# Patient Record
Sex: FEMALE | Race: WHITE | Employment: OTHER | URBAN - METROPOLITAN AREA
[De-identification: names, ages, dates, MRNs, and addresses within clinical notes are randomized per-mention and may not be internally consistent; named-entity substitution may affect disease eponyms.]

---

## 2018-01-01 ENCOUNTER — APPOINTMENT (OUTPATIENT)
Dept: ULTRASOUND IMAGING | Facility: CLINIC | Age: 63
DRG: 477 | End: 2018-01-01
Attending: STUDENT IN AN ORGANIZED HEALTH CARE EDUCATION/TRAINING PROGRAM
Payer: COMMERCIAL

## 2018-01-01 ENCOUNTER — APPOINTMENT (OUTPATIENT)
Dept: PHYSICAL THERAPY | Facility: CLINIC | Age: 63
DRG: 477 | End: 2018-01-01
Attending: INTERNAL MEDICINE
Payer: COMMERCIAL

## 2018-01-01 ENCOUNTER — APPOINTMENT (OUTPATIENT)
Dept: MRI IMAGING | Facility: CLINIC | Age: 63
DRG: 477 | End: 2018-01-01
Attending: INTERNAL MEDICINE
Payer: COMMERCIAL

## 2018-01-01 ENCOUNTER — APPOINTMENT (OUTPATIENT)
Dept: GENERAL RADIOLOGY | Facility: CLINIC | Age: 63
DRG: 477 | End: 2018-01-01
Attending: ORTHOPAEDIC SURGERY
Payer: COMMERCIAL

## 2018-01-01 ENCOUNTER — APPOINTMENT (OUTPATIENT)
Dept: GENERAL RADIOLOGY | Facility: CLINIC | Age: 63
DRG: 477 | End: 2018-01-01
Attending: INTERNAL MEDICINE
Payer: COMMERCIAL

## 2018-01-01 ENCOUNTER — PRE VISIT (OUTPATIENT)
Dept: ONCOLOGY | Facility: CLINIC | Age: 63
End: 2018-01-01

## 2018-01-01 ENCOUNTER — ONCOLOGY VISIT (OUTPATIENT)
Dept: ONCOLOGY | Facility: CLINIC | Age: 63
End: 2018-01-01
Payer: COMMERCIAL

## 2018-01-01 ENCOUNTER — APPOINTMENT (OUTPATIENT)
Dept: OCCUPATIONAL THERAPY | Facility: CLINIC | Age: 63
DRG: 871 | End: 2018-01-01
Attending: INTERNAL MEDICINE
Payer: COMMERCIAL

## 2018-01-01 ENCOUNTER — TRANSFERRED RECORDS (OUTPATIENT)
Dept: HEALTH INFORMATION MANAGEMENT | Facility: CLINIC | Age: 63
End: 2018-01-01

## 2018-01-01 ENCOUNTER — APPOINTMENT (OUTPATIENT)
Dept: GENERAL RADIOLOGY | Facility: CLINIC | Age: 63
DRG: 871 | End: 2018-01-01
Attending: PEDIATRICS
Payer: COMMERCIAL

## 2018-01-01 ENCOUNTER — APPOINTMENT (OUTPATIENT)
Dept: PHYSICAL THERAPY | Facility: CLINIC | Age: 63
DRG: 871 | End: 2018-01-01
Attending: INTERNAL MEDICINE
Payer: COMMERCIAL

## 2018-01-01 ENCOUNTER — ANESTHESIA (OUTPATIENT)
Dept: SURGERY | Facility: CLINIC | Age: 63
DRG: 477 | End: 2018-01-01
Payer: COMMERCIAL

## 2018-01-01 ENCOUNTER — APPOINTMENT (OUTPATIENT)
Dept: INTERVENTIONAL RADIOLOGY/VASCULAR | Facility: CLINIC | Age: 63
DRG: 477 | End: 2018-01-01
Attending: RADIOLOGY PRACTITIONER ASSISTANT
Payer: COMMERCIAL

## 2018-01-01 ENCOUNTER — APPOINTMENT (OUTPATIENT)
Dept: GENERAL RADIOLOGY | Facility: CLINIC | Age: 63
DRG: 871 | End: 2018-01-01
Attending: INTERNAL MEDICINE
Payer: COMMERCIAL

## 2018-01-01 ENCOUNTER — TEAM CONFERENCE (OUTPATIENT)
Dept: OTHER | Facility: CLINIC | Age: 63
End: 2018-01-01

## 2018-01-01 ENCOUNTER — APPOINTMENT (OUTPATIENT)
Dept: GENERAL RADIOLOGY | Facility: CLINIC | Age: 63
DRG: 477 | End: 2018-01-01
Attending: PHYSICIAN ASSISTANT
Payer: COMMERCIAL

## 2018-01-01 ENCOUNTER — APPOINTMENT (OUTPATIENT)
Dept: CT IMAGING | Facility: CLINIC | Age: 63
DRG: 477 | End: 2018-01-01
Attending: STUDENT IN AN ORGANIZED HEALTH CARE EDUCATION/TRAINING PROGRAM
Payer: COMMERCIAL

## 2018-01-01 ENCOUNTER — APPOINTMENT (OUTPATIENT)
Dept: PHYSICAL THERAPY | Facility: CLINIC | Age: 63
DRG: 871 | End: 2018-01-01
Attending: PEDIATRICS
Payer: COMMERCIAL

## 2018-01-01 ENCOUNTER — APPOINTMENT (OUTPATIENT)
Dept: CARDIOLOGY | Facility: CLINIC | Age: 63
DRG: 871 | End: 2018-01-01
Attending: PEDIATRICS
Payer: COMMERCIAL

## 2018-01-01 ENCOUNTER — HOSPITAL ENCOUNTER (INPATIENT)
Facility: CLINIC | Age: 63
LOS: 14 days | Discharge: NURSING, REHAB OR LONG-TERM CARE | DRG: 477 | End: 2018-11-08
Attending: INTERNAL MEDICINE | Admitting: INTERNAL MEDICINE
Payer: COMMERCIAL

## 2018-01-01 ENCOUNTER — APPOINTMENT (OUTPATIENT)
Dept: CT IMAGING | Facility: CLINIC | Age: 63
DRG: 871 | End: 2018-01-01
Attending: INTERNAL MEDICINE
Payer: COMMERCIAL

## 2018-01-01 ENCOUNTER — APPOINTMENT (OUTPATIENT)
Dept: CARDIOLOGY | Facility: CLINIC | Age: 63
DRG: 477 | End: 2018-01-01
Attending: INTERNAL MEDICINE
Payer: COMMERCIAL

## 2018-01-01 ENCOUNTER — HOSPITAL ENCOUNTER (INPATIENT)
Facility: CLINIC | Age: 63
LOS: 20 days | DRG: 871 | End: 2018-12-31
Attending: INTERNAL MEDICINE | Admitting: INTERNAL MEDICINE
Payer: COMMERCIAL

## 2018-01-01 ENCOUNTER — ANESTHESIA EVENT (OUTPATIENT)
Dept: SURGERY | Facility: CLINIC | Age: 63
DRG: 477 | End: 2018-01-01
Payer: COMMERCIAL

## 2018-01-01 VITALS
WEIGHT: 293 LBS | TEMPERATURE: 98.3 F | DIASTOLIC BLOOD PRESSURE: 76 MMHG | BODY MASS INDEX: 48.82 KG/M2 | HEART RATE: 86 BPM | OXYGEN SATURATION: 94 % | SYSTOLIC BLOOD PRESSURE: 146 MMHG | RESPIRATION RATE: 16 BRPM | HEIGHT: 65 IN

## 2018-01-01 VITALS
OXYGEN SATURATION: 93 % | SYSTOLIC BLOOD PRESSURE: 134 MMHG | DIASTOLIC BLOOD PRESSURE: 73 MMHG | BODY MASS INDEX: 47.09 KG/M2 | WEIGHT: 293 LBS | TEMPERATURE: 100.7 F | RESPIRATION RATE: 26 BRPM | HEIGHT: 66 IN | HEART RATE: 95 BPM

## 2018-01-01 DIAGNOSIS — S72.401D CLOSED FRACTURE OF DISTAL END OF RIGHT FEMUR WITH ROUTINE HEALING, UNSPECIFIED FRACTURE MORPHOLOGY, SUBSEQUENT ENCOUNTER: Primary | ICD-10-CM

## 2018-01-01 DIAGNOSIS — G47.33 OSA (OBSTRUCTIVE SLEEP APNEA): ICD-10-CM

## 2018-01-01 DIAGNOSIS — E83.39 HYPOPHOSPHATEMIA: ICD-10-CM

## 2018-01-01 LAB
1,25(OH)2D SERPL-MCNC: 27.2 PG/ML (ref 19.9–79.3)
ABO + RH BLD: NORMAL
ABO + RH BLD: NORMAL
ALBUMIN SERPL-MCNC: 1.9 G/DL (ref 3.4–5)
ALBUMIN SERPL-MCNC: 1.9 G/DL (ref 3.4–5)
ALBUMIN SERPL-MCNC: 2 G/DL (ref 3.4–5)
ALBUMIN SERPL-MCNC: 2.1 G/DL (ref 3.4–5)
ALBUMIN SERPL-MCNC: 2.2 G/DL (ref 3.4–5)
ALBUMIN SERPL-MCNC: 2.3 G/DL (ref 3.4–5)
ALBUMIN SERPL-MCNC: 2.4 G/DL (ref 3.4–5)
ALBUMIN SERPL-MCNC: 2.5 G/DL (ref 3.4–5)
ALBUMIN SERPL-MCNC: 2.6 G/DL (ref 3.4–5)
ALBUMIN UR QL: DETECTED
ALBUMIN UR-MCNC: 10 MG/DL
ALP SERPL-CCNC: 149 U/L (ref 40–150)
ALP SERPL-CCNC: 154 U/L (ref 40–150)
ALP SERPL-CCNC: 156 U/L (ref 40–150)
ALP SERPL-CCNC: 158 U/L (ref 40–150)
ALP SERPL-CCNC: 176 U/L (ref 40–150)
ALP SERPL-CCNC: 178 U/L (ref 40–150)
ALP SERPL-CCNC: 191 U/L (ref 40–150)
ALP SERPL-CCNC: 207 U/L (ref 40–150)
ALP SERPL-CCNC: 231 U/L (ref 40–150)
ALPHA1 GLOB 24H UR QL ELPH: DETECTED
ALPHA2 GLOB UR QL ELPH: DETECTED
ALT SERPL W P-5'-P-CCNC: 15 U/L (ref 0–50)
ALT SERPL W P-5'-P-CCNC: 16 U/L (ref 0–50)
ALT SERPL W P-5'-P-CCNC: 25 U/L (ref 0–50)
ALT SERPL W P-5'-P-CCNC: 26 U/L (ref 0–50)
ALT SERPL W P-5'-P-CCNC: 27 U/L (ref 0–50)
ALT SERPL W P-5'-P-CCNC: 34 U/L (ref 0–50)
AMMONIA PLAS-SCNC: 36 UMOL/L (ref 10–50)
ANION GAP SERPL CALCULATED.3IONS-SCNC: 10 MMOL/L (ref 3–14)
ANION GAP SERPL CALCULATED.3IONS-SCNC: 5 MMOL/L (ref 3–14)
ANION GAP SERPL CALCULATED.3IONS-SCNC: 6 MMOL/L (ref 3–14)
ANION GAP SERPL CALCULATED.3IONS-SCNC: 7 MMOL/L (ref 3–14)
ANION GAP SERPL CALCULATED.3IONS-SCNC: 8 MMOL/L (ref 3–14)
ANION GAP SERPL CALCULATED.3IONS-SCNC: 9 MMOL/L (ref 3–14)
ANISOCYTOSIS BLD QL SMEAR: ABNORMAL
ANISOCYTOSIS BLD QL SMEAR: SLIGHT
ANNOTATION COMMENT IMP: NORMAL
APPEARANCE UR: CLEAR
APTT PPP: 32 SEC (ref 22–37)
AST SERPL W P-5'-P-CCNC: 27 U/L (ref 0–45)
AST SERPL W P-5'-P-CCNC: 29 U/L (ref 0–45)
AST SERPL W P-5'-P-CCNC: 30 U/L (ref 0–45)
AST SERPL W P-5'-P-CCNC: 31 U/L (ref 0–45)
AST SERPL W P-5'-P-CCNC: 36 U/L (ref 0–45)
AST SERPL W P-5'-P-CCNC: 36 U/L (ref 0–45)
AST SERPL W P-5'-P-CCNC: 37 U/L (ref 0–45)
AST SERPL W P-5'-P-CCNC: 37 U/L (ref 0–45)
AST SERPL W P-5'-P-CCNC: 57 U/L (ref 0–45)
B-GLOBULIN UR QL ELPH: DETECTED
BACTERIA SPEC CULT: ABNORMAL
BACTERIA SPEC CULT: NO GROWTH
BACTERIA SPEC CULT: NORMAL
BASE EXCESS BLDA CALC-SCNC: 0.1 MMOL/L
BASE EXCESS BLDA CALC-SCNC: 0.8 MMOL/L
BASE EXCESS BLDV CALC-SCNC: 0.7 MMOL/L
BASE EXCESS BLDV CALC-SCNC: 1.6 MMOL/L
BASE EXCESS BLDV CALC-SCNC: 1.7 MMOL/L
BASE EXCESS BLDV CALC-SCNC: 2.8 MMOL/L
BASE EXCESS BLDV CALC-SCNC: 2.9 MMOL/L
BASE EXCESS BLDV CALC-SCNC: 2.9 MMOL/L
BASE EXCESS BLDV CALC-SCNC: 3.1 MMOL/L
BASOPHILS # BLD AUTO: 0 10E9/L (ref 0–0.2)
BASOPHILS NFR BLD AUTO: 0 %
BILIRUB DIRECT SERPL-MCNC: 0.1 MG/DL (ref 0–0.2)
BILIRUB DIRECT SERPL-MCNC: <0.1 MG/DL (ref 0–0.2)
BILIRUB SERPL-MCNC: 0.2 MG/DL (ref 0.2–1.3)
BILIRUB SERPL-MCNC: 0.3 MG/DL (ref 0.2–1.3)
BILIRUB SERPL-MCNC: 0.4 MG/DL (ref 0.2–1.3)
BILIRUB SERPL-MCNC: 0.4 MG/DL (ref 0.2–1.3)
BILIRUB SERPL-MCNC: 0.5 MG/DL (ref 0.2–1.3)
BILIRUB SERPL-MCNC: 0.5 MG/DL (ref 0.2–1.3)
BILIRUB UR QL STRIP: NEGATIVE
BLD GP AB SCN SERPL QL: NORMAL
BLOOD BANK CMNT PATIENT-IMP: NORMAL
BUN SERPL-MCNC: 10 MG/DL (ref 7–30)
BUN SERPL-MCNC: 10 MG/DL (ref 7–30)
BUN SERPL-MCNC: 11 MG/DL (ref 7–30)
BUN SERPL-MCNC: 12 MG/DL (ref 7–30)
BUN SERPL-MCNC: 12 MG/DL (ref 7–30)
BUN SERPL-MCNC: 14 MG/DL (ref 7–30)
BUN SERPL-MCNC: 15 MG/DL (ref 7–30)
BUN SERPL-MCNC: 17 MG/DL (ref 7–30)
BUN SERPL-MCNC: 18 MG/DL (ref 7–30)
BUN SERPL-MCNC: 19 MG/DL (ref 7–30)
BUN SERPL-MCNC: 19 MG/DL (ref 7–30)
BUN SERPL-MCNC: 20 MG/DL (ref 7–30)
BUN SERPL-MCNC: 21 MG/DL (ref 7–30)
BUN SERPL-MCNC: 26 MG/DL (ref 7–30)
BUN SERPL-MCNC: 4 MG/DL (ref 7–30)
BUN SERPL-MCNC: 5 MG/DL (ref 7–30)
BUN SERPL-MCNC: 6 MG/DL (ref 7–30)
BUN SERPL-MCNC: 7 MG/DL (ref 7–30)
BUN SERPL-MCNC: 7 MG/DL (ref 7–30)
BUN SERPL-MCNC: 8 MG/DL (ref 7–30)
BUN SERPL-MCNC: 9 MG/DL (ref 7–30)
C DIFF TOX B STL QL: NEGATIVE
C DIFF TOX B STL QL: NEGATIVE
CA-I BLD-MCNC: 7 MG/DL (ref 4.4–5.2)
CA-I BLD-MCNC: 7.1 MG/DL (ref 4.4–5.2)
CA-I BLD-MCNC: 7.3 MG/DL (ref 4.4–5.2)
CA-I BLD-MCNC: 7.6 MG/DL (ref 4.4–5.2)
CA-I BLD-MCNC: 7.6 MG/DL (ref 4.4–5.2)
CA-I BLD-MCNC: NORMAL MG/DL (ref 4.4–5.2)
CA-I SERPL ISE-MCNC: 4.9 MG/DL (ref 4.4–5.2)
CA-I SERPL ISE-MCNC: 5.1 MG/DL (ref 4.4–5.2)
CA-I SERPL ISE-MCNC: 5.2 MG/DL (ref 4.4–5.2)
CA-I SERPL ISE-MCNC: 5.3 MG/DL (ref 4.4–5.2)
CA-I SERPL ISE-MCNC: 5.4 MG/DL (ref 4.4–5.2)
CA-I SERPL ISE-MCNC: 5.4 MG/DL (ref 4.4–5.2)
CA-I SERPL ISE-MCNC: 5.8 MG/DL (ref 4.4–5.2)
CA-I SERPL ISE-MCNC: 5.8 MG/DL (ref 4.4–5.2)
CA-I SERPL ISE-MCNC: 6 MG/DL (ref 4.4–5.2)
CA-I SERPL ISE-MCNC: 6.2 MG/DL (ref 4.4–5.2)
CA-I SERPL ISE-MCNC: 6.3 MG/DL (ref 4.4–5.2)
CA-I SERPL ISE-MCNC: 6.5 MG/DL (ref 4.4–5.2)
CA-I SERPL ISE-MCNC: 6.6 MG/DL (ref 4.4–5.2)
CA-I SERPL ISE-MCNC: 6.7 MG/DL (ref 4.4–5.2)
CA-I SERPL ISE-MCNC: 6.8 MG/DL (ref 4.4–5.2)
CA-I SERPL ISE-MCNC: 7 MG/DL (ref 4.4–5.2)
CA-I SERPL ISE-MCNC: 7.1 MG/DL (ref 4.4–5.2)
CA-I SERPL ISE-MCNC: 7.1 MG/DL (ref 4.4–5.2)
CA-I SERPL ISE-MCNC: 7.3 MG/DL (ref 4.4–5.2)
CA-I SERPL ISE-MCNC: 7.3 MG/DL (ref 4.4–5.2)
CA-I SERPL ISE-MCNC: 7.6 MG/DL (ref 4.4–5.2)
CALCIUM SERPL-MCNC: 10.2 MG/DL (ref 8.5–10.1)
CALCIUM SERPL-MCNC: 10.2 MG/DL (ref 8.5–10.1)
CALCIUM SERPL-MCNC: 10.6 MG/DL (ref 8.5–10.1)
CALCIUM SERPL-MCNC: 10.8 MG/DL (ref 8.5–10.1)
CALCIUM SERPL-MCNC: 10.9 MG/DL (ref 8.5–10.1)
CALCIUM SERPL-MCNC: 11.6 MG/DL (ref 8.5–10.1)
CALCIUM SERPL-MCNC: 11.7 MG/DL (ref 8.5–10.1)
CALCIUM SERPL-MCNC: 11.7 MG/DL (ref 8.5–10.1)
CALCIUM SERPL-MCNC: 11.8 MG/DL (ref 8.5–10.1)
CALCIUM SERPL-MCNC: 11.8 MG/DL (ref 8.5–10.1)
CALCIUM SERPL-MCNC: 12.1 MG/DL (ref 8.5–10.1)
CALCIUM SERPL-MCNC: 12.2 MG/DL (ref 8.5–10.1)
CALCIUM SERPL-MCNC: 12.2 MG/DL (ref 8.5–10.1)
CALCIUM SERPL-MCNC: 12.3 MG/DL (ref 8.5–10.1)
CALCIUM SERPL-MCNC: 12.4 MG/DL (ref 8.5–10.1)
CALCIUM SERPL-MCNC: 12.5 MG/DL (ref 8.5–10.1)
CALCIUM SERPL-MCNC: 12.6 MG/DL (ref 8.5–10.1)
CALCIUM SERPL-MCNC: 12.7 MG/DL (ref 8.5–10.1)
CALCIUM SERPL-MCNC: 12.8 MG/DL (ref 8.5–10.1)
CALCIUM SERPL-MCNC: 12.8 MG/DL (ref 8.5–10.1)
CALCIUM SERPL-MCNC: 13 MG/DL (ref 8.5–10.1)
CALCIUM SERPL-MCNC: 13.1 MG/DL (ref 8.5–10.1)
CALCIUM SERPL-MCNC: 13.2 MG/DL (ref 8.5–10.1)
CALCIUM SERPL-MCNC: 13.2 MG/DL (ref 8.5–10.1)
CALCIUM SERPL-MCNC: 13.3 MG/DL (ref 8.5–10.1)
CALCIUM SERPL-MCNC: 8.3 MG/DL (ref 8.5–10.1)
CALCIUM SERPL-MCNC: 8.9 MG/DL (ref 8.5–10.1)
CALCIUM SERPL-MCNC: 9.3 MG/DL (ref 8.5–10.1)
CALCIUM SERPL-MCNC: 9.4 MG/DL (ref 8.5–10.1)
CALCIUM SERPL-MCNC: 9.5 MG/DL (ref 8.5–10.1)
CALCIUM SERPL-MCNC: 9.6 MG/DL (ref 8.5–10.1)
CALCIUM SERPL-MCNC: 9.7 MG/DL (ref 8.5–10.1)
CALCIUM SERPL-MCNC: 9.8 MG/DL (ref 8.5–10.1)
CALCIUM SERPL-MCNC: 9.9 MG/DL (ref 8.5–10.1)
CHLORIDE SERPL-SCNC: 100 MMOL/L (ref 94–109)
CHLORIDE SERPL-SCNC: 101 MMOL/L (ref 94–109)
CHLORIDE SERPL-SCNC: 102 MMOL/L (ref 94–109)
CHLORIDE SERPL-SCNC: 103 MMOL/L (ref 94–109)
CHLORIDE SERPL-SCNC: 104 MMOL/L (ref 94–109)
CHLORIDE SERPL-SCNC: 105 MMOL/L (ref 94–109)
CHLORIDE SERPL-SCNC: 105 MMOL/L (ref 94–109)
CHLORIDE SERPL-SCNC: 106 MMOL/L (ref 94–109)
CHLORIDE SERPL-SCNC: 95 MMOL/L (ref 94–109)
CHLORIDE SERPL-SCNC: 96 MMOL/L (ref 94–109)
CHLORIDE SERPL-SCNC: 96 MMOL/L (ref 94–109)
CHLORIDE SERPL-SCNC: 97 MMOL/L (ref 94–109)
CHLORIDE SERPL-SCNC: 97 MMOL/L (ref 94–109)
CHLORIDE SERPL-SCNC: 98 MMOL/L (ref 94–109)
CO2 SERPL-SCNC: 23 MMOL/L (ref 20–32)
CO2 SERPL-SCNC: 23 MMOL/L (ref 20–32)
CO2 SERPL-SCNC: 24 MMOL/L (ref 20–32)
CO2 SERPL-SCNC: 25 MMOL/L (ref 20–32)
CO2 SERPL-SCNC: 26 MMOL/L (ref 20–32)
CO2 SERPL-SCNC: 27 MMOL/L (ref 20–32)
CO2 SERPL-SCNC: 28 MMOL/L (ref 20–32)
CO2 SERPL-SCNC: 29 MMOL/L (ref 20–32)
CO2 SERPL-SCNC: 30 MMOL/L (ref 20–32)
CO2 SERPL-SCNC: 31 MMOL/L (ref 20–32)
CO2 SERPL-SCNC: 31 MMOL/L (ref 20–32)
CO2 SERPL-SCNC: 33 MMOL/L (ref 20–32)
COLLECT DURATION TIME SPEC: ABNORMAL H
COLOR UR AUTO: YELLOW
COPATH REPORT: NORMAL
CREAT SERPL-MCNC: 0.3 MG/DL (ref 0.52–1.04)
CREAT SERPL-MCNC: 0.32 MG/DL (ref 0.52–1.04)
CREAT SERPL-MCNC: 0.36 MG/DL (ref 0.52–1.04)
CREAT SERPL-MCNC: 0.38 MG/DL (ref 0.52–1.04)
CREAT SERPL-MCNC: 0.39 MG/DL (ref 0.52–1.04)
CREAT SERPL-MCNC: 0.4 MG/DL (ref 0.52–1.04)
CREAT SERPL-MCNC: 0.42 MG/DL (ref 0.52–1.04)
CREAT SERPL-MCNC: 0.42 MG/DL (ref 0.52–1.04)
CREAT SERPL-MCNC: 0.44 MG/DL (ref 0.52–1.04)
CREAT SERPL-MCNC: 0.46 MG/DL (ref 0.52–1.04)
CREAT SERPL-MCNC: 0.49 MG/DL (ref 0.52–1.04)
CREAT SERPL-MCNC: 0.52 MG/DL (ref 0.52–1.04)
CREAT SERPL-MCNC: 0.52 MG/DL (ref 0.52–1.04)
CREAT SERPL-MCNC: 0.53 MG/DL (ref 0.52–1.04)
CREAT SERPL-MCNC: 0.53 MG/DL (ref 0.52–1.04)
CREAT SERPL-MCNC: 0.56 MG/DL (ref 0.52–1.04)
CREAT SERPL-MCNC: 0.56 MG/DL (ref 0.52–1.04)
CREAT SERPL-MCNC: 0.57 MG/DL (ref 0.52–1.04)
CREAT SERPL-MCNC: 0.62 MG/DL (ref 0.52–1.04)
CREAT SERPL-MCNC: 0.7 MG/DL (ref 0.52–1.04)
CREAT SERPL-MCNC: 0.71 MG/DL (ref 0.52–1.04)
CREAT SERPL-MCNC: 0.76 MG/DL (ref 0.52–1.04)
CREAT SERPL-MCNC: 0.79 MG/DL (ref 0.52–1.04)
CREAT SERPL-MCNC: 0.83 MG/DL (ref 0.52–1.04)
CREAT SERPL-MCNC: 0.87 MG/DL (ref 0.52–1.04)
CREAT SERPL-MCNC: 1.09 MG/DL (ref 0.57–1.11)
CREAT UR-MCNC: 18 MG/DL
CRP SERPL-MCNC: 140 MG/L (ref 0–8)
CRP SERPL-MCNC: 82 MG/L (ref 0–8)
DEPRECATED CALCIDIOL+CALCIFEROL SERPL-MC: <11 UG/L (ref 20–75)
DIFFERENTIAL METHOD BLD: ABNORMAL
EOSINOPHIL # BLD AUTO: 0 10E9/L (ref 0–0.7)
EOSINOPHIL # BLD AUTO: 0 10E9/L (ref 0–0.7)
EOSINOPHIL # BLD AUTO: 0.3 10E9/L (ref 0–0.7)
EOSINOPHIL NFR BLD AUTO: 0 %
EOSINOPHIL NFR BLD AUTO: 0 %
EOSINOPHIL NFR BLD AUTO: 2 %
ERYTHROCYTE [DISTWIDTH] IN BLOOD BY AUTOMATED COUNT: 12.6 % (ref 10–15)
ERYTHROCYTE [DISTWIDTH] IN BLOOD BY AUTOMATED COUNT: 12.7 % (ref 10–15)
ERYTHROCYTE [DISTWIDTH] IN BLOOD BY AUTOMATED COUNT: 12.8 % (ref 10–15)
ERYTHROCYTE [DISTWIDTH] IN BLOOD BY AUTOMATED COUNT: 13.1 % (ref 10–15)
ERYTHROCYTE [DISTWIDTH] IN BLOOD BY AUTOMATED COUNT: 13.1 % (ref 10–15)
ERYTHROCYTE [DISTWIDTH] IN BLOOD BY AUTOMATED COUNT: 13.2 % (ref 10–15)
ERYTHROCYTE [DISTWIDTH] IN BLOOD BY AUTOMATED COUNT: 13.3 % (ref 10–15)
ERYTHROCYTE [DISTWIDTH] IN BLOOD BY AUTOMATED COUNT: 13.3 % (ref 10–15)
ERYTHROCYTE [DISTWIDTH] IN BLOOD BY AUTOMATED COUNT: 13.5 % (ref 10–15)
ERYTHROCYTE [DISTWIDTH] IN BLOOD BY AUTOMATED COUNT: 17.8 % (ref 10–15)
ERYTHROCYTE [DISTWIDTH] IN BLOOD BY AUTOMATED COUNT: 17.8 % (ref 10–15)
ERYTHROCYTE [DISTWIDTH] IN BLOOD BY AUTOMATED COUNT: 17.9 % (ref 10–15)
ERYTHROCYTE [DISTWIDTH] IN BLOOD BY AUTOMATED COUNT: 18 % (ref 10–15)
ERYTHROCYTE [DISTWIDTH] IN BLOOD BY AUTOMATED COUNT: 18.2 % (ref 10–15)
ERYTHROCYTE [DISTWIDTH] IN BLOOD BY AUTOMATED COUNT: 18.2 % (ref 10–15)
ERYTHROCYTE [DISTWIDTH] IN BLOOD BY AUTOMATED COUNT: 18.3 % (ref 10–15)
ERYTHROCYTE [DISTWIDTH] IN BLOOD BY AUTOMATED COUNT: 18.4 % (ref 10–15)
ERYTHROCYTE [DISTWIDTH] IN BLOOD BY AUTOMATED COUNT: 18.7 % (ref 10–15)
ERYTHROCYTE [DISTWIDTH] IN BLOOD BY AUTOMATED COUNT: 19 % (ref 10–15)
FOLATE SERPL-MCNC: 3.9 NG/ML
GAMMA GLOB UR QL ELPH: DETECTED
GFR SERPL CREATININE-BSD FRML MDRD: 51 ML/MIN/1.73M2
GFR SERPL CREATININE-BSD FRML MDRD: 66 ML/MIN/1.7M2
GFR SERPL CREATININE-BSD FRML MDRD: 70 ML/MIN/1.7M2
GFR SERPL CREATININE-BSD FRML MDRD: 74 ML/MIN/1.7M2
GFR SERPL CREATININE-BSD FRML MDRD: 76 ML/MIN/1.7M2
GFR SERPL CREATININE-BSD FRML MDRD: 83 ML/MIN/1.7M2
GFR SERPL CREATININE-BSD FRML MDRD: 84 ML/MIN/1.7M2
GFR SERPL CREATININE-BSD FRML MDRD: >90 ML/MIN/1.7M2
GFR SERPL CREATININE-BSD FRML MDRD: >90 ML/MIN/{1.73_M2}
GLUCOSE BLDC GLUCOMTR-MCNC: 100 MG/DL (ref 70–99)
GLUCOSE BLDC GLUCOMTR-MCNC: 100 MG/DL (ref 70–99)
GLUCOSE BLDC GLUCOMTR-MCNC: 101 MG/DL (ref 70–99)
GLUCOSE BLDC GLUCOMTR-MCNC: 102 MG/DL (ref 70–99)
GLUCOSE BLDC GLUCOMTR-MCNC: 103 MG/DL (ref 70–99)
GLUCOSE BLDC GLUCOMTR-MCNC: 103 MG/DL (ref 70–99)
GLUCOSE BLDC GLUCOMTR-MCNC: 105 MG/DL (ref 70–99)
GLUCOSE BLDC GLUCOMTR-MCNC: 105 MG/DL (ref 70–99)
GLUCOSE BLDC GLUCOMTR-MCNC: 107 MG/DL (ref 70–99)
GLUCOSE BLDC GLUCOMTR-MCNC: 108 MG/DL (ref 70–99)
GLUCOSE BLDC GLUCOMTR-MCNC: 109 MG/DL (ref 70–99)
GLUCOSE BLDC GLUCOMTR-MCNC: 109 MG/DL (ref 70–99)
GLUCOSE BLDC GLUCOMTR-MCNC: 110 MG/DL (ref 70–99)
GLUCOSE BLDC GLUCOMTR-MCNC: 111 MG/DL (ref 70–99)
GLUCOSE BLDC GLUCOMTR-MCNC: 118 MG/DL (ref 70–99)
GLUCOSE BLDC GLUCOMTR-MCNC: 118 MG/DL (ref 70–99)
GLUCOSE BLDC GLUCOMTR-MCNC: 119 MG/DL (ref 70–99)
GLUCOSE BLDC GLUCOMTR-MCNC: 122 MG/DL (ref 70–99)
GLUCOSE BLDC GLUCOMTR-MCNC: 125 MG/DL (ref 70–99)
GLUCOSE BLDC GLUCOMTR-MCNC: 126 MG/DL (ref 70–99)
GLUCOSE BLDC GLUCOMTR-MCNC: 127 MG/DL (ref 70–99)
GLUCOSE BLDC GLUCOMTR-MCNC: 128 MG/DL (ref 70–99)
GLUCOSE BLDC GLUCOMTR-MCNC: 131 MG/DL (ref 70–99)
GLUCOSE BLDC GLUCOMTR-MCNC: 131 MG/DL (ref 70–99)
GLUCOSE BLDC GLUCOMTR-MCNC: 143 MG/DL (ref 70–99)
GLUCOSE BLDC GLUCOMTR-MCNC: 89 MG/DL (ref 70–99)
GLUCOSE BLDC GLUCOMTR-MCNC: 92 MG/DL (ref 70–99)
GLUCOSE BLDC GLUCOMTR-MCNC: 94 MG/DL (ref 70–99)
GLUCOSE BLDC GLUCOMTR-MCNC: 97 MG/DL (ref 70–99)
GLUCOSE BLDC GLUCOMTR-MCNC: 98 MG/DL (ref 70–99)
GLUCOSE BLDC GLUCOMTR-MCNC: 99 MG/DL (ref 70–99)
GLUCOSE SERPL-MCNC: 100 MG/DL (ref 70–99)
GLUCOSE SERPL-MCNC: 100 MG/DL (ref 70–99)
GLUCOSE SERPL-MCNC: 102 MG/DL (ref 70–99)
GLUCOSE SERPL-MCNC: 104 MG/DL (ref 70–99)
GLUCOSE SERPL-MCNC: 104 MG/DL (ref 70–99)
GLUCOSE SERPL-MCNC: 105 MG/DL (ref 70–99)
GLUCOSE SERPL-MCNC: 109 MG/DL (ref 70–99)
GLUCOSE SERPL-MCNC: 113 MG/DL (ref 70–99)
GLUCOSE SERPL-MCNC: 114 MG/DL (ref 70–99)
GLUCOSE SERPL-MCNC: 114 MG/DL (ref 70–99)
GLUCOSE SERPL-MCNC: 115 MG/DL (ref 70–99)
GLUCOSE SERPL-MCNC: 116 MG/DL (ref 70–99)
GLUCOSE SERPL-MCNC: 116 MG/DL (ref 70–99)
GLUCOSE SERPL-MCNC: 118 MG/DL (ref 70–100)
GLUCOSE SERPL-MCNC: 120 MG/DL (ref 70–99)
GLUCOSE SERPL-MCNC: 121 MG/DL (ref 70–99)
GLUCOSE SERPL-MCNC: 123 MG/DL (ref 70–99)
GLUCOSE SERPL-MCNC: 123 MG/DL (ref 70–99)
GLUCOSE SERPL-MCNC: 126 MG/DL (ref 70–99)
GLUCOSE SERPL-MCNC: 128 MG/DL (ref 70–99)
GLUCOSE SERPL-MCNC: 128 MG/DL (ref 70–99)
GLUCOSE SERPL-MCNC: 129 MG/DL (ref 70–99)
GLUCOSE SERPL-MCNC: 129 MG/DL (ref 70–99)
GLUCOSE SERPL-MCNC: 90 MG/DL (ref 70–99)
GLUCOSE SERPL-MCNC: 91 MG/DL (ref 70–99)
GLUCOSE SERPL-MCNC: 91 MG/DL (ref 70–99)
GLUCOSE SERPL-MCNC: 99 MG/DL (ref 70–99)
GLUCOSE UR STRIP-MCNC: NEGATIVE MG/DL
HBA1C MFR BLD: 5.6 % (ref 0–5.6)
HCO3 BLD-SCNC: 27 MMOL/L (ref 21–28)
HCO3 BLD-SCNC: 27 MMOL/L (ref 21–28)
HCO3 BLDV-SCNC: 27 MMOL/L (ref 21–28)
HCO3 BLDV-SCNC: 28 MMOL/L (ref 21–28)
HCO3 BLDV-SCNC: 28 MMOL/L (ref 21–28)
HCO3 BLDV-SCNC: 29 MMOL/L (ref 21–28)
HCO3 BLDV-SCNC: 30 MMOL/L (ref 21–28)
HCO3 BLDV-SCNC: 30 MMOL/L (ref 21–28)
HCO3 BLDV-SCNC: 31 MMOL/L (ref 21–28)
HCT VFR BLD AUTO: 27.8 % (ref 35–47)
HCT VFR BLD AUTO: 29.4 % (ref 35–47)
HCT VFR BLD AUTO: 30.2 % (ref 35–47)
HCT VFR BLD AUTO: 31 % (ref 35–47)
HCT VFR BLD AUTO: 31.3 % (ref 35–47)
HCT VFR BLD AUTO: 32.1 % (ref 35–47)
HCT VFR BLD AUTO: 32.4 % (ref 35–47)
HCT VFR BLD AUTO: 32.5 % (ref 35–47)
HCT VFR BLD AUTO: 32.5 % (ref 35–47)
HCT VFR BLD AUTO: 32.7 % (ref 35–47)
HCT VFR BLD AUTO: 32.8 % (ref 35–47)
HCT VFR BLD AUTO: 33 % (ref 35–47)
HCT VFR BLD AUTO: 33.2 % (ref 35–47)
HCT VFR BLD AUTO: 33.5 % (ref 35–47)
HCT VFR BLD AUTO: 33.5 % (ref 35–47)
HCT VFR BLD AUTO: 33.6 % (ref 35–47)
HCT VFR BLD AUTO: 34.1 % (ref 35–47)
HGB BLD-MCNC: 10.4 G/DL (ref 11.7–15.7)
HGB BLD-MCNC: 10.6 G/DL (ref 11.7–15.7)
HGB BLD-MCNC: 8.2 G/DL (ref 11.7–15.7)
HGB BLD-MCNC: 8.3 G/DL (ref 11.7–15.7)
HGB BLD-MCNC: 8.5 G/DL (ref 11.7–15.7)
HGB BLD-MCNC: 8.8 G/DL (ref 11.7–15.7)
HGB BLD-MCNC: 8.9 G/DL (ref 11.7–15.7)
HGB BLD-MCNC: 8.9 G/DL (ref 11.7–15.7)
HGB BLD-MCNC: 9 G/DL (ref 11.7–15.7)
HGB BLD-MCNC: 9.1 G/DL (ref 11.7–15.7)
HGB BLD-MCNC: 9.3 G/DL (ref 11.7–15.7)
HGB BLD-MCNC: 9.4 G/DL (ref 11.7–15.7)
HGB BLD-MCNC: 9.5 G/DL (ref 11.7–15.7)
HGB BLD-MCNC: 9.6 G/DL (ref 11.7–15.7)
HGB BLD-MCNC: 9.6 G/DL (ref 11.7–15.7)
HGB UR QL STRIP: ABNORMAL
HYALINE CASTS #/AREA URNS LPF: 15 /LPF (ref 0–2)
HYPOCHROMIA BLD QL: PRESENT
INR PPP: 1.32 (ref 0.86–1.14)
INTERPRETATION ECG - MUSE: NORMAL
INTERPRETATION UR IFE-IMP: ABNORMAL
KAPPA LC FREE 24H UR-MRATE: ABNORMAL MG/D
KAPPA LC FREE UR-MCNC: 13.9 MG/DL (ref 0.14–2.42)
KAPPA LC FREE/LAMBDA FREE UR: 10.61 RATIO (ref 2.04–10.37)
KETONES UR STRIP-MCNC: NEGATIVE MG/DL
LACTATE BLD-SCNC: 0.6 MMOL/L (ref 0.7–2)
LACTATE BLD-SCNC: 0.7 MMOL/L (ref 0.7–2)
LACTATE BLD-SCNC: 0.7 MMOL/L (ref 0.7–2)
LACTATE BLD-SCNC: 0.8 MMOL/L (ref 0.7–2)
LACTATE BLD-SCNC: 0.8 MMOL/L (ref 0.7–2)
LACTATE BLD-SCNC: 0.9 MMOL/L (ref 0.7–2)
LACTATE BLD-SCNC: 1 MMOL/L (ref 0.7–2)
LACTATE BLD-SCNC: 1.3 MMOL/L (ref 0.7–2)
LACTATE BLD-SCNC: 1.3 MMOL/L (ref 0.7–2)
LACTATE BLD-SCNC: 1.4 MMOL/L (ref 0.7–2)
LACTATE SERPL-SCNC: 0.7 MMOL/L (ref 0.4–2)
LAMBDA LC FREE 24H UR-MRATE: ABNORMAL MG/D
LAMBDA LC FREE UR-MCNC: 1.31 MG/DL (ref 0.02–0.67)
LEUKOCYTE ESTERASE UR QL STRIP: NEGATIVE
LIPASE SERPL-CCNC: 291 U/L (ref 73–393)
LIPASE SERPL-CCNC: 994 U/L (ref 73–393)
LYMPHOCYTES # BLD AUTO: 0.7 10E9/L (ref 0.8–5.3)
LYMPHOCYTES # BLD AUTO: 0.8 10E9/L (ref 0.8–5.3)
LYMPHOCYTES # BLD AUTO: 0.9 10E9/L (ref 0.8–5.3)
LYMPHOCYTES NFR BLD AUTO: 4.4 %
LYMPHOCYTES NFR BLD AUTO: 5 %
LYMPHOCYTES NFR BLD AUTO: 5.2 %
Lab: ABNORMAL
Lab: NORMAL
MACROCYTES BLD QL SMEAR: PRESENT
MACROCYTES BLD QL SMEAR: PRESENT
MAGNESIUM SERPL-MCNC: 1.4 MG/DL (ref 1.6–2.3)
MAGNESIUM SERPL-MCNC: 1.5 MG/DL (ref 1.6–2.3)
MAGNESIUM SERPL-MCNC: 1.5 MG/DL (ref 1.6–2.3)
MAGNESIUM SERPL-MCNC: 1.6 MG/DL (ref 1.6–2.3)
MAGNESIUM SERPL-MCNC: 1.7 MG/DL (ref 1.6–2.3)
MAGNESIUM SERPL-MCNC: 1.7 MG/DL (ref 1.6–2.3)
MAGNESIUM SERPL-MCNC: 1.8 MG/DL (ref 1.6–2.3)
MAGNESIUM SERPL-MCNC: 1.9 MG/DL (ref 1.6–2.3)
MAGNESIUM SERPL-MCNC: 2 MG/DL (ref 1.6–2.3)
MAGNESIUM SERPL-MCNC: 2.1 MG/DL (ref 1.6–2.3)
MAGNESIUM SERPL-MCNC: 2.2 MG/DL (ref 1.6–2.3)
MAGNESIUM SERPL-MCNC: 2.2 MG/DL (ref 1.6–2.3)
MAGNESIUM SERPL-MCNC: 2.3 MG/DL (ref 1.6–2.3)
MCH RBC QN AUTO: 26.4 PG (ref 26.5–33)
MCH RBC QN AUTO: 26.4 PG (ref 26.5–33)
MCH RBC QN AUTO: 26.5 PG (ref 26.5–33)
MCH RBC QN AUTO: 26.6 PG (ref 26.5–33)
MCH RBC QN AUTO: 26.7 PG (ref 26.5–33)
MCH RBC QN AUTO: 26.7 PG (ref 26.5–33)
MCH RBC QN AUTO: 26.8 PG (ref 26.5–33)
MCH RBC QN AUTO: 27.3 PG (ref 26.5–33)
MCH RBC QN AUTO: 31.3 PG (ref 26.5–33)
MCH RBC QN AUTO: 31.8 PG (ref 26.5–33)
MCH RBC QN AUTO: 31.9 PG (ref 26.5–33)
MCH RBC QN AUTO: 32 PG (ref 26.5–33)
MCH RBC QN AUTO: 32.1 PG (ref 26.5–33)
MCH RBC QN AUTO: 32.1 PG (ref 26.5–33)
MCH RBC QN AUTO: 32.2 PG (ref 26.5–33)
MCH RBC QN AUTO: 32.2 PG (ref 26.5–33)
MCH RBC QN AUTO: 33.1 PG (ref 26.5–33)
MCHC RBC AUTO-ENTMCNC: 27.1 G/DL (ref 31.5–36.5)
MCHC RBC AUTO-ENTMCNC: 27.2 G/DL (ref 31.5–36.5)
MCHC RBC AUTO-ENTMCNC: 27.2 G/DL (ref 31.5–36.5)
MCHC RBC AUTO-ENTMCNC: 27.4 G/DL (ref 31.5–36.5)
MCHC RBC AUTO-ENTMCNC: 27.6 G/DL (ref 31.5–36.5)
MCHC RBC AUTO-ENTMCNC: 27.7 G/DL (ref 31.5–36.5)
MCHC RBC AUTO-ENTMCNC: 27.8 G/DL (ref 31.5–36.5)
MCHC RBC AUTO-ENTMCNC: 28.1 G/DL (ref 31.5–36.5)
MCHC RBC AUTO-ENTMCNC: 28.2 G/DL (ref 31.5–36.5)
MCHC RBC AUTO-ENTMCNC: 28.4 G/DL (ref 31.5–36.5)
MCHC RBC AUTO-ENTMCNC: 28.6 G/DL (ref 31.5–36.5)
MCHC RBC AUTO-ENTMCNC: 29.1 G/DL (ref 31.5–36.5)
MCHC RBC AUTO-ENTMCNC: 29.3 G/DL (ref 31.5–36.5)
MCHC RBC AUTO-ENTMCNC: 29.5 G/DL (ref 31.5–36.5)
MCHC RBC AUTO-ENTMCNC: 29.9 G/DL (ref 31.5–36.5)
MCHC RBC AUTO-ENTMCNC: 30.5 G/DL (ref 31.5–36.5)
MCHC RBC AUTO-ENTMCNC: 31.5 G/DL (ref 31.5–36.5)
MCV RBC AUTO: 105 FL (ref 78–100)
MCV RBC AUTO: 105 FL (ref 78–100)
MCV RBC AUTO: 107 FL (ref 78–100)
MCV RBC AUTO: 109 FL (ref 78–100)
MCV RBC AUTO: 110 FL (ref 78–100)
MCV RBC AUTO: 111 FL (ref 78–100)
MCV RBC AUTO: 111 FL (ref 78–100)
MCV RBC AUTO: 112 FL (ref 78–100)
MCV RBC AUTO: 112 FL (ref 78–100)
MCV RBC AUTO: 95 FL (ref 78–100)
MCV RBC AUTO: 95 FL (ref 78–100)
MCV RBC AUTO: 96 FL (ref 78–100)
MCV RBC AUTO: 96 FL (ref 78–100)
MCV RBC AUTO: 97 FL (ref 78–100)
MCV RBC AUTO: 98 FL (ref 78–100)
MCV RBC AUTO: 98 FL (ref 78–100)
MONOCYTES # BLD AUTO: 0.6 10E9/L (ref 0–1.3)
MONOCYTES # BLD AUTO: 0.8 10E9/L (ref 0–1.3)
MONOCYTES # BLD AUTO: 1.1 10E9/L (ref 0–1.3)
MONOCYTES NFR BLD AUTO: 3.5 %
MONOCYTES NFR BLD AUTO: 5 %
MONOCYTES NFR BLD AUTO: 7 %
MRSA DNA SPEC QL NAA+PROBE: NEGATIVE
MUCOUS THREADS #/AREA URNS LPF: PRESENT /LPF
MYELOCYTES # BLD: 0.3 10E9/L
MYELOCYTES # BLD: 0.3 10E9/L
MYELOCYTES NFR BLD MANUAL: 1.8 %
MYELOCYTES NFR BLD MANUAL: 2 %
NEUTROPHILS # BLD AUTO: 13.1 10E9/L (ref 1.6–8.3)
NEUTROPHILS # BLD AUTO: 14 10E9/L (ref 1.6–8.3)
NEUTROPHILS # BLD AUTO: 15.5 10E9/L (ref 1.6–8.3)
NEUTROPHILS NFR BLD AUTO: 86 %
NEUTROPHILS NFR BLD AUTO: 86.8 %
NEUTROPHILS NFR BLD AUTO: 91.3 %
NITRATE UR QL: NEGATIVE
NRBC # BLD AUTO: 0.2 10*3/UL
NRBC BLD AUTO-RTO: 1 /100
NT-PROBNP SERPL-MCNC: 1937 PG/ML (ref 0–900)
O2/TOTAL GAS SETTING VFR VENT: 21 %
O2/TOTAL GAS SETTING VFR VENT: 21 %
O2/TOTAL GAS SETTING VFR VENT: 30 %
O2/TOTAL GAS SETTING VFR VENT: 4 %
O2/TOTAL GAS SETTING VFR VENT: 50 %
O2/TOTAL GAS SETTING VFR VENT: 50 %
O2/TOTAL GAS SETTING VFR VENT: 9 %
O2/TOTAL GAS SETTING VFR VENT: ABNORMAL %
OXYHGB MFR BLDV: 92 %
PCO2 BLD: 53 MM HG (ref 35–45)
PCO2 BLD: 55 MM HG (ref 35–45)
PCO2 BLDV: 51 MM HG (ref 40–50)
PCO2 BLDV: 54 MM HG (ref 40–50)
PCO2 BLDV: 55 MM HG (ref 40–50)
PCO2 BLDV: 56 MM HG (ref 40–50)
PCO2 BLDV: 59 MM HG (ref 40–50)
PCO2 BLDV: 61 MM HG (ref 40–50)
PCO2 BLDV: 62 MM HG (ref 40–50)
PCO2 BLDV: 63 MM HG (ref 40–50)
PCO2 BLDV: 66 MM HG (ref 40–50)
PH BLD: 7.3 PH (ref 7.35–7.45)
PH BLD: 7.32 PH (ref 7.35–7.45)
PH BLDV: 7.28 PH (ref 7.32–7.43)
PH BLDV: 7.29 PH (ref 7.32–7.43)
PH BLDV: 7.3 PH (ref 7.32–7.43)
PH BLDV: 7.3 PH (ref 7.32–7.43)
PH BLDV: 7.32 PH (ref 7.32–7.43)
PH BLDV: 7.33 PH (ref 7.32–7.43)
PH BLDV: 7.35 PH (ref 7.32–7.43)
PH UR STRIP: 5 PH (ref 5–7)
PHOSPHATE SERPL-MCNC: 1.5 MG/DL (ref 2.5–4.5)
PHOSPHATE SERPL-MCNC: 1.5 MG/DL (ref 2.5–4.5)
PHOSPHATE SERPL-MCNC: 1.6 MG/DL (ref 2.5–4.5)
PHOSPHATE SERPL-MCNC: 1.6 MG/DL (ref 2.5–4.5)
PHOSPHATE SERPL-MCNC: 1.7 MG/DL (ref 2.5–4.5)
PHOSPHATE SERPL-MCNC: 1.7 MG/DL (ref 2.5–4.5)
PHOSPHATE SERPL-MCNC: 1.8 MG/DL (ref 2.5–4.5)
PHOSPHATE SERPL-MCNC: 1.8 MG/DL (ref 2.5–4.5)
PHOSPHATE SERPL-MCNC: 1.9 MG/DL (ref 2.5–4.5)
PHOSPHATE SERPL-MCNC: 2 MG/DL (ref 2.5–4.5)
PHOSPHATE SERPL-MCNC: 2 MG/DL (ref 2.5–4.5)
PHOSPHATE SERPL-MCNC: 2.1 MG/DL (ref 2.5–4.5)
PHOSPHATE SERPL-MCNC: 2.2 MG/DL (ref 2.5–4.5)
PHOSPHATE SERPL-MCNC: 2.2 MG/DL (ref 2.5–4.5)
PHOSPHATE SERPL-MCNC: 2.3 MG/DL (ref 2.5–4.5)
PHOSPHATE SERPL-MCNC: 2.4 MG/DL (ref 2.5–4.5)
PHOSPHATE SERPL-MCNC: 2.5 MG/DL (ref 2.5–4.5)
PHOSPHATE SERPL-MCNC: 2.6 MG/DL (ref 2.5–4.5)
PHOSPHATE SERPL-MCNC: 2.6 MG/DL (ref 2.5–4.5)
PLATELET # BLD AUTO: 197 10E9/L (ref 150–450)
PLATELET # BLD AUTO: 219 10E9/L (ref 150–450)
PLATELET # BLD AUTO: 226 10E9/L (ref 150–450)
PLATELET # BLD AUTO: 227 10E9/L (ref 150–450)
PLATELET # BLD AUTO: 227 10E9/L (ref 150–450)
PLATELET # BLD AUTO: 228 10E9/L (ref 150–450)
PLATELET # BLD AUTO: 231 10E9/L (ref 150–450)
PLATELET # BLD AUTO: 233 10E9/L (ref 150–450)
PLATELET # BLD AUTO: 240 10E9/L (ref 150–450)
PLATELET # BLD AUTO: 246 10E9/L (ref 150–450)
PLATELET # BLD AUTO: 248 10E9/L (ref 150–450)
PLATELET # BLD AUTO: 250 10E9/L (ref 150–450)
PLATELET # BLD AUTO: 260 10E9/L (ref 150–450)
PLATELET # BLD AUTO: 266 10E9/L (ref 150–450)
PLATELET # BLD AUTO: 267 10E9/L (ref 150–450)
PLATELET # BLD AUTO: 298 10E9/L (ref 150–450)
PLATELET # BLD AUTO: 303 10E9/L (ref 150–450)
PLATELET # BLD AUTO: 308 10E9/L (ref 150–450)
PLATELET # BLD AUTO: 312 10E9/L (ref 150–450)
PLATELET # BLD AUTO: 316 10E9/L (ref 150–450)
PLATELET # BLD AUTO: 319 10E9/L (ref 150–450)
PLATELET # BLD AUTO: NORMAL 10E9/L (ref 150–450)
PLATELET # BLD EST: ABNORMAL 10*3/UL
PLATELET # BLD EST: NORMAL 10*3/UL
PO2 BLD: 168 MM HG (ref 80–105)
PO2 BLD: 170 MM HG (ref 80–105)
PO2 BLDV: 104 MM HG (ref 25–47)
PO2 BLDV: 67 MM HG (ref 25–47)
PO2 BLDV: 68 MM HG (ref 25–47)
PO2 BLDV: 69 MM HG (ref 25–47)
PO2 BLDV: 76 MM HG (ref 25–47)
PO2 BLDV: 76 MM HG (ref 25–47)
PO2 BLDV: 90 MM HG (ref 25–47)
PO2 BLDV: 94 MM HG (ref 25–47)
PO2 BLDV: 97 MM HG (ref 25–47)
POIKILOCYTOSIS BLD QL SMEAR: SLIGHT
POTASSIUM SERPL-SCNC: 3 MMOL/L (ref 3.4–5.3)
POTASSIUM SERPL-SCNC: 3.2 MMOL/L (ref 3.4–5.3)
POTASSIUM SERPL-SCNC: 3.3 MMOL/L (ref 3.4–5.3)
POTASSIUM SERPL-SCNC: 3.4 MMOL/L (ref 3.4–5.3)
POTASSIUM SERPL-SCNC: 3.4 MMOL/L (ref 3.4–5.3)
POTASSIUM SERPL-SCNC: 3.5 MMOL/L (ref 3.4–5.3)
POTASSIUM SERPL-SCNC: 3.6 MMOL/L (ref 3.4–5.3)
POTASSIUM SERPL-SCNC: 3.7 MMOL/L (ref 3.4–5.3)
POTASSIUM SERPL-SCNC: 3.7 MMOL/L (ref 3.4–5.3)
POTASSIUM SERPL-SCNC: 3.8 MMOL/L (ref 3.4–5.3)
POTASSIUM SERPL-SCNC: 4 MMOL/L (ref 3.4–5.3)
POTASSIUM SERPL-SCNC: 4.1 MMOL/L (ref 3.4–5.3)
POTASSIUM SERPL-SCNC: 4.1 MMOL/L (ref 3.4–5.3)
POTASSIUM SERPL-SCNC: 4.4 MMOL/L (ref 3.4–5.3)
POTASSIUM SERPL-SCNC: 4.4 MMOL/L (ref 3.5–5.1)
POTASSIUM SERPL-SCNC: 4.5 MMOL/L (ref 3.4–5.3)
POTASSIUM SERPL-SCNC: 4.5 MMOL/L (ref 3.4–5.3)
POTASSIUM SERPL-SCNC: 4.6 MMOL/L (ref 3.4–5.3)
POTASSIUM SERPL-SCNC: 4.7 MMOL/L (ref 3.4–5.3)
POTASSIUM SERPL-SCNC: 4.9 MMOL/L (ref 3.4–5.3)
POTASSIUM SERPL-SCNC: 5 MMOL/L (ref 3.4–5.3)
POTASSIUM SERPL-SCNC: 5.1 MMOL/L (ref 3.4–5.3)
PROCALCITONIN SERPL-MCNC: 0.13 NG/ML
PROCALCITONIN SERPL-MCNC: 0.68 NG/ML
PROT 24H UR-MRATE: ABNORMAL MG/D (ref 10–140)
PROT SERPL-MCNC: 6.6 G/DL (ref 6.8–8.8)
PROT SERPL-MCNC: 6.7 G/DL (ref 6.8–8.8)
PROT SERPL-MCNC: 6.8 G/DL (ref 6.8–8.8)
PROT SERPL-MCNC: 6.8 G/DL (ref 6.8–8.8)
PROT SERPL-MCNC: 6.9 G/DL (ref 6.8–8.8)
PROT SERPL-MCNC: 7.5 G/DL (ref 6.8–8.8)
PROT SERPL-MCNC: 7.5 G/DL (ref 6.8–8.8)
PROT UR-MCNC: 0.41 G/L
PROT/CREAT 24H UR: 2.3 G/G CR (ref 0–0.2)
PTH RELATED PROT SERPL-SCNC: 81.2 PMOL/L (ref 0–3.4)
PTH-INTACT SERPL-MCNC: <7 PG/ML (ref 18–80)
RBC # BLD AUTO: 2.56 10E12/L (ref 3.8–5.2)
RBC # BLD AUTO: 2.65 10E12/L (ref 3.8–5.2)
RBC # BLD AUTO: 2.73 10E12/L (ref 3.8–5.2)
RBC # BLD AUTO: 2.8 10E12/L (ref 3.8–5.2)
RBC # BLD AUTO: 2.96 10E12/L (ref 3.8–5.2)
RBC # BLD AUTO: 2.98 10E12/L (ref 3.8–5.2)
RBC # BLD AUTO: 3.01 10E12/L (ref 3.8–5.2)
RBC # BLD AUTO: 3.2 10E12/L (ref 3.8–5.2)
RBC # BLD AUTO: 3.21 10E12/L (ref 3.8–5.2)
RBC # BLD AUTO: 3.24 10E12/L (ref 3.8–5.2)
RBC # BLD AUTO: 3.3 10E12/L (ref 3.8–5.2)
RBC # BLD AUTO: 3.31 10E12/L (ref 3.8–5.2)
RBC # BLD AUTO: 3.37 10E12/L (ref 3.8–5.2)
RBC # BLD AUTO: 3.37 10E12/L (ref 3.8–5.2)
RBC # BLD AUTO: 3.39 10E12/L (ref 3.8–5.2)
RBC # BLD AUTO: 3.44 10E12/L (ref 3.8–5.2)
RBC # BLD AUTO: 3.44 10E12/L (ref 3.8–5.2)
RBC # BLD AUTO: 3.45 10E12/L (ref 3.8–5.2)
RBC # BLD AUTO: 3.51 10E12/L (ref 3.8–5.2)
RBC #/AREA URNS AUTO: 2 /HPF (ref 0–2)
RETICS # AUTO: 47.1 10E9/L (ref 25–95)
RETICS/RBC NFR AUTO: 1.4 % (ref 0.5–2)
RETINYL PALMITATE SERPL-MCNC: <0.02 MG/L (ref 0–0.1)
SODIUM SERPL-SCNC: 130 MMOL/L (ref 133–144)
SODIUM SERPL-SCNC: 132 MMOL/L (ref 133–144)
SODIUM SERPL-SCNC: 134 MMOL/L (ref 133–144)
SODIUM SERPL-SCNC: 135 MMOL/L (ref 133–144)
SODIUM SERPL-SCNC: 135 MMOL/L (ref 133–144)
SODIUM SERPL-SCNC: 136 MMOL/L (ref 133–144)
SODIUM SERPL-SCNC: 137 MMOL/L (ref 133–144)
SODIUM SERPL-SCNC: 138 MMOL/L (ref 133–144)
SODIUM SERPL-SCNC: 139 MMOL/L (ref 133–144)
SODIUM SERPL-SCNC: 141 MMOL/L (ref 133–144)
SOURCE: ABNORMAL
SP GR UR STRIP: 1.01 (ref 1–1.03)
SPECIMEN EXP DATE BLD: NORMAL
SPECIMEN SOURCE: ABNORMAL
SPECIMEN SOURCE: NORMAL
SPECIMEN VOL ?TM UR: 70 ML
STOMATOCYTES BLD QL SMEAR: SLIGHT
TRANS CELLS #/AREA URNS HPF: <1 /HPF (ref 0–1)
UROBILINOGEN UR STRIP-MCNC: NORMAL MG/DL (ref 0–2)
VANCOMYCIN SERPL-MCNC: 21.1 MG/L
VIT A SERPL-MCNC: 0.33 MG/L (ref 0.3–1.2)
VIT B12 SERPL-MCNC: 839 PG/ML (ref 193–986)
VITAMIN D2 SERPL-MCNC: <5 UG/L
VITAMIN D3 SERPL-MCNC: 6 UG/L
WBC # BLD AUTO: 10.8 10E9/L (ref 4–11)
WBC # BLD AUTO: 11.5 10E9/L (ref 4–11)
WBC # BLD AUTO: 12.4 10E9/L (ref 4–11)
WBC # BLD AUTO: 12.5 10E9/L (ref 4–11)
WBC # BLD AUTO: 12.8 10E9/L (ref 4–11)
WBC # BLD AUTO: 13.3 10E9/L (ref 4–11)
WBC # BLD AUTO: 13.4 10E9/L (ref 4–11)
WBC # BLD AUTO: 14 10E9/L (ref 4–11)
WBC # BLD AUTO: 14.1 10E9/L (ref 4–11)
WBC # BLD AUTO: 14.3 10E9/L (ref 4–11)
WBC # BLD AUTO: 14.3 10E9/L (ref 4–11)
WBC # BLD AUTO: 14.7 10E9/L (ref 4–11)
WBC # BLD AUTO: 15.1 10E9/L (ref 4–11)
WBC # BLD AUTO: 15.1 10E9/L (ref 4–11)
WBC # BLD AUTO: 15.3 10E9/L (ref 4–11)
WBC # BLD AUTO: 15.8 10E9/L (ref 4–11)
WBC # BLD AUTO: 16 10E9/L (ref 4–11)
WBC # BLD AUTO: 16.3 10E9/L (ref 4–11)
WBC # BLD AUTO: 16.3 10E9/L (ref 4–11)
WBC # BLD AUTO: 17 10E9/L (ref 4–11)
WBC #/AREA URNS AUTO: 3 /HPF (ref 0–5)

## 2018-01-01 PROCEDURE — 40000141 ZZH STATISTIC PERIPHERAL IV START W/O US GUIDANCE

## 2018-01-01 PROCEDURE — 82330 ASSAY OF CALCIUM: CPT | Performed by: PHYSICIAN ASSISTANT

## 2018-01-01 PROCEDURE — 94660 CPAP INITIATION&MGMT: CPT

## 2018-01-01 PROCEDURE — 12000008 ZZH R&B INTERMEDIATE UMMC

## 2018-01-01 PROCEDURE — 25000132 ZZH RX MED GY IP 250 OP 250 PS 637: Performed by: PHYSICIAN ASSISTANT

## 2018-01-01 PROCEDURE — 84100 ASSAY OF PHOSPHORUS: CPT | Performed by: INTERNAL MEDICINE

## 2018-01-01 PROCEDURE — 84132 ASSAY OF SERUM POTASSIUM: CPT | Performed by: INTERNAL MEDICINE

## 2018-01-01 PROCEDURE — 84145 PROCALCITONIN (PCT): CPT | Performed by: STUDENT IN AN ORGANIZED HEALTH CARE EDUCATION/TRAINING PROGRAM

## 2018-01-01 PROCEDURE — 25000132 ZZH RX MED GY IP 250 OP 250 PS 637: Performed by: INTERNAL MEDICINE

## 2018-01-01 PROCEDURE — 99233 SBSQ HOSP IP/OBS HIGH 50: CPT | Performed by: INTERNAL MEDICINE

## 2018-01-01 PROCEDURE — 25000132 ZZH RX MED GY IP 250 OP 250 PS 637: Performed by: STUDENT IN AN ORGANIZED HEALTH CARE EDUCATION/TRAINING PROGRAM

## 2018-01-01 PROCEDURE — 25500064 ZZH RX 255 OP 636: Performed by: INTERNAL MEDICINE

## 2018-01-01 PROCEDURE — 87040 BLOOD CULTURE FOR BACTERIA: CPT | Performed by: PHYSICIAN ASSISTANT

## 2018-01-01 PROCEDURE — 25000128 H RX IP 250 OP 636: Performed by: PEDIATRICS

## 2018-01-01 PROCEDURE — 82310 ASSAY OF CALCIUM: CPT | Performed by: STUDENT IN AN ORGANIZED HEALTH CARE EDUCATION/TRAINING PROGRAM

## 2018-01-01 PROCEDURE — 00000146 ZZHCL STATISTIC GLUCOSE BY METER IP

## 2018-01-01 PROCEDURE — 80069 RENAL FUNCTION PANEL: CPT | Performed by: INTERNAL MEDICINE

## 2018-01-01 PROCEDURE — 83735 ASSAY OF MAGNESIUM: CPT | Performed by: STUDENT IN AN ORGANIZED HEALTH CARE EDUCATION/TRAINING PROGRAM

## 2018-01-01 PROCEDURE — 25000128 H RX IP 250 OP 636: Performed by: PHYSICIAN ASSISTANT

## 2018-01-01 PROCEDURE — 84156 ASSAY OF PROTEIN URINE: CPT | Performed by: STUDENT IN AN ORGANIZED HEALTH CARE EDUCATION/TRAINING PROGRAM

## 2018-01-01 PROCEDURE — 40000275 ZZH STATISTIC RCP TIME EA 10 MIN

## 2018-01-01 PROCEDURE — 80048 BASIC METABOLIC PNL TOTAL CA: CPT | Performed by: PEDIATRICS

## 2018-01-01 PROCEDURE — 99222 1ST HOSP IP/OBS MODERATE 55: CPT | Mod: GC | Performed by: OBSTETRICS & GYNECOLOGY

## 2018-01-01 PROCEDURE — 25000128 H RX IP 250 OP 636: Performed by: INTERNAL MEDICINE

## 2018-01-01 PROCEDURE — 71045 X-RAY EXAM CHEST 1 VIEW: CPT

## 2018-01-01 PROCEDURE — 82330 ASSAY OF CALCIUM: CPT | Performed by: INTERNAL MEDICINE

## 2018-01-01 PROCEDURE — 99223 1ST HOSP IP/OBS HIGH 75: CPT | Mod: GC | Performed by: INTERNAL MEDICINE

## 2018-01-01 PROCEDURE — 25000125 ZZHC RX 250: Performed by: STUDENT IN AN ORGANIZED HEALTH CARE EDUCATION/TRAINING PROGRAM

## 2018-01-01 PROCEDURE — 93010 ELECTROCARDIOGRAM REPORT: CPT | Performed by: INTERNAL MEDICINE

## 2018-01-01 PROCEDURE — 36415 COLL VENOUS BLD VENIPUNCTURE: CPT | Performed by: STUDENT IN AN ORGANIZED HEALTH CARE EDUCATION/TRAINING PROGRAM

## 2018-01-01 PROCEDURE — 85610 PROTHROMBIN TIME: CPT | Performed by: STUDENT IN AN ORGANIZED HEALTH CARE EDUCATION/TRAINING PROGRAM

## 2018-01-01 PROCEDURE — 25000132 ZZH RX MED GY IP 250 OP 250 PS 637: Performed by: CLINICAL NURSE SPECIALIST

## 2018-01-01 PROCEDURE — 97530 THERAPEUTIC ACTIVITIES: CPT | Mod: GP | Performed by: REHABILITATION PRACTITIONER

## 2018-01-01 PROCEDURE — 25000128 H RX IP 250 OP 636: Performed by: NURSE ANESTHETIST, CERTIFIED REGISTERED

## 2018-01-01 PROCEDURE — 82803 BLOOD GASES ANY COMBINATION: CPT | Performed by: PHYSICIAN ASSISTANT

## 2018-01-01 PROCEDURE — 99233 SBSQ HOSP IP/OBS HIGH 50: CPT | Mod: GC | Performed by: INTERNAL MEDICINE

## 2018-01-01 PROCEDURE — 25000128 H RX IP 250 OP 636: Performed by: STUDENT IN AN ORGANIZED HEALTH CARE EDUCATION/TRAINING PROGRAM

## 2018-01-01 PROCEDURE — 82330 ASSAY OF CALCIUM: CPT | Performed by: PEDIATRICS

## 2018-01-01 PROCEDURE — 87070 CULTURE OTHR SPECIMN AEROBIC: CPT | Performed by: ORTHOPAEDIC SURGERY

## 2018-01-01 PROCEDURE — 99233 SBSQ HOSP IP/OBS HIGH 50: CPT | Mod: GC | Performed by: PEDIATRICS

## 2018-01-01 PROCEDURE — 83735 ASSAY OF MAGNESIUM: CPT | Performed by: PEDIATRICS

## 2018-01-01 PROCEDURE — 25000131 ZZH RX MED GY IP 250 OP 636 PS 637: Performed by: INTERNAL MEDICINE

## 2018-01-01 PROCEDURE — 83735 ASSAY OF MAGNESIUM: CPT | Performed by: INTERNAL MEDICINE

## 2018-01-01 PROCEDURE — 40000193 ZZH STATISTIC PT WARD VISIT

## 2018-01-01 PROCEDURE — 25000125 ZZHC RX 250: Performed by: PHYSICIAN ASSISTANT

## 2018-01-01 PROCEDURE — 97530 THERAPEUTIC ACTIVITIES: CPT | Mod: GP | Performed by: PHYSICAL THERAPIST

## 2018-01-01 PROCEDURE — 73720 MRI LWR EXTREMITY W/O&W/DYE: CPT | Mod: RT

## 2018-01-01 PROCEDURE — 12000006 ZZH R&B IMCU INTERMEDIATE UMMC

## 2018-01-01 PROCEDURE — 40000193 ZZH STATISTIC PT WARD VISIT: Performed by: PHYSICAL THERAPIST

## 2018-01-01 PROCEDURE — 99233 SBSQ HOSP IP/OBS HIGH 50: CPT | Mod: GC | Performed by: STUDENT IN AN ORGANIZED HEALTH CARE EDUCATION/TRAINING PROGRAM

## 2018-01-01 PROCEDURE — 25000132 ZZH RX MED GY IP 250 OP 250 PS 637: Performed by: PEDIATRICS

## 2018-01-01 PROCEDURE — 97163 PT EVAL HIGH COMPLEX 45 MIN: CPT | Mod: GP

## 2018-01-01 PROCEDURE — 00000159 ZZHCL STATISTIC H-SEND OUTS PREP: Performed by: ORTHOPAEDIC SURGERY

## 2018-01-01 PROCEDURE — 36415 COLL VENOUS BLD VENIPUNCTURE: CPT | Performed by: PEDIATRICS

## 2018-01-01 PROCEDURE — 82040 ASSAY OF SERUM ALBUMIN: CPT | Performed by: STUDENT IN AN ORGANIZED HEALTH CARE EDUCATION/TRAINING PROGRAM

## 2018-01-01 PROCEDURE — 40000611 ZZHCL STATISTIC MORPHOLOGY W/INTERP HEMEPATH TC 85060: Performed by: INTERNAL MEDICINE

## 2018-01-01 PROCEDURE — 83605 ASSAY OF LACTIC ACID: CPT | Performed by: PEDIATRICS

## 2018-01-01 PROCEDURE — 97110 THERAPEUTIC EXERCISES: CPT | Mod: GP

## 2018-01-01 PROCEDURE — 25000131 ZZH RX MED GY IP 250 OP 636 PS 637: Performed by: STUDENT IN AN ORGANIZED HEALTH CARE EDUCATION/TRAINING PROGRAM

## 2018-01-01 PROCEDURE — 36415 COLL VENOUS BLD VENIPUNCTURE: CPT | Performed by: INTERNAL MEDICINE

## 2018-01-01 PROCEDURE — 99232 SBSQ HOSP IP/OBS MODERATE 35: CPT | Performed by: INTERNAL MEDICINE

## 2018-01-01 PROCEDURE — 88341 IMHCHEM/IMCYTCHM EA ADD ANTB: CPT | Mod: 26,59 | Performed by: ORTHOPAEDIC SURGERY

## 2018-01-01 PROCEDURE — 83605 ASSAY OF LACTIC ACID: CPT | Performed by: STUDENT IN AN ORGANIZED HEALTH CARE EDUCATION/TRAINING PROGRAM

## 2018-01-01 PROCEDURE — 84590 ASSAY OF VITAMIN A: CPT | Performed by: STUDENT IN AN ORGANIZED HEALTH CARE EDUCATION/TRAINING PROGRAM

## 2018-01-01 PROCEDURE — 73552 X-RAY EXAM OF FEMUR 2/>: CPT | Mod: RT

## 2018-01-01 PROCEDURE — 88342 IMHCHEM/IMCYTCHM 1ST ANTB: CPT | Mod: 26,59 | Performed by: ORTHOPAEDIC SURGERY

## 2018-01-01 PROCEDURE — 12000001 ZZH R&B MED SURG/OB UMMC

## 2018-01-01 PROCEDURE — 84100 ASSAY OF PHOSPHORUS: CPT | Performed by: PEDIATRICS

## 2018-01-01 PROCEDURE — 99232 SBSQ HOSP IP/OBS MODERATE 35: CPT | Performed by: CLINICAL NURSE SPECIALIST

## 2018-01-01 PROCEDURE — 36415 COLL VENOUS BLD VENIPUNCTURE: CPT | Performed by: PHYSICIAN ASSISTANT

## 2018-01-01 PROCEDURE — 25000125 ZZHC RX 250: Performed by: INTERNAL MEDICINE

## 2018-01-01 PROCEDURE — 85027 COMPLETE CBC AUTOMATED: CPT | Performed by: INTERNAL MEDICINE

## 2018-01-01 PROCEDURE — 84100 ASSAY OF PHOSPHORUS: CPT | Performed by: PHYSICIAN ASSISTANT

## 2018-01-01 PROCEDURE — 80053 COMPREHEN METABOLIC PANEL: CPT | Performed by: INTERNAL MEDICINE

## 2018-01-01 PROCEDURE — 83735 ASSAY OF MAGNESIUM: CPT | Performed by: PHYSICIAN ASSISTANT

## 2018-01-01 PROCEDURE — 85027 COMPLETE CBC AUTOMATED: CPT | Performed by: PEDIATRICS

## 2018-01-01 PROCEDURE — 88360 TUMOR IMMUNOHISTOCHEM/MANUAL: CPT | Performed by: ORTHOPAEDIC SURGERY

## 2018-01-01 PROCEDURE — 80069 RENAL FUNCTION PANEL: CPT | Performed by: PEDIATRICS

## 2018-01-01 PROCEDURE — 80048 BASIC METABOLIC PNL TOTAL CA: CPT | Performed by: INTERNAL MEDICINE

## 2018-01-01 PROCEDURE — 99223 1ST HOSP IP/OBS HIGH 75: CPT | Performed by: INTERNAL MEDICINE

## 2018-01-01 PROCEDURE — 82330 ASSAY OF CALCIUM: CPT | Performed by: STUDENT IN AN ORGANIZED HEALTH CARE EDUCATION/TRAINING PROGRAM

## 2018-01-01 PROCEDURE — 85027 COMPLETE CBC AUTOMATED: CPT | Performed by: STUDENT IN AN ORGANIZED HEALTH CARE EDUCATION/TRAINING PROGRAM

## 2018-01-01 PROCEDURE — 88305 TISSUE EXAM BY PATHOLOGIST: CPT | Performed by: ORTHOPAEDIC SURGERY

## 2018-01-01 PROCEDURE — 80053 COMPREHEN METABOLIC PANEL: CPT | Performed by: STUDENT IN AN ORGANIZED HEALTH CARE EDUCATION/TRAINING PROGRAM

## 2018-01-01 PROCEDURE — 82607 VITAMIN B-12: CPT | Performed by: STUDENT IN AN ORGANIZED HEALTH CARE EDUCATION/TRAINING PROGRAM

## 2018-01-01 PROCEDURE — 87075 CULTR BACTERIA EXCEPT BLOOD: CPT | Performed by: ORTHOPAEDIC SURGERY

## 2018-01-01 PROCEDURE — 40000809 ZZH STATISTIC NO DOCUMENTATION TO SUPPORT CHARGE

## 2018-01-01 PROCEDURE — 40000193 ZZH STATISTIC PT WARD VISIT: Performed by: REHABILITATION PRACTITIONER

## 2018-01-01 PROCEDURE — 40000275 ZZH STATISTIC RCP TIME EA 10 MIN: Performed by: OPTOMETRIST

## 2018-01-01 PROCEDURE — 84100 ASSAY OF PHOSPHORUS: CPT | Performed by: STUDENT IN AN ORGANIZED HEALTH CARE EDUCATION/TRAINING PROGRAM

## 2018-01-01 PROCEDURE — 82746 ASSAY OF FOLIC ACID SERUM: CPT | Performed by: PHYSICIAN ASSISTANT

## 2018-01-01 PROCEDURE — 97165 OT EVAL LOW COMPLEX 30 MIN: CPT | Mod: GO

## 2018-01-01 PROCEDURE — 97530 THERAPEUTIC ACTIVITIES: CPT | Mod: GP

## 2018-01-01 PROCEDURE — 83605 ASSAY OF LACTIC ACID: CPT | Performed by: INTERNAL MEDICINE

## 2018-01-01 PROCEDURE — 82803 BLOOD GASES ANY COMBINATION: CPT | Performed by: STUDENT IN AN ORGANIZED HEALTH CARE EDUCATION/TRAINING PROGRAM

## 2018-01-01 PROCEDURE — 82810 BLOOD GASES O2 SAT ONLY: CPT | Performed by: STUDENT IN AN ORGANIZED HEALTH CARE EDUCATION/TRAINING PROGRAM

## 2018-01-01 PROCEDURE — 37000008 ZZH ANESTHESIA TECHNICAL FEE, 1ST 30 MIN: Performed by: ORTHOPAEDIC SURGERY

## 2018-01-01 PROCEDURE — 99233 SBSQ HOSP IP/OBS HIGH 50: CPT | Performed by: CLINICAL NURSE SPECIALIST

## 2018-01-01 PROCEDURE — 85049 AUTOMATED PLATELET COUNT: CPT | Performed by: INTERNAL MEDICINE

## 2018-01-01 PROCEDURE — 93308 TTE F-UP OR LMTD: CPT

## 2018-01-01 PROCEDURE — 40000802 ZZH SITE CHECK

## 2018-01-01 PROCEDURE — 80069 RENAL FUNCTION PANEL: CPT | Performed by: STUDENT IN AN ORGANIZED HEALTH CARE EDUCATION/TRAINING PROGRAM

## 2018-01-01 PROCEDURE — 25000125 ZZHC RX 250: Performed by: ORTHOPAEDIC SURGERY

## 2018-01-01 PROCEDURE — 40000133 ZZH STATISTIC OT WARD VISIT

## 2018-01-01 PROCEDURE — 25000128 H RX IP 250 OP 636

## 2018-01-01 PROCEDURE — 82310 ASSAY OF CALCIUM: CPT | Performed by: INTERNAL MEDICINE

## 2018-01-01 PROCEDURE — 81001 URINALYSIS AUTO W/SCOPE: CPT | Performed by: PHYSICIAN ASSISTANT

## 2018-01-01 PROCEDURE — 99207 ZZC CDG-MDM COMPONENT: MEETS LOW - DOWN CODED: CPT | Performed by: INTERNAL MEDICINE

## 2018-01-01 PROCEDURE — 80076 HEPATIC FUNCTION PANEL: CPT | Performed by: INTERNAL MEDICINE

## 2018-01-01 PROCEDURE — 25000128 H RX IP 250 OP 636: Performed by: ANESTHESIOLOGY

## 2018-01-01 PROCEDURE — 93005 ELECTROCARDIOGRAM TRACING: CPT

## 2018-01-01 PROCEDURE — 87040 BLOOD CULTURE FOR BACTERIA: CPT | Performed by: INTERNAL MEDICINE

## 2018-01-01 PROCEDURE — 88331 PATH CONSLTJ SURG 1 BLK 1SPC: CPT | Performed by: ORTHOPAEDIC SURGERY

## 2018-01-01 PROCEDURE — 36600 WITHDRAWAL OF ARTERIAL BLOOD: CPT

## 2018-01-01 PROCEDURE — 25000125 ZZHC RX 250: Performed by: NURSE ANESTHETIST, CERTIFIED REGISTERED

## 2018-01-01 PROCEDURE — 99231 SBSQ HOSP IP/OBS SF/LOW 25: CPT | Performed by: INTERNAL MEDICINE

## 2018-01-01 PROCEDURE — 88307 TISSUE EXAM BY PATHOLOGIST: CPT | Mod: 26 | Performed by: ORTHOPAEDIC SURGERY

## 2018-01-01 PROCEDURE — 83605 ASSAY OF LACTIC ACID: CPT | Performed by: PHYSICIAN ASSISTANT

## 2018-01-01 PROCEDURE — 87493 C DIFF AMPLIFIED PROBE: CPT | Performed by: INTERNAL MEDICINE

## 2018-01-01 PROCEDURE — 0QBB0ZZ EXCISION OF RIGHT LOWER FEMUR, OPEN APPROACH: ICD-10-PCS | Performed by: ORTHOPAEDIC SURGERY

## 2018-01-01 PROCEDURE — 84156 ASSAY OF PROTEIN URINE: CPT | Performed by: INTERNAL MEDICINE

## 2018-01-01 PROCEDURE — 99233 SBSQ HOSP IP/OBS HIGH 50: CPT | Performed by: PEDIATRICS

## 2018-01-01 PROCEDURE — 40000170 ZZH STATISTIC PRE-PROCEDURE ASSESSMENT II: Performed by: ORTHOPAEDIC SURGERY

## 2018-01-01 PROCEDURE — 86140 C-REACTIVE PROTEIN: CPT | Performed by: PHYSICIAN ASSISTANT

## 2018-01-01 PROCEDURE — 88305 TISSUE EXAM BY PATHOLOGIST: CPT | Performed by: STUDENT IN AN ORGANIZED HEALTH CARE EDUCATION/TRAINING PROGRAM

## 2018-01-01 PROCEDURE — 99232 SBSQ HOSP IP/OBS MODERATE 35: CPT | Mod: GC | Performed by: INTERNAL MEDICINE

## 2018-01-01 PROCEDURE — 85027 COMPLETE CBC AUTOMATED: CPT | Performed by: PHYSICIAN ASSISTANT

## 2018-01-01 PROCEDURE — 82803 BLOOD GASES ANY COMBINATION: CPT | Performed by: INTERNAL MEDICINE

## 2018-01-01 PROCEDURE — 82310 ASSAY OF CALCIUM: CPT | Performed by: PHYSICIAN ASSISTANT

## 2018-01-01 PROCEDURE — 82306 VITAMIN D 25 HYDROXY: CPT | Performed by: STUDENT IN AN ORGANIZED HEALTH CARE EDUCATION/TRAINING PROGRAM

## 2018-01-01 PROCEDURE — 93306 TTE W/DOPPLER COMPLETE: CPT | Mod: 26 | Performed by: INTERNAL MEDICINE

## 2018-01-01 PROCEDURE — 99233 SBSQ HOSP IP/OBS HIGH 50: CPT | Performed by: STUDENT IN AN ORGANIZED HEALTH CARE EDUCATION/TRAINING PROGRAM

## 2018-01-01 PROCEDURE — 99207 ZZC NO BILLABLE SERVICE THIS VISIT: CPT | Performed by: INTERNAL MEDICINE

## 2018-01-01 PROCEDURE — 83690 ASSAY OF LIPASE: CPT | Performed by: INTERNAL MEDICINE

## 2018-01-01 PROCEDURE — 0QBB0ZX EXCISION OF RIGHT LOWER FEMUR, OPEN APPROACH, DIAGNOSTIC: ICD-10-PCS | Performed by: ORTHOPAEDIC SURGERY

## 2018-01-01 PROCEDURE — 25000128 H RX IP 250 OP 636: Performed by: NURSE PRACTITIONER

## 2018-01-01 PROCEDURE — 86140 C-REACTIVE PROTEIN: CPT | Performed by: STUDENT IN AN ORGANIZED HEALTH CARE EDUCATION/TRAINING PROGRAM

## 2018-01-01 PROCEDURE — 10022 IR FINE NEEDLE ASPIRATION W IMAGING: CPT

## 2018-01-01 PROCEDURE — 87040 BLOOD CULTURE FOR BACTERIA: CPT | Performed by: STUDENT IN AN ORGANIZED HEALTH CARE EDUCATION/TRAINING PROGRAM

## 2018-01-01 PROCEDURE — 84132 ASSAY OF SERUM POTASSIUM: CPT | Performed by: PEDIATRICS

## 2018-01-01 PROCEDURE — 73502 X-RAY EXAM HIP UNI 2-3 VIEWS: CPT

## 2018-01-01 PROCEDURE — 87641 MR-STAPH DNA AMP PROBE: CPT | Performed by: PEDIATRICS

## 2018-01-01 PROCEDURE — 99222 1ST HOSP IP/OBS MODERATE 55: CPT | Mod: GC | Performed by: INTERNAL MEDICINE

## 2018-01-01 PROCEDURE — 36000064 ZZH SURGERY LEVEL 4 EA 15 ADDTL MIN - UMMC: Performed by: ORTHOPAEDIC SURGERY

## 2018-01-01 PROCEDURE — 83605 ASSAY OF LACTIC ACID: CPT

## 2018-01-01 PROCEDURE — 87493 C DIFF AMPLIFIED PROBE: CPT

## 2018-01-01 PROCEDURE — 82803 BLOOD GASES ANY COMBINATION: CPT | Performed by: PEDIATRICS

## 2018-01-01 PROCEDURE — 85045 AUTOMATED RETICULOCYTE COUNT: CPT | Performed by: STUDENT IN AN ORGANIZED HEALTH CARE EDUCATION/TRAINING PROGRAM

## 2018-01-01 PROCEDURE — C9113 INJ PANTOPRAZOLE SODIUM, VIA: HCPCS | Performed by: INTERNAL MEDICINE

## 2018-01-01 PROCEDURE — 97110 THERAPEUTIC EXERCISES: CPT | Mod: GP | Performed by: PHYSICAL THERAPIST

## 2018-01-01 PROCEDURE — 25000125 ZZHC RX 250: Performed by: RADIOLOGY

## 2018-01-01 PROCEDURE — 88341 IMHCHEM/IMCYTCHM EA ADD ANTB: CPT | Performed by: ORTHOPAEDIC SURGERY

## 2018-01-01 PROCEDURE — 99222 1ST HOSP IP/OBS MODERATE 55: CPT | Performed by: INTERNAL MEDICINE

## 2018-01-01 PROCEDURE — 99207 ZZC APP CREDIT; MD BILLING SHARED VISIT: CPT | Mod: GC | Performed by: PEDIATRICS

## 2018-01-01 PROCEDURE — 74018 RADEX ABDOMEN 1 VIEW: CPT

## 2018-01-01 PROCEDURE — 86850 RBC ANTIBODY SCREEN: CPT | Performed by: STUDENT IN AN ORGANIZED HEALTH CARE EDUCATION/TRAINING PROGRAM

## 2018-01-01 PROCEDURE — 82542 COL CHROMOTOGRAPHY QUAL/QUAN: CPT | Performed by: STUDENT IN AN ORGANIZED HEALTH CARE EDUCATION/TRAINING PROGRAM

## 2018-01-01 PROCEDURE — 25000132 ZZH RX MED GY IP 250 OP 250 PS 637: Performed by: HOSPITALIST

## 2018-01-01 PROCEDURE — 99238 HOSP IP/OBS DSCHRG MGMT 30/<: CPT | Mod: GC | Performed by: INTERNAL MEDICINE

## 2018-01-01 PROCEDURE — 80048 BASIC METABOLIC PNL TOTAL CA: CPT | Performed by: PHYSICIAN ASSISTANT

## 2018-01-01 PROCEDURE — 82140 ASSAY OF AMMONIA: CPT | Performed by: PEDIATRICS

## 2018-01-01 PROCEDURE — 99239 HOSP IP/OBS DSCHRG MGMT >30: CPT | Mod: GC | Performed by: INTERNAL MEDICINE

## 2018-01-01 PROCEDURE — 88360 TUMOR IMMUNOHISTOCHEM/MANUAL: CPT | Mod: 26 | Performed by: ORTHOPAEDIC SURGERY

## 2018-01-01 PROCEDURE — 82040 ASSAY OF SERUM ALBUMIN: CPT | Performed by: PEDIATRICS

## 2018-01-01 PROCEDURE — 25000566 ZZH SEVOFLURANE, EA 15 MIN: Performed by: ORTHOPAEDIC SURGERY

## 2018-01-01 PROCEDURE — A9585 GADOBUTROL INJECTION: HCPCS | Performed by: INTERNAL MEDICINE

## 2018-01-01 PROCEDURE — 83970 ASSAY OF PARATHORMONE: CPT | Performed by: INTERNAL MEDICINE

## 2018-01-01 PROCEDURE — 71000015 ZZH RECOVERY PHASE 1 LEVEL 2 EA ADDTL HR: Performed by: ORTHOPAEDIC SURGERY

## 2018-01-01 PROCEDURE — 99207 ZZC APP CREDIT; MD BILLING SHARED VISIT: CPT | Performed by: PHYSICIAN ASSISTANT

## 2018-01-01 PROCEDURE — 88172 CYTP DX EVAL FNA 1ST EA SITE: CPT | Performed by: STUDENT IN AN ORGANIZED HEALTH CARE EDUCATION/TRAINING PROGRAM

## 2018-01-01 PROCEDURE — 37000009 ZZH ANESTHESIA TECHNICAL FEE, EACH ADDTL 15 MIN: Performed by: ORTHOPAEDIC SURGERY

## 2018-01-01 PROCEDURE — 99223 1ST HOSP IP/OBS HIGH 75: CPT | Mod: AI | Performed by: HOSPITALIST

## 2018-01-01 PROCEDURE — 87176 TISSUE HOMOGENIZATION CULTR: CPT | Performed by: ORTHOPAEDIC SURGERY

## 2018-01-01 PROCEDURE — 86901 BLOOD TYPING SEROLOGIC RH(D): CPT | Performed by: STUDENT IN AN ORGANIZED HEALTH CARE EDUCATION/TRAINING PROGRAM

## 2018-01-01 PROCEDURE — 97162 PT EVAL MOD COMPLEX 30 MIN: CPT | Mod: GP

## 2018-01-01 PROCEDURE — 36000066 ZZH SURGERY LEVEL 4 W FLUORO 1ST 30 MIN - UMMC: Performed by: ORTHOPAEDIC SURGERY

## 2018-01-01 PROCEDURE — 97110 THERAPEUTIC EXERCISES: CPT | Mod: GP | Performed by: REHABILITATION PRACTITIONER

## 2018-01-01 PROCEDURE — 97535 SELF CARE MNGMENT TRAINING: CPT | Mod: GO

## 2018-01-01 PROCEDURE — 83880 ASSAY OF NATRIURETIC PEPTIDE: CPT | Performed by: STUDENT IN AN ORGANIZED HEALTH CARE EDUCATION/TRAINING PROGRAM

## 2018-01-01 PROCEDURE — 87088 URINE BACTERIA CULTURE: CPT | Performed by: PEDIATRICS

## 2018-01-01 PROCEDURE — 97162 PT EVAL MOD COMPLEX 30 MIN: CPT | Mod: GP | Performed by: PHYSICAL THERAPIST

## 2018-01-01 PROCEDURE — 99221 1ST HOSP IP/OBS SF/LOW 40: CPT | Mod: GC | Performed by: INTERNAL MEDICINE

## 2018-01-01 PROCEDURE — 74177 CT ABD & PELVIS W/CONTRAST: CPT

## 2018-01-01 PROCEDURE — 86900 BLOOD TYPING SEROLOGIC ABO: CPT | Performed by: STUDENT IN AN ORGANIZED HEALTH CARE EDUCATION/TRAINING PROGRAM

## 2018-01-01 PROCEDURE — 99356 ZZC PROLONGED SERV,INPATIENT,1ST HR: CPT | Performed by: CLINICAL NURSE SPECIALIST

## 2018-01-01 PROCEDURE — 83883 ASSAY NEPHELOMETRY NOT SPEC: CPT | Performed by: INTERNAL MEDICINE

## 2018-01-01 PROCEDURE — 80048 BASIC METABOLIC PNL TOTAL CA: CPT | Performed by: STUDENT IN AN ORGANIZED HEALTH CARE EDUCATION/TRAINING PROGRAM

## 2018-01-01 PROCEDURE — C1713 ANCHOR/SCREW BN/BN,TIS/BN: HCPCS | Performed by: ORTHOPAEDIC SURGERY

## 2018-01-01 PROCEDURE — 0QSB04Z REPOSITION RIGHT LOWER FEMUR WITH INTERNAL FIXATION DEVICE, OPEN APPROACH: ICD-10-PCS | Performed by: ORTHOPAEDIC SURGERY

## 2018-01-01 PROCEDURE — 80202 ASSAY OF VANCOMYCIN: CPT | Performed by: PEDIATRICS

## 2018-01-01 PROCEDURE — C1769 GUIDE WIRE: HCPCS | Performed by: ORTHOPAEDIC SURGERY

## 2018-01-01 PROCEDURE — 87086 URINE CULTURE/COLONY COUNT: CPT | Performed by: PEDIATRICS

## 2018-01-01 PROCEDURE — 80053 COMPREHEN METABOLIC PANEL: CPT | Performed by: PEDIATRICS

## 2018-01-01 PROCEDURE — 85730 THROMBOPLASTIN TIME PARTIAL: CPT | Performed by: STUDENT IN AN ORGANIZED HEALTH CARE EDUCATION/TRAINING PROGRAM

## 2018-01-01 PROCEDURE — 40000278 XR SURGERY CARM FLUORO LESS THAN 5 MIN: Mod: TC

## 2018-01-01 PROCEDURE — 85048 AUTOMATED LEUKOCYTE COUNT: CPT | Performed by: INTERNAL MEDICINE

## 2018-01-01 PROCEDURE — 71000014 ZZH RECOVERY PHASE 1 LEVEL 2 FIRST HR: Performed by: ORTHOPAEDIC SURGERY

## 2018-01-01 PROCEDURE — 82330 ASSAY OF CALCIUM: CPT | Performed by: HOSPITALIST

## 2018-01-01 PROCEDURE — 99231 SBSQ HOSP IP/OBS SF/LOW 25: CPT | Mod: GC | Performed by: INTERNAL MEDICINE

## 2018-01-01 PROCEDURE — 86335 IMMUNFIX E-PHORSIS/URINE/CSF: CPT | Performed by: INTERNAL MEDICINE

## 2018-01-01 PROCEDURE — 90682 RIV4 VACC RECOMBINANT DNA IM: CPT | Performed by: STUDENT IN AN ORGANIZED HEALTH CARE EDUCATION/TRAINING PROGRAM

## 2018-01-01 PROCEDURE — 80053 COMPREHEN METABOLIC PANEL: CPT | Performed by: PHYSICIAN ASSISTANT

## 2018-01-01 PROCEDURE — 40000556 ZZH STATISTIC PERIPHERAL IV START W US GUIDANCE

## 2018-01-01 PROCEDURE — 99207 ZZC NO SHOW FOR SCHEDULED APPT: CPT | Performed by: OBSTETRICS & GYNECOLOGY

## 2018-01-01 PROCEDURE — 00000155 ZZHCL STATISTIC H-CELL BLOCK W/STAIN: Performed by: STUDENT IN AN ORGANIZED HEALTH CARE EDUCATION/TRAINING PROGRAM

## 2018-01-01 PROCEDURE — 27210794 ZZH OR GENERAL SUPPLY STERILE: Performed by: ORTHOPAEDIC SURGERY

## 2018-01-01 PROCEDURE — 40000894 ZZH STATISTIC OT IP EVAL DEFER

## 2018-01-01 PROCEDURE — 88342 IMHCHEM/IMCYTCHM 1ST ANTB: CPT | Performed by: ORTHOPAEDIC SURGERY

## 2018-01-01 PROCEDURE — 85025 COMPLETE CBC W/AUTO DIFF WBC: CPT | Performed by: STUDENT IN AN ORGANIZED HEALTH CARE EDUCATION/TRAINING PROGRAM

## 2018-01-01 PROCEDURE — 0CBC3ZZ EXCISION OF LEFT PAROTID DUCT, PERCUTANEOUS APPROACH: ICD-10-PCS | Performed by: RADIOLOGY

## 2018-01-01 PROCEDURE — 76856 US EXAM PELVIC COMPLETE: CPT

## 2018-01-01 PROCEDURE — 88331 PATH CONSLTJ SURG 1 BLK 1SPC: CPT | Mod: 26 | Performed by: ORTHOPAEDIC SURGERY

## 2018-01-01 PROCEDURE — 85025 COMPLETE CBC W/AUTO DIFF WBC: CPT | Performed by: INTERNAL MEDICINE

## 2018-01-01 PROCEDURE — 83036 HEMOGLOBIN GLYCOSYLATED A1C: CPT | Performed by: INTERNAL MEDICINE

## 2018-01-01 PROCEDURE — 84132 ASSAY OF SERUM POTASSIUM: CPT | Performed by: STUDENT IN AN ORGANIZED HEALTH CARE EDUCATION/TRAINING PROGRAM

## 2018-01-01 PROCEDURE — 83605 ASSAY OF LACTIC ACID: CPT | Performed by: ANESTHESIOLOGY

## 2018-01-01 PROCEDURE — 25000128 H RX IP 250 OP 636: Performed by: ORTHOPAEDIC SURGERY

## 2018-01-01 PROCEDURE — 97530 THERAPEUTIC ACTIVITIES: CPT | Mod: GO

## 2018-01-01 PROCEDURE — 93308 TTE F-UP OR LMTD: CPT | Mod: 26 | Performed by: INTERNAL MEDICINE

## 2018-01-01 PROCEDURE — 94640 AIRWAY INHALATION TREATMENT: CPT

## 2018-01-01 PROCEDURE — 87640 STAPH A DNA AMP PROBE: CPT | Performed by: PEDIATRICS

## 2018-01-01 PROCEDURE — 27211024 ZZHC OR SUPPLY OTHER OPNP: Performed by: ORTHOPAEDIC SURGERY

## 2018-01-01 PROCEDURE — 82652 VIT D 1 25-DIHYDROXY: CPT | Performed by: STUDENT IN AN ORGANIZED HEALTH CARE EDUCATION/TRAINING PROGRAM

## 2018-01-01 PROCEDURE — 88307 TISSUE EXAM BY PATHOLOGIST: CPT | Performed by: ORTHOPAEDIC SURGERY

## 2018-01-01 PROCEDURE — 88173 CYTOPATH EVAL FNA REPORT: CPT | Performed by: STUDENT IN AN ORGANIZED HEALTH CARE EDUCATION/TRAINING PROGRAM

## 2018-01-01 PROCEDURE — 87186 SC STD MICRODIL/AGAR DIL: CPT | Performed by: PEDIATRICS

## 2018-01-01 PROCEDURE — 93321 DOPPLER ECHO F-UP/LMTD STD: CPT | Mod: 26 | Performed by: INTERNAL MEDICINE

## 2018-01-01 PROCEDURE — 36415 COLL VENOUS BLD VENIPUNCTURE: CPT | Performed by: ANESTHESIOLOGY

## 2018-01-01 PROCEDURE — 88305 TISSUE EXAM BY PATHOLOGIST: CPT | Mod: 26 | Performed by: ORTHOPAEDIC SURGERY

## 2018-01-01 PROCEDURE — 93325 DOPPLER ECHO COLOR FLOW MAPG: CPT | Mod: 26 | Performed by: INTERNAL MEDICINE

## 2018-01-01 PROCEDURE — 84145 PROCALCITONIN (PCT): CPT | Performed by: PHYSICIAN ASSISTANT

## 2018-01-01 DEVICE — IMP SCR SYN CORTEX 4.5X46MM SELF TAP SS 214.846: Type: IMPLANTABLE DEVICE | Site: FEMUR | Status: FUNCTIONAL

## 2018-01-01 DEVICE — IMP SCR SYN CORTEX 4.5X38MM SELF TAP SS 214.838: Type: IMPLANTABLE DEVICE | Site: FEMUR | Status: FUNCTIONAL

## 2018-01-01 DEVICE — IMPLANTABLE DEVICE: Type: IMPLANTABLE DEVICE | Site: FEMUR | Status: FUNCTIONAL

## 2018-01-01 DEVICE — IMP SCR SYN 5.0X65MM VA LOCK ST T25 STARDRIVE 02.231.265: Type: IMPLANTABLE DEVICE | Site: FEMUR | Status: FUNCTIONAL

## 2018-01-01 DEVICE — IMP SCR SYN 5.0X42MM VA LOCK ST T25 STARDRIVE 02.231.242: Type: IMPLANTABLE DEVICE | Site: FEMUR | Status: FUNCTIONAL

## 2018-01-01 DEVICE — IMP SCR SYN 5.0X40MM VA LOCK ST T25 STARDRIVE 02.231.240: Type: IMPLANTABLE DEVICE | Site: FEMUR | Status: FUNCTIONAL

## 2018-01-01 DEVICE — IMP SCR SYN 5.0X38MM VA LOCK ST T25 STARDRIVE 02.231.238: Type: IMPLANTABLE DEVICE | Site: FEMUR | Status: FUNCTIONAL

## 2018-01-01 DEVICE — IMP SCR SYN 5.0X70MM VA LOCK ST T25 STARDRIVE 02.231.270: Type: IMPLANTABLE DEVICE | Site: FEMUR | Status: FUNCTIONAL

## 2018-01-01 DEVICE — IMP SCR SYN 5.0X80MM VA LOCK ST T25 STARDRIVE 02.231.280: Type: IMPLANTABLE DEVICE | Site: FEMUR | Status: FUNCTIONAL

## 2018-01-01 RX ORDER — OXYCODONE HCL 20 MG/ML
2.6-5.2 CONCENTRATE, ORAL ORAL EVERY 4 HOURS PRN
Status: DISCONTINUED | OUTPATIENT
Start: 2018-01-01 | End: 2018-01-01

## 2018-01-01 RX ORDER — CALCITONIN SALMON 200 [USP'U]/ML
600 INJECTION, SOLUTION INTRAMUSCULAR; SUBCUTANEOUS ONCE
Status: COMPLETED | OUTPATIENT
Start: 2018-01-01 | End: 2018-01-01

## 2018-01-01 RX ORDER — LOPERAMIDE HCL 2 MG
2 CAPSULE ORAL 4 TIMES DAILY PRN
Start: 2018-01-01 | End: 2024-08-08

## 2018-01-01 RX ORDER — LORAZEPAM 0.5 MG/1
.5-1 TABLET ORAL
Start: 2018-01-01 | End: 2024-08-08

## 2018-01-01 RX ORDER — SODIUM CHLORIDE 9 MG/ML
INJECTION, SOLUTION INTRAVENOUS CONTINUOUS
Status: DISCONTINUED | OUTPATIENT
Start: 2018-01-01 | End: 2018-01-01

## 2018-01-01 RX ORDER — SOD PHOS DI, MONO/K PHOS MONO 250 MG
2 TABLET ORAL 4 TIMES DAILY
COMMUNITY

## 2018-01-01 RX ORDER — ATROPINE SULFATE 10 MG/ML
1-2 SOLUTION/ DROPS OPHTHALMIC
Status: DISCONTINUED | OUTPATIENT
Start: 2018-01-01 | End: 2018-01-01 | Stop reason: HOSPADM

## 2018-01-01 RX ORDER — LOPERAMIDE HCL 2 MG
2 CAPSULE ORAL 4 TIMES DAILY PRN
Status: DISCONTINUED | OUTPATIENT
Start: 2018-01-01 | End: 2018-01-01 | Stop reason: HOSPADM

## 2018-01-01 RX ORDER — GADOBUTROL 604.72 MG/ML
10 INJECTION INTRAVENOUS ONCE
Status: COMPLETED | OUTPATIENT
Start: 2018-01-01 | End: 2018-01-01

## 2018-01-01 RX ORDER — HYDROMORPHONE HYDROCHLORIDE 1 MG/ML
.3-.5 INJECTION, SOLUTION INTRAMUSCULAR; INTRAVENOUS; SUBCUTANEOUS EVERY 5 MIN PRN
Status: DISCONTINUED | OUTPATIENT
Start: 2018-01-01 | End: 2018-01-01 | Stop reason: HOSPADM

## 2018-01-01 RX ORDER — POTASSIUM CHLORIDE 20MEQ/15ML
40 LIQUID (ML) ORAL ONCE
Status: COMPLETED | OUTPATIENT
Start: 2018-01-01 | End: 2018-01-01

## 2018-01-01 RX ORDER — POLYETHYLENE GLYCOL 3350 17 G/17G
17 POWDER, FOR SOLUTION ORAL DAILY PRN
Status: DISCONTINUED | OUTPATIENT
Start: 2018-01-01 | End: 2018-01-01 | Stop reason: HOSPADM

## 2018-01-01 RX ORDER — LORAZEPAM 0.5 MG/1
.5-1 TABLET ORAL
Status: DISCONTINUED | OUTPATIENT
Start: 2018-01-01 | End: 2018-01-01 | Stop reason: HOSPADM

## 2018-01-01 RX ORDER — ENALAPRIL MALEATE 5 MG/1
5 TABLET ORAL DAILY
Status: DISCONTINUED | OUTPATIENT
Start: 2018-01-01 | End: 2018-01-01 | Stop reason: HOSPADM

## 2018-01-01 RX ORDER — AMOXICILLIN 250 MG
2 CAPSULE ORAL 2 TIMES DAILY
Status: DISCONTINUED | OUTPATIENT
Start: 2018-01-01 | End: 2018-01-01

## 2018-01-01 RX ORDER — LEVALBUTEROL TARTRATE 45 UG/1
2 AEROSOL, METERED ORAL EVERY 4 HOURS PRN
Status: DISCONTINUED | OUTPATIENT
Start: 2018-01-01 | End: 2018-01-01

## 2018-01-01 RX ORDER — ASPIRIN 81 MG/1
81 TABLET ORAL DAILY
COMMUNITY

## 2018-01-01 RX ORDER — OXYCODONE HYDROCHLORIDE 5 MG/1
5-10 TABLET ORAL
Status: DISCONTINUED | OUTPATIENT
Start: 2018-01-01 | End: 2018-01-01

## 2018-01-01 RX ORDER — SODIUM CHLORIDE, SODIUM LACTATE, POTASSIUM CHLORIDE, CALCIUM CHLORIDE 600; 310; 30; 20 MG/100ML; MG/100ML; MG/100ML; MG/100ML
INJECTION, SOLUTION INTRAVENOUS CONTINUOUS PRN
Status: DISCONTINUED | OUTPATIENT
Start: 2018-01-01 | End: 2018-01-01

## 2018-01-01 RX ORDER — MAGNESIUM SULFATE HEPTAHYDRATE 40 MG/ML
4 INJECTION, SOLUTION INTRAVENOUS EVERY 4 HOURS PRN
Status: DISCONTINUED | OUTPATIENT
Start: 2018-01-01 | End: 2018-01-01

## 2018-01-01 RX ORDER — ASPIRIN 81 MG/1
81 TABLET ORAL DAILY
Status: DISCONTINUED | OUTPATIENT
Start: 2018-01-01 | End: 2018-01-01

## 2018-01-01 RX ORDER — LIDOCAINE 40 MG/G
CREAM TOPICAL
Status: DISCONTINUED | OUTPATIENT
Start: 2018-01-01 | End: 2018-01-01 | Stop reason: HOSPADM

## 2018-01-01 RX ORDER — AMOXICILLIN 250 MG
1 CAPSULE ORAL 2 TIMES DAILY
Status: DISCONTINUED | OUTPATIENT
Start: 2018-01-01 | End: 2018-01-01

## 2018-01-01 RX ORDER — METOPROLOL TARTRATE 100 MG
100 TABLET ORAL
Status: DISCONTINUED | OUTPATIENT
Start: 2018-01-01 | End: 2018-01-01 | Stop reason: CLARIF

## 2018-01-01 RX ORDER — TRAMADOL HYDROCHLORIDE 50 MG/1
100 TABLET ORAL EVERY 6 HOURS
Status: DISCONTINUED | OUTPATIENT
Start: 2018-01-01 | End: 2018-01-01

## 2018-01-01 RX ORDER — ONDANSETRON 2 MG/ML
4 INJECTION INTRAMUSCULAR; INTRAVENOUS EVERY 30 MIN PRN
Status: DISCONTINUED | OUTPATIENT
Start: 2018-01-01 | End: 2018-01-01 | Stop reason: HOSPADM

## 2018-01-01 RX ORDER — POTASSIUM CHLORIDE 29.8 MG/ML
20 INJECTION INTRAVENOUS
Status: DISCONTINUED | OUTPATIENT
Start: 2018-01-01 | End: 2018-01-01 | Stop reason: HOSPADM

## 2018-01-01 RX ORDER — OXYCODONE HYDROCHLORIDE 100 MG/5ML
2.5-5 SOLUTION ORAL EVERY 4 HOURS PRN
Status: DISCONTINUED | OUTPATIENT
Start: 2018-01-01 | End: 2018-01-01

## 2018-01-01 RX ORDER — FUROSEMIDE 10 MG/ML
20 INJECTION INTRAMUSCULAR; INTRAVENOUS EVERY 8 HOURS
Status: DISCONTINUED | OUTPATIENT
Start: 2018-01-01 | End: 2018-01-01

## 2018-01-01 RX ORDER — OXYCODONE HYDROCHLORIDE 5 MG/1
5-10 TABLET ORAL EVERY 4 HOURS PRN
Status: DISCONTINUED | OUTPATIENT
Start: 2018-01-01 | End: 2018-01-01

## 2018-01-01 RX ORDER — METOPROLOL TARTRATE 100 MG
100 TABLET ORAL 2 TIMES DAILY
COMMUNITY

## 2018-01-01 RX ORDER — POTASSIUM CHLORIDE 7.45 MG/ML
10 INJECTION INTRAVENOUS
Status: DISCONTINUED | OUTPATIENT
Start: 2018-01-01 | End: 2018-01-01 | Stop reason: HOSPADM

## 2018-01-01 RX ORDER — DEXTROSE MONOHYDRATE 25 G/50ML
25-50 INJECTION, SOLUTION INTRAVENOUS
Status: DISCONTINUED | OUTPATIENT
Start: 2018-01-01 | End: 2018-01-01

## 2018-01-01 RX ORDER — ACETAMINOPHEN 325 MG/1
650 TABLET ORAL EVERY 6 HOURS PRN
Status: DISCONTINUED | OUTPATIENT
Start: 2018-01-01 | End: 2018-01-01

## 2018-01-01 RX ORDER — FUROSEMIDE 40 MG
40 TABLET ORAL 2 TIMES DAILY
COMMUNITY

## 2018-01-01 RX ORDER — POTASSIUM CL/LIDO/0.9 % NACL 10MEQ/0.1L
10 INTRAVENOUS SOLUTION, PIGGYBACK (ML) INTRAVENOUS
Status: DISCONTINUED | OUTPATIENT
Start: 2018-01-01 | End: 2018-01-01 | Stop reason: RX

## 2018-01-01 RX ORDER — MEPERIDINE HYDROCHLORIDE 25 MG/ML
12.5 INJECTION INTRAMUSCULAR; INTRAVENOUS; SUBCUTANEOUS EVERY 5 MIN PRN
Status: DISCONTINUED | OUTPATIENT
Start: 2018-01-01 | End: 2018-01-01 | Stop reason: HOSPADM

## 2018-01-01 RX ORDER — POLYETHYLENE GLYCOL 3350 17 G/17G
17 POWDER, FOR SOLUTION ORAL DAILY
Status: DISCONTINUED | OUTPATIENT
Start: 2018-01-01 | End: 2018-01-01

## 2018-01-01 RX ORDER — KETAMINE HYDROCHLORIDE 10 MG/ML
INJECTION, SOLUTION INTRAMUSCULAR; INTRAVENOUS PRN
Status: DISCONTINUED | OUTPATIENT
Start: 2018-01-01 | End: 2018-01-01

## 2018-01-01 RX ORDER — HYDROMORPHONE HYDROCHLORIDE 1 MG/ML
.5-1 SOLUTION ORAL EVERY 4 HOURS PRN
Status: DISCONTINUED | OUTPATIENT
Start: 2018-01-01 | End: 2018-01-01

## 2018-01-01 RX ORDER — CALCITONIN SALMON 200 [IU]/.09ML
1 SPRAY, METERED NASAL DAILY
Status: DISCONTINUED | OUTPATIENT
Start: 2018-01-01 | End: 2018-01-01

## 2018-01-01 RX ORDER — CALCITONIN SALMON 200 [USP'U]/ML
400 INJECTION, SOLUTION INTRAMUSCULAR; SUBCUTANEOUS EVERY 12 HOURS
Status: COMPLETED | OUTPATIENT
Start: 2018-01-01 | End: 2018-01-01

## 2018-01-01 RX ORDER — METOPROLOL TARTRATE 50 MG
100 TABLET ORAL 2 TIMES DAILY
Status: DISCONTINUED | OUTPATIENT
Start: 2018-01-01 | End: 2018-01-01 | Stop reason: HOSPADM

## 2018-01-01 RX ORDER — NICOTINE POLACRILEX 4 MG
15-30 LOZENGE BUCCAL
Status: DISCONTINUED | OUTPATIENT
Start: 2018-01-01 | End: 2018-01-01

## 2018-01-01 RX ORDER — ONDANSETRON 4 MG/1
4 TABLET, ORALLY DISINTEGRATING ORAL EVERY 6 HOURS PRN
Status: DISCONTINUED | OUTPATIENT
Start: 2018-01-01 | End: 2018-01-01 | Stop reason: HOSPADM

## 2018-01-01 RX ORDER — BISACODYL 10 MG
10 SUPPOSITORY, RECTAL RECTAL
Status: DISCONTINUED | OUTPATIENT
Start: 2018-01-01 | End: 2018-01-01 | Stop reason: HOSPADM

## 2018-01-01 RX ORDER — METOPROLOL TARTRATE 50 MG
100 TABLET ORAL 2 TIMES DAILY
Status: DISCONTINUED | OUTPATIENT
Start: 2018-01-01 | End: 2018-01-01

## 2018-01-01 RX ORDER — POTASSIUM CHLORIDE 1.5 G/1.58G
20-40 POWDER, FOR SOLUTION ORAL
Status: DISCONTINUED | OUTPATIENT
Start: 2018-01-01 | End: 2018-01-01

## 2018-01-01 RX ORDER — POTASSIUM CHLORIDE 750 MG/1
20-40 TABLET, EXTENDED RELEASE ORAL
Status: DISCONTINUED | OUTPATIENT
Start: 2018-01-01 | End: 2018-01-01

## 2018-01-01 RX ORDER — TRAMADOL HYDROCHLORIDE 50 MG/1
50-100 TABLET ORAL EVERY 4 HOURS PRN
COMMUNITY

## 2018-01-01 RX ORDER — OXYCODONE HCL 20 MG/ML
5-10 CONCENTRATE, ORAL ORAL
Status: DISCONTINUED | OUTPATIENT
Start: 2018-01-01 | End: 2018-01-01

## 2018-01-01 RX ORDER — ONDANSETRON 2 MG/ML
INJECTION INTRAMUSCULAR; INTRAVENOUS PRN
Status: DISCONTINUED | OUTPATIENT
Start: 2018-01-01 | End: 2018-01-01

## 2018-01-01 RX ORDER — SENNOSIDES A AND B 8.6 MG/1
1 TABLET, FILM COATED ORAL DAILY
COMMUNITY

## 2018-01-01 RX ORDER — ASPIRIN 325 MG
325 TABLET ORAL 2 TIMES DAILY
Status: DISCONTINUED | OUTPATIENT
Start: 2018-01-01 | End: 2018-01-01

## 2018-01-01 RX ORDER — FUROSEMIDE 10 MG/ML
20 INJECTION INTRAMUSCULAR; INTRAVENOUS ONCE
Status: COMPLETED | OUTPATIENT
Start: 2018-01-01 | End: 2018-01-01

## 2018-01-01 RX ORDER — POTASSIUM CHLORIDE 750 MG/1
20-40 TABLET, EXTENDED RELEASE ORAL
Status: DISCONTINUED | OUTPATIENT
Start: 2018-01-01 | End: 2018-01-01 | Stop reason: HOSPADM

## 2018-01-01 RX ORDER — POTASSIUM CHLORIDE 20MEQ/15ML
60 LIQUID (ML) ORAL ONCE
Status: COMPLETED | OUTPATIENT
Start: 2018-01-01 | End: 2018-01-01

## 2018-01-01 RX ORDER — HYDROMORPHONE HYDROCHLORIDE 1 MG/ML
.3-.5 INJECTION, SOLUTION INTRAMUSCULAR; INTRAVENOUS; SUBCUTANEOUS ONCE
Status: COMPLETED | OUTPATIENT
Start: 2018-01-01 | End: 2018-01-01

## 2018-01-01 RX ORDER — CALCITONIN SALMON 200 [USP'U]/ML
600 INJECTION, SOLUTION INTRAMUSCULAR; SUBCUTANEOUS EVERY 12 HOURS
Status: COMPLETED | OUTPATIENT
Start: 2018-01-01 | End: 2018-01-01

## 2018-01-01 RX ORDER — ASPIRIN 81 MG/1
81 TABLET ORAL 2 TIMES DAILY
Status: DISCONTINUED | OUTPATIENT
Start: 2018-01-01 | End: 2018-01-01

## 2018-01-01 RX ORDER — NALOXONE HYDROCHLORIDE 0.4 MG/ML
.1-.4 INJECTION, SOLUTION INTRAMUSCULAR; INTRAVENOUS; SUBCUTANEOUS
Status: ACTIVE | OUTPATIENT
Start: 2018-01-01 | End: 2018-01-01

## 2018-01-01 RX ORDER — CALCITONIN SALMON 200 [USP'U]/ML
400 INJECTION, SOLUTION INTRAMUSCULAR; SUBCUTANEOUS ONCE
Status: COMPLETED | OUTPATIENT
Start: 2018-01-01 | End: 2018-01-01

## 2018-01-01 RX ORDER — HYDROMORPHONE HYDROCHLORIDE 1 MG/ML
0.5 SOLUTION ORAL EVERY 6 HOURS PRN
Status: DISCONTINUED | OUTPATIENT
Start: 2018-01-01 | End: 2018-01-01

## 2018-01-01 RX ORDER — MAGNESIUM SULFATE HEPTAHYDRATE 40 MG/ML
4 INJECTION, SOLUTION INTRAVENOUS EVERY 4 HOURS PRN
Status: DISCONTINUED | OUTPATIENT
Start: 2018-01-01 | End: 2018-01-01 | Stop reason: HOSPADM

## 2018-01-01 RX ORDER — FUROSEMIDE 10 MG/ML
10 INJECTION INTRAMUSCULAR; INTRAVENOUS ONCE
Status: COMPLETED | OUTPATIENT
Start: 2018-01-01 | End: 2018-01-01

## 2018-01-01 RX ORDER — POTASSIUM CHLORIDE 1.5 G/1.58G
20-40 POWDER, FOR SOLUTION ORAL
Status: DISCONTINUED | OUTPATIENT
Start: 2018-01-01 | End: 2018-01-01 | Stop reason: HOSPADM

## 2018-01-01 RX ORDER — LEVALBUTEROL INHALATION SOLUTION 0.63 MG/3ML
0.63 SOLUTION RESPIRATORY (INHALATION) EVERY 6 HOURS PRN
Status: DISCONTINUED | OUTPATIENT
Start: 2018-01-01 | End: 2018-01-01

## 2018-01-01 RX ORDER — POTASSIUM CHLORIDE 7.45 MG/ML
10 INJECTION INTRAVENOUS
Status: DISCONTINUED | OUTPATIENT
Start: 2018-01-01 | End: 2018-01-01

## 2018-01-01 RX ORDER — LOPERAMIDE HCL 2 MG
2 CAPSULE ORAL 4 TIMES DAILY PRN
Status: DISCONTINUED | OUTPATIENT
Start: 2018-01-01 | End: 2018-01-01

## 2018-01-01 RX ORDER — LEVALBUTEROL INHALATION SOLUTION 0.63 MG/3ML
0.63 SOLUTION RESPIRATORY (INHALATION) 4 TIMES DAILY
Status: DISCONTINUED | OUTPATIENT
Start: 2018-01-01 | End: 2018-01-01

## 2018-01-01 RX ORDER — ONDANSETRON 2 MG/ML
4 INJECTION INTRAMUSCULAR; INTRAVENOUS EVERY 6 HOURS PRN
Status: DISCONTINUED | OUTPATIENT
Start: 2018-01-01 | End: 2018-01-01 | Stop reason: HOSPADM

## 2018-01-01 RX ORDER — PROPOFOL 10 MG/ML
INJECTION, EMULSION INTRAVENOUS PRN
Status: DISCONTINUED | OUTPATIENT
Start: 2018-01-01 | End: 2018-01-01

## 2018-01-01 RX ORDER — IBUPROFEN 200 MG
400 TABLET ORAL EVERY 6 HOURS PRN
Status: DISCONTINUED | OUTPATIENT
Start: 2018-01-01 | End: 2018-01-01 | Stop reason: HOSPADM

## 2018-01-01 RX ORDER — ONDANSETRON 4 MG/1
4 TABLET, ORALLY DISINTEGRATING ORAL EVERY 30 MIN PRN
Status: DISCONTINUED | OUTPATIENT
Start: 2018-01-01 | End: 2018-01-01 | Stop reason: HOSPADM

## 2018-01-01 RX ORDER — POTASSIUM CHLORIDE 29.8 MG/ML
20 INJECTION INTRAVENOUS
Status: DISCONTINUED | OUTPATIENT
Start: 2018-01-01 | End: 2018-01-01

## 2018-01-01 RX ORDER — CEFAZOLIN SODIUM 1 G/3ML
1 INJECTION, POWDER, FOR SOLUTION INTRAMUSCULAR; INTRAVENOUS EVERY 8 HOURS
Status: DISCONTINUED | OUTPATIENT
Start: 2018-01-01 | End: 2018-01-01

## 2018-01-01 RX ORDER — ASPIRIN 325 MG
81 TABLET ORAL DAILY
Status: DISCONTINUED | OUTPATIENT
Start: 2018-01-01 | End: 2018-01-01

## 2018-01-01 RX ORDER — TRAMADOL HYDROCHLORIDE 50 MG/1
50-100 TABLET ORAL EVERY 6 HOURS PRN
Status: DISCONTINUED | OUTPATIENT
Start: 2018-01-01 | End: 2018-01-01

## 2018-01-01 RX ORDER — MAGNESIUM SULFATE HEPTAHYDRATE 40 MG/ML
2 INJECTION, SOLUTION INTRAVENOUS DAILY PRN
Status: DISCONTINUED | OUTPATIENT
Start: 2018-01-01 | End: 2018-01-01

## 2018-01-01 RX ORDER — ONDANSETRON 2 MG/ML
4 INJECTION INTRAMUSCULAR; INTRAVENOUS EVERY 6 HOURS PRN
Status: DISCONTINUED | OUTPATIENT
Start: 2018-01-01 | End: 2018-01-01

## 2018-01-01 RX ORDER — HYDROMORPHONE HYDROCHLORIDE 1 MG/ML
0.5 INJECTION, SOLUTION INTRAMUSCULAR; INTRAVENOUS; SUBCUTANEOUS
Status: DISCONTINUED | OUTPATIENT
Start: 2018-01-01 | End: 2018-01-01

## 2018-01-01 RX ORDER — FENTANYL CITRATE 50 UG/ML
25-50 INJECTION, SOLUTION INTRAMUSCULAR; INTRAVENOUS
Status: DISCONTINUED | OUTPATIENT
Start: 2018-01-01 | End: 2018-01-01 | Stop reason: HOSPADM

## 2018-01-01 RX ORDER — ACETAMINOPHEN 325 MG/1
650 TABLET ORAL EVERY 4 HOURS PRN
COMMUNITY

## 2018-01-01 RX ORDER — FENTANYL CITRATE 50 UG/ML
INJECTION, SOLUTION INTRAMUSCULAR; INTRAVENOUS PRN
Status: DISCONTINUED | OUTPATIENT
Start: 2018-01-01 | End: 2018-01-01

## 2018-01-01 RX ORDER — LEVALBUTEROL INHALATION SOLUTION 0.63 MG/3ML
0.63 SOLUTION RESPIRATORY (INHALATION) EVERY 4 HOURS PRN
Start: 2018-01-01 | End: 2024-08-08

## 2018-01-01 RX ORDER — ONDANSETRON 2 MG/ML
4 INJECTION INTRAMUSCULAR; INTRAVENOUS EVERY 6 HOURS PRN
Qty: 15 ML
Start: 2018-01-01 | End: 2024-08-08

## 2018-01-01 RX ORDER — MAGNESIUM HYDROXIDE/ALUMINUM HYDROXICE/SIMETHICONE 120; 1200; 1200 MG/30ML; MG/30ML; MG/30ML
15 SUSPENSION ORAL
COMMUNITY

## 2018-01-01 RX ORDER — AMPICILLIN AND SULBACTAM 2; 1 G/1; G/1
3 INJECTION, POWDER, FOR SOLUTION INTRAMUSCULAR; INTRAVENOUS EVERY 6 HOURS
Status: DISCONTINUED | OUTPATIENT
Start: 2018-01-01 | End: 2018-01-01

## 2018-01-01 RX ORDER — ATROPINE SULFATE 10 MG/ML
1-2 SOLUTION/ DROPS OPHTHALMIC
Start: 2018-01-01 | End: 2024-08-08

## 2018-01-01 RX ORDER — ACETAMINOPHEN 325 MG/1
650 TABLET ORAL EVERY 4 HOURS PRN
Status: COMPLETED | OUTPATIENT
Start: 2018-01-01 | End: 2018-01-01

## 2018-01-01 RX ORDER — NALOXONE HYDROCHLORIDE 0.4 MG/ML
.1-.4 INJECTION, SOLUTION INTRAMUSCULAR; INTRAVENOUS; SUBCUTANEOUS
Status: DISCONTINUED | OUTPATIENT
Start: 2018-01-01 | End: 2018-01-01 | Stop reason: HOSPADM

## 2018-01-01 RX ORDER — HYDROMORPHONE HYDROCHLORIDE 1 MG/ML
.3-.5 INJECTION, SOLUTION INTRAMUSCULAR; INTRAVENOUS; SUBCUTANEOUS
Status: DISCONTINUED | OUTPATIENT
Start: 2018-01-01 | End: 2018-01-01

## 2018-01-01 RX ORDER — LIDOCAINE 40 MG/G
CREAM TOPICAL
Status: DISCONTINUED | OUTPATIENT
Start: 2018-01-01 | End: 2018-01-01

## 2018-01-01 RX ORDER — SODIUM CHLORIDE 9 MG/ML
INJECTION, SOLUTION INTRAVENOUS CONTINUOUS
Status: ACTIVE | OUTPATIENT
Start: 2018-01-01 | End: 2018-01-01

## 2018-01-01 RX ORDER — FUROSEMIDE 10 MG/ML
20 INJECTION INTRAMUSCULAR; INTRAVENOUS 2 TIMES DAILY
Status: COMPLETED | OUTPATIENT
Start: 2018-01-01 | End: 2018-01-01

## 2018-01-01 RX ORDER — POTASSIUM CL/LIDO/0.9 % NACL 10MEQ/0.1L
10 INTRAVENOUS SOLUTION, PIGGYBACK (ML) INTRAVENOUS
Status: DISCONTINUED | OUTPATIENT
Start: 2018-01-01 | End: 2018-01-01

## 2018-01-01 RX ORDER — DEXAMETHASONE SODIUM PHOSPHATE 4 MG/ML
8 INJECTION, SOLUTION INTRA-ARTICULAR; INTRALESIONAL; INTRAMUSCULAR; INTRAVENOUS; SOFT TISSUE DAILY
Status: DISCONTINUED | OUTPATIENT
Start: 2018-01-01 | End: 2018-01-01 | Stop reason: HOSPADM

## 2018-01-01 RX ORDER — OXYCODONE HCL 20 MG/ML
5-10 CONCENTRATE, ORAL ORAL
Status: DISCONTINUED | OUTPATIENT
Start: 2018-01-01 | End: 2018-01-01 | Stop reason: HOSPADM

## 2018-01-01 RX ORDER — OXYCODONE HYDROCHLORIDE 5 MG/1
5 TABLET ORAL EVERY 4 HOURS PRN
Status: DISCONTINUED | OUTPATIENT
Start: 2018-01-01 | End: 2018-01-01

## 2018-01-01 RX ORDER — ACETAMINOPHEN 325 MG/1
650 TABLET ORAL EVERY 6 HOURS PRN
Start: 2018-01-01 | End: 2024-08-08

## 2018-01-01 RX ORDER — OXYCODONE HYDROCHLORIDE 5 MG/1
10-15 TABLET ORAL
Status: DISCONTINUED | OUTPATIENT
Start: 2018-01-01 | End: 2018-01-01

## 2018-01-01 RX ORDER — HALOPERIDOL 2 MG/ML
.5-1 SOLUTION ORAL EVERY 4 HOURS PRN
Status: DISCONTINUED | OUTPATIENT
Start: 2018-01-01 | End: 2018-01-01 | Stop reason: HOSPADM

## 2018-01-01 RX ORDER — LIDOCAINE 4 G/G
2 PATCH TOPICAL
Status: DISCONTINUED | OUTPATIENT
Start: 2018-01-01 | End: 2018-01-01 | Stop reason: HOSPADM

## 2018-01-01 RX ORDER — CEFTRIAXONE 1 G/1
1 INJECTION, POWDER, FOR SOLUTION INTRAMUSCULAR; INTRAVENOUS EVERY 24 HOURS
Status: COMPLETED | OUTPATIENT
Start: 2018-01-01 | End: 2018-01-01

## 2018-01-01 RX ORDER — SODIUM CHLORIDE 9 MG/ML
INJECTION, SOLUTION INTRAVENOUS CONTINUOUS
Status: DISPENSED | OUTPATIENT
Start: 2018-01-01 | End: 2018-01-01

## 2018-01-01 RX ORDER — FUROSEMIDE 10 MG/ML
40 INJECTION INTRAMUSCULAR; INTRAVENOUS EVERY 8 HOURS
Status: DISCONTINUED | OUTPATIENT
Start: 2018-01-01 | End: 2018-01-01

## 2018-01-01 RX ORDER — LIDOCAINE HYDROCHLORIDE 20 MG/ML
INJECTION, SOLUTION INFILTRATION; PERINEURAL PRN
Status: DISCONTINUED | OUTPATIENT
Start: 2018-01-01 | End: 2018-01-01

## 2018-01-01 RX ORDER — BISACODYL 10 MG
10 SUPPOSITORY, RECTAL RECTAL DAILY PRN
COMMUNITY

## 2018-01-01 RX ORDER — FUROSEMIDE 10 MG/ML
20 INJECTION INTRAMUSCULAR; INTRAVENOUS EVERY 6 HOURS
Status: DISCONTINUED | OUTPATIENT
Start: 2018-01-01 | End: 2018-01-01

## 2018-01-01 RX ORDER — ENALAPRIL MALEATE 5 MG/1
5 TABLET ORAL DAILY
COMMUNITY

## 2018-01-01 RX ORDER — KETOROLAC TROMETHAMINE 15 MG/ML
30 INJECTION, SOLUTION INTRAMUSCULAR; INTRAVENOUS EVERY 6 HOURS PRN
Status: DISCONTINUED | OUTPATIENT
Start: 2018-01-01 | End: 2018-01-01

## 2018-01-01 RX ORDER — OXYCODONE HYDROCHLORIDE 100 MG/5ML
2.5-5 SOLUTION ORAL EVERY 6 HOURS PRN
Start: 2018-01-01 | End: 2024-08-08

## 2018-01-01 RX ORDER — OXYCODONE HYDROCHLORIDE 100 MG/5ML
2.5-5 SOLUTION ORAL EVERY 6 HOURS PRN
Status: DISCONTINUED | OUTPATIENT
Start: 2018-01-01 | End: 2018-01-01

## 2018-01-01 RX ORDER — LEVALBUTEROL INHALATION SOLUTION 0.63 MG/3ML
0.63 SOLUTION RESPIRATORY (INHALATION) EVERY 4 HOURS PRN
Status: DISCONTINUED | OUTPATIENT
Start: 2018-01-01 | End: 2018-01-01 | Stop reason: HOSPADM

## 2018-01-01 RX ORDER — GLYCOPYRROLATE 1 MG/1
2-4 TABLET ORAL EVERY 4 HOURS PRN
Status: DISCONTINUED | OUTPATIENT
Start: 2018-01-01 | End: 2018-01-01 | Stop reason: HOSPADM

## 2018-01-01 RX ORDER — HYDROMORPHONE HYDROCHLORIDE 1 MG/ML
0.5 INJECTION, SOLUTION INTRAMUSCULAR; INTRAVENOUS; SUBCUTANEOUS ONCE
Status: COMPLETED | OUTPATIENT
Start: 2018-01-01 | End: 2018-01-01

## 2018-01-01 RX ORDER — SODIUM CHLORIDE, SODIUM LACTATE, POTASSIUM CHLORIDE, CALCIUM CHLORIDE 600; 310; 30; 20 MG/100ML; MG/100ML; MG/100ML; MG/100ML
INJECTION, SOLUTION INTRAVENOUS CONTINUOUS
Status: DISCONTINUED | OUTPATIENT
Start: 2018-01-01 | End: 2018-01-01

## 2018-01-01 RX ORDER — DEXAMETHASONE SODIUM PHOSPHATE 4 MG/ML
8 INJECTION, SOLUTION INTRA-ARTICULAR; INTRALESIONAL; INTRAMUSCULAR; INTRAVENOUS; SOFT TISSUE DAILY
Status: DISCONTINUED | OUTPATIENT
Start: 2018-01-01 | End: 2018-01-01

## 2018-01-01 RX ORDER — TRAMADOL HYDROCHLORIDE 50 MG/1
25 TABLET ORAL EVERY 6 HOURS PRN
Qty: 10 TABLET | Refills: 0 | Status: ON HOLD | OUTPATIENT
Start: 2018-01-01 | End: 2018-01-01

## 2018-01-01 RX ORDER — LORAZEPAM 2 MG/ML
.5-1 INJECTION INTRAMUSCULAR
Status: DISCONTINUED | OUTPATIENT
Start: 2018-01-01 | End: 2018-01-01 | Stop reason: HOSPADM

## 2018-01-01 RX ORDER — IOPAMIDOL 755 MG/ML
135 INJECTION, SOLUTION INTRAVASCULAR ONCE
Status: COMPLETED | OUTPATIENT
Start: 2018-01-01 | End: 2018-01-01

## 2018-01-01 RX ORDER — SODIUM CHLORIDE, SODIUM LACTATE, POTASSIUM CHLORIDE, CALCIUM CHLORIDE 600; 310; 30; 20 MG/100ML; MG/100ML; MG/100ML; MG/100ML
INJECTION, SOLUTION INTRAVENOUS CONTINUOUS
Status: DISCONTINUED | OUTPATIENT
Start: 2018-01-01 | End: 2018-01-01 | Stop reason: HOSPADM

## 2018-01-01 RX ORDER — OXYCODONE HYDROCHLORIDE 10 MG/1
10 TABLET ORAL
Status: DISCONTINUED | OUTPATIENT
Start: 2018-01-01 | End: 2018-01-01

## 2018-01-01 RX ORDER — OXYCODONE HCL 20 MG/ML
5-10 CONCENTRATE, ORAL ORAL EVERY 4 HOURS PRN
Status: DISCONTINUED | OUTPATIENT
Start: 2018-01-01 | End: 2018-01-01

## 2018-01-01 RX ORDER — SENNOSIDES 8.6 MG
8.6 TABLET ORAL 2 TIMES DAILY PRN
Status: DISCONTINUED | OUTPATIENT
Start: 2018-01-01 | End: 2018-01-01 | Stop reason: HOSPADM

## 2018-01-01 RX ORDER — MORPHINE SULFATE 2 MG/ML
2 INJECTION, SOLUTION INTRAMUSCULAR; INTRAVENOUS ONCE
Status: COMPLETED | OUTPATIENT
Start: 2018-01-01 | End: 2018-01-01

## 2018-01-01 RX ORDER — FUROSEMIDE 10 MG/ML
40 INJECTION INTRAMUSCULAR; INTRAVENOUS EVERY 6 HOURS
Status: DISCONTINUED | OUTPATIENT
Start: 2018-01-01 | End: 2018-01-01

## 2018-01-01 RX ORDER — FUROSEMIDE 10 MG/ML
30 INJECTION INTRAMUSCULAR; INTRAVENOUS EVERY 6 HOURS SCHEDULED
Status: DISCONTINUED | OUTPATIENT
Start: 2018-01-01 | End: 2018-01-01

## 2018-01-01 RX ORDER — FUROSEMIDE 20 MG
40 TABLET ORAL
Status: DISCONTINUED | OUTPATIENT
Start: 2018-01-01 | End: 2018-01-01

## 2018-01-01 RX ORDER — ONDANSETRON 4 MG/1
4 TABLET, ORALLY DISINTEGRATING ORAL EVERY 6 HOURS PRN
Start: 2018-01-01 | End: 2024-08-08

## 2018-01-01 RX ORDER — ACETAMINOPHEN 325 MG/1
975 TABLET ORAL EVERY 8 HOURS
Status: DISCONTINUED | OUTPATIENT
Start: 2018-01-01 | End: 2018-01-01 | Stop reason: HOSPADM

## 2018-01-01 RX ORDER — LORAZEPAM 2 MG/ML
.5-1 INJECTION INTRAMUSCULAR
Qty: 6 ML
Start: 2018-01-01 | End: 2024-08-08

## 2018-01-01 RX ORDER — MAGNESIUM HYDROXIDE 1200 MG/15ML
LIQUID ORAL PRN
Status: DISCONTINUED | OUTPATIENT
Start: 2018-01-01 | End: 2018-01-01 | Stop reason: HOSPADM

## 2018-01-01 RX ORDER — LEVOFLOXACIN 5 MG/ML
750 INJECTION, SOLUTION INTRAVENOUS EVERY 24 HOURS
Status: DISCONTINUED | OUTPATIENT
Start: 2018-01-01 | End: 2018-01-01

## 2018-01-01 RX ORDER — MAGNESIUM SULFATE HEPTAHYDRATE 40 MG/ML
2 INJECTION, SOLUTION INTRAVENOUS ONCE
Status: COMPLETED | OUTPATIENT
Start: 2018-01-01 | End: 2018-01-01

## 2018-01-01 RX ORDER — TRAMADOL HYDROCHLORIDE 50 MG/1
100 TABLET ORAL EVERY 6 HOURS
Qty: 24 TABLET | Refills: 0
Start: 2018-01-01 | End: 2024-08-08

## 2018-01-01 RX ORDER — FUROSEMIDE 10 MG/ML
30 INJECTION INTRAMUSCULAR; INTRAVENOUS EVERY 8 HOURS
Status: DISCONTINUED | OUTPATIENT
Start: 2018-01-01 | End: 2018-01-01

## 2018-01-01 RX ORDER — CALCITONIN SALMON 200 [IU]/.09ML
1 SPRAY, METERED NASAL DAILY
COMMUNITY

## 2018-01-01 RX ORDER — GLYCOPYRROLATE 2 MG/1
2-4 TABLET ORAL EVERY 4 HOURS PRN
Start: 2018-01-01 | End: 2024-08-08

## 2018-01-01 RX ORDER — POLYETHYLENE GLYCOL 3350 17 G/17G
17 POWDER, FOR SOLUTION ORAL 3 TIMES DAILY
Status: DISCONTINUED | OUTPATIENT
Start: 2018-01-01 | End: 2018-01-01

## 2018-01-01 RX ADMIN — ACETAMINOPHEN 975 MG: 325 TABLET, FILM COATED ORAL at 04:46

## 2018-01-01 RX ADMIN — METOPROLOL TARTRATE 100 MG: 50 TABLET ORAL at 10:13

## 2018-01-01 RX ADMIN — ATROPINE SULFATE 2 DROP: 10 SOLUTION OPHTHALMIC at 05:27

## 2018-01-01 RX ADMIN — OXYCODONE HYDROCHLORIDE 5 MG: 5 TABLET ORAL at 07:08

## 2018-01-01 RX ADMIN — TRAMADOL HYDROCHLORIDE 100 MG: 50 TABLET, COATED ORAL at 18:01

## 2018-01-01 RX ADMIN — TRAMADOL HYDROCHLORIDE 100 MG: 50 TABLET, COATED ORAL at 23:29

## 2018-01-01 RX ADMIN — TRAMADOL HYDROCHLORIDE 100 MG: 50 TABLET, COATED ORAL at 01:53

## 2018-01-01 RX ADMIN — METOPROLOL TARTRATE 100 MG: 50 TABLET ORAL at 20:32

## 2018-01-01 RX ADMIN — SODIUM CHLORIDE: 9 INJECTION, SOLUTION INTRAVENOUS at 05:14

## 2018-01-01 RX ADMIN — ACETAMINOPHEN 650 MG: 325 TABLET, FILM COATED ORAL at 05:09

## 2018-01-01 RX ADMIN — OXYCODONE HYDROCHLORIDE 10 MG: 100 SOLUTION ORAL at 05:13

## 2018-01-01 RX ADMIN — ACETAMINOPHEN 650 MG: 325 TABLET, FILM COATED ORAL at 05:23

## 2018-01-01 RX ADMIN — SODIUM CHLORIDE: 9 INJECTION, SOLUTION INTRAVENOUS at 03:58

## 2018-01-01 RX ADMIN — SENNOSIDES AND DOCUSATE SODIUM 2 TABLET: 8.6; 5 TABLET ORAL at 19:50

## 2018-01-01 RX ADMIN — POTASSIUM & SODIUM PHOSPHATES POWDER PACK 280-160-250 MG 1 PACKET: 280-160-250 PACK at 13:34

## 2018-01-01 RX ADMIN — Medication 2.5 MG: at 23:44

## 2018-01-01 RX ADMIN — LIDOCAINE 2 PATCH: 560 PATCH PERCUTANEOUS; TOPICAL; TRANSDERMAL at 10:55

## 2018-01-01 RX ADMIN — OXYCODONE HYDROCHLORIDE 10 MG: 100 SOLUTION ORAL at 12:11

## 2018-01-01 RX ADMIN — TRAMADOL HYDROCHLORIDE 100 MG: 50 TABLET, COATED ORAL at 16:26

## 2018-01-01 RX ADMIN — FUROSEMIDE 20 MG: 10 INJECTION, SOLUTION INTRAVENOUS at 14:04

## 2018-01-01 RX ADMIN — LOPERAMIDE HYDROCHLORIDE 2 MG: 2 CAPSULE ORAL at 14:34

## 2018-01-01 RX ADMIN — METOPROLOL TARTRATE 100 MG: 50 TABLET ORAL at 19:45

## 2018-01-01 RX ADMIN — TRAMADOL HYDROCHLORIDE 50 MG: 50 TABLET, COATED ORAL at 21:48

## 2018-01-01 RX ADMIN — Medication 10 MG: at 13:36

## 2018-01-01 RX ADMIN — FUROSEMIDE 20 MG: 10 INJECTION, SOLUTION INTRAVENOUS at 13:22

## 2018-01-01 RX ADMIN — SODIUM CHLORIDE: 9 INJECTION, SOLUTION INTRAVENOUS at 21:06

## 2018-01-01 RX ADMIN — AMPICILLIN AND SULBACTAM 3 G: 1; 2 INJECTION, POWDER, FOR SOLUTION INTRAMUSCULAR; INTRAVENOUS at 06:10

## 2018-01-01 RX ADMIN — SODIUM CHLORIDE: 9 INJECTION, SOLUTION INTRAVENOUS at 06:02

## 2018-01-01 RX ADMIN — METOPROLOL TARTRATE 100 MG: 50 TABLET ORAL at 08:50

## 2018-01-01 RX ADMIN — TRAMADOL HYDROCHLORIDE 100 MG: 50 TABLET, COATED ORAL at 10:27

## 2018-01-01 RX ADMIN — AMPICILLIN AND SULBACTAM 3 G: 1; 2 INJECTION, POWDER, FOR SOLUTION INTRAMUSCULAR; INTRAVENOUS at 06:33

## 2018-01-01 RX ADMIN — Medication 4 MG: at 10:10

## 2018-01-01 RX ADMIN — Medication 2.5 MG: at 08:40

## 2018-01-01 RX ADMIN — OMEPRAZOLE 20 MG: 20 CAPSULE, DELAYED RELEASE ORAL at 08:35

## 2018-01-01 RX ADMIN — SODIUM CHLORIDE: 9 INJECTION, SOLUTION INTRAVENOUS at 18:07

## 2018-01-01 RX ADMIN — OXYCODONE HYDROCHLORIDE 2.6 MG: 100 SOLUTION ORAL at 01:51

## 2018-01-01 RX ADMIN — ACETAMINOPHEN 975 MG: 325 TABLET, FILM COATED ORAL at 16:27

## 2018-01-01 RX ADMIN — AMPICILLIN AND SULBACTAM 3 G: 1; 2 INJECTION, POWDER, FOR SOLUTION INTRAMUSCULAR; INTRAVENOUS at 05:53

## 2018-01-01 RX ADMIN — FUROSEMIDE 30 MG: 10 INJECTION, SOLUTION INTRAVENOUS at 00:02

## 2018-01-01 RX ADMIN — VITAMIN D, TAB 1000IU (100/BT) 2000 UNITS: 25 TAB at 09:18

## 2018-01-01 RX ADMIN — OXYCODONE HYDROCHLORIDE 10 MG: 100 SOLUTION ORAL at 22:56

## 2018-01-01 RX ADMIN — ENOXAPARIN SODIUM 40 MG: 100 INJECTION SUBCUTANEOUS at 13:22

## 2018-01-01 RX ADMIN — POTASSIUM PHOSPHATE, MONOBASIC AND POTASSIUM PHOSPHATE, DIBASIC 20 MMOL: 224; 236 INJECTION, SOLUTION INTRAVENOUS at 12:07

## 2018-01-01 RX ADMIN — ACETAMINOPHEN 650 MG: 325 TABLET, FILM COATED ORAL at 15:33

## 2018-01-01 RX ADMIN — OXYCODONE HYDROCHLORIDE 5 MG: 5 TABLET ORAL at 09:46

## 2018-01-01 RX ADMIN — DIBASIC SODIUM PHOSPHATE, MONOBASIC POTASSIUM PHOSPHATE AND MONOBASIC SODIUM PHOSPHATE 500 MG: 852; 155; 130 TABLET ORAL at 10:10

## 2018-01-01 RX ADMIN — Medication 25 MG: at 17:47

## 2018-01-01 RX ADMIN — Medication 8 MG: at 08:52

## 2018-01-01 RX ADMIN — MAGNESIUM SULFATE HEPTAHYDRATE 2 G: 40 INJECTION, SOLUTION INTRAVENOUS at 14:46

## 2018-01-01 RX ADMIN — TRAMADOL HYDROCHLORIDE 100 MG: 50 TABLET, COATED ORAL at 05:44

## 2018-01-01 RX ADMIN — METOPROLOL TARTRATE 100 MG: 50 TABLET, FILM COATED ORAL at 08:58

## 2018-01-01 RX ADMIN — SODIUM CHLORIDE: 9 INJECTION, SOLUTION INTRAVENOUS at 17:00

## 2018-01-01 RX ADMIN — TRAMADOL HYDROCHLORIDE 100 MG: 50 TABLET, COATED ORAL at 11:30

## 2018-01-01 RX ADMIN — MICONAZOLE NITRATE: 2 POWDER TOPICAL at 19:54

## 2018-01-01 RX ADMIN — ENALAPRIL MALEATE 5 MG: 5 TABLET ORAL at 08:13

## 2018-01-01 RX ADMIN — FUROSEMIDE 20 MG: 10 INJECTION, SOLUTION INTRAVENOUS at 09:29

## 2018-01-01 RX ADMIN — CALCITONIN SALMON 400 UNITS: 200 INJECTION, SOLUTION INTRAMUSCULAR; SUBCUTANEOUS at 00:35

## 2018-01-01 RX ADMIN — ASPIRIN 325 MG: 325 TABLET, DELAYED RELEASE ORAL at 09:46

## 2018-01-01 RX ADMIN — FUROSEMIDE 40 MG: 20 TABLET ORAL at 16:48

## 2018-01-01 RX ADMIN — AMPICILLIN AND SULBACTAM 3 G: 1; 2 INJECTION, POWDER, FOR SOLUTION INTRAMUSCULAR; INTRAVENOUS at 17:40

## 2018-01-01 RX ADMIN — SODIUM CHLORIDE, PRESERVATIVE FREE 84 ML: 5 INJECTION INTRAVENOUS at 12:22

## 2018-01-01 RX ADMIN — CALCITONIN SALMON 600 UNITS: 200 INJECTION, SOLUTION INTRAMUSCULAR; SUBCUTANEOUS at 18:30

## 2018-01-01 RX ADMIN — FUROSEMIDE 40 MG: 20 TABLET ORAL at 09:02

## 2018-01-01 RX ADMIN — HYDROMORPHONE HYDROCHLORIDE 0.5 MG: 1 SOLUTION ORAL at 05:01

## 2018-01-01 RX ADMIN — SODIUM CHLORIDE: 9 INJECTION, SOLUTION INTRAVENOUS at 19:35

## 2018-01-01 RX ADMIN — POTASSIUM & SODIUM PHOSPHATES POWDER PACK 280-160-250 MG 1 PACKET: 280-160-250 PACK at 11:03

## 2018-01-01 RX ADMIN — OMEPRAZOLE 20 MG: 20 CAPSULE, DELAYED RELEASE ORAL at 09:19

## 2018-01-01 RX ADMIN — SODIUM CHLORIDE: 9 INJECTION, SOLUTION INTRAVENOUS at 05:34

## 2018-01-01 RX ADMIN — ENALAPRIL MALEATE 5 MG: 5 TABLET ORAL at 08:14

## 2018-01-01 RX ADMIN — AMPICILLIN AND SULBACTAM 3 G: 1; 2 INJECTION, POWDER, FOR SOLUTION INTRAMUSCULAR; INTRAVENOUS at 23:23

## 2018-01-01 RX ADMIN — SODIUM CHLORIDE: 9 INJECTION, SOLUTION INTRAVENOUS at 06:42

## 2018-01-01 RX ADMIN — PROPOFOL 200 MG: 10 INJECTION, EMULSION INTRAVENOUS at 13:44

## 2018-01-01 RX ADMIN — METOPROLOL TARTRATE 100 MG: 50 TABLET ORAL at 10:10

## 2018-01-01 RX ADMIN — METOPROLOL TARTRATE 100 MG: 50 TABLET, FILM COATED ORAL at 20:04

## 2018-01-01 RX ADMIN — OXYCODONE HYDROCHLORIDE 15 MG: 5 TABLET ORAL at 22:12

## 2018-01-01 RX ADMIN — MICONAZOLE NITRATE: 2 POWDER TOPICAL at 19:26

## 2018-01-01 RX ADMIN — ROCURONIUM BROMIDE 10 MG: 10 INJECTION INTRAVENOUS at 15:10

## 2018-01-01 RX ADMIN — Medication 25 MG: at 14:34

## 2018-01-01 RX ADMIN — TRAMADOL HYDROCHLORIDE 100 MG: 50 TABLET, COATED ORAL at 16:11

## 2018-01-01 RX ADMIN — MICONAZOLE NITRATE: 2 POWDER TOPICAL at 20:32

## 2018-01-01 RX ADMIN — Medication 25 MG: at 08:15

## 2018-01-01 RX ADMIN — METOPROLOL TARTRATE 100 MG: 50 TABLET ORAL at 08:40

## 2018-01-01 RX ADMIN — ACETAMINOPHEN 650 MG: 325 TABLET, FILM COATED ORAL at 13:42

## 2018-01-01 RX ADMIN — ENOXAPARIN SODIUM 40 MG: 40 INJECTION SUBCUTANEOUS at 20:17

## 2018-01-01 RX ADMIN — Medication 2.5 MG: at 02:27

## 2018-01-01 RX ADMIN — METOPROLOL TARTRATE 100 MG: 50 TABLET ORAL at 08:24

## 2018-01-01 RX ADMIN — ACETAMINOPHEN 975 MG: 325 TABLET, FILM COATED ORAL at 08:15

## 2018-01-01 RX ADMIN — DIBASIC SODIUM PHOSPHATE, MONOBASIC POTASSIUM PHOSPHATE AND MONOBASIC SODIUM PHOSPHATE 500 MG: 852; 155; 130 TABLET ORAL at 08:50

## 2018-01-01 RX ADMIN — METOPROLOL TARTRATE 100 MG: 50 TABLET ORAL at 09:02

## 2018-01-01 RX ADMIN — Medication 15 MG: at 13:41

## 2018-01-01 RX ADMIN — ACETAMINOPHEN 650 MG: 325 TABLET, FILM COATED ORAL at 22:01

## 2018-01-01 RX ADMIN — DIBASIC SODIUM PHOSPHATE, MONOBASIC POTASSIUM PHOSPHATE AND MONOBASIC SODIUM PHOSPHATE 500 MG: 852; 155; 130 TABLET ORAL at 08:59

## 2018-01-01 RX ADMIN — SENNOSIDES AND DOCUSATE SODIUM 2 TABLET: 8.6; 5 TABLET ORAL at 08:36

## 2018-01-01 RX ADMIN — ACETAMINOPHEN 975 MG: 325 TABLET, FILM COATED ORAL at 17:33

## 2018-01-01 RX ADMIN — SENNOSIDES AND DOCUSATE SODIUM 2 TABLET: 8.6; 5 TABLET ORAL at 21:04

## 2018-01-01 RX ADMIN — FUROSEMIDE 20 MG: 10 INJECTION, SOLUTION INTRAVENOUS at 08:45

## 2018-01-01 RX ADMIN — LEVALBUTEROL HYDROCHLORIDE 0.63 MG: 0.63 SOLUTION RESPIRATORY (INHALATION) at 00:22

## 2018-01-01 RX ADMIN — POTASSIUM PHOSPHATE, MONOBASIC AND POTASSIUM PHOSPHATE, DIBASIC 20 MMOL: 224; 236 INJECTION, SOLUTION INTRAVENOUS at 23:59

## 2018-01-01 RX ADMIN — OXYCODONE HYDROCHLORIDE 2.6 MG: 100 SOLUTION ORAL at 04:42

## 2018-01-01 RX ADMIN — ACETAMINOPHEN 650 MG: 325 TABLET, FILM COATED ORAL at 09:02

## 2018-01-01 RX ADMIN — ACETAMINOPHEN 975 MG: 325 TABLET, FILM COATED ORAL at 16:52

## 2018-01-01 RX ADMIN — Medication 25 MG: at 19:25

## 2018-01-01 RX ADMIN — Medication 2 G: at 10:51

## 2018-01-01 RX ADMIN — FUROSEMIDE 20 MG: 10 INJECTION, SOLUTION INTRAVENOUS at 12:02

## 2018-01-01 RX ADMIN — ACETAMINOPHEN 975 MG: 325 TABLET, FILM COATED ORAL at 14:08

## 2018-01-01 RX ADMIN — HYDROMORPHONE HYDROCHLORIDE 0.5 MG: 1 INJECTION, SOLUTION INTRAMUSCULAR; INTRAVENOUS; SUBCUTANEOUS at 15:20

## 2018-01-01 RX ADMIN — HYDROMORPHONE HYDROCHLORIDE 0.5 MG: 1 INJECTION, SOLUTION INTRAMUSCULAR; INTRAVENOUS; SUBCUTANEOUS at 15:43

## 2018-01-01 RX ADMIN — FUROSEMIDE 20 MG: 10 INJECTION, SOLUTION INTRAVENOUS at 20:30

## 2018-01-01 RX ADMIN — SENNOSIDES AND DOCUSATE SODIUM 2 TABLET: 8.6; 5 TABLET ORAL at 09:03

## 2018-01-01 RX ADMIN — TRAMADOL HYDROCHLORIDE 100 MG: 50 TABLET, COATED ORAL at 00:02

## 2018-01-01 RX ADMIN — FUROSEMIDE 30 MG: 10 INJECTION, SOLUTION INTRAVENOUS at 23:59

## 2018-01-01 RX ADMIN — Medication 4 MG: at 08:38

## 2018-01-01 RX ADMIN — DIBASIC SODIUM PHOSPHATE, MONOBASIC POTASSIUM PHOSPHATE AND MONOBASIC SODIUM PHOSPHATE 500 MG: 852; 155; 130 TABLET ORAL at 08:25

## 2018-01-01 RX ADMIN — DIBASIC SODIUM PHOSPHATE, MONOBASIC POTASSIUM PHOSPHATE AND MONOBASIC SODIUM PHOSPHATE 500 MG: 852; 155; 130 TABLET ORAL at 20:34

## 2018-01-01 RX ADMIN — Medication 0.5 MG: at 22:00

## 2018-01-01 RX ADMIN — OMEPRAZOLE 20 MG: 20 CAPSULE, DELAYED RELEASE ORAL at 08:14

## 2018-01-01 RX ADMIN — FUROSEMIDE 40 MG: 20 TABLET ORAL at 08:25

## 2018-01-01 RX ADMIN — METOPROLOL TARTRATE 100 MG: 50 TABLET, FILM COATED ORAL at 07:48

## 2018-01-01 RX ADMIN — AMPICILLIN AND SULBACTAM 3 G: 1; 2 INJECTION, POWDER, FOR SOLUTION INTRAMUSCULAR; INTRAVENOUS at 17:46

## 2018-01-01 RX ADMIN — ATROPINE SULFATE 2 DROP: 10 SOLUTION OPHTHALMIC at 18:20

## 2018-01-01 RX ADMIN — OXYCODONE HYDROCHLORIDE 5 MG: 5 TABLET ORAL at 21:04

## 2018-01-01 RX ADMIN — OXYCODONE HYDROCHLORIDE 10 MG: 100 SOLUTION ORAL at 12:46

## 2018-01-01 RX ADMIN — ENOXAPARIN SODIUM 40 MG: 100 INJECTION SUBCUTANEOUS at 13:10

## 2018-01-01 RX ADMIN — METOPROLOL TARTRATE 100 MG: 50 TABLET, FILM COATED ORAL at 08:57

## 2018-01-01 RX ADMIN — ENOXAPARIN SODIUM 40 MG: 100 INJECTION SUBCUTANEOUS at 11:33

## 2018-01-01 RX ADMIN — TRAMADOL HYDROCHLORIDE 100 MG: 50 TABLET, COATED ORAL at 05:02

## 2018-01-01 RX ADMIN — FUROSEMIDE 20 MG: 10 INJECTION, SOLUTION INTRAMUSCULAR; INTRAVENOUS at 02:41

## 2018-01-01 RX ADMIN — OXYCODONE HYDROCHLORIDE 10 MG: 100 SOLUTION ORAL at 19:32

## 2018-01-01 RX ADMIN — FUROSEMIDE 40 MG: 20 TABLET ORAL at 16:11

## 2018-01-01 RX ADMIN — FUROSEMIDE 40 MG: 20 TABLET ORAL at 16:26

## 2018-01-01 RX ADMIN — OXYCODONE HYDROCHLORIDE 5 MG: 5 TABLET ORAL at 04:57

## 2018-01-01 RX ADMIN — DIBASIC SODIUM PHOSPHATE, MONOBASIC POTASSIUM PHOSPHATE AND MONOBASIC SODIUM PHOSPHATE 500 MG: 852; 155; 130 TABLET ORAL at 08:17

## 2018-01-01 RX ADMIN — SENNOSIDES AND DOCUSATE SODIUM 2 TABLET: 8.6; 5 TABLET ORAL at 09:45

## 2018-01-01 RX ADMIN — POTASSIUM PHOSPHATE, MONOBASIC AND POTASSIUM PHOSPHATE, DIBASIC 15 MMOL: 224; 236 INJECTION, SOLUTION INTRAVENOUS at 08:39

## 2018-01-01 RX ADMIN — METOPROLOL TARTRATE 100 MG: 50 TABLET, FILM COATED ORAL at 08:40

## 2018-01-01 RX ADMIN — LIDOCAINE 2 PATCH: 560 PATCH PERCUTANEOUS; TOPICAL; TRANSDERMAL at 12:01

## 2018-01-01 RX ADMIN — OXYCODONE HYDROCHLORIDE 5 MG: 5 TABLET ORAL at 11:58

## 2018-01-01 RX ADMIN — POTASSIUM CHLORIDE 20 MEQ: 750 TABLET, EXTENDED RELEASE ORAL at 16:01

## 2018-01-01 RX ADMIN — TRAMADOL HYDROCHLORIDE 100 MG: 50 TABLET, COATED ORAL at 13:42

## 2018-01-01 RX ADMIN — SUGAMMADEX 300 MG: 100 INJECTION, SOLUTION INTRAVENOUS at 17:33

## 2018-01-01 RX ADMIN — METOPROLOL TARTRATE 100 MG: 50 TABLET ORAL at 20:26

## 2018-01-01 RX ADMIN — VANCOMYCIN HYDROCHLORIDE 2000 MG: 10 INJECTION, POWDER, LYOPHILIZED, FOR SOLUTION INTRAVENOUS at 23:53

## 2018-01-01 RX ADMIN — CALCITONIN SALMON 600 UNITS: 200 INJECTION, SOLUTION INTRAMUSCULAR; SUBCUTANEOUS at 18:27

## 2018-01-01 RX ADMIN — ACETAMINOPHEN 975 MG: 325 TABLET, FILM COATED ORAL at 23:09

## 2018-01-01 RX ADMIN — CALCITONIN SALMON 600 UNITS: 200 INJECTION, SOLUTION INTRAMUSCULAR; SUBCUTANEOUS at 04:58

## 2018-01-01 RX ADMIN — SODIUM CHLORIDE: 9 INJECTION, SOLUTION INTRAVENOUS at 18:32

## 2018-01-01 RX ADMIN — SODIUM CHLORIDE, POTASSIUM CHLORIDE, SODIUM LACTATE AND CALCIUM CHLORIDE: 600; 310; 30; 20 INJECTION, SOLUTION INTRAVENOUS at 08:03

## 2018-01-01 RX ADMIN — OXYCODONE HYDROCHLORIDE 10 MG: 100 SOLUTION ORAL at 07:54

## 2018-01-01 RX ADMIN — METOPROLOL TARTRATE 100 MG: 50 TABLET ORAL at 08:38

## 2018-01-01 RX ADMIN — CALCITONIN SALMON 600 UNITS: 200 INJECTION, SOLUTION INTRAMUSCULAR; SUBCUTANEOUS at 12:08

## 2018-01-01 RX ADMIN — OXYCODONE HYDROCHLORIDE 10 MG: 100 SOLUTION ORAL at 08:52

## 2018-01-01 RX ADMIN — FUROSEMIDE 20 MG: 10 INJECTION, SOLUTION INTRAVENOUS at 17:47

## 2018-01-01 RX ADMIN — OXYCODONE HYDROCHLORIDE 2.6 MG: 100 SOLUTION ORAL at 16:32

## 2018-01-01 RX ADMIN — ENOXAPARIN SODIUM 40 MG: 40 INJECTION SUBCUTANEOUS at 19:37

## 2018-01-01 RX ADMIN — Medication 25 MG: at 20:28

## 2018-01-01 RX ADMIN — Medication 25 MG: at 01:34

## 2018-01-01 RX ADMIN — ACETAMINOPHEN 975 MG: 325 TABLET, FILM COATED ORAL at 06:41

## 2018-01-01 RX ADMIN — KETOROLAC TROMETHAMINE 30 MG: 15 INJECTION, SOLUTION INTRAMUSCULAR; INTRAVENOUS at 09:55

## 2018-01-01 RX ADMIN — CEFTRIAXONE 1 G: 1 INJECTION, POWDER, FOR SOLUTION INTRAMUSCULAR; INTRAVENOUS at 11:49

## 2018-01-01 RX ADMIN — ACETAMINOPHEN 975 MG: 325 TABLET, FILM COATED ORAL at 17:47

## 2018-01-01 RX ADMIN — AMPICILLIN AND SULBACTAM 3 G: 1; 2 INJECTION, POWDER, FOR SOLUTION INTRAMUSCULAR; INTRAVENOUS at 23:56

## 2018-01-01 RX ADMIN — ENALAPRIL MALEATE 5 MG: 5 TABLET ORAL at 09:18

## 2018-01-01 RX ADMIN — OXYCODONE HYDROCHLORIDE 2.6 MG: 100 SOLUTION ORAL at 09:42

## 2018-01-01 RX ADMIN — ENOXAPARIN SODIUM 40 MG: 100 INJECTION SUBCUTANEOUS at 11:50

## 2018-01-01 RX ADMIN — Medication 0.2 MG: at 18:54

## 2018-01-01 RX ADMIN — DEXAMETHASONE SODIUM PHOSPHATE 8 MG: 4 INJECTION, SOLUTION INTRA-ARTICULAR; INTRALESIONAL; INTRAMUSCULAR; INTRAVENOUS; SOFT TISSUE at 09:14

## 2018-01-01 RX ADMIN — OXYCODONE HYDROCHLORIDE 4.8 MG: 100 SOLUTION ORAL at 03:37

## 2018-01-01 RX ADMIN — POTASSIUM PHOSPHATE, MONOBASIC AND POTASSIUM PHOSPHATE, DIBASIC 20 MMOL: 224; 236 INJECTION, SOLUTION INTRAVENOUS at 20:23

## 2018-01-01 RX ADMIN — Medication 2.5 MG: at 16:26

## 2018-01-01 RX ADMIN — OXYCODONE HYDROCHLORIDE 10 MG: 5 TABLET ORAL at 03:47

## 2018-01-01 RX ADMIN — Medication 25 MG: at 20:17

## 2018-01-01 RX ADMIN — CALCITONIN SALMON 400 UNITS: 200 INJECTION, SOLUTION INTRAMUSCULAR; SUBCUTANEOUS at 03:35

## 2018-01-01 RX ADMIN — SODIUM CHLORIDE: 9 INJECTION, SOLUTION INTRAVENOUS at 08:40

## 2018-01-01 RX ADMIN — METOPROLOL TARTRATE 100 MG: 50 TABLET ORAL at 19:40

## 2018-01-01 RX ADMIN — METOPROLOL TARTRATE 100 MG: 50 TABLET, FILM COATED ORAL at 08:15

## 2018-01-01 RX ADMIN — FENTANYL CITRATE 25 MCG: 50 INJECTION, SOLUTION INTRAMUSCULAR; INTRAVENOUS at 17:15

## 2018-01-01 RX ADMIN — AMPICILLIN AND SULBACTAM 3 G: 1; 2 INJECTION, POWDER, FOR SOLUTION INTRAMUSCULAR; INTRAVENOUS at 00:13

## 2018-01-01 RX ADMIN — ENALAPRIL MALEATE 5 MG: 5 TABLET ORAL at 08:57

## 2018-01-01 RX ADMIN — AMPICILLIN AND SULBACTAM 3 G: 1; 2 INJECTION, POWDER, FOR SOLUTION INTRAMUSCULAR; INTRAVENOUS at 12:32

## 2018-01-01 RX ADMIN — Medication 10 MEQ: at 09:00

## 2018-01-01 RX ADMIN — ACETAMINOPHEN 975 MG: 325 TABLET, FILM COATED ORAL at 18:03

## 2018-01-01 RX ADMIN — ACETAMINOPHEN 650 MG: 325 TABLET, FILM COATED ORAL at 06:20

## 2018-01-01 RX ADMIN — Medication 25 MG: at 11:05

## 2018-01-01 RX ADMIN — TRAMADOL HYDROCHLORIDE 100 MG: 50 TABLET, COATED ORAL at 04:21

## 2018-01-01 RX ADMIN — POTASSIUM CHLORIDE 20 MEQ: 750 TABLET, EXTENDED RELEASE ORAL at 11:50

## 2018-01-01 RX ADMIN — VITAMIN D, TAB 1000IU (100/BT) 2000 UNITS: 25 TAB at 08:14

## 2018-01-01 RX ADMIN — SODIUM CHLORIDE: 9 INJECTION, SOLUTION INTRAVENOUS at 09:01

## 2018-01-01 RX ADMIN — POTASSIUM PHOSPHATE, MONOBASIC AND POTASSIUM PHOSPHATE, DIBASIC 20 MMOL: 224; 236 INJECTION, SOLUTION INTRAVENOUS at 10:36

## 2018-01-01 RX ADMIN — DIBASIC SODIUM PHOSPHATE, MONOBASIC POTASSIUM PHOSPHATE AND MONOBASIC SODIUM PHOSPHATE 500 MG: 852; 155; 130 TABLET ORAL at 08:56

## 2018-01-01 RX ADMIN — DEXAMETHASONE SODIUM PHOSPHATE 8 MG: 4 INJECTION, SOLUTION INTRA-ARTICULAR; INTRALESIONAL; INTRAMUSCULAR; INTRAVENOUS; SOFT TISSUE at 14:29

## 2018-01-01 RX ADMIN — POTASSIUM & SODIUM PHOSPHATES POWDER PACK 280-160-250 MG 1 PACKET: 280-160-250 PACK at 07:49

## 2018-01-01 RX ADMIN — ACETAMINOPHEN 975 MG: 325 TABLET, FILM COATED ORAL at 02:06

## 2018-01-01 RX ADMIN — SODIUM CHLORIDE 1000 ML: 9 INJECTION, SOLUTION INTRAVENOUS at 10:07

## 2018-01-01 RX ADMIN — Medication 25 MG: at 05:06

## 2018-01-01 RX ADMIN — DIBASIC SODIUM PHOSPHATE, MONOBASIC POTASSIUM PHOSPHATE AND MONOBASIC SODIUM PHOSPHATE 500 MG: 852; 155; 130 TABLET ORAL at 18:49

## 2018-01-01 RX ADMIN — POTASSIUM CHLORIDE 40 MEQ: 1.5 POWDER, FOR SOLUTION ORAL at 06:43

## 2018-01-01 RX ADMIN — POTASSIUM PHOSPHATE, MONOBASIC AND POTASSIUM PHOSPHATE, DIBASIC 15 MMOL: 224; 236 INJECTION, SOLUTION INTRAVENOUS at 09:58

## 2018-01-01 RX ADMIN — AMPICILLIN AND SULBACTAM 3 G: 1; 2 INJECTION, POWDER, FOR SOLUTION INTRAMUSCULAR; INTRAVENOUS at 23:46

## 2018-01-01 RX ADMIN — ENOXAPARIN SODIUM 40 MG: 100 INJECTION SUBCUTANEOUS at 21:52

## 2018-01-01 RX ADMIN — MICONAZOLE NITRATE: 2 POWDER TOPICAL at 09:03

## 2018-01-01 RX ADMIN — MICONAZOLE NITRATE: 2 POWDER TOPICAL at 21:47

## 2018-01-01 RX ADMIN — ENOXAPARIN SODIUM 40 MG: 40 INJECTION SUBCUTANEOUS at 21:58

## 2018-01-01 RX ADMIN — AMPICILLIN AND SULBACTAM 3 G: 1; 2 INJECTION, POWDER, FOR SOLUTION INTRAMUSCULAR; INTRAVENOUS at 17:14

## 2018-01-01 RX ADMIN — Medication 25 MG: at 20:18

## 2018-01-01 RX ADMIN — TRAMADOL HYDROCHLORIDE 100 MG: 50 TABLET, COATED ORAL at 08:07

## 2018-01-01 RX ADMIN — OMEPRAZOLE 20 MG: 20 CAPSULE, DELAYED RELEASE ORAL at 10:26

## 2018-01-01 RX ADMIN — OXYCODONE HYDROCHLORIDE 10 MG: 100 SOLUTION ORAL at 05:48

## 2018-01-01 RX ADMIN — Medication 25 MG: at 20:50

## 2018-01-01 RX ADMIN — METOPROLOL TARTRATE 100 MG: 50 TABLET, FILM COATED ORAL at 00:32

## 2018-01-01 RX ADMIN — SODIUM CHLORIDE: 9 INJECTION, SOLUTION INTRAVENOUS at 12:24

## 2018-01-01 RX ADMIN — DIBASIC SODIUM PHOSPHATE, MONOBASIC POTASSIUM PHOSPHATE AND MONOBASIC SODIUM PHOSPHATE 500 MG: 852; 155; 130 TABLET ORAL at 09:02

## 2018-01-01 RX ADMIN — Medication 10 MEQ: at 10:18

## 2018-01-01 RX ADMIN — ASPIRIN 325 MG: 325 TABLET, DELAYED RELEASE ORAL at 20:18

## 2018-01-01 RX ADMIN — Medication 0.5 MG: at 05:23

## 2018-01-01 RX ADMIN — Medication 5 MG: at 20:27

## 2018-01-01 RX ADMIN — IOPAMIDOL 135 ML: 755 INJECTION, SOLUTION INTRAVENOUS at 12:22

## 2018-01-01 RX ADMIN — OXYCODONE HYDROCHLORIDE 2.6 MG: 100 SOLUTION ORAL at 14:00

## 2018-01-01 RX ADMIN — FENTANYL CITRATE 25 MCG: 50 INJECTION, SOLUTION INTRAMUSCULAR; INTRAVENOUS at 13:36

## 2018-01-01 RX ADMIN — TRAMADOL HYDROCHLORIDE 100 MG: 50 TABLET, COATED ORAL at 11:20

## 2018-01-01 RX ADMIN — METOPROLOL TARTRATE 100 MG: 50 TABLET, FILM COATED ORAL at 08:47

## 2018-01-01 RX ADMIN — FUROSEMIDE 10 MG: 10 INJECTION, SOLUTION INTRAVENOUS at 08:59

## 2018-01-01 RX ADMIN — CALCITONIN SALMON 400 UNITS: 200 INJECTION, SOLUTION INTRAMUSCULAR; SUBCUTANEOUS at 14:55

## 2018-01-01 RX ADMIN — ENOXAPARIN SODIUM 40 MG: 100 INJECTION SUBCUTANEOUS at 23:47

## 2018-01-01 RX ADMIN — FUROSEMIDE 20 MG: 10 INJECTION, SOLUTION INTRAVENOUS at 15:24

## 2018-01-01 RX ADMIN — FUROSEMIDE 20 MG: 10 INJECTION, SOLUTION INTRAVENOUS at 16:07

## 2018-01-01 RX ADMIN — DEXAMETHASONE SODIUM PHOSPHATE 8 MG: 4 INJECTION, SOLUTION INTRA-ARTICULAR; INTRALESIONAL; INTRAMUSCULAR; INTRAVENOUS; SOFT TISSUE at 08:56

## 2018-01-01 RX ADMIN — SODIUM CHLORIDE 1000 ML: 9 INJECTION, SOLUTION INTRAVENOUS at 12:21

## 2018-01-01 RX ADMIN — MICONAZOLE NITRATE: 2 POWDER TOPICAL at 09:02

## 2018-01-01 RX ADMIN — METOPROLOL TARTRATE 100 MG: 50 TABLET ORAL at 08:25

## 2018-01-01 RX ADMIN — POTASSIUM PHOSPHATE, MONOBASIC AND POTASSIUM PHOSPHATE, DIBASIC 15 MMOL: 224; 236 INJECTION, SOLUTION INTRAVENOUS at 17:10

## 2018-01-01 RX ADMIN — FUROSEMIDE 40 MG: 20 TABLET ORAL at 16:15

## 2018-01-01 RX ADMIN — ACETAMINOPHEN 975 MG: 325 TABLET, FILM COATED ORAL at 08:40

## 2018-01-01 RX ADMIN — ACETAMINOPHEN 975 MG: 325 TABLET, FILM COATED ORAL at 09:19

## 2018-01-01 RX ADMIN — SODIUM CHLORIDE: 9 INJECTION, SOLUTION INTRAVENOUS at 22:47

## 2018-01-01 RX ADMIN — SODIUM CHLORIDE: 9 INJECTION, SOLUTION INTRAVENOUS at 23:38

## 2018-01-01 RX ADMIN — ACETAMINOPHEN 975 MG: 325 TABLET, FILM COATED ORAL at 23:59

## 2018-01-01 RX ADMIN — LEVOFLOXACIN 750 MG: 5 INJECTION, SOLUTION INTRAVENOUS at 08:21

## 2018-01-01 RX ADMIN — DIBASIC SODIUM PHOSPHATE, MONOBASIC POTASSIUM PHOSPHATE AND MONOBASIC SODIUM PHOSPHATE 500 MG: 852; 155; 130 TABLET ORAL at 19:48

## 2018-01-01 RX ADMIN — FUROSEMIDE 20 MG: 10 INJECTION, SOLUTION INTRAVENOUS at 16:25

## 2018-01-01 RX ADMIN — MICONAZOLE NITRATE: 2 POWDER TOPICAL at 20:31

## 2018-01-01 RX ADMIN — CALCITONIN SALMON 600 UNITS: 200 INJECTION, SOLUTION INTRAMUSCULAR; SUBCUTANEOUS at 15:46

## 2018-01-01 RX ADMIN — Medication 10 MEQ: at 13:25

## 2018-01-01 RX ADMIN — ACETAMINOPHEN 650 MG: 325 TABLET, FILM COATED ORAL at 06:18

## 2018-01-01 RX ADMIN — OXYCODONE HYDROCHLORIDE 10 MG: 100 SOLUTION ORAL at 12:08

## 2018-01-01 RX ADMIN — SODIUM CHLORIDE: 9 INJECTION, SOLUTION INTRAVENOUS at 11:43

## 2018-01-01 RX ADMIN — ENALAPRIL MALEATE 5 MG: 5 TABLET ORAL at 08:24

## 2018-01-01 RX ADMIN — DENOSUMAB 120 MG: 60 INJECTION SUBCUTANEOUS at 10:49

## 2018-01-01 RX ADMIN — Medication 10 MEQ: at 11:33

## 2018-01-01 RX ADMIN — TRAMADOL HYDROCHLORIDE 100 MG: 50 TABLET, COATED ORAL at 23:39

## 2018-01-01 RX ADMIN — OMEPRAZOLE 20 MG: 20 CAPSULE, DELAYED RELEASE ORAL at 08:58

## 2018-01-01 RX ADMIN — POTASSIUM PHOSPHATE, MONOBASIC AND POTASSIUM PHOSPHATE, DIBASIC 15 MMOL: 224; 236 INJECTION, SOLUTION INTRAVENOUS at 13:36

## 2018-01-01 RX ADMIN — METOPROLOL TARTRATE 100 MG: 50 TABLET ORAL at 19:51

## 2018-01-01 RX ADMIN — METOPROLOL TARTRATE 100 MG: 50 TABLET ORAL at 19:57

## 2018-01-01 RX ADMIN — ACETAMINOPHEN 975 MG: 325 TABLET, FILM COATED ORAL at 09:18

## 2018-01-01 RX ADMIN — SODIUM CHLORIDE 1000 ML: 9 INJECTION, SOLUTION INTRAVENOUS at 08:29

## 2018-01-01 RX ADMIN — DIBASIC SODIUM PHOSPHATE, MONOBASIC POTASSIUM PHOSPHATE AND MONOBASIC SODIUM PHOSPHATE 500 MG: 852; 155; 130 TABLET ORAL at 09:19

## 2018-01-01 RX ADMIN — MAGNESIUM SULFATE HEPTAHYDRATE 2 G: 40 INJECTION, SOLUTION INTRAVENOUS at 14:26

## 2018-01-01 RX ADMIN — Medication 25 MG: at 06:57

## 2018-01-01 RX ADMIN — TRAMADOL HYDROCHLORIDE 100 MG: 50 TABLET, COATED ORAL at 04:09

## 2018-01-01 RX ADMIN — POTASSIUM PHOSPHATE, MONOBASIC AND POTASSIUM PHOSPHATE, DIBASIC 15 MMOL: 224; 236 INJECTION, SOLUTION INTRAVENOUS at 16:07

## 2018-01-01 RX ADMIN — Medication 0.5 MG: at 22:28

## 2018-01-01 RX ADMIN — FUROSEMIDE 40 MG: 20 TABLET ORAL at 17:24

## 2018-01-01 RX ADMIN — OXYCODONE HYDROCHLORIDE 2.6 MG: 100 SOLUTION ORAL at 20:44

## 2018-01-01 RX ADMIN — POTASSIUM PHOSPHATE, MONOBASIC AND POTASSIUM PHOSPHATE, DIBASIC 20 MMOL: 224; 236 INJECTION, SOLUTION INTRAVENOUS at 09:57

## 2018-01-01 RX ADMIN — METOPROLOL TARTRATE 100 MG: 50 TABLET ORAL at 09:19

## 2018-01-01 RX ADMIN — ACETAMINOPHEN 975 MG: 325 TABLET, FILM COATED ORAL at 00:09

## 2018-01-01 RX ADMIN — TRAMADOL HYDROCHLORIDE 100 MG: 50 TABLET, COATED ORAL at 17:33

## 2018-01-01 RX ADMIN — ASPIRIN 325 MG: 325 TABLET, DELAYED RELEASE ORAL at 20:27

## 2018-01-01 RX ADMIN — AMPICILLIN AND SULBACTAM 3 G: 1; 2 INJECTION, POWDER, FOR SOLUTION INTRAMUSCULAR; INTRAVENOUS at 11:33

## 2018-01-01 RX ADMIN — ENOXAPARIN SODIUM 40 MG: 100 INJECTION SUBCUTANEOUS at 11:30

## 2018-01-01 RX ADMIN — SODIUM CHLORIDE 1000 ML: 9 INJECTION, SOLUTION INTRAVENOUS at 01:04

## 2018-01-01 RX ADMIN — FUROSEMIDE 20 MG: 10 INJECTION, SOLUTION INTRAVENOUS at 11:50

## 2018-01-01 RX ADMIN — VITAMIN D, TAB 1000IU (100/BT) 2000 UNITS: 25 TAB at 07:48

## 2018-01-01 RX ADMIN — SODIUM CHLORIDE: 9 INJECTION, SOLUTION INTRAVENOUS at 02:50

## 2018-01-01 RX ADMIN — ENOXAPARIN SODIUM 40 MG: 100 INJECTION SUBCUTANEOUS at 12:50

## 2018-01-01 RX ADMIN — ENOXAPARIN SODIUM 40 MG: 40 INJECTION SUBCUTANEOUS at 21:04

## 2018-01-01 RX ADMIN — ACETAMINOPHEN 975 MG: 325 TABLET, FILM COATED ORAL at 23:58

## 2018-01-01 RX ADMIN — METOPROLOL TARTRATE 100 MG: 50 TABLET ORAL at 20:38

## 2018-01-01 RX ADMIN — MICONAZOLE NITRATE: 2 POWDER TOPICAL at 12:48

## 2018-01-01 RX ADMIN — ASPIRIN 325 MG: 325 TABLET, DELAYED RELEASE ORAL at 21:11

## 2018-01-01 RX ADMIN — MICONAZOLE NITRATE: 2 POWDER TOPICAL at 08:52

## 2018-01-01 RX ADMIN — ENOXAPARIN SODIUM 40 MG: 100 INJECTION SUBCUTANEOUS at 11:59

## 2018-01-01 RX ADMIN — POTASSIUM PHOSPHATE, MONOBASIC AND POTASSIUM PHOSPHATE, DIBASIC 15 MMOL: 224; 236 INJECTION, SOLUTION INTRAVENOUS at 15:25

## 2018-01-01 RX ADMIN — SODIUM CHLORIDE: 9 INJECTION, SOLUTION INTRAVENOUS at 00:45

## 2018-01-01 RX ADMIN — FUROSEMIDE 40 MG: 20 TABLET ORAL at 09:24

## 2018-01-01 RX ADMIN — DIBASIC SODIUM PHOSPHATE, MONOBASIC POTASSIUM PHOSPHATE AND MONOBASIC SODIUM PHOSPHATE 500 MG: 852; 155; 130 TABLET ORAL at 20:50

## 2018-01-01 RX ADMIN — ENOXAPARIN SODIUM 40 MG: 100 INJECTION SUBCUTANEOUS at 12:02

## 2018-01-01 RX ADMIN — AMPICILLIN AND SULBACTAM 3 G: 1; 2 INJECTION, POWDER, FOR SOLUTION INTRAMUSCULAR; INTRAVENOUS at 17:43

## 2018-01-01 RX ADMIN — ACETAMINOPHEN 975 MG: 325 TABLET, FILM COATED ORAL at 08:23

## 2018-01-01 RX ADMIN — Medication 2.5 MG: at 09:19

## 2018-01-01 RX ADMIN — Medication 25 MG: at 14:50

## 2018-01-01 RX ADMIN — POTASSIUM & SODIUM PHOSPHATES POWDER PACK 280-160-250 MG 1 PACKET: 280-160-250 PACK at 16:03

## 2018-01-01 RX ADMIN — Medication 25 MG: at 11:45

## 2018-01-01 RX ADMIN — SODIUM CHLORIDE: 9 INJECTION, SOLUTION INTRAVENOUS at 16:45

## 2018-01-01 RX ADMIN — ACETAMINOPHEN 975 MG: 325 TABLET, FILM COATED ORAL at 08:12

## 2018-01-01 RX ADMIN — ACETAMINOPHEN 975 MG: 325 TABLET, FILM COATED ORAL at 23:15

## 2018-01-01 RX ADMIN — OXYCODONE HYDROCHLORIDE 10 MG: 100 SOLUTION ORAL at 02:22

## 2018-01-01 RX ADMIN — POTASSIUM CHLORIDE 20 MEQ: 750 TABLET, EXTENDED RELEASE ORAL at 09:45

## 2018-01-01 RX ADMIN — FUROSEMIDE 30 MG: 10 INJECTION, SOLUTION INTRAVENOUS at 16:48

## 2018-01-01 RX ADMIN — ACETAMINOPHEN 650 MG: 325 TABLET, FILM COATED ORAL at 08:34

## 2018-01-01 RX ADMIN — OMEPRAZOLE 20 MG: 20 CAPSULE, DELAYED RELEASE ORAL at 09:05

## 2018-01-01 RX ADMIN — SODIUM CHLORIDE: 9 INJECTION, SOLUTION INTRAVENOUS at 21:47

## 2018-01-01 RX ADMIN — SODIUM CHLORIDE: 9 INJECTION, SOLUTION INTRAVENOUS at 23:56

## 2018-01-01 RX ADMIN — AMPICILLIN AND SULBACTAM 3 G: 1; 2 INJECTION, POWDER, FOR SOLUTION INTRAMUSCULAR; INTRAVENOUS at 11:59

## 2018-01-01 RX ADMIN — TRAMADOL HYDROCHLORIDE 50 MG: 50 TABLET, COATED ORAL at 15:33

## 2018-01-01 RX ADMIN — Medication 5 MG: at 15:10

## 2018-01-01 RX ADMIN — OXYCODONE HYDROCHLORIDE 10 MG: 5 TABLET ORAL at 11:49

## 2018-01-01 RX ADMIN — LOPERAMIDE HYDROCHLORIDE 2 MG: 2 CAPSULE ORAL at 09:15

## 2018-01-01 RX ADMIN — Medication 4 MG: at 09:01

## 2018-01-01 RX ADMIN — SODIUM CHLORIDE: 9 INJECTION, SOLUTION INTRAVENOUS at 18:03

## 2018-01-01 RX ADMIN — OMEPRAZOLE 20 MG: 20 CAPSULE, DELAYED RELEASE ORAL at 06:59

## 2018-01-01 RX ADMIN — SENNOSIDES AND DOCUSATE SODIUM 2 TABLET: 8.6; 5 TABLET ORAL at 20:32

## 2018-01-01 RX ADMIN — OXYCODONE HYDROCHLORIDE 2.6 MG: 100 SOLUTION ORAL at 05:26

## 2018-01-01 RX ADMIN — ATROPINE SULFATE 2 DROP: 10 SOLUTION OPHTHALMIC at 01:46

## 2018-01-01 RX ADMIN — LIDOCAINE HYDROCHLORIDE 15 ML: 10 INJECTION, SOLUTION INFILTRATION; PERINEURAL at 11:54

## 2018-01-01 RX ADMIN — Medication 2.5 MG: at 12:50

## 2018-01-01 RX ADMIN — ACETAMINOPHEN 650 MG: 325 TABLET, FILM COATED ORAL at 16:43

## 2018-01-01 RX ADMIN — Medication 2.5 MG: at 11:36

## 2018-01-01 RX ADMIN — METOPROLOL TARTRATE 100 MG: 50 TABLET, FILM COATED ORAL at 09:19

## 2018-01-01 RX ADMIN — ENOXAPARIN SODIUM 40 MG: 100 INJECTION SUBCUTANEOUS at 00:30

## 2018-01-01 RX ADMIN — LEVOFLOXACIN 750 MG: 5 INJECTION, SOLUTION INTRAVENOUS at 08:17

## 2018-01-01 RX ADMIN — TRAMADOL HYDROCHLORIDE 100 MG: 50 TABLET, COATED ORAL at 16:09

## 2018-01-01 RX ADMIN — ATROPINE SULFATE 2 DROP: 10 SOLUTION OPHTHALMIC at 07:52

## 2018-01-01 RX ADMIN — PANTOPRAZOLE SODIUM 40 MG: 40 INJECTION, POWDER, FOR SOLUTION INTRAVENOUS at 14:29

## 2018-01-01 RX ADMIN — ENALAPRIL MALEATE 5 MG: 5 TABLET ORAL at 07:49

## 2018-01-01 RX ADMIN — DIBASIC SODIUM PHOSPHATE, MONOBASIC POTASSIUM PHOSPHATE AND MONOBASIC SODIUM PHOSPHATE 500 MG: 852; 155; 130 TABLET ORAL at 19:42

## 2018-01-01 RX ADMIN — MICONAZOLE NITRATE: 2 POWDER TOPICAL at 09:24

## 2018-01-01 RX ADMIN — SODIUM CHLORIDE: 9 INJECTION, SOLUTION INTRAVENOUS at 15:10

## 2018-01-01 RX ADMIN — VANCOMYCIN HYDROCHLORIDE 2000 MG: 10 INJECTION, POWDER, LYOPHILIZED, FOR SOLUTION INTRAVENOUS at 00:57

## 2018-01-01 RX ADMIN — METOPROLOL TARTRATE 100 MG: 50 TABLET, FILM COATED ORAL at 20:27

## 2018-01-01 RX ADMIN — MAGNESIUM SULFATE HEPTAHYDRATE 2 G: 40 INJECTION, SOLUTION INTRAVENOUS at 11:21

## 2018-01-01 RX ADMIN — SODIUM CHLORIDE: 9 INJECTION, SOLUTION INTRAVENOUS at 02:27

## 2018-01-01 RX ADMIN — Medication 10 MEQ: at 10:03

## 2018-01-01 RX ADMIN — AMPICILLIN AND SULBACTAM 3 G: 1; 2 INJECTION, POWDER, FOR SOLUTION INTRAMUSCULAR; INTRAVENOUS at 23:35

## 2018-01-01 RX ADMIN — ENOXAPARIN SODIUM 40 MG: 100 INJECTION SUBCUTANEOUS at 12:25

## 2018-01-01 RX ADMIN — POTASSIUM PHOSPHATE, MONOBASIC AND POTASSIUM PHOSPHATE, DIBASIC 10 MMOL: 224; 236 INJECTION, SOLUTION INTRAVENOUS at 16:26

## 2018-01-01 RX ADMIN — Medication 2.5 MG: at 08:39

## 2018-01-01 RX ADMIN — MICONAZOLE NITRATE: 2 POWDER TOPICAL at 14:37

## 2018-01-01 RX ADMIN — Medication 25 MG: at 10:50

## 2018-01-01 RX ADMIN — AMPICILLIN AND SULBACTAM 3 G: 1; 2 INJECTION, POWDER, FOR SOLUTION INTRAMUSCULAR; INTRAVENOUS at 13:27

## 2018-01-01 RX ADMIN — MAGNESIUM SULFATE HEPTAHYDRATE 2 G: 40 INJECTION, SOLUTION INTRAVENOUS at 09:58

## 2018-01-01 RX ADMIN — PROPOFOL 50 MG: 10 INJECTION, EMULSION INTRAVENOUS at 14:15

## 2018-01-01 RX ADMIN — FENTANYL CITRATE 50 MCG: 50 INJECTION, SOLUTION INTRAMUSCULAR; INTRAVENOUS at 14:15

## 2018-01-01 RX ADMIN — MICONAZOLE NITRATE: 2 POWDER TOPICAL at 11:43

## 2018-01-01 RX ADMIN — POTASSIUM CHLORIDE 40 MEQ: 750 TABLET, EXTENDED RELEASE ORAL at 16:48

## 2018-01-01 RX ADMIN — Medication 2.5 MG: at 10:30

## 2018-01-01 RX ADMIN — OMEPRAZOLE 20 MG: 20 CAPSULE, DELAYED RELEASE ORAL at 09:17

## 2018-01-01 RX ADMIN — MICONAZOLE NITRATE: 2 POWDER TOPICAL at 20:46

## 2018-01-01 RX ADMIN — DENOSUMAB 120 MG: 120 INJECTION SUBCUTANEOUS at 18:24

## 2018-01-01 RX ADMIN — VANCOMYCIN HYDROCHLORIDE 2000 MG: 10 INJECTION, POWDER, LYOPHILIZED, FOR SOLUTION INTRAVENOUS at 11:02

## 2018-01-01 RX ADMIN — POTASSIUM PHOSPHATE, MONOBASIC AND POTASSIUM PHOSPHATE, DIBASIC 15 MMOL: 224; 236 INJECTION, SOLUTION INTRAVENOUS at 10:41

## 2018-01-01 RX ADMIN — FUROSEMIDE 20 MG: 10 INJECTION, SOLUTION INTRAVENOUS at 12:16

## 2018-01-01 RX ADMIN — CEFEPIME HYDROCHLORIDE 2 G: 2 INJECTION, POWDER, FOR SOLUTION INTRAVENOUS at 05:02

## 2018-01-01 RX ADMIN — Medication 4 MG: at 13:45

## 2018-01-01 RX ADMIN — ENOXAPARIN SODIUM 40 MG: 100 INJECTION SUBCUTANEOUS at 23:39

## 2018-01-01 RX ADMIN — DIBASIC SODIUM PHOSPHATE, MONOBASIC POTASSIUM PHOSPHATE AND MONOBASIC SODIUM PHOSPHATE 500 MG: 852; 155; 130 TABLET ORAL at 19:53

## 2018-01-01 RX ADMIN — METOPROLOL TARTRATE 100 MG: 50 TABLET ORAL at 08:56

## 2018-01-01 RX ADMIN — Medication 25 MG: at 10:44

## 2018-01-01 RX ADMIN — MAGNESIUM SULFATE HEPTAHYDRATE 2 G: 40 INJECTION, SOLUTION INTRAVENOUS at 18:01

## 2018-01-01 RX ADMIN — Medication 4 MG: at 09:06

## 2018-01-01 RX ADMIN — DIBASIC SODIUM PHOSPHATE, MONOBASIC POTASSIUM PHOSPHATE AND MONOBASIC SODIUM PHOSPHATE 500 MG: 852; 155; 130 TABLET ORAL at 19:43

## 2018-01-01 RX ADMIN — ROCURONIUM BROMIDE 50 MG: 10 INJECTION INTRAVENOUS at 13:44

## 2018-01-01 RX ADMIN — ASPIRIN 325 MG: 325 TABLET, DELAYED RELEASE ORAL at 08:36

## 2018-01-01 RX ADMIN — Medication 4 MG: at 09:19

## 2018-01-01 RX ADMIN — ASPIRIN 325 MG: 325 TABLET, DELAYED RELEASE ORAL at 09:20

## 2018-01-01 RX ADMIN — CEFEPIME HYDROCHLORIDE 2 G: 2 INJECTION, POWDER, FOR SOLUTION INTRAVENOUS at 18:10

## 2018-01-01 RX ADMIN — OXYCODONE HYDROCHLORIDE 10 MG: 100 SOLUTION ORAL at 10:11

## 2018-01-01 RX ADMIN — METOPROLOL TARTRATE 100 MG: 50 TABLET, FILM COATED ORAL at 19:37

## 2018-01-01 RX ADMIN — FUROSEMIDE 40 MG: 20 TABLET ORAL at 13:45

## 2018-01-01 RX ADMIN — METOPROLOL TARTRATE 100 MG: 50 TABLET, FILM COATED ORAL at 08:36

## 2018-01-01 RX ADMIN — POTASSIUM PHOSPHATE, MONOBASIC AND POTASSIUM PHOSPHATE, DIBASIC 15 MMOL: 224; 236 INJECTION, SOLUTION INTRAVENOUS at 22:29

## 2018-01-01 RX ADMIN — POTASSIUM CHLORIDE 40 MEQ: 1.5 POWDER, FOR SOLUTION ORAL at 12:11

## 2018-01-01 RX ADMIN — ACETAMINOPHEN 650 MG: 325 TABLET, FILM COATED ORAL at 17:16

## 2018-01-01 RX ADMIN — SODIUM CHLORIDE: 9 INJECTION, SOLUTION INTRAVENOUS at 05:44

## 2018-01-01 RX ADMIN — OXYCODONE HYDROCHLORIDE 10 MG: 5 TABLET ORAL at 03:01

## 2018-01-01 RX ADMIN — MAGNESIUM SULFATE HEPTAHYDRATE 2 G: 40 INJECTION, SOLUTION INTRAVENOUS at 10:04

## 2018-01-01 RX ADMIN — FUROSEMIDE 40 MG: 20 TABLET ORAL at 10:14

## 2018-01-01 RX ADMIN — SENNOSIDES AND DOCUSATE SODIUM 2 TABLET: 8.6; 5 TABLET ORAL at 21:11

## 2018-01-01 RX ADMIN — TRAMADOL HYDROCHLORIDE 100 MG: 50 TABLET, COATED ORAL at 11:53

## 2018-01-01 RX ADMIN — OXYCODONE HYDROCHLORIDE 2.6 MG: 100 SOLUTION ORAL at 12:17

## 2018-01-01 RX ADMIN — ACETAMINOPHEN 975 MG: 325 TABLET, FILM COATED ORAL at 01:26

## 2018-01-01 RX ADMIN — HUMAN ALBUMIN MICROSPHERES AND PERFLUTREN 6 ML: 10; .22 INJECTION, SOLUTION INTRAVENOUS at 10:45

## 2018-01-01 RX ADMIN — MICONAZOLE NITRATE: 2 POWDER TOPICAL at 21:46

## 2018-01-01 RX ADMIN — TRAMADOL HYDROCHLORIDE 100 MG: 50 TABLET, COATED ORAL at 17:08

## 2018-01-01 RX ADMIN — OXYCODONE HYDROCHLORIDE 2.6 MG: 100 SOLUTION ORAL at 20:14

## 2018-01-01 RX ADMIN — ENOXAPARIN SODIUM 40 MG: 100 INJECTION SUBCUTANEOUS at 00:02

## 2018-01-01 RX ADMIN — AMPICILLIN AND SULBACTAM 3 G: 1; 2 INJECTION, POWDER, FOR SOLUTION INTRAMUSCULAR; INTRAVENOUS at 12:25

## 2018-01-01 RX ADMIN — ACETAMINOPHEN 975 MG: 325 TABLET, FILM COATED ORAL at 06:44

## 2018-01-01 RX ADMIN — ENOXAPARIN SODIUM 40 MG: 100 INJECTION SUBCUTANEOUS at 23:52

## 2018-01-01 RX ADMIN — METOPROLOL TARTRATE 100 MG: 50 TABLET, FILM COATED ORAL at 20:17

## 2018-01-01 RX ADMIN — TRAMADOL HYDROCHLORIDE 100 MG: 50 TABLET, COATED ORAL at 06:00

## 2018-01-01 RX ADMIN — ACETAMINOPHEN 650 MG: 325 TABLET, FILM COATED ORAL at 21:04

## 2018-01-01 RX ADMIN — ACETAMINOPHEN 975 MG: 325 TABLET, FILM COATED ORAL at 14:15

## 2018-01-01 RX ADMIN — MICONAZOLE NITRATE: 2 POWDER TOPICAL at 09:04

## 2018-01-01 RX ADMIN — TRAMADOL HYDROCHLORIDE 100 MG: 50 TABLET, COATED ORAL at 17:25

## 2018-01-01 RX ADMIN — POTASSIUM & SODIUM PHOSPHATES POWDER PACK 280-160-250 MG 2 PACKET: 280-160-250 PACK at 21:04

## 2018-01-01 RX ADMIN — Medication 2.5 MG: at 01:50

## 2018-01-01 RX ADMIN — METOPROLOL TARTRATE 100 MG: 50 TABLET, FILM COATED ORAL at 09:02

## 2018-01-01 RX ADMIN — FUROSEMIDE 20 MG: 10 INJECTION, SOLUTION INTRAVENOUS at 04:14

## 2018-01-01 RX ADMIN — SENNOSIDES AND DOCUSATE SODIUM 2 TABLET: 8.6; 5 TABLET ORAL at 09:18

## 2018-01-01 RX ADMIN — OXYCODONE HYDROCHLORIDE 2.6 MG: 100 SOLUTION ORAL at 00:31

## 2018-01-01 RX ADMIN — CEFEPIME HYDROCHLORIDE 2 G: 2 INJECTION, POWDER, FOR SOLUTION INTRAVENOUS at 05:46

## 2018-01-01 RX ADMIN — Medication 5 MG: at 19:56

## 2018-01-01 RX ADMIN — ENOXAPARIN SODIUM 40 MG: 100 INJECTION SUBCUTANEOUS at 00:23

## 2018-01-01 RX ADMIN — Medication 2.5 MG: at 14:24

## 2018-01-01 RX ADMIN — Medication 2.5 MG: at 20:16

## 2018-01-01 RX ADMIN — DIBASIC SODIUM PHOSPHATE, MONOBASIC POTASSIUM PHOSPHATE AND MONOBASIC SODIUM PHOSPHATE 500 MG: 852; 155; 130 TABLET ORAL at 20:32

## 2018-01-01 RX ADMIN — SODIUM CHLORIDE: 9 INJECTION, SOLUTION INTRAVENOUS at 13:38

## 2018-01-01 RX ADMIN — METOPROLOL TARTRATE 100 MG: 50 TABLET ORAL at 20:50

## 2018-01-01 RX ADMIN — MICONAZOLE NITRATE: 2 POWDER TOPICAL at 10:32

## 2018-01-01 RX ADMIN — MICONAZOLE NITRATE: 2 POWDER TOPICAL at 08:25

## 2018-01-01 RX ADMIN — MAGNESIUM SULFATE IN WATER 4 G: 40 INJECTION, SOLUTION INTRAVENOUS at 07:10

## 2018-01-01 RX ADMIN — ASPIRIN 325 MG: 325 TABLET, DELAYED RELEASE ORAL at 08:57

## 2018-01-01 RX ADMIN — Medication 0.5 MG: at 13:18

## 2018-01-01 RX ADMIN — ACETAMINOPHEN 975 MG: 325 TABLET, FILM COATED ORAL at 15:48

## 2018-01-01 RX ADMIN — Medication 10 MEQ: at 13:49

## 2018-01-01 RX ADMIN — POTASSIUM & SODIUM PHOSPHATES POWDER PACK 280-160-250 MG 2 PACKET: 280-160-250 PACK at 16:36

## 2018-01-01 RX ADMIN — ACETAMINOPHEN 650 MG: 325 TABLET, FILM COATED ORAL at 21:46

## 2018-01-01 RX ADMIN — OXYCODONE HYDROCHLORIDE 10 MG: 100 SOLUTION ORAL at 21:30

## 2018-01-01 RX ADMIN — FUROSEMIDE 20 MG: 10 INJECTION, SOLUTION INTRAVENOUS at 00:21

## 2018-01-01 RX ADMIN — METOPROLOL TARTRATE 100 MG: 50 TABLET ORAL at 20:39

## 2018-01-01 RX ADMIN — FUROSEMIDE 30 MG: 10 INJECTION, SOLUTION INTRAVENOUS at 08:36

## 2018-01-01 RX ADMIN — MICONAZOLE NITRATE: 2 POWDER TOPICAL at 22:48

## 2018-01-01 RX ADMIN — MICONAZOLE NITRATE: 2 POWDER TOPICAL at 08:35

## 2018-01-01 RX ADMIN — ACETAMINOPHEN 650 MG: 325 TABLET, FILM COATED ORAL at 20:39

## 2018-01-01 RX ADMIN — POTASSIUM CHLORIDE 40 MEQ: 750 TABLET, EXTENDED RELEASE ORAL at 09:19

## 2018-01-01 RX ADMIN — Medication 1 MG: at 00:09

## 2018-01-01 RX ADMIN — Medication 2 G: at 10:48

## 2018-01-01 RX ADMIN — OMEPRAZOLE 20 MG: 20 CAPSULE, DELAYED RELEASE ORAL at 09:03

## 2018-01-01 RX ADMIN — LORAZEPAM 1 MG: 0.5 TABLET ORAL at 03:00

## 2018-01-01 RX ADMIN — TRAMADOL HYDROCHLORIDE 100 MG: 50 TABLET, COATED ORAL at 09:08

## 2018-01-01 RX ADMIN — OXYCODONE HYDROCHLORIDE 10 MG: 100 SOLUTION ORAL at 06:49

## 2018-01-01 RX ADMIN — Medication 1 MG: at 21:04

## 2018-01-01 RX ADMIN — TRAMADOL HYDROCHLORIDE 100 MG: 50 TABLET, COATED ORAL at 01:07

## 2018-01-01 RX ADMIN — MICONAZOLE NITRATE: 2 POWDER TOPICAL at 19:58

## 2018-01-01 RX ADMIN — METOPROLOL TARTRATE 100 MG: 50 TABLET ORAL at 18:49

## 2018-01-01 RX ADMIN — POTASSIUM CHLORIDE 20 MEQ: 750 TABLET, EXTENDED RELEASE ORAL at 11:20

## 2018-01-01 RX ADMIN — MICONAZOLE NITRATE: 2 POWDER TOPICAL at 18:31

## 2018-01-01 RX ADMIN — Medication 2.5 MG: at 17:29

## 2018-01-01 RX ADMIN — POTASSIUM PHOSPHATE, MONOBASIC AND POTASSIUM PHOSPHATE, DIBASIC 20 MMOL: 224; 236 INJECTION, SOLUTION INTRAVENOUS at 01:20

## 2018-01-01 RX ADMIN — Medication 25 MG: at 03:17

## 2018-01-01 RX ADMIN — METOPROLOL TARTRATE 100 MG: 50 TABLET, FILM COATED ORAL at 21:11

## 2018-01-01 RX ADMIN — METOPROLOL TARTRATE 100 MG: 50 TABLET, FILM COATED ORAL at 08:24

## 2018-01-01 RX ADMIN — MAGNESIUM SULFATE IN WATER 2 G: 40 INJECTION, SOLUTION INTRAVENOUS at 06:22

## 2018-01-01 RX ADMIN — POTASSIUM PHOSPHATE, MONOBASIC AND POTASSIUM PHOSPHATE, DIBASIC 15 MMOL: 224; 236 INJECTION, SOLUTION INTRAVENOUS at 12:35

## 2018-01-01 RX ADMIN — ENOXAPARIN SODIUM 40 MG: 100 INJECTION SUBCUTANEOUS at 12:32

## 2018-01-01 RX ADMIN — DIBASIC SODIUM PHOSPHATE, MONOBASIC POTASSIUM PHOSPHATE AND MONOBASIC SODIUM PHOSPHATE 500 MG: 852; 155; 130 TABLET ORAL at 08:38

## 2018-01-01 RX ADMIN — POTASSIUM CHLORIDE 40 MEQ: 40 SOLUTION ORAL at 19:53

## 2018-01-01 RX ADMIN — POLYETHYLENE GLYCOL 3350 17 G: 17 POWDER, FOR SOLUTION ORAL at 09:18

## 2018-01-01 RX ADMIN — METOPROLOL TARTRATE 100 MG: 50 TABLET ORAL at 08:17

## 2018-01-01 RX ADMIN — POTASSIUM CHLORIDE 40 MEQ: 750 TABLET, EXTENDED RELEASE ORAL at 08:40

## 2018-01-01 RX ADMIN — OXYCODONE HYDROCHLORIDE 10 MG: 100 SOLUTION ORAL at 09:41

## 2018-01-01 RX ADMIN — SENNOSIDES AND DOCUSATE SODIUM 2 TABLET: 8.6; 5 TABLET ORAL at 20:17

## 2018-01-01 RX ADMIN — FUROSEMIDE 20 MG: 10 INJECTION, SOLUTION INTRAVENOUS at 08:41

## 2018-01-01 RX ADMIN — OXYCODONE HYDROCHLORIDE 10 MG: 100 SOLUTION ORAL at 19:22

## 2018-01-01 RX ADMIN — ONDANSETRON 4 MG: 2 INJECTION INTRAMUSCULAR; INTRAVENOUS at 16:59

## 2018-01-01 RX ADMIN — OXYCODONE HYDROCHLORIDE 10 MG: 100 SOLUTION ORAL at 17:51

## 2018-01-01 RX ADMIN — DIBASIC SODIUM PHOSPHATE, MONOBASIC POTASSIUM PHOSPHATE AND MONOBASIC SODIUM PHOSPHATE 500 MG: 852; 155; 130 TABLET ORAL at 20:39

## 2018-01-01 RX ADMIN — OXYCODONE HYDROCHLORIDE 10 MG: 5 TABLET ORAL at 08:14

## 2018-01-01 RX ADMIN — METOPROLOL TARTRATE 100 MG: 50 TABLET, FILM COATED ORAL at 08:14

## 2018-01-01 RX ADMIN — OXYCODONE HYDROCHLORIDE 5 MG: 5 TABLET ORAL at 22:01

## 2018-01-01 RX ADMIN — ROCURONIUM BROMIDE 10 MG: 10 INJECTION INTRAVENOUS at 16:00

## 2018-01-01 RX ADMIN — FUROSEMIDE 20 MG: 10 INJECTION, SOLUTION INTRAVENOUS at 02:21

## 2018-01-01 RX ADMIN — ENALAPRIL MALEATE 5 MG: 5 TABLET ORAL at 09:46

## 2018-01-01 RX ADMIN — ENOXAPARIN SODIUM 40 MG: 100 INJECTION SUBCUTANEOUS at 00:13

## 2018-01-01 RX ADMIN — AMPICILLIN AND SULBACTAM 3 G: 1; 2 INJECTION, POWDER, FOR SOLUTION INTRAMUSCULAR; INTRAVENOUS at 05:59

## 2018-01-01 RX ADMIN — POTASSIUM PHOSPHATE, MONOBASIC AND POTASSIUM PHOSPHATE, DIBASIC 15 MMOL: 224; 236 INJECTION, SOLUTION INTRAVENOUS at 06:59

## 2018-01-01 RX ADMIN — INFLUENZA A VIRUS A/MICHIGAN/45/2015 (H1N1) RECOMBINANT HEMAGGLUTININ ANTIGEN, INFLUENZA A VIRUS A/SINGAPORE/INFIMH-16-0019/2016 (H3N2) RECOMBINANT HEMAGGLUTININ ANTIGEN, INFLUENZA B VIRUS B/MARYLAND/15/2016 RECOMBINANT HEMAGGLUTININ ANTIGEN, AND INFLUENZA B VIRUS B/PHUKET/3073/2013 RECOMBINANT HEMAGGLUTININ ANTIGEN 0.5 ML: 45; 45; 45; 45 INJECTION INTRAMUSCULAR at 18:13

## 2018-01-01 RX ADMIN — MICONAZOLE NITRATE: 2 POWDER TOPICAL at 20:39

## 2018-01-01 RX ADMIN — AMPICILLIN AND SULBACTAM 3 G: 1; 2 INJECTION, POWDER, FOR SOLUTION INTRAMUSCULAR; INTRAVENOUS at 18:29

## 2018-01-01 RX ADMIN — FENTANYL CITRATE 25 MCG: 50 INJECTION, SOLUTION INTRAMUSCULAR; INTRAVENOUS at 17:29

## 2018-01-01 RX ADMIN — ENOXAPARIN SODIUM 40 MG: 100 INJECTION SUBCUTANEOUS at 23:44

## 2018-01-01 RX ADMIN — ACETAMINOPHEN 975 MG: 325 TABLET, FILM COATED ORAL at 00:26

## 2018-01-01 RX ADMIN — MAGNESIUM SULFATE HEPTAHYDRATE 2 G: 40 INJECTION, SOLUTION INTRAVENOUS at 20:02

## 2018-01-01 RX ADMIN — OMEPRAZOLE 20 MG: 20 CAPSULE, DELAYED RELEASE ORAL at 09:46

## 2018-01-01 RX ADMIN — HYDROMORPHONE HYDROCHLORIDE 0.5 MG: 1 SOLUTION ORAL at 01:15

## 2018-01-01 RX ADMIN — LOPERAMIDE HYDROCHLORIDE 2 MG: 2 CAPSULE ORAL at 12:24

## 2018-01-01 RX ADMIN — MICONAZOLE NITRATE: 2 POWDER TOPICAL at 14:12

## 2018-01-01 RX ADMIN — METOPROLOL TARTRATE 100 MG: 50 TABLET ORAL at 09:20

## 2018-01-01 RX ADMIN — OXYCODONE HYDROCHLORIDE 10 MG: 100 SOLUTION ORAL at 15:30

## 2018-01-01 RX ADMIN — ACETAMINOPHEN 650 MG: 325 TABLET, FILM COATED ORAL at 14:00

## 2018-01-01 RX ADMIN — ACETAMINOPHEN 650 MG: 325 TABLET, FILM COATED ORAL at 00:20

## 2018-01-01 RX ADMIN — SODIUM CHLORIDE: 9 INJECTION, SOLUTION INTRAVENOUS at 22:48

## 2018-01-01 RX ADMIN — POTASSIUM CHLORIDE 20 MEQ: 750 TABLET, EXTENDED RELEASE ORAL at 18:32

## 2018-01-01 RX ADMIN — ACETAMINOPHEN 975 MG: 325 TABLET, FILM COATED ORAL at 09:45

## 2018-01-01 RX ADMIN — TRAMADOL HYDROCHLORIDE 100 MG: 50 TABLET, COATED ORAL at 22:01

## 2018-01-01 RX ADMIN — KETOROLAC TROMETHAMINE 30 MG: 15 INJECTION, SOLUTION INTRAMUSCULAR; INTRAVENOUS at 08:33

## 2018-01-01 RX ADMIN — ACETAMINOPHEN 975 MG: 325 TABLET, FILM COATED ORAL at 08:35

## 2018-01-01 RX ADMIN — MICONAZOLE NITRATE: 2 POWDER TOPICAL at 20:49

## 2018-01-01 RX ADMIN — METOPROLOL TARTRATE 100 MG: 50 TABLET, FILM COATED ORAL at 20:32

## 2018-01-01 RX ADMIN — MAGNESIUM SULFATE HEPTAHYDRATE 2 G: 40 INJECTION, SOLUTION INTRAVENOUS at 14:47

## 2018-01-01 RX ADMIN — TRAMADOL HYDROCHLORIDE 100 MG: 50 TABLET, COATED ORAL at 04:22

## 2018-01-01 RX ADMIN — METOPROLOL TARTRATE 100 MG: 50 TABLET ORAL at 19:53

## 2018-01-01 RX ADMIN — MICONAZOLE NITRATE: 2 POWDER TOPICAL at 08:51

## 2018-01-01 RX ADMIN — METOPROLOL TARTRATE 100 MG: 50 TABLET ORAL at 20:35

## 2018-01-01 RX ADMIN — Medication 25 MG: at 03:28

## 2018-01-01 RX ADMIN — MICONAZOLE NITRATE: 2 POWDER TOPICAL at 07:53

## 2018-01-01 RX ADMIN — ACETAMINOPHEN 975 MG: 325 TABLET, FILM COATED ORAL at 00:02

## 2018-01-01 RX ADMIN — METOPROLOL TARTRATE 100 MG: 50 TABLET, FILM COATED ORAL at 20:18

## 2018-01-01 RX ADMIN — MAGNESIUM SULFATE IN WATER 4 G: 40 INJECTION, SOLUTION INTRAVENOUS at 11:20

## 2018-01-01 RX ADMIN — POTASSIUM PHOSPHATE, MONOBASIC AND POTASSIUM PHOSPHATE, DIBASIC 20 MMOL: 224; 236 INJECTION, SOLUTION INTRAVENOUS at 16:02

## 2018-01-01 RX ADMIN — ENOXAPARIN SODIUM 40 MG: 100 INJECTION SUBCUTANEOUS at 23:22

## 2018-01-01 RX ADMIN — PANTOPRAZOLE SODIUM 40 MG: 40 INJECTION, POWDER, FOR SOLUTION INTRAVENOUS at 08:51

## 2018-01-01 RX ADMIN — FENTANYL CITRATE 25 MCG: 50 INJECTION, SOLUTION INTRAMUSCULAR; INTRAVENOUS at 13:44

## 2018-01-01 RX ADMIN — POTASSIUM CHLORIDE 20 MEQ: 750 TABLET, EXTENDED RELEASE ORAL at 09:20

## 2018-01-01 RX ADMIN — Medication 5 MG: at 08:54

## 2018-01-01 RX ADMIN — METOPROLOL TARTRATE 100 MG: 50 TABLET, FILM COATED ORAL at 09:46

## 2018-01-01 RX ADMIN — OMEPRAZOLE 20 MG: 20 CAPSULE, DELAYED RELEASE ORAL at 07:49

## 2018-01-01 RX ADMIN — METOPROLOL TARTRATE 100 MG: 50 TABLET ORAL at 21:46

## 2018-01-01 RX ADMIN — SENNOSIDES AND DOCUSATE SODIUM 1 TABLET: 8.6; 5 TABLET ORAL at 08:42

## 2018-01-01 RX ADMIN — ENOXAPARIN SODIUM 40 MG: 40 INJECTION SUBCUTANEOUS at 20:04

## 2018-01-01 RX ADMIN — SODIUM CHLORIDE: 9 INJECTION, SOLUTION INTRAVENOUS at 07:52

## 2018-01-01 RX ADMIN — ACETAMINOPHEN 975 MG: 325 TABLET, FILM COATED ORAL at 18:07

## 2018-01-01 RX ADMIN — Medication 25 MG: at 09:51

## 2018-01-01 RX ADMIN — LOPERAMIDE HYDROCHLORIDE 2 MG: 2 CAPSULE ORAL at 17:24

## 2018-01-01 RX ADMIN — FUROSEMIDE 5 MG/HR: 10 INJECTION, SOLUTION INTRAVENOUS at 12:21

## 2018-01-01 RX ADMIN — OXYCODONE HYDROCHLORIDE 5 MG: 5 TABLET ORAL at 19:46

## 2018-01-01 RX ADMIN — ACETAMINOPHEN 975 MG: 325 TABLET, FILM COATED ORAL at 16:03

## 2018-01-01 RX ADMIN — POTASSIUM CHLORIDE 60 MEQ: 20 SOLUTION ORAL at 06:24

## 2018-01-01 RX ADMIN — DIBASIC SODIUM PHOSPHATE, MONOBASIC POTASSIUM PHOSPHATE AND MONOBASIC SODIUM PHOSPHATE 500 MG: 852; 155; 130 TABLET ORAL at 10:11

## 2018-01-01 RX ADMIN — TRAMADOL HYDROCHLORIDE 100 MG: 50 TABLET, COATED ORAL at 11:48

## 2018-01-01 RX ADMIN — MICONAZOLE NITRATE: 2 POWDER TOPICAL at 21:06

## 2018-01-01 RX ADMIN — FUROSEMIDE 40 MG: 20 TABLET ORAL at 17:08

## 2018-01-01 RX ADMIN — METOPROLOL TARTRATE 100 MG: 50 TABLET, FILM COATED ORAL at 21:04

## 2018-01-01 RX ADMIN — METOPROLOL TARTRATE 100 MG: 50 TABLET ORAL at 08:59

## 2018-01-01 RX ADMIN — MICONAZOLE NITRATE: 2 POWDER TOPICAL at 19:38

## 2018-01-01 RX ADMIN — TRAMADOL HYDROCHLORIDE 100 MG: 50 TABLET, COATED ORAL at 14:25

## 2018-01-01 RX ADMIN — ENOXAPARIN SODIUM 40 MG: 100 INJECTION SUBCUTANEOUS at 23:35

## 2018-01-01 RX ADMIN — TRAMADOL HYDROCHLORIDE 100 MG: 50 TABLET, COATED ORAL at 03:44

## 2018-01-01 RX ADMIN — OXYCODONE HYDROCHLORIDE 10 MG: 100 SOLUTION ORAL at 22:54

## 2018-01-01 RX ADMIN — SODIUM CHLORIDE, PRESERVATIVE FREE: 5 INJECTION INTRAVENOUS at 17:32

## 2018-01-01 RX ADMIN — DOCUSATE SODIUM 286 ML: 50 LIQUID ORAL at 20:29

## 2018-01-01 RX ADMIN — FUROSEMIDE 20 MG: 10 INJECTION, SOLUTION INTRAVENOUS at 13:15

## 2018-01-01 RX ADMIN — ASPIRIN 81 MG: 81 TABLET, COATED ORAL at 19:50

## 2018-01-01 RX ADMIN — ASPIRIN 325 MG: 325 TABLET, DELAYED RELEASE ORAL at 09:03

## 2018-01-01 RX ADMIN — ENOXAPARIN SODIUM 40 MG: 100 INJECTION SUBCUTANEOUS at 08:17

## 2018-01-01 RX ADMIN — METOPROLOL TARTRATE 100 MG: 50 TABLET ORAL at 08:58

## 2018-01-01 RX ADMIN — OXYCODONE HYDROCHLORIDE 10 MG: 100 SOLUTION ORAL at 01:47

## 2018-01-01 RX ADMIN — METOPROLOL TARTRATE 100 MG: 50 TABLET, FILM COATED ORAL at 20:16

## 2018-01-01 RX ADMIN — ZOLEDRONIC ACID 4 MG: 0.8 INJECTION, SOLUTION, CONCENTRATE INTRAVENOUS at 02:08

## 2018-01-01 RX ADMIN — DIBASIC SODIUM PHOSPHATE, MONOBASIC POTASSIUM PHOSPHATE AND MONOBASIC SODIUM PHOSPHATE 500 MG: 852; 155; 130 TABLET ORAL at 19:57

## 2018-01-01 RX ADMIN — ONDANSETRON 4 MG: 2 INJECTION INTRAMUSCULAR; INTRAVENOUS at 13:54

## 2018-01-01 RX ADMIN — ENOXAPARIN SODIUM 40 MG: 100 INJECTION SUBCUTANEOUS at 23:28

## 2018-01-01 RX ADMIN — FUROSEMIDE 20 MG: 10 INJECTION, SOLUTION INTRAVENOUS at 11:45

## 2018-01-01 RX ADMIN — TRAMADOL HYDROCHLORIDE 100 MG: 50 TABLET, COATED ORAL at 08:26

## 2018-01-01 RX ADMIN — SODIUM CHLORIDE: 9 INJECTION, SOLUTION INTRAVENOUS at 21:23

## 2018-01-01 RX ADMIN — TRAMADOL HYDROCHLORIDE 100 MG: 50 TABLET, COATED ORAL at 21:01

## 2018-01-01 RX ADMIN — Medication 10 MEQ: at 12:22

## 2018-01-01 RX ADMIN — ACETAMINOPHEN 650 MG: 325 TABLET, FILM COATED ORAL at 21:58

## 2018-01-01 RX ADMIN — SODIUM CHLORIDE: 9 INJECTION, SOLUTION INTRAVENOUS at 02:23

## 2018-01-01 RX ADMIN — ENALAPRIL MALEATE 5 MG: 5 TABLET ORAL at 08:58

## 2018-01-01 RX ADMIN — ENOXAPARIN SODIUM 40 MG: 100 INJECTION SUBCUTANEOUS at 11:53

## 2018-01-01 RX ADMIN — OXYCODONE HYDROCHLORIDE 10 MG: 100 SOLUTION ORAL at 15:35

## 2018-01-01 RX ADMIN — SODIUM CHLORIDE 1 G: 9 INJECTION, SOLUTION INTRAVENOUS at 15:33

## 2018-01-01 RX ADMIN — TRAMADOL HYDROCHLORIDE 100 MG: 50 TABLET, COATED ORAL at 10:03

## 2018-01-01 RX ADMIN — POTASSIUM PHOSPHATE, MONOBASIC AND POTASSIUM PHOSPHATE, DIBASIC 15 MMOL: 224; 236 INJECTION, SOLUTION INTRAVENOUS at 11:39

## 2018-01-01 RX ADMIN — OXYCODONE HYDROCHLORIDE 10 MG: 100 SOLUTION ORAL at 16:28

## 2018-01-01 RX ADMIN — MICONAZOLE NITRATE: 2 POWDER TOPICAL at 08:37

## 2018-01-01 RX ADMIN — Medication 10 MEQ: at 11:15

## 2018-01-01 RX ADMIN — ASPIRIN 325 MG: 325 TABLET, DELAYED RELEASE ORAL at 20:32

## 2018-01-01 RX ADMIN — FUROSEMIDE 40 MG: 20 TABLET ORAL at 08:39

## 2018-01-01 RX ADMIN — SODIUM CHLORIDE: 9 INJECTION, SOLUTION INTRAVENOUS at 05:38

## 2018-01-01 RX ADMIN — LOPERAMIDE HYDROCHLORIDE 2 MG: 2 CAPSULE ORAL at 09:19

## 2018-01-01 RX ADMIN — Medication 0.5 MG: at 20:01

## 2018-01-01 RX ADMIN — ACETAMINOPHEN 650 MG: 325 TABLET, FILM COATED ORAL at 07:08

## 2018-01-01 RX ADMIN — CEFTRIAXONE 1 G: 1 INJECTION, POWDER, FOR SOLUTION INTRAMUSCULAR; INTRAVENOUS at 08:39

## 2018-01-01 RX ADMIN — DIBASIC SODIUM PHOSPHATE, MONOBASIC POTASSIUM PHOSPHATE AND MONOBASIC SODIUM PHOSPHATE 500 MG: 852; 155; 130 TABLET ORAL at 19:40

## 2018-01-01 RX ADMIN — Medication 25 MG: at 13:20

## 2018-01-01 RX ADMIN — Medication 25 MG: at 13:16

## 2018-01-01 RX ADMIN — POTASSIUM PHOSPHATE, MONOBASIC AND POTASSIUM PHOSPHATE, DIBASIC 15 MMOL: 224; 236 INJECTION, SOLUTION INTRAVENOUS at 12:15

## 2018-01-01 RX ADMIN — SODIUM CHLORIDE, POTASSIUM CHLORIDE, SODIUM LACTATE AND CALCIUM CHLORIDE: 600; 310; 30; 20 INJECTION, SOLUTION INTRAVENOUS at 14:27

## 2018-01-01 RX ADMIN — Medication 0.5 MG: at 05:46

## 2018-01-01 RX ADMIN — POTASSIUM CHLORIDE 20 MEQ: 1.5 POWDER, FOR SOLUTION ORAL at 14:10

## 2018-01-01 RX ADMIN — SODIUM CHLORIDE: 9 INJECTION, SOLUTION INTRAVENOUS at 17:35

## 2018-01-01 RX ADMIN — Medication 0.5 MG: at 11:10

## 2018-01-01 RX ADMIN — ENOXAPARIN SODIUM 40 MG: 100 INJECTION SUBCUTANEOUS at 11:21

## 2018-01-01 RX ADMIN — Medication 25 MG: at 02:06

## 2018-01-01 RX ADMIN — HYDROMORPHONE HYDROCHLORIDE 0.5 MG: 1 INJECTION, SOLUTION INTRAMUSCULAR; INTRAVENOUS; SUBCUTANEOUS at 17:11

## 2018-01-01 RX ADMIN — POTASSIUM & SODIUM PHOSPHATES POWDER PACK 280-160-250 MG 1 PACKET: 280-160-250 PACK at 20:17

## 2018-01-01 RX ADMIN — OXYCODONE HYDROCHLORIDE 10 MG: 100 SOLUTION ORAL at 01:28

## 2018-01-01 RX ADMIN — OMEPRAZOLE 20 MG: 20 CAPSULE, DELAYED RELEASE ORAL at 08:24

## 2018-01-01 RX ADMIN — ACETAMINOPHEN 650 MG: 325 TABLET, FILM COATED ORAL at 20:42

## 2018-01-01 RX ADMIN — ENALAPRIL MALEATE 5 MG: 5 TABLET ORAL at 17:32

## 2018-01-01 RX ADMIN — Medication 2 G: at 15:05

## 2018-01-01 RX ADMIN — MICONAZOLE NITRATE: 2 POWDER TOPICAL at 20:33

## 2018-01-01 RX ADMIN — ENOXAPARIN SODIUM 40 MG: 40 INJECTION SUBCUTANEOUS at 20:18

## 2018-01-01 RX ADMIN — DIBASIC SODIUM PHOSPHATE, MONOBASIC POTASSIUM PHOSPHATE AND MONOBASIC SODIUM PHOSPHATE 500 MG: 852; 155; 130 TABLET ORAL at 21:47

## 2018-01-01 RX ADMIN — ENOXAPARIN SODIUM 40 MG: 100 INJECTION SUBCUTANEOUS at 23:56

## 2018-01-01 RX ADMIN — ACETAMINOPHEN 975 MG: 325 TABLET, FILM COATED ORAL at 09:03

## 2018-01-01 RX ADMIN — TRAMADOL HYDROCHLORIDE 100 MG: 50 TABLET, COATED ORAL at 21:47

## 2018-01-01 RX ADMIN — METOPROLOL TARTRATE 100 MG: 50 TABLET, FILM COATED ORAL at 09:17

## 2018-01-01 RX ADMIN — VANCOMYCIN HYDROCHLORIDE 2000 MG: 10 INJECTION, POWDER, LYOPHILIZED, FOR SOLUTION INTRAVENOUS at 12:02

## 2018-01-01 RX ADMIN — SODIUM CHLORIDE: 9 INJECTION, SOLUTION INTRAVENOUS at 04:24

## 2018-01-01 RX ADMIN — FUROSEMIDE 20 MG: 10 INJECTION, SOLUTION INTRAVENOUS at 06:44

## 2018-01-01 RX ADMIN — FUROSEMIDE 20 MG: 10 INJECTION, SOLUTION INTRAVENOUS at 16:48

## 2018-01-01 RX ADMIN — POTASSIUM CHLORIDE 20 MEQ: 750 TABLET, EXTENDED RELEASE ORAL at 08:58

## 2018-01-01 RX ADMIN — AMPICILLIN AND SULBACTAM 3 G: 1; 2 INJECTION, POWDER, FOR SOLUTION INTRAMUSCULAR; INTRAVENOUS at 17:24

## 2018-01-01 RX ADMIN — METOPROLOL TARTRATE 100 MG: 50 TABLET, FILM COATED ORAL at 19:50

## 2018-01-01 RX ADMIN — AMPICILLIN AND SULBACTAM 3 G: 1; 2 INJECTION, POWDER, FOR SOLUTION INTRAMUSCULAR; INTRAVENOUS at 06:16

## 2018-01-01 RX ADMIN — IOPAMIDOL 135 ML: 755 INJECTION, SOLUTION INTRAVENOUS at 11:25

## 2018-01-01 RX ADMIN — DIBASIC SODIUM PHOSPHATE, MONOBASIC POTASSIUM PHOSPHATE AND MONOBASIC SODIUM PHOSPHATE 500 MG: 852; 155; 130 TABLET ORAL at 20:27

## 2018-01-01 RX ADMIN — CEFTRIAXONE 1 G: 1 INJECTION, POWDER, FOR SOLUTION INTRAMUSCULAR; INTRAVENOUS at 09:52

## 2018-01-01 RX ADMIN — POTASSIUM & SODIUM PHOSPHATES POWDER PACK 280-160-250 MG 2 PACKET: 280-160-250 PACK at 13:10

## 2018-01-01 RX ADMIN — CALCITONIN SALMON 600 UNITS: 200 INJECTION, SOLUTION INTRAMUSCULAR at 10:56

## 2018-01-01 RX ADMIN — Medication 1 MG: at 20:17

## 2018-01-01 RX ADMIN — Medication 4 MG: at 08:24

## 2018-01-01 RX ADMIN — LIDOCAINE 2 PATCH: 560 PATCH PERCUTANEOUS; TOPICAL; TRANSDERMAL at 10:45

## 2018-01-01 RX ADMIN — ENOXAPARIN SODIUM 40 MG: 100 INJECTION SUBCUTANEOUS at 11:02

## 2018-01-01 RX ADMIN — VITAMIN D, TAB 1000IU (100/BT) 2000 UNITS: 25 TAB at 15:48

## 2018-01-01 RX ADMIN — OMEPRAZOLE 20 MG: 20 CAPSULE, DELAYED RELEASE ORAL at 08:15

## 2018-01-01 RX ADMIN — ACETAMINOPHEN 975 MG: 325 TABLET, FILM COATED ORAL at 17:09

## 2018-01-01 RX ADMIN — MORPHINE SULFATE 2 MG: 2 INJECTION, SOLUTION INTRAMUSCULAR; INTRAVENOUS at 09:33

## 2018-01-01 RX ADMIN — POLYETHYLENE GLYCOL 3350 17 G: 17 POWDER, FOR SOLUTION ORAL at 09:43

## 2018-01-01 RX ADMIN — FUROSEMIDE 20 MG: 10 INJECTION, SOLUTION INTRAVENOUS at 00:26

## 2018-01-01 RX ADMIN — AMPICILLIN AND SULBACTAM 3 G: 1; 2 INJECTION, POWDER, FOR SOLUTION INTRAMUSCULAR; INTRAVENOUS at 06:02

## 2018-01-01 RX ADMIN — ACETAMINOPHEN 975 MG: 325 TABLET, FILM COATED ORAL at 16:36

## 2018-01-01 RX ADMIN — ACETAMINOPHEN 975 MG: 325 TABLET, FILM COATED ORAL at 08:14

## 2018-01-01 RX ADMIN — POTASSIUM PHOSPHATE, MONOBASIC AND POTASSIUM PHOSPHATE, DIBASIC 15 MMOL: 224; 236 INJECTION, SOLUTION INTRAVENOUS at 02:31

## 2018-01-01 RX ADMIN — Medication 25 MG: at 14:19

## 2018-01-01 RX ADMIN — GADOBUTROL 10 ML: 604.72 INJECTION INTRAVENOUS at 06:34

## 2018-01-01 RX ADMIN — ACETAMINOPHEN 975 MG: 325 TABLET, FILM COATED ORAL at 22:01

## 2018-01-01 RX ADMIN — Medication 0.3 MG: at 19:15

## 2018-01-01 RX ADMIN — Medication 25 MG: at 00:21

## 2018-01-01 RX ADMIN — POTASSIUM PHOSPHATE, MONOBASIC AND POTASSIUM PHOSPHATE, DIBASIC 20 MMOL: 224; 236 INJECTION, SOLUTION INTRAVENOUS at 22:14

## 2018-01-01 RX ADMIN — DIBASIC SODIUM PHOSPHATE, MONOBASIC POTASSIUM PHOSPHATE AND MONOBASIC SODIUM PHOSPHATE 500 MG: 852; 155; 130 TABLET ORAL at 08:40

## 2018-01-01 RX ADMIN — MICONAZOLE NITRATE: 2 POWDER TOPICAL at 08:57

## 2018-01-01 RX ADMIN — AMPICILLIN AND SULBACTAM 3 G: 1; 2 INJECTION, POWDER, FOR SOLUTION INTRAMUSCULAR; INTRAVENOUS at 00:20

## 2018-01-01 RX ADMIN — ENOXAPARIN SODIUM 40 MG: 100 INJECTION SUBCUTANEOUS at 23:26

## 2018-01-01 RX ADMIN — LOPERAMIDE HYDROCHLORIDE 2 MG: 2 CAPSULE ORAL at 20:32

## 2018-01-01 RX ADMIN — POTASSIUM & SODIUM PHOSPHATES POWDER PACK 280-160-250 MG 2 PACKET: 280-160-250 PACK at 08:16

## 2018-01-01 RX ADMIN — FUROSEMIDE 4 MG/HR: 10 INJECTION, SOLUTION INTRAVENOUS at 18:50

## 2018-01-01 RX ADMIN — LIDOCAINE HYDROCHLORIDE 100 MG: 20 INJECTION, SOLUTION INFILTRATION; PERINEURAL at 13:44

## 2018-01-01 ASSESSMENT — ACTIVITIES OF DAILY LIVING (ADL)
ADLS_ACUITY_SCORE: 21
ADLS_ACUITY_SCORE: 26
ADLS_ACUITY_SCORE: 30
COGNITION: 0 - NO COGNITION ISSUES REPORTED
ADLS_ACUITY_SCORE: 19
ADLS_ACUITY_SCORE: 33
ADLS_ACUITY_SCORE: 26
ADLS_ACUITY_SCORE: 20
ADLS_ACUITY_SCORE: 11
ADLS_ACUITY_SCORE: 18
ADLS_ACUITY_SCORE: 18
ADLS_ACUITY_SCORE: 11
ADLS_ACUITY_SCORE: 25
ADLS_ACUITY_SCORE: 17
ADLS_ACUITY_SCORE: 16
ADLS_ACUITY_SCORE: 20
BATHING: 2-->ASSISTIVE PERSON
ADLS_ACUITY_SCORE: 18
ADLS_ACUITY_SCORE: 33
ADLS_ACUITY_SCORE: 19
ADLS_ACUITY_SCORE: 21
ADLS_ACUITY_SCORE: 20
ADLS_ACUITY_SCORE: 11
ADLS_ACUITY_SCORE: 27
ADLS_ACUITY_SCORE: 18
ADLS_ACUITY_SCORE: 33
ADLS_ACUITY_SCORE: 19
ADLS_ACUITY_SCORE: 27
ADLS_ACUITY_SCORE: 16
ADLS_ACUITY_SCORE: 18
AMBULATION: 4-->COMPLETELY DEPENDENT
ADLS_ACUITY_SCORE: 30
DRESS: 2-->ASSISTIVE PERSON
ADLS_ACUITY_SCORE: 29
ADLS_ACUITY_SCORE: 25
ADLS_ACUITY_SCORE: 20
ADLS_ACUITY_SCORE: 18
ADLS_ACUITY_SCORE: 11
ADLS_ACUITY_SCORE: 13
ADLS_ACUITY_SCORE: 11
ADLS_ACUITY_SCORE: 18
ADLS_ACUITY_SCORE: 33
ADLS_ACUITY_SCORE: 29
ADLS_ACUITY_SCORE: 33
ADLS_ACUITY_SCORE: 11
ADLS_ACUITY_SCORE: 25
ADLS_ACUITY_SCORE: 11
ADLS_ACUITY_SCORE: 25
ADLS_ACUITY_SCORE: 33
ADLS_ACUITY_SCORE: 11
ADLS_ACUITY_SCORE: 16
ADLS_ACUITY_SCORE: 19
ADLS_ACUITY_SCORE: 11
ADLS_ACUITY_SCORE: 23
ADLS_ACUITY_SCORE: 11
ADLS_ACUITY_SCORE: 26
ADLS_ACUITY_SCORE: 13
ADLS_ACUITY_SCORE: 18
ADLS_ACUITY_SCORE: 33
ADLS_ACUITY_SCORE: 11
ADLS_ACUITY_SCORE: 16
ADLS_ACUITY_SCORE: 10
ADLS_ACUITY_SCORE: 21
ADLS_ACUITY_SCORE: 11
ADLS_ACUITY_SCORE: 29
ADLS_ACUITY_SCORE: 25
ADLS_ACUITY_SCORE: 29
ADLS_ACUITY_SCORE: 20
ADLS_ACUITY_SCORE: 32
ADLS_ACUITY_SCORE: 18
ADLS_ACUITY_SCORE: 16
ADLS_ACUITY_SCORE: 21
SWALLOWING: 0-->SWALLOWS FOODS/LIQUIDS WITHOUT DIFFICULTY
ADLS_ACUITY_SCORE: 16
ADLS_ACUITY_SCORE: 11
ADLS_ACUITY_SCORE: 18
ADLS_ACUITY_SCORE: 36
ADLS_ACUITY_SCORE: 11
ADLS_ACUITY_SCORE: 20
ADLS_ACUITY_SCORE: 11
ADLS_ACUITY_SCORE: 18
ADLS_ACUITY_SCORE: 19
ADLS_ACUITY_SCORE: 10
ADLS_ACUITY_SCORE: 18
ADLS_ACUITY_SCORE: 11
TOILETING: 4-->COMPLETELY DEPENDENT
ADLS_ACUITY_SCORE: 21
ADLS_ACUITY_SCORE: 35
ADLS_ACUITY_SCORE: 18
ADLS_ACUITY_SCORE: 27
ADLS_ACUITY_SCORE: 10
ADLS_ACUITY_SCORE: 20
ADLS_ACUITY_SCORE: 19
ADLS_ACUITY_SCORE: 11
ADLS_ACUITY_SCORE: 30
ADLS_ACUITY_SCORE: 18
ADLS_ACUITY_SCORE: 18
ADLS_ACUITY_SCORE: 11
ADLS_ACUITY_SCORE: 11
ADLS_ACUITY_SCORE: 19
ADLS_ACUITY_SCORE: 11
ADLS_ACUITY_SCORE: 11
ADLS_ACUITY_SCORE: 27
ADLS_ACUITY_SCORE: 18
ADLS_ACUITY_SCORE: 25
ADLS_ACUITY_SCORE: 18
ADLS_ACUITY_SCORE: 23
ADLS_ACUITY_SCORE: 16
ADLS_ACUITY_SCORE: 27
ADLS_ACUITY_SCORE: 11
ADLS_ACUITY_SCORE: 25
ADLS_ACUITY_SCORE: 25
RETIRED_EATING: 1-->ASSISTIVE EQUIPMENT
ADLS_ACUITY_SCORE: 35
ADLS_ACUITY_SCORE: 29
ADLS_ACUITY_SCORE: 33
ADLS_ACUITY_SCORE: 10
ADLS_ACUITY_SCORE: 19
ADLS_ACUITY_SCORE: 25
ADLS_ACUITY_SCORE: 22
RETIRED_COMMUNICATION: 0-->UNDERSTANDS/COMMUNICATES WITHOUT DIFFICULTY
ADLS_ACUITY_SCORE: 21
FALL_HISTORY_WITHIN_LAST_SIX_MONTHS: NO
ADLS_ACUITY_SCORE: 25
ADLS_ACUITY_SCORE: 11
ADLS_ACUITY_SCORE: 27
ADLS_ACUITY_SCORE: 37
ADLS_ACUITY_SCORE: 16
ADLS_ACUITY_SCORE: 25
ADLS_ACUITY_SCORE: 33
ADLS_ACUITY_SCORE: 11
ADLS_ACUITY_SCORE: 25
ADLS_ACUITY_SCORE: 15
ADLS_ACUITY_SCORE: 18
ADLS_ACUITY_SCORE: 11
ADLS_ACUITY_SCORE: 25
ADLS_ACUITY_SCORE: 11
ADLS_ACUITY_SCORE: 13
ADLS_ACUITY_SCORE: 16
ADLS_ACUITY_SCORE: 30
ADLS_ACUITY_SCORE: 22
ADLS_ACUITY_SCORE: 21
ADLS_ACUITY_SCORE: 11
ADLS_ACUITY_SCORE: 11
ADLS_ACUITY_SCORE: 21
ADLS_ACUITY_SCORE: 25
ADLS_ACUITY_SCORE: 19
ADLS_ACUITY_SCORE: 11
ADLS_ACUITY_SCORE: 13
ADLS_ACUITY_SCORE: 27
ADLS_ACUITY_SCORE: 36
ADLS_ACUITY_SCORE: 16
ADLS_ACUITY_SCORE: 25
ADLS_ACUITY_SCORE: 23
ADLS_ACUITY_SCORE: 16
ADLS_ACUITY_SCORE: 11
ADLS_ACUITY_SCORE: 18
ADLS_ACUITY_SCORE: 11
ADLS_ACUITY_SCORE: 11
ADLS_ACUITY_SCORE: 19
ADLS_ACUITY_SCORE: 17
ADLS_ACUITY_SCORE: 26
ADLS_ACUITY_SCORE: 21
ADLS_ACUITY_SCORE: 33
ADLS_ACUITY_SCORE: 18
ADLS_ACUITY_SCORE: 31
ADLS_ACUITY_SCORE: 16
ADLS_ACUITY_SCORE: 13
ADLS_ACUITY_SCORE: 10
ADLS_ACUITY_SCORE: 11
ADLS_ACUITY_SCORE: 29
ADLS_ACUITY_SCORE: 17
ADLS_ACUITY_SCORE: 25
ADLS_ACUITY_SCORE: 19
ADLS_ACUITY_SCORE: 17
ADLS_ACUITY_SCORE: 27
ADLS_ACUITY_SCORE: 13
TRANSFERRING: 4-->COMPLETELY DEPENDENT
ADLS_ACUITY_SCORE: 16
ADLS_ACUITY_SCORE: 33
ADLS_ACUITY_SCORE: 25
ADLS_ACUITY_SCORE: 17
ADLS_ACUITY_SCORE: 11
ADLS_ACUITY_SCORE: 25
ADLS_ACUITY_SCORE: 21
ADLS_ACUITY_SCORE: 11
ADLS_ACUITY_SCORE: 11
ADLS_ACUITY_SCORE: 33
ADLS_ACUITY_SCORE: 16
ADLS_ACUITY_SCORE: 11
ADLS_ACUITY_SCORE: 23
ADLS_ACUITY_SCORE: 15
ADLS_ACUITY_SCORE: 15
ADLS_ACUITY_SCORE: 11
ADLS_ACUITY_SCORE: 18
ADLS_ACUITY_SCORE: 18
ADLS_ACUITY_SCORE: 11
ADLS_ACUITY_SCORE: 16
ADLS_ACUITY_SCORE: 20

## 2018-01-01 ASSESSMENT — MIFFLIN-ST. JEOR
SCORE: 2092.66
SCORE: 2086.76
SCORE: 1986.97
SCORE: 2100.75
SCORE: 2117.75
SCORE: 2141.75
SCORE: 2092.21

## 2018-01-01 ASSESSMENT — PAIN DESCRIPTION - DESCRIPTORS
DESCRIPTORS: PATIENT UNABLE TO DESCRIBE
DESCRIPTORS: ACHING
DESCRIPTORS: ACHING;SORE
DESCRIPTORS: DISCOMFORT;OTHER (COMMENT)
DESCRIPTORS: SHARP
DESCRIPTORS: PATIENT UNABLE TO DESCRIBE
DESCRIPTORS: PATIENT UNABLE TO DESCRIBE
DESCRIPTORS: DISCOMFORT;OTHER (COMMENT)
DESCRIPTORS: ACHING
DESCRIPTORS: ACHING
DESCRIPTORS: PATIENT UNABLE TO DESCRIBE
DESCRIPTORS: ACHING
DESCRIPTORS: OTHER (COMMENT)
DESCRIPTORS: PATIENT UNABLE TO DESCRIBE
DESCRIPTORS: SHARP
DESCRIPTORS: ACHING
DESCRIPTORS: PATIENT UNABLE TO DESCRIBE
DESCRIPTORS: ACHING;TINGLING
DESCRIPTORS: ACHING;DISCOMFORT
DESCRIPTORS: ACHING
DESCRIPTORS: ACHING;CONSTANT
DESCRIPTORS: ACHING
DESCRIPTORS: ACHING;CONSTANT
DESCRIPTORS: ACHING
DESCRIPTORS: ACHING
DESCRIPTORS: CONSTANT
DESCRIPTORS: ACHING
DESCRIPTORS: PATIENT UNABLE TO DESCRIBE
DESCRIPTORS: ACHING
DESCRIPTORS: PATIENT UNABLE TO DESCRIBE
DESCRIPTORS: PATIENT UNABLE TO DESCRIBE
DESCRIPTORS: ACHING
DESCRIPTORS: PATIENT UNABLE TO DESCRIBE
DESCRIPTORS: ACHING
DESCRIPTORS: ACHING
DESCRIPTORS: PATIENT UNABLE TO DESCRIBE
DESCRIPTORS: ACHING
DESCRIPTORS: SHARP
DESCRIPTORS: SORE
DESCRIPTORS: CRAMPING;DISCOMFORT;SORE
DESCRIPTORS: PATIENT UNABLE TO DESCRIBE
DESCRIPTORS: ACHING;CONSTANT
DESCRIPTORS: ACHING;DISCOMFORT
DESCRIPTORS: CONSTANT;CRAMPING
DESCRIPTORS: ACHING
DESCRIPTORS: ACHING
DESCRIPTORS: PATIENT UNABLE TO DESCRIBE
DESCRIPTORS: ACHING
DESCRIPTORS: PATIENT UNABLE TO DESCRIBE
DESCRIPTORS: PATIENT UNABLE TO DESCRIBE
DESCRIPTORS: SHARP;CONSTANT;ACHING
DESCRIPTORS: PATIENT UNABLE TO DESCRIBE
DESCRIPTORS: ACHING;CRAMPING
DESCRIPTORS: ACHING
DESCRIPTORS: ACHING
DESCRIPTORS: DISCOMFORT;OTHER (COMMENT)
DESCRIPTORS: PATIENT UNABLE TO DESCRIBE
DESCRIPTORS: ACHING
DESCRIPTORS: ACHING
DESCRIPTORS: PATIENT UNABLE TO DESCRIBE
DESCRIPTORS: ACHING;CONSTANT
DESCRIPTORS: ACHING
DESCRIPTORS: PATIENT UNABLE TO DESCRIBE
DESCRIPTORS: ACHING;TINGLING
DESCRIPTORS: ACHING
DESCRIPTORS: ACHING;SORE
DESCRIPTORS: ACHING;TINGLING
DESCRIPTORS: ACHING;CONSTANT
DESCRIPTORS: PATIENT UNABLE TO DESCRIBE
DESCRIPTORS: ACHING;CONSTANT
DESCRIPTORS: DISCOMFORT
DESCRIPTORS: ACHING;CONSTANT
DESCRIPTORS: ACHING;SORE
DESCRIPTORS: ACHING
DESCRIPTORS: PATIENT UNABLE TO DESCRIBE
DESCRIPTORS: PATIENT UNABLE TO DESCRIBE
DESCRIPTORS: DISCOMFORT
DESCRIPTORS: ACHING
DESCRIPTORS: ACHING;SORE
DESCRIPTORS: ACHING
DESCRIPTORS: ACHING;CONSTANT

## 2018-10-25 PROBLEM — S72.90XA FEMUR FRACTURE (H): Status: ACTIVE | Noted: 2018-01-01

## 2018-10-25 NOTE — LETTER
Health Information Management Services               Recipient:  [facility]        Sender:  LORENA Sharpe, [facility]  [address]   Phone [phone]          Date: November 7, 2018  Patient Name:  Mayra Stokes  Routing Message:    Please consider for short-term rehab placement. Thanks!        The documents accompanying this notice contain confidential information belonging to the sender.  This information is intended only for the use of the individual or entity named above.  The authorized recipient of this information is prohibited from disclosing this information to any other party and is required to destroy the information after its stated need has been fulfilled, unless otherwise required by state law.      If you are not the intended recipient, you are hereby notified that any disclosure, copying, distribution or action taken in reliance on the contents of these documents is strictly prohibited. If you have received this document in error, please notify [place] immediately at [phone].  You may return the document via fax ([phone]) or return mail  (Health Information Management, , [address] [place] [address]).

## 2018-10-25 NOTE — LETTER
Health Information Management Services               Recipient:  [facility]        Sender:  LORENA Sharpe, LGSW  [address]   Phone [phone]          Date: November 6, 2018  Patient Name:  Mayra Stokes  Routing Message:    Please consider for short-term rehab placement. Thanks!        The documents accompanying this notice contain confidential information belonging to the sender.  This information is intended only for the use of the individual or entity named above.  The authorized recipient of this information is prohibited from disclosing this information to any other party and is required to destroy the information after its stated need has been fulfilled, unless otherwise required by state law.      If you are not the intended recipient, you are hereby notified that any disclosure, copying, distribution or action taken in reliance on the contents of these documents is strictly prohibited. If you have received this document in error, please notify [place] immediately at [phone].  You may return the document via fax ([phone]) or return mail  (Health Information Management, , [address] [place] [address]).

## 2018-10-25 NOTE — LETTER
Health Information Management Services               Recipient:  [facility]        Sender:  LORENA Sharpe, LGSW  [address]   Phone [phone]          Date: November 8, 2018  Patient Name:  Mayra Stokes  Routing Message:    Updated d/c orders        The documents accompanying this notice contain confidential information belonging to the sender.  This information is intended only for the use of the individual or entity named above.  The authorized recipient of this information is prohibited from disclosing this information to any other party and is required to destroy the information after its stated need has been fulfilled, unless otherwise required by state law.      If you are not the intended recipient, you are hereby notified that any disclosure, copying, distribution or action taken in reliance on the contents of these documents is strictly prohibited. If you have received this document in error, please notify [facility] immediately at [phone].  You may return the document via fax ([phone]) or return mail  (Health Information Management, , [address] [place] [address]).

## 2018-10-25 NOTE — LETTER
Transition Communication Hand-off for Care Transitions to Next Level of Care Provider    Name: Mayra Stokes  : 1955  MRN #: [id number]  Primary Care Provider: Tahmina Sanchez     Primary Clinic: [facility] [address] [facility] [address]     Reason for Hospitalization:  pathological femur fracture  hypercalcemia  Femur fracture (H)  Pathological Femur Fracture  Admit Date/Time: 10/25/2018  8:04 PM  Discharge Date: 18  Payor Source: Payor: [employer] / Plan: [employer] PEAK / Product Type: HMO /     Readmission Assessment Measure (NICHOLAS) Risk Score/category: Elevated    Reason for Communication Hand-off Referral: Multiple providers/specialties    Discharge Plan:  TCU:  [facility]   [address] [place] [address] [phone]  Fax [phone]     Concern for non-adherence with plan of care:   Y/N No  Discharge Needs Assessment:  Needs       Most Recent Value    Equipment Currently Used at Home wheelchair, manual, shower chair, walker, rolling          Already enrolled in Tele-monitoring program and name of program:  Unknown  Follow-up specialty is recommended: Yes    Follow-up plan:  Future Appointments  Date Time Provider Department Center   2018 7:00 PM Rajani Moody OT [facility]   2018 10:00 AM Narcisa Dasilva MD [facility]       Any outstanding tests or procedures:        Referrals     Future Labs/Procedures    Occupational Therapy Adult Consult     Comments:    Evaluate and treat as clinically indicated.    Reason:  S/p femur fracture    Physical Therapy Adult Consult     Comments:    Evaluate and treat as clinically indicated.    Reason:  S/p femur fracture            LORENA Sharpe, LGSW  [address]   Pager [phone]  Phone [phone]      AVS/Discharge Summary is the source of truth; this is a helpful guide for improved communication of patient story

## 2018-10-25 NOTE — LETTER
Health Information Management Services               Recipient:  [facility]        Sender:  LORENA Sharpe, LGSW  [address]   Phone [phone]          Date: November 8, 2018  Patient Name:  Mayra Stokes  Routing Message:    Discharge orders        The documents accompanying this notice contain confidential information belonging to the sender.  This information is intended only for the use of the individual or entity named above.  The authorized recipient of this information is prohibited from disclosing this information to any other party and is required to destroy the information after its stated need has been fulfilled, unless otherwise required by state law.      If you are not the intended recipient, you are hereby notified that any disclosure, copying, distribution or action taken in reliance on the contents of these documents is strictly prohibited. If you have received this document in error, please notify [place] immediately at [phone].  You may return the document via fax ([phone]) or return mail  (Health Information Management, , [address] [place] [address]).

## 2018-10-25 NOTE — IP AVS SNAPSHOT
[address] [address]    Phone:  [phone]                                       After Visit Summary   10/25/2018    Mayra Stokes    MRN: [id number]           After Visit Summary Signature Page     I have received my discharge instructions, and my questions have been answered. I have discussed any challenges I see with this plan with the nurse or doctor.    ..........................................................................................................................................  Patient/Patient Representative Signature      ..........................................................................................................................................  Patient Representative Print Name and Relationship to Patient    ..................................................               ................................................  Date                                   Time    ..........................................................................................................................................  Reviewed by Signature/Title    ...................................................              ..............................................  Date                                               Time          22EPIC Rev 08/18

## 2018-10-25 NOTE — IP AVS SNAPSHOT
MRN:[id number]                      After Visit Summary   10/25/2018    Mayra Stokes    MRN: [id number]           Thank you!     Thank you for choosing [facility] for your care. Our goal is always to provide you with excellent care. Hearing back from our patients is one way we can continue to improve our services. Please take a few minutes to complete the written survey that you may receive in the mail after you visit with us. Thank you!        Patient Information     Date Of Birth          1955        Designated Caregiver       Most Recent Value    Caregiver    Will someone help with your care after discharge? yes    Name of designated caregiver friends    Phone number of caregiver NA    Caregiver address NA      About your hospital stay     You were admitted on:  October 25, 2018 You last received care in the:  [address] [facility]    You were discharged on:  November 8, 2018        Reason for your hospital stay       You were in the hospital for treatment of high calcium and a femur fracture.                  Who to Call     For medical emergencies, please call 911.  For non-urgent questions about your medical care, please call your primary care provider or clinic, [phone]  For questions related to your surgery, please call your surgery clinic        Attending Provider     Provider Specialty    Lady Singh MD Internal Medicine    Orion Daley MD Internal Medicine    Ray, Avani Hudson MD Internal Medicine    Froilan, Nelly Gil MD Internal Medicine       Primary Care Provider Office Phone # Fax #    Tahmina Sanchez PA-C [phone]      After Care Instructions     Activity - Up ad delfin           Additional Discharge Instructions       Patient will need close follow up of her electrolytes, specifically potassium, calcium, phosphorus, and magnesium.            Diet       Follow this diet upon discharge: Regular            Fall precautions           General  info for SNF       Length of Stay Estimate: Short Term Care: Estimated # of Days <30  Condition at Discharge: Improving  Level of care:skilled   Rehabilitation Potential: Good  Admission H&P remains valid and up-to-date: Yes  Recent Chemotherapy: N/A  Use Nursing Home Standing Orders: Yes            Mantoux instructions       Give two-step Mantoux (PPD) Per Facility Policy Yes                  Follow-up Appointments     Follow Up ([facility])       Follow up with primary care provider, Tahmina Sanchez, on Monday, 11/12/2018, at 10:00AM.    Follow up with Dr. White (orthopedics) within 2 weeks of discharge for evaluation after femur fracture.    Follow up with gynecology/oncology in [place] within 4-6 weeks of discharge for endometrial biopsy and pelvic exam. Alternatively, this can be done with any gynecologist in your area.    Follow up with oncology and radiation oncology within one week of discharge for PET scan, follow up of hypocalcemia, and metastatic squamous cell carcinoma.     Please make a dental appointment within 3-4 weeks of discharge for exam due to risk of avascular necrosis of the jaw with denosumab use.    Appointments on [facility] and/or [facility] (with [facility] or [facility] provider or service). Call [phone] if you haven't heard regarding these appointments within 7 days of discharge.                  Your next 10 appointments already scheduled     Nov 30, 2018 10:00 AM CST   New Visit with Narcisa Marques MD   [facility]    [address] [place] [id number] [phone]              Additional Services     Occupational Therapy Adult Consult       Evaluate and treat as clinically indicated.    Reason:  S/p femur fracture            Physical Therapy Adult Consult       Evaluate and treat as clinically indicated.    Reason:  S/p femur fracture                  Pending Results     Date and Time Order Name Status Description  "   10/26/2018 0223 PTH Related Peptide Test In process             Admission Information     Date & Time Provider Department Dept. Phone    10/25/2018 Nelly Mcgovern MD [address] [facility] [place] [phone]      Your Vitals Were     Blood Pressure Pulse Temperature Respirations Height Weight    146/76 (BP Location: Left arm) 86 98.3  F (36.8  C) (Oral) 16 1.651 m (5' 5\") 153 kg (337 lb 4.9 oz)    Pulse Oximetry BMI (Body Mass Index)                94% 56.13 kg/m2          WhoteverharQewz Information     Betyah lets you send messages to your doctor, view your test results, renew your prescriptions, schedule appointments and more. To sign up, go to [email or web address] . Click on \"Log in\" on the left side of the screen, which will take you to the Welcome page. Then click on \"Sign up Now\" on the right side of the page.     You will be asked to enter the access code listed below, as well as some personal information. Please follow the directions to create your username and password.     Your access code is: [email or web address]  Expires: 2019  9:12 AM     Your access code will  in 90 days. If you need help or a new code, please call your [place] clinic or [phone].        Care EveryWhere ID     This is your Care EveryWhere ID. This could be used by other organizations to access your [place] medical records  [id number]        Equal Access to Services     [facility] MARVEL : Hadii chastity mcgowan hadlotuso Sodonaldali, waaxda luqadaha, qaybta kaalmada virgilioyachidi, waxay yodit osuna . So [facility] [phone].    ATENCIÓN: Si habla español, tiene a shaffer disposición servicios gratuitos de asistencia lingüística. Llame al [phone].    We comply with applicable federal civil rights laws and [place] laws. We do not discriminate on the basis of race, color, national origin, age, disability, sex, sexual orientation, or gender identity.               Review of your medicines      START taking        Dose / " Directions    potassium & sodium phosphates 280-160-250 MG Packet   Commonly known as:  NEUTRA-PHOS   Used for:  Hypophosphatemia        Dose:  2 packet   Take 2 packets by mouth 4 times daily   Quantity:  120 each   Refills:  0         CONTINUE these medicines which may have CHANGED, or have new prescriptions. If we are uncertain of the size of tablets/capsules you have at home, strength may be listed as something that might have changed.        Dose / Directions    * TRAMADOL HCL PO   This may have changed:  Another medication with the same name was added. Make sure you understand how and when to take each.        Dose:  50 mg   Take 50 mg by mouth every 6 hours as needed for moderate to severe pain (1-2 tabs every 4-6 hr)   Refills:  0       * traMADol 50 MG tablet   Commonly known as:  ULTRAM   This may have changed:  You were already taking a medication with the same name, and this prescription was added. Make sure you understand how and when to take each.        Dose:  25 mg   Take 0.5 tablets (25 mg) by mouth every 6 hours as needed for moderate pain   Quantity:  10 tablet   Refills:  0       * Notice:  This list has 2 medication(s) that are the same as other medications prescribed for you. Read the directions carefully, and ask your doctor or other care provider to review them with you.      CONTINUE these medicines which have NOT CHANGED        Dose / Directions    ASPIRIN PO        Dose:  81 mg   Take 81 mg by mouth daily   Refills:  0       calcitonin (salmon) 200 UNIT/ACT nasal spray   Commonly known as:  MIACALCIN        Dose:  1 spray   Spray 1 spray into one nostril alternating nostrils daily Alternate nostril each day.   Refills:  0       ENALAPRIL MALEATE PO   Indication:  High Blood Pressure Disorder        Dose:  5 mg   Take 5 mg by mouth daily   Refills:  0       LASIX PO        Dose:  40 mg   Take 40 mg by mouth 2 times daily   Refills:  0       LOPRESSOR PO        Dose:  100 mg   Take 100 mg by  mouth 2 times daily   Refills:  0       ZANAFLEX PO        Dose:  4 mg   Take 4 mg by mouth daily as needed for muscle spasms   Refills:  0         STOP taking     OXYCODONE HCL PO                Where to get your medicines      Some of these will need a paper prescription and others can be bought over the counter. Ask your nurse if you have questions.     Bring a paper prescription for each of these medications     traMADol 50 MG tablet       You don't need a prescription for these medications     potassium & sodium phosphates 280-160-250 MG Packet                Protect others around you: Learn how to safely use, store and throw away your medicines at www.disposemymeds.org.        Information about OPIOIDS     PRESCRIPTION OPIOIDS: WHAT YOU NEED TO KNOW   We gave you an opioid (narcotic) pain medicine. It is important to manage your pain, but opioids are not always the best choice. You should first try all the other options your care team gave you. Take this medicine for as short a time (and as few doses) as possible.    Some activities can increase your pain, such as bandage changes or therapy sessions. It may help to take your pain medicine 30 to 60 minutes before these activities. Reduce your stress by getting enough sleep, working on hobbies you enjoy and practicing relaxation or meditation. Talk to your care team about ways to manage your pain beyond prescription opioids.    These medicines have risks:    DO NOT drive when on new or higher doses of pain medicine. These medicines can affect your alertness and reaction times, and you could be arrested for driving under the influence (DUI). If you need to use opioids long-term, talk to your care team about driving.    DO NOT operate heavy machinery    DO NOT do any other dangerous activities while taking these medicines.    DO NOT drink any alcohol while taking these medicines.     If the opioid prescribed includes acetaminophen, DO NOT take with any other  medicines that contain acetaminophen. Read all labels carefully. Look for the word  acetaminophen  or  Tylenol.  Ask your pharmacist if you have questions or are unsure.    You can get addicted to pain medicines, especially if you have a history of addiction (chemical, alcohol or substance dependence). Talk to your care team about ways to reduce this risk.    All opioids tend to cause constipation. Drink plenty of water and eat foods that have a lot of fiber, such as fruits, vegetables, prune juice, apple juice and high-fiber cereal. Take a laxative (Miralax, milk of magnesia, Colace, Senna) if you don t move your bowels at least every other day. Other side effects include upset stomach, sleepiness, dizziness, throwing up, tolerance (needing more of the medicine to have the same effect), physical dependence and slowed breathing.    Store your pills in a secure place, locked if possible. We will not replace any lost or stolen medicine. If you don t finish your medicine, please throw away (dispose) as directed by your pharmacist. The [place] Pollution Control Agency has more information about safe disposal: https://[email or web address].mn.us/living-green/managing-unwanted-medications             Medication List: This is a list of all your medications and when to take them. Check marks below indicate your daily home schedule. Keep this list as a reference.      Medications           Morning Afternoon Evening Bedtime As Needed    ASPIRIN PO   Take 81 mg by mouth daily   Last time this was given:  325 mg on 11/1/2018  8:27 PM                                calcitonin (salmon) 200 UNIT/ACT nasal spray   Commonly known as:  MIACALCIN   Spray 1 spray into one nostril alternating nostrils daily Alternate nostril each day.                                ENALAPRIL MALEATE PO   Take 5 mg by mouth daily   Last time this was given:  5 mg on 11/8/2018  8:14 AM                                LASIX PO   Take 40 mg by mouth 2 times daily                                 LOPRESSOR PO   Take 100 mg by mouth 2 times daily   Last time this was given:  100 mg on 11/8/2018  8:15 AM                                potassium & sodium phosphates 280-160-250 MG Packet   Commonly known as:  NEUTRA-PHOS   Take 2 packets by mouth 4 times daily   Last time this was given:  2 packets on 11/8/2018  8:16 AM                                * TRAMADOL HCL PO   Take 50 mg by mouth every 6 hours as needed for moderate to severe pain (1-2 tabs every 4-6 hr)                                * traMADol 50 MG tablet   Commonly known as:  ULTRAM   Take 0.5 tablets (25 mg) by mouth every 6 hours as needed for moderate pain   Last time this was given:  25 mg on 11/8/2018  6:57 AM                                ZANAFLEX PO   Take 4 mg by mouth daily as needed for muscle spasms                                * Notice:  This list has 2 medication(s) that are the same as other medications prescribed for you. Read the directions carefully, and ask your doctor or other care provider to review them with you.

## 2018-10-25 NOTE — LETTER
Health Information Management Services               Recipient:  [facility]        Sender:  LORENA Sharpe, LGSW  [address]   Phone [phone]          Date: November 6, 2018  Patient Name:  Mayra Stokes  Routing Message:    Please consider for short-term rehab placement. Thanks!        The documents accompanying this notice contain confidential information belonging to the sender.  This information is intended only for the use of the individual or entity named above.  The authorized recipient of this information is prohibited from disclosing this information to any other party and is required to destroy the information after its stated need has been fulfilled, unless otherwise required by state law.      If you are not the intended recipient, you are hereby notified that any disclosure, copying, distribution or action taken in reliance on the contents of these documents is strictly prohibited. If you have received this document in error, please notify [place] immediately at [phone].  You may return the document via fax ([phone]) or return mail  (Health Information Management, , [address] [place] [address]).

## 2018-10-25 NOTE — PROGRESS NOTES
[facility]  Transfer Triage Note    Date of call: 10/24/18  Time of call: 6:36 PM    Reason for Transfer:Procedure can be done here and not at referring hospital  Further diagnostic work up, management, and consultation for specialized care  Diagnosis: Hypercalcemia, pathologic displaced distal femur fracture      Outside Records: Not available  Additional records requested to be faxed to [phone].    Stability of Patient: Patient is at risk for instability, however patient requires urgent transfer and does not meet ICU criteria     Expected Time of Arrival for Transfer: 0-8 hours    Recommendations for Management and Stabilization: Not needed    Additional Comments:   Patient with subacute-to- chronic hypercalcemia, unclear etiology being worked up by an outside oncologist, so far studies including BMB are negative but still suspicious of occult malignancy. Presented to [place] ED with acute pathologic fracture; hypercalcemia (14.5) despite IVF, facility does not have calcitonin.     Plan to transfer to the  as able for further/expediated hypercalcemia work-up and fracture management, discussed case with Orthopedics but unable to view images to make recs. Requested they send all imaging with the patient.   Will monitor until bed available with active management of hypercalcemia, IVF, and pain control.     Lady Singh MD

## 2018-10-25 NOTE — LETTER
[facility]               Recipient:  [facility]        Sender:  LORENA Sharpe, LGSW  [address]   Phone [phone]          Date: November 5, 2018  Patient Name:  Mayra Stokes  Routing Message:    Please consider for swing bed placement. Thanks!        The documents accompanying this notice contain confidential information belonging to the sender.  This information is intended only for the use of the individual or entity named above.  The authorized recipient of this information is prohibited from disclosing this information to any other party and is required to destroy the information after its stated need has been fulfilled, unless otherwise required by state law.      If you are not the intended recipient, you are hereby notified that any disclosure, copying, distribution or action taken in reliance on the contents of these documents is strictly prohibited. If you have received this document in error, please notify [facility] immediately at [phone].  You may return the document via fax ([phone]) or return mail  ([facility] Information [facility], , [address] [place] [address]).

## 2018-10-25 NOTE — IP AVS SNAPSHOT
Mayra Stokes #[id number] (CSN:[id number])  (63 year old F)  (Adm: 10/25/18)     [id number]               [address] [facility]: [phone]            Patient Demographics     Patient Name Sex          Age SSN Address Phone    Mayra Stokes Female 1955 (63 year old)  [address] [facility] [id number] [phone] (Home)      Emergency Contact(s)     Name Relation Home Work Mobile    Radha Stokes Relative [phone]      Magui Alberto Friend [phone]        Admission Information     Attending Provider Admitting Provider Admission Type Admission Date/Time    Nelly Mcgovern MD Sundberg, Sotero Cavanaugh MD Urgent 10/25/18  2004    Discharge Date Hospital Service Auth/Cert Status Service Area     Internal Medicine [facility]    Unit Room/Bed Admission Status        U5B [id number] Admission (Confirmed)       Admission     Complaint    pathological femur fracture, hypercalcemia, Femur fracture (H), Pathological Femur Fracture      Hospital Account     Name Acct ID Class Status Primary Coverage    Mayra Stokes [id number] Inpatient Open [employer]            Guarantor Account (for Hospital Account #[id number])     Name Relation to Pt Service Area Active? Acct Type    Mayra Stokes Self FCS Yes Personal/Family    Address Phone          [address] [facility], [place] [address] [phone](H)              Coverage Information (for Hospital Account #[id number])     F/O Payor/Plan Precert #    [employer]AK     Subscriber Subscriber #    Mayra Stokes [id number]    Address Phone    [address]  [place] [address] [phone]                                                INTERAGENCY TRANSFER FORM - PHYSICIAN ORDERS   10/25/2018                       [address] [place] [address]: [phone]            Attending Provider: Nelly Mcgovern MD     Allergies:  No Known Allergies     "Infection:  None   Service:  INTERNAL MED    Ht:  1.651 m (5' 5\")   Wt:  153 kg (337 lb 4.9 oz)   Admission Wt:  149.7 kg (330 lb)    BMI:  56.13 kg/m 2   BSA:  2.65 m 2            ED Clinical Impression     Diagnosis Description Comment Added By Time Added    Closed fracture of distal end of right femur with routine healing, unspecified fracture morphology, subsequent encounter [S72.401D] Closed fracture of distal end of right femur with routine healing, unspecified fracture morphology, subsequent encounter [S72.401D]  Silvia Toribio MD 11/8/2018  8:57 AM    Hypophosphatemia [E83.39] Hypophosphatemia [E83.39]  Silvia Toribio MD 11/8/2018  9:03 AM      Hospital Problems as of 11/8/2018              Priority Class Noted POA    Femur fracture (H) Medium  10/25/2018 Yes      Non-Hospital Problems as of 11/8/2018     None      Code Status History     Date Active Date Inactive Code Status Order ID Comments User Context    10/25/2018 10:52 PM 10/27/2018 12:37 PM DNR [id number]  Susi Gonzalez MD Inpatient    10/25/2018  8:17 PM 10/25/2018 10:52 PM Full Code [id number]  Susi Gonzalez MD Inpatient      Current Code Status     Date Active Code Status Order ID Comments User Context       10/27/2018 12:37 PM Full Code [id number]  Sotero Berumen MD Inpatient       Questions for Current Code Status     Question Answer Comment    Code status determined by: Discussion with patient/legal decision maker       Summary of Visit     Reason for your hospital stay       You were in the hospital for treatment of high calcium and a femur fracture.                Medication Review      START taking        Dose / Directions Comments    potassium & sodium phosphates 280-160-250 MG Packet   Commonly known as:  NEUTRA-PHOS   Used for:  Hypophosphatemia        Dose:  2 packet   Take 2 packets by mouth 4 times daily   Quantity:  120 each   Refills:  0          CONTINUE these medications which may have CHANGED, or have new prescriptions. " If we are uncertain of the size of tablets/capsules you have at home, strength may be listed as something that might have changed.        Dose / Directions Comments    * TRAMADOL HCL PO   This may have changed:  Another medication with the same name was added. Make sure you understand how and when to take each.        Dose:  50 mg   Take 50 mg by mouth every 6 hours as needed for moderate to severe pain (1-2 tabs every 4-6 hr)   Refills:  0        * traMADol 50 MG tablet   Commonly known as:  ULTRAM   This may have changed:  You were already taking a medication with the same name, and this prescription was added. Make sure you understand how and when to take each.        Dose:  25 mg   Take 0.5 tablets (25 mg) by mouth every 6 hours as needed for moderate pain   Quantity:  10 tablet   Refills:  0        * Notice:  This list has 2 medication(s) that are the same as other medications prescribed for you. Read the directions carefully, and ask your doctor or other care provider to review them with you.      CONTINUE these medications which have NOT CHANGED        Dose / Directions Comments    ASPIRIN PO        Dose:  81 mg   Take 81 mg by mouth daily   Refills:  0        calcitonin (salmon) 200 UNIT/ACT nasal spray   Commonly known as:  MIACALCIN        Dose:  1 spray   Spray 1 spray into one nostril alternating nostrils daily Alternate nostril each day.   Refills:  0        ENALAPRIL MALEATE PO   Indication:  High Blood Pressure Disorder        Dose:  5 mg   Take 5 mg by mouth daily   Refills:  0        LASIX PO        Dose:  40 mg   Take 40 mg by mouth 2 times daily   Refills:  0        LOPRESSOR PO        Dose:  100 mg   Take 100 mg by mouth 2 times daily   Refills:  0        ZANAFLEX PO        Dose:  4 mg   Take 4 mg by mouth daily as needed for muscle spasms   Refills:  0          STOP taking     OXYCODONE HCL PO                   After Care     Activity - Up ad delfin           Additional Discharge Instructions        Patient will need close follow up of her electrolytes, specifically potassium, calcium, phosphorus, and magnesium.       Diet       Follow this diet upon discharge: Regular       Fall precautions           General info for SNF       Length of Stay Estimate: Short Term Care: Estimated # of Days <30  Condition at Discharge: Improving  Level of care:skilled   Rehabilitation Potential: Good  Admission H&P remains valid and up-to-date: Yes  Recent Chemotherapy: N/A  Use Nursing Home Standing Orders: Yes       Mantoux instructions       Give two-step Mantoux (PPD) Per Facility Policy Yes             Referrals     Occupational Therapy Adult Consult       Evaluate and treat as clinically indicated.    Reason:  S/p femur fracture       Physical Therapy Adult Consult       Evaluate and treat as clinically indicated.    Reason:  S/p femur fracture             Follow-Up Appointment Instructions     Follow Up ([facility])       Follow up with primary care provider, Tahmina Sanchez, on Monday, 11/12/2018, at 10:00AM.    Follow up with Dr. White (orthopedics) within 2 weeks of discharge for evaluation after femur fracture.    Follow up with gynecology/oncology in [place] within 4-6 weeks of discharge for endometrial biopsy and pelvic exam. Alternatively, this can be done with any gynecologist in your area.    Follow up with oncology and radiation oncology within one week of discharge for PET scan, follow up of hypocalcemia, and metastatic squamous cell carcinoma.     Please make a dental appointment within 3-4 weeks of discharge for exam due to risk of avascular necrosis of the jaw with denosumab use.    Appointments on [place] and/or [facility] (with [facility] or [facility] provider or service). Call [phone] if you haven't heard regarding these appointments within 7 days of discharge.             Your next 10 appointments already scheduled     Nov 30, 2018 10:00 AM CST   New Visit with Narcisa Marques MD   [facility]  "[facility]    [address] [place] [address] [phone]                                                  INTERAGENCY TRANSFER FORM - NURSING   10/25/2018                       [address] [facility]: [phone]            Attending Provider: Nelly Mcgovern MD     Allergies:  No Known Allergies    Infection:  None   Service:  INTERNAL MED    Ht:  1.651 m (5' 5\")   Wt:  153 kg (337 lb 4.9 oz)   Admission Wt:  149.7 kg (330 lb)    BMI:  56.13 kg/m 2   BSA:  2.65 m 2            Advance Directives        Scanned docmt in ACP Activity?           Yes, scanned ACP docmt        Immunizations     Name Date      Influenza Quad, Recombinant, p-free 11/04/18       ASSESSMENT     Discharge Profile Flowsheet     EXPECTED DISCHARGE     Passing flatus  yes 11/08/18 0246    Expected Discharge Date  -- (TBD) 10/26/18 1107   COMMUNICATION ASSESSMENT      DISCHARGE NEEDS ASSESSMENT     Patient's communication style  spoken language (English or Bilingual) 10/29/18 1318    Equipment Currently Used at Home  wheelchair, manual;shower chair;walker, rolling 10/27/18 1438   SKIN      FUNCTIONAL LEVEL CURRENT     Inspection of bony prominences  Full except (identify areas not inspected) 11/08/18 0246    Toileting  1 - assistive equipment 11/06/18 0543   Full except areas not inspected   Coccyx;Sacrum;Buttock, right;Buttock, left 11/08/18 0246    Communication  0 - understands/communicates without difficulty 11/06/18 0543   Inspection under devices  Full except (identify device(s) not inspected) 11/08/18 0246    Swallowing  0 - swallows foods/liquids without difficulty 11/06/18 0543   Skin WDL  ex 11/07/18 0947    GASTROINTESTINAL (ADULT,PEDIATRIC,OB)     Not Inspected under devices  Other (dressings not removed) 11/08/18 0246    GI WDL  ex 11/08/18 0246   Skin Temperature  warm 11/08/18 0246    Abdominal Appearance  obese 11/08/18 0246   Skin Moisture  dry 11/08/18 0246    " "All Quadrants Bowel Sounds  audible and active in all quadrants 11/08/18 0246   Skin Elasticity  quick return to original state 11/08/18 0246    All Quadrants Palpation  soft/nontender 11/08/18 0246   Skin Integrity  bruise(s);incision(s);scab(s);scar(s) 11/08/18 0246    RUQ Palpation  soft/nontender 11/08/18 0246   Additional Documentation  -- (+CMS) 11/04/18 0427    RLQ Palpation  soft/nontender 11/07/18 1650   SAFETY      Last Bowel Movement  11/07/18 11/08/18 0246   Safety WDL  WDL 11/08/18 0246    GI Signs/Symptoms  gas discomfort 11/04/18 1532   All Alarms  none present 11/08/18 0246                 Assessment WDL (Within Defined Limits) Definitions           Safety WDL     Effective: 09/28/15    Row Information: <b>WDL Definition:</b> Bed in low position, wheels locked; call light in reach; upper side rails up x 2; ID band on<br> <font color=\"gray\"><i>Item=AS safety wdl>>List=AS safety wdl>>Version=F14</i></font>      Skin WDL     Effective: 09/28/15    Row Information: <b>WDL Definition:</b> Warm; dry; intact; elastic; without discoloration; pressure points without redness<br> <font color=\"gray\"><i>Item=AS skin wdl>>List=AS skin wdl>>Version=F14</i></font>      Vitals     Vital Signs Flowsheet     QUICK ADDS     Comfort  tolerable with discomfort 11/07/18 1523    Quick Adds  -- (post-PT) 11/01/18 1132   Change in Pain  about the same 11/07/18 1523    VITAL SIGNS     Pain Control  partially effective 11/07/18 1523    Temp  98.3  F (36.8  C) 11/08/18 0802   Functioning  can do most things, but pain gets in the way of some 11/07/18 1523    Temp src  Oral 11/08/18 0802   Sleep  awake with occasional pain 11/07/18 1523    Resp  16 11/08/18 0802   ANALGESIA SIDE EFFECTS MONITORING      Pulse  86 11/08/18 0802   Side Effects Monitoring: Respiratory Quality  R 11/05/18 1551    Heart Rate  91 11/07/18 2208   Side Effects Monitoring: Respiratory Depth  N 11/05/18 1551    Pulse/Heart Rate Source  Monitor 11/08/18 0802  " " Side Effects Monitoring: Sedation Level  1 11/05/18 1551    BP  146/76 11/08/18 0802   KATHY COMA SCALE      BP Location  Left arm 11/08/18 0802   Best Eye Response  4-->(E4) spontaneous 11/08/18 0246    POSITIONING     Best Motor Response  6-->(M6) obeys commands 11/08/18 0246    Body Position  turned 11/08/18 0246   Best Verbal Response  5-->(V5) oriented 11/08/18 0246    Head of Bed (HOB)  HOB at 20 degrees 11/08/18 0246   Kathy Coma Scale Score  15 11/08/18 0246    Chair  -- 11/07/18 0148   HEIGHT AND WEIGHT      Positioning/Transfer Devices  pillows;in use 11/06/18 1422   Height  1.651 m (5' 5\") 10/26/18 1011    OXYGEN THERAPY     Height Method  Stated 10/26/18 1011    SpO2  94 % 11/08/18 0802   Weight  (!)  153 kg (337 lb 4.9 oz) 11/07/18 2220    O2 Device  None (Room air) 11/08/18 0802   Weight Method  Bed scale 11/07/18 2220    FiO2 (%)  30 % 11/06/18 2342   BSA (Calculated - sq m)  2.62 10/26/18 1011    Oxygen Delivery  1 LPM 11/05/18 0555   BMI (Calculated)  55.03 10/26/18 1011    RESPIRATORY MONITORING     EKG MONITORING      Respiratory Monitoring (EtCO2)  30 mmHg 10/26/18 2354   Cardiac Rhythm  ST 10/26/18 1223    Integrated Pulmonary Index (IPI)  8-9 10/26/18 2354   DAILY CARE      PAIN/COMFORT     Activity Management  activity adjusted per tolerance 11/08/18 0246    Patient Currently in Pain  denies 11/07/18 1645   Activity Assistance Provided  assistance, 2 people 11/08/18 0246    Preferred Pain Scale  CAPA (Clinically Aligned Pain Assessment) ([facility], [facility] and [facility] Adults Only) 11/07/18 1645   Assistive Device Utilized  mechanical lift 11/07/18 0947    Pain Location  Hip 11/06/18 1441   ECG      Pain Orientation  Right 11/06/18 1441   ECG Rhythm  Normal sinus rhythm 11/04/18 1525    Pain Descriptors  Aching 11/06/18 1441   Ectopy  None 11/04/18 1525    Pain Management Interventions  analgesia administered 11/06/18 1441   Lead Monitored  Lead II 11/04/18 1525    Pain Intervention(s)  " Medication (See eMAR) 11/06/18 1441   POINT OF CARE TESTING      Response to Interventions  Decrease in pain 11/05/18 0341   Puncture Site  fingertip 10/30/18 0812    CLINICALLY ALIGNED PAIN ASSESSMENT (CAPA) ([facility], [facility] AND [facility] ADULTS ONLY)     Bedside Glucose (mg/dl )   131 mg/dl 10/30/18 0812            Patient Lines/Drains/Airways Status    Active LINES/DRAINS/AIRWAYS     Name: Placement date: Placement time: Site: Days: Last dressing change:    External Catheter 10/25/18 0200 10/25/18   0200    14     Peripheral IV 11/06/18 Left;Medial Lower forearm 11/06/18   1417   Lower forearm   1     Wound Buttocks Fissure Small slit open in cleft       Buttocks        Wound 11/06/18 Right Abdomen Fissure Moisture associated wound- skin/ abdominal fold 11/06/18   2030   Abdomen   1     Incision/Surgical Site 10/26/18 Right Leg 10/26/18   1744    12             Patient Lines/Drains/Airways Status    Active PICC/CVC     None            Intake/Output Detail Report     Date Intake     Output       Net    Shift P.O. I.V. IV Piggyback Total Urine Emesis/NG output Drains Blood Total       Day 11/07/18 0000 - 11/07/18 0659 -- -- -- -- 200 -- -- -- 200 -200    Avelina 11/07/18 0700 - 11/07/18 1459 225 -- -- 225 1300 -- -- -- 1300 -1075    Noc 11/07/18 1500 - 11/07/18 2359 -- -- -- -- 450 -- -- -- 450 -450    Day 11/08/18 0000 - 11/08/18 0659 -- -- -- -- 1100 -- -- -- 1100 -1100    Avelina 11/08/18 0700 - 11/08/18 1459 -- -- -- -- -- -- -- -- -- 0      Last Void/BM       Most Recent Value    Urine Occurrence 1 at 11/05/2018 1000    Stool Occurrence 1 at 11/07/2018 1944      Case Management/Discharge Planning     Case Management/Discharge Planning Flowsheet     LIVING ENVIRONMENT     MH/BH CAREGIVER      Lives With  alone 10/27/18 1438   Filed Complexity Screen Score  10 11/05/18 0920    Living Arrangements  mobile home 10/27/18 1438   ABUSE RISK SCREEN      COPING/STRESS     QUESTION TO PATIENT:  Has a member of your family or  a partner(now or in the past) intimidated, hurt, manipulated, or controlled you in any way?  no 10/29/18 1318    Major Change/Loss/Stressor  hospitalization 10/29/18 1318   QUESTION TO PATIENT: Do you feel safe going back to the place where you are living?  yes 10/29/18 1318    EXPECTED DISCHARGE     OBSERVATION: Is there reason to believe there has been maltreatment of a vulnerable adult (ie. Physical/Sexual/Emotional abuse, self neglect, lack of adequate food, shelter, medical care, or financial exploitation)?  no 10/29/18 1318    Expected Discharge Date  -- (TBD) 10/26/18 1107   OTHER      FINAL RESOURCES     Are you depressed or being treated for depression?  No 10/29/18 1318    Equipment Currently Used at Home  wheelchair, manual;shower chair;walker, rolling 10/27/18 1438                       [address] [facility] BANK: [phone]            Medication Administration Report for Mayra Stokes as of 11/08/18 0913   Legend:    Given Hold Not Given Due Canceled Entry Other Actions    Time Time (Time) Time  Time-Action       Inactive    Active    Linked        Medications 11/02/18 11/03/18 11/04/18 11/05/18 11/06/18 11/07/18 11/08/18    acetaminophen (TYLENOL) tablet 975 mg  Dose: 975 mg  Freq: EVERY 8 HOURS Route: PO  Start: 10/26/18 0504   Admin Instructions: Alternate ibuprofen (if ordered) with acetaminophen.  Maximum acetaminophen dose from all sources = 75 mg/kg/day not to exceed 4 grams/day.    Admin. Amount: 3 tablet (3 × 325 mg tablet)  Last Admin: 11/08/18 0814  Dispense Loc: [facility] ADS 5B1     0641 (975 mg)-Given       1415 (975 mg)-Given       2309 (975 mg)-Given        0823 (975 mg)-Given       1548 (975 mg)-Given       2359 (975 mg)-Given        0815 (975 mg)-Given       1803 (975 mg)-Given        0206 (975 mg)-Given       0918 (975 mg)-Given       1627 (975 mg)-Given        (0000)-Not Given       0812 (975 mg)-Given       1603 (975 mg)-Given        0126 (975 mg)-Given       (0800)-Not Given       1636  (975 mg)-Given        0009 (975 mg)-Given       0814 (975 mg)-Given       [ ] 1600           cholecalciferol (vitamin D3) tablet 2,000 Units  Dose: 2,000 Units  Freq: DAILY Route: PO  Start: 18 1530   Admin. Amount: 2 tablet (2 × 1,000 Units tablet)  Last Admin: 18 0814  Dispense Loc: [facility] ADS 5B1      1548 (2,000 Units)-Given        0814 (2,000 Units)-Given        0918 (2,000 Units)-Given        0814 (2,000 Units)-Given        0748 (2,000 Units)-Given        0814 (2,000 Units)-Given           enalapril (VASOTEC) tablet 5 mg  Dose: 5 mg  Freq: DAILY Route: PO  Start: 10/30/18 1700   Admin. Amount: 1 tablet (1 × 5 mg tablet)  Last Admin: 18 0814  Dispense Loc: [facility] ADS 5B1     0858 (5 mg)-Given        0824 (5 mg)-Given        0814 (5 mg)-Given        0918 (5 mg)-Given        0813 (5 mg)-Given        0749 (5 mg)-Given        0814 (5 mg)-Given           enoxaparin (LOVENOX) injection 40 mg  Dose: 40 mg  Freq: EVERY 24 HOURS Route: SC  Start: 18   Admin. Amount: 40 mg = 0.4 mL Conc: 40 mg/0.4 mL  Last Admin: 18  Dispense Loc: [facility] ADS 5B1  Volume: 0.4 mL      (40 mg)-Given         (40 mg)-Given         (40 mg)-Given         (40 mg)-Given         (40 mg)-Given         (40 mg)-Given        [ ] 2030           glycerin (adult) Suppository 1 suppository  Dose: 1 suppository  Freq: ONCE Route: RE  Start: 10/31/18 1845   Admin. Amount: 1 suppository  Dispense Loc: [facility]  Administrations Remainin               ibuprofen (ADVIL/MOTRIN) tablet 400 mg  Dose: 400 mg  Freq: EVERY 6 HOURS PRN Route: PO  PRN Reason: moderate pain  PRN Comment: mild pain  Start: 10/29/18 1238   Admin Instructions: up until discharged.   Notify provider if pain is not controlled with current regimen.   Alternate ibuprofen with acetaminophen if ordered.    Admin. Amount: 2 tablet (2 × 200 mg tablet)  Dispense Loc: [facility] ADS 5B1  Administrations Remaining:  "4  POC: IR Post-procedure               Lidocaine (LIDOCARE) 4 % Patch 2 patch  Dose: 2 patch  Freq: EVERY 24 HOURS 0800 Route: TD  Start: 11/03/18 0930   Admin Instructions: Apply patch(s) to painful area of right side of abdomen. To prevent lidocaine toxicity, patient should be patch free for 12 hrs daily. Patches may be cut to smaller size prior to removing release liner.  NEVER APPLY HEAT OVER PATCH which increases absorption and risk of local anesthetic toxicity. Do not apply over area where liposomal bupivacaine was injected for 96 hours post injection.    Admin. Amount: 2 patch  Last Admin: 11/05/18 1201  Dispense Loc: [facility] ADS 5B1  Infused Over: 12 Hours      1045 (2 patch)-Given [C]        1055 (2 patch)-Given [C]        1201 (2 patch)-Given        (1105)-Not Given        (1310)-Not Given        [ ] 1100           lidocaine (LMX4) cream  Freq: EVERY 1 HOUR PRN Route: Top  PRN Reason: moderate pain  PRN Comment: with VAD insertion or accessing implanted port.  Start: 10/30/18 1342   Admin Instructions: Do NOT give if patient has a history of allergy to any local anesthetic or any \"jose daniel\" product. Apply 30 minutes prior to VAD insertion or port access.   MAX Dose: 2.5 g (  of 5 g tube)                     Dispense Loc: [facility] ADS 5B1               lidocaine 1 % 0.5-5 mL  Dose: 0.5-5 mL  Freq: EVERY 1 HOUR PRN Route: OTHER  PRN Comment: mild pain with VAD insertion or accessing implanted port.  Start: 10/30/18 1342   Admin Instructions: Do NOT give if patient has a history of allergy to any local anesthetic or any \"jose daniel\" product. MAX dose 1 mL subcutaneous OR intradermal in divided doses.    Admin. Amount: 0.5-5 mL  Dispense Loc: [facility] ADS 5B1  Volume: 5 mL               lidocaine 1 % 1 mL  Dose: 1 mL  Freq: EVERY 1 HOUR PRN Route: OTHER  PRN Comment: mild pain with VAD insertion or accessing implanted port  Start: 10/25/18 2015   Admin Instructions: Do NOT give if patient has a history of allergy to " "any local anesthetic or any \"jose daniel\" product. MAX dose 1 mL subcutaneous OR intradermal in divided doses.    Admin. Amount: 1 mL  Dispense Loc: [facility] ADS 5B1  Volume: 2 mL               lidocaine patch in PLACE  Freq: EVERY 8 HOURS Route: TD  Start: 11/03/18 0930   Admin Instructions: Chart every shift, confirming that patch is still in place on patient (no barcode scan needed). See patch order for dose information.  NEVER APPLY HEAT OVER PATCH which will increase absorption and may lead to risk of local anesthetic toxicity. Do not apply over area where liposomal bupivacaine injected for 96 hours.    Last Admin: 11/07/18 1637  Dispense Loc: [facility]      1051 (2 each)-Patch in Place [C]       1744 ( )-Patch in Place        0130 ( )-Patch in Place              1804 ( )-Patch in Place        0212 ( )-Negative       0904 ( )-Given       1631 ( )-Patch in Place        0130 ( )-Negative       1051 ( )-Negative       1700 ( )-Negative        0130 ( )-Negative       1055 ( )-Negative       1637 ( )-Negative        (0013)-Not Given [C]       [ ] 0930       [ ] 1730           lidocaine patch REMOVAL  Freq: EVERY 24 HOURS 2000 Route: TD  Start: 11/03/18 2000   Admin Instructions: Patient should have a 12 hour patch free interval    Last Admin: 11/07/18 2058  Dispense Loc: [facility]      2017 ( )-Patch Removed        2022 (2 each)-Patch Removed        1940 ( )-Patch Removed        2004 ( )-Negative        2058 ( )-Negative        [ ] 2000           magnesium sulfate 2 g in NS intermittent infusion ([facility] or [facility])  Dose: 2 g  Freq: DAILY PRN Route: IV  PRN Reason: magnesium supplementation  Start: 10/31/18 0754   Admin Instructions: For Serum Mg++ 1.6 - 2 mg/dL  Give 2 g and recheck magnesium level next AM.    Admin. Amount: 2 g = 50 mL Conc: 2 g/50 mL  Last Admin: 11/02/18 1505  Dispense Loc: [facility]  Infused Over: 60 Minutes  Volume: 50 mL     1505 (2 g)-New Bag                 " magnesium sulfate 4 g in 100 mL sterile water (premade)  Dose: 4 g  Freq: EVERY 4 HOURS PRN Route: IV  PRN Reason: magnesium supplementation  Start: 10/31/18 0754   Admin Instructions: For serum Mg++ less than 1.6 mg/dL  Give 4 g and recheck magnesium level 2 hours after dose, and next AM.    Admin. Amount: 4 g = 100 mL Conc: 4 g/100 mL  Dispense Loc: [facility]  Infused Over: 120 Minutes  Volume: 100 mL               melatonin tablet 1 mg  Dose: 1 mg  Freq: AT BEDTIME PRN Route: PO  PRN Reason: sleep  Start: 10/25/18 2014   Admin Instructions: Do not give unless at least 6 hours of uninterrupted sleep is expected.    Admin. Amount: 1 tablet (1 × 1 mg tablet)  Last Admin: 11/08/18 0009  Dispense Loc: [facility] ADS 5B1      2017 (1 mg)-Given            0009 (1 mg)-Given           metoprolol tartrate (LOPRESSOR) tablet 100 mg  Dose: 100 mg  Freq: 2 TIMES DAILY Route: PO  Start: 10/25/18 2215   Admin Instructions: Hold for SBP less than 100 or HR less than 60    Admin. Amount: 2 tablet (2 × 50 mg tablet)  Last Admin: 11/08/18 0815  Dispense Loc: [facility] ADS 5B1     0858 (100 mg)-Given       2004 (100 mg)-Given        0824 (100 mg)-Given       2017 (100 mg)-Given        0815 (100 mg)-Given       2017 (100 mg)-Given        0917 (100 mg)-Given       1937 (100 mg)-Given        0814 (100 mg)-Given       2016 (100 mg)-Given        0748 (100 mg)-Given       2104 (100 mg)-Given        0815 (100 mg)-Given       [ ] 2000           naloxone (NARCAN) injection 0.1-0.4 mg  Dose: 0.1-0.4 mg  Freq: EVERY 2 MIN PRN Route: IV  PRN Reason: opioid reversal  Start: 10/25/18 2014   Admin Instructions: For respiratory rate LESS than or EQUAL to 8.  Partial reversal dose:  0.1 mg titrated q 2 minutes for Analgesia Side Effects Monitoring Sedation Level of 3 (frequently drowsy, arousable, drifts to sleep during conversation).Full reversal dose:  0.4 mg bolus for Analgesia Side Effects Monitoring Sedation Level of 4 (somnolent,  minimal or no response to stimulation).  For ordered IV doses 0.1-2mg give IVP. Give each 0.4mg over 15 seconds in emergency situations. For non-emergent situations further dilute in 9mL of NS to facilitate titration of response.    Admin. Amount: 0.1-0.4 mg = 0.25-1 mL Conc: 0.4 mg/mL  Dispense Loc: [facility] ADS 5B1  Volume: 1 mL               omeprazole (priLOSEC) CR capsule 20 mg  Dose: 20 mg  Freq: EVERY MORNING BEFORE BREAKFAST Route: PO  Start: 10/28/18 0730   Admin. Amount: 1 capsule (1 × 20 mg capsule)  Last Admin: 11/08/18 0815  Dispense Loc: [facility] ADS 5B1     0858 (20 mg)-Given        0824 (20 mg)-Given        0659 (20 mg)-Given        0917 (20 mg)-Given        0814 (20 mg)-Given        0749 (20 mg)-Given        0815 (20 mg)-Given           ondansetron (ZOFRAN-ODT) ODT tab 4 mg  Dose: 4 mg  Freq: EVERY 6 HOURS PRN Route: PO  PRN Reasons: nausea,vomiting  Start: 10/25/18 2016   Admin Instructions: This is Step 1 of nausea and vomiting management.  If nausea not resolved in 15 minutes, go to Step 2 prochlorperazine (COMPAZINE). Do not push through foil backing. Peel back foil and gently remove. Place on tongue immediately. Administration with liquid unnecessary  With dry hands, peel back foil backing and gently remove tablet; do not push oral disintegrating tablet through foil backing; administer immediately on tongue and oral disintegrating tablet dissolves in seconds; then swallow with saliva; liquid not required.    Admin. Amount: 1 tablet (1 × 4 mg tablet)  Dispense Loc: [facility] ADS 5B1              Or  ondansetron (ZOFRAN) injection 4 mg  Dose: 4 mg  Freq: EVERY 6 HOURS PRN Route: IV  PRN Reasons: nausea,vomiting  Start: 10/25/18 2016   Admin Instructions: This is Step 1 of nausea and vomiting management.  If nausea not resolved in 15 minutes, go to Step 2 prochlorperazine (COMPAZINE).  Irritant. For ordered IV doses 0.1-4 mg, give IV Push undiluted over 2-5 minutes.    Admin. Amount: 4 mg = 2 mL Conc:  4 mg/2 mL  Dispense Loc: [facility] ADS 5B1  Infused Over: 2-5 Minutes  Volume: 2 mL               polyethylene glycol (MIRALAX/GLYCOLAX) Packet 17 g  Dose: 17 g  Freq: DAILY PRN Route: PO  PRN Reason: constipation  Start: 10/25/18 2016   Admin Instructions: Give in 8oz of  water, juice, or soda. Hold for loose stools.  This is the second step of a three step constipation treatment.  1 Packet = 17 grams. Mixed prescribed dose in 8 ounces of water. Follow with 8 oz. of water.    Admin. Amount: 17 g  Dispense Loc: [facility] ADS 5B1               potassium & sodium phosphates (NEUTRA-PHOS) Packet 2 packet  Dose: 2 packet  Freq: 4 TIMES DAILY Route: PO  Start: 11/07/18 1200   Admin. Amount: 2 packet  Last Admin: 11/08/18 0816  Dispense Loc: [facility] ADS 5B1          1310 (2 packet)-Given       1636 (2 packet)-Given       2104 (2 packet)-Given        0816 (2 packet)-Given       [ ] 1200       [ ] 1600       [ ] 2000           potassium chloride (KLOR-CON) Packet 20-40 mEq  Dose: 20-40 mEq  Freq: EVERY 2 HOURS PRN Route: ORAL OR FEED  PRN Reason: potassium supplementation  Start: 10/30/18 0703   Admin Instructions: Use if unable to tolerate tablets.    If Serum K+ 3.4-4.0, dose = 20 mEq x1. Recheck K+ level the next AM.  If Serum K+ 3.0-3.3, dose = 60 mEq po total dose (40 mEq x 1 followed in 2 hours by 20 mEq X1). Recheck K+ level 4 hours after dose and the next AM.  If Serum K+ 2.5-2.9, dose = 80 mEq po total dose (40 mEq Q2H x2). Recheck K+ level 4 hours after dose and the next AM.  If Serum K+ less than 2.5, See IV order.  Dissolve packet contents in 4-8 ounces of cold water or juice.    Admin. Amount: 20-40 mEq  Last Admin: 11/01/18 0643  Dispense Loc: [facility] ADS 5B1               potassium chloride 10 mEq in 100 mL sterile water intermittent infusion (premix)  Dose: 10 mEq  Freq: EVERY 1 HOUR PRN Route: IV  PRN Reason: potassium supplementation  Start: 10/30/18 0703   Admin Instructions: Infuse via PERIPHERAL LINE or  CENTRAL LINE. Use for central line replacement if patient weight less than 65 kg, if patient is on TPN with high potassium content or if unit does not stock 20 mEq bags.  If Serum K+ 3.4-4.0, dose = 10 mEq/hr x2 doses. Recheck K+ level the next AM.  If Serum K+ 3.0-3.3, dose = 10 mEq/hr x4 doses (40 mEq IV total dose). Recheck K+ level 2 hours after dose and the next AM.  If Serum K+ less than 3.0, dose = 10 mEq/hr x6 doses (60 mEq IV total dose). Recheck K+ level 2 hours after dose and the next AM.    Admin. Amount: 10 mEq = 100 mL Conc: 10 mEq/100 mL  Dispense Loc: [facility] ADS 5B1  Infused Over: 60 Minutes  Volume: 100 mL               potassium chloride 20 mEq in 50 mL intermittent infusion  Dose: 20 mEq  Freq: EVERY 1 HOUR PRN Route: IV  PRN Reason: potassium supplementation  Start: 10/30/18 0703   Admin Instructions: Infuse via CENTRAL LINE Only.  May need EKG if less than 65 kg or on TPN - Max rate is 0.3 mEq/kg/hr for patients not on EKG monitoring.    If Serum K+ 3.4-4.0, dose = 20 mEq/hr x1 doses. Recheck K+ level the next AM.  If Serum K+ 3.0-3.3, dose = 20 mEq/hr x2 doses (40 mEq IV total dose).  Recheck K+ level 2 hours after dose and the next AM.  If Serum K+ less than 3.0, dose = 20 mEq/hr x3 doses (60 mEq IV total dose). Recheck K+ level 2 hours after dose and the next AM.    Admin. Amount: 20 mEq = 50 mL Conc: 20 mEq/50 mL  Dispense Loc: [facility]  Volume: 50 mL               potassium chloride SA (K-DUR/KLOR-CON M) CR tablet 20-40 mEq  Dose: 20-40 mEq  Freq: EVERY 2 HOURS PRN Route: PO  PRN Reason: potassium supplementation  Start: 10/30/18 0703   Admin Instructions: Use if able to take PO.   If Serum K+ 3.4-4.0, dose = 20 mEq x1. Recheck K+ level the next AM.  If Serum K+ 3.0-3.3, dose = 60 mEq po total dose (40 mEq x1 followed in 2 hours by 20 mEq x1). Recheck K+ level 4 hours after dose and the next AM.  If Serum K+ 2.5-2.9, dose = 80 mEq po total dose (40 mEq Q2H x2). Recheck K+  level 4 hours after dose and the next AM.  If Serum K+ less than 2.5, See IV order.  DO NOT CRUSH.    Admin. Amount: 2-4 tablet (2-4 × 10 mEq tablet)  Last Admin: 11/01/18 1601  Dispense Loc: [facility] ADS 5B1               potassium phosphate 15 mmol in D5W 250 mL intermittent infusion  Dose: 15 mmol  Freq: DAILY PRN Route: IV  PRN Reason: phosphorous supplementation  Start: 10/27/18 0028   Admin Instructions: For serum phosphorus level 2-2.4  Do not infuse Phosphorus in the same line as TPN.   Give 15 mmol and recheck phosphorus level next AM.  Each mmol of phosphate provides 1.47 mEq of Potassium. Multiply the patient's phosphate dose by 1.47 to determine the amount of potassium in this dose.    Admin. Amount: 15 mmol  Last Admin: 11/04/18 0659  Dispense Loc: [facility]  Infused Over: 4 Hours  Volume: 250 mL   Mixture Administration Information:   Medication Type Amount   potassium phosphate 3 MMOLE/ML SOLN Medications 15 mmol   D5W 5 % SOLN Base 250 mL             1139 (15 mmol)-New Bag         0659 (15 mmol)-New Bag               potassium phosphate 20 mmol in D5W 250 mL intermittent infusion  Dose: 20 mmol  Freq: EVERY 6 HOURS PRN Route: IV  PRN Reason: phosphorous supplementation  Last Dose: 20 mmol (11/05/18 1207)  Start: 10/27/18 0028   Admin Instructions: For serum phosphorus level 1.1-1.9  For CENTRAL Line ONLY  Do not infuse Phosphorus in the same line as TPN.   Give 20 mmol and recheck phosphorus level 2 hours after last dose and next AM.  Each mmol of phosphate provides 1.47 mEq of Potassium. Multiply the patient's phosphate dose by 1.47 to determine the amount of potassium in this dose.    Admin. Amount: 20 mmol  Last Admin: 11/05/18 1207  Dispense Loc: [facility]  Infused Over: 4 Hours  Volume: 250 mL   Mixture Administration Information:   Medication Type Amount   potassium phosphate 3 MMOLE/ML SOLN Medications 20 mmol   D5W 5 % SOLN Base 250 mL                1207 (20 mmol)-New  Bag              potassium phosphate 20 mmol in D5W 500 mL intermittent infusion  Dose: 20 mmol  Freq: EVERY 6 HOURS PRN Route: IV  PRN Reason: phosphorous supplementation  Last Dose: 20 mmol (11/03/18 1036)  Start: 10/27/18 0028   Admin Instructions: For serum phosphorus level 1.1-1.9  For Peripheral Line  Do not infuse Phosphorus in the same line as TPN.   Give 20 mmol and recheck phosphorus level 2 hours after last dose and next AM.  Each mmol of phosphate provides 1.47 mEq of Potassium. Multiply the patient's phosphate dose by 1.47 to determine the amount of potassium in this dose.    Admin. Amount: 20 mmol  Last Admin: 11/03/18 2023  Dispense Loc: [facility]  Infused Over: 4 Hours  Volume: 500 mL   Mixture Administration Information:   Medication Type Amount   potassium phosphate 3 MMOLE/ML SOLN Medications 20 mmol   D5W 5 % SOLN Base 500 mL              1036 (20 mmol)-New Bag       2023 (20 mmol)-New Bag                potassium phosphate 25 mmol in D5W 500 mL intermittent infusion  Dose: 25 mmol  Freq: EVERY 8 HOURS PRN Route: IV  PRN Reason: phosphorous supplementation  Start: 10/27/18 0028   Admin Instructions: For serum phosphorus level less than 1.1  Do not infuse Phosphorus in the same line as TPN.   Give 25 mmol and recheck phosphorus level 2 hours after last dose and next AM.  Each mmol of phosphate provides 1.47 mEq of Potassium. Multiply the patient's phosphate dose by 1.47 to determine the amount of potassium in this dose.    Admin. Amount: 25 mmol  Dispense Loc: [facility]  Infused Over: 6 Hours  Volume: 500 mL   Mixture Administration Information:   Medication Type Amount   potassium phosphate 3 MMOLE/ML SOLN Medications 25 mmol   D5W 5 % SOLN Base 500 mL                       sennosides (SENOKOT) tablet 8.6 mg  Dose: 8.6 mg  Freq: 2 TIMES DAILY PRN Route: PO  PRN Reason: constipation  Start: 11/03/18 0738   Admin. Amount: 1 tablet (1 × 8.6 mg tablet)  Dispense Loc: [facility]  ADS 5B1               sodium chloride (PF) 0.9% PF flush 10 mL  Dose: 10 mL  Freq: EVERY 8 HOURS Route: IK  Start: 10/30/18 1345   Admin Instructions: And Q1H PRN, to lock peripheral midline IV catheter dormant lumen. Recommended to flush Q8H each lumen even during infusions    Admin. Amount: 10 mL  Last Admin: 11/05/18 0206  Dispense Loc: [facility] Floor Stock  Volume: 10 mL   Current Line: Peripheral IV 11/06/18 Left;Medial Lower forearm    (0905)-Not Given       (1505)-Not Given       2313 (10 mL)-Given        (0742)-Not Given [C]       1550 (10 mL)-Given        (0411)-Not Given       (0740)-Not Given [C]       1820 (10 mL)-Given        0206 (10 mL)-Given              (1631)-Not Given        (0000)-Not Given       (1108)-Not Given       (1606)-Not Given        (0131)-Not Given       (1055)-Not Given       (1638)-Not Given               (0818)-Not Given       [ ] 1600           sodium chloride (PF) 0.9% PF flush 10-20 mL  Dose: 10-20 mL  Freq: EVERY 1 HOUR PRN Route: IK  PRN Reasons: line flush,post meds or blood draw  PRN Comment: to flush each peripheral midline IV catheter lumen  Start: 10/30/18 1342   Admin Instructions: 10 mL post IV meds;  20 mL post blood draw    Admin. Amount: 10-20 mL  Last Admin: 11/04/18 1100  Dispense Loc: [facility] Floor Stock  Volume: 20 mL      1037 (5 mL)-Given        1100 (10 mL)-Given               sodium chloride (PF) 0.9% PF flush 3 mL  Dose: 3 mL  Freq: EVERY 8 HOURS Route: IK  Start: 10/25/18 2030   Admin Instructions: And Q1H PRN, to lock peripheral IV dormant line.    Admin. Amount: 3 mL  Last Admin: 11/08/18 0818  Dispense Loc: [facility] Floor Stock  Volume: 3 mL   Current Line: Peripheral IV 11/06/18 Left;Medial Lower forearm    (0905)-Not Given       (1506)-Not Given       2314 (3 mL)-Given        0929 (3 mL)-Given       1550 (3 mL)-Given        0000 (3 mL)-Given       0756 (3 mL)-Given       (1821)-Not Given [C]               1205 (3 mL)-Given       (1631)-Not Given         (0000)-Not Given       (1108)-Not Given       1606 (3 mL)-Given        0133 (3 mL)-Given       0753 (3 mL)-Given       1639 (3 mL)-Given        0011 (3 mL)-Given       0818 (3 mL)-Given       [ ] 1600           sodium chloride (PF) 0.9% PF flush 3 mL  Dose: 3 mL  Freq: EVERY 1 HOUR PRN Route: IK  PRN Reason: line flush  PRN Comment: for peripheral IV flush post IV meds  Start: 10/25/18 2015   Admin. Amount: 3 mL  Last Admin: 10/26/18 0546  Dispense Loc: [facility] Floor Stock  Volume: 3 mL               traMADol (ULTRAM) half-tab 25 mg  Dose: 25 mg  Freq: EVERY 6 HOURS PRN Route: PO  PRN Reason: moderate pain  Start: 10/30/18 1246   Admin. Amount: 1 half-tab (1 × 25 mg half-tab)  Last Admin: 18  Dispense Loc: [facility] ADS 5B1     0328 (25 mg)-Given       1105 (25 mg)-Given       1925 (25 mg)-Given        0317 (25 mg)-Given       1044 (25 mg)-Given       2017 (25 mg)-Given        0815 (25 mg)-Given       1434 (25 mg)-Given       2018 (25 mg)-Given        0206 (25 mg)-Given       1419 (25 mg)-Given        0506 (25 mg)-Given       1145 (25 mg)-Given        1320 (25 mg)-Given        0134 (25 mg)-Given       0657 (25 mg)-Given          Completed Medications  Medications 18         Dose: 650 mg  Freq: EVERY 4 HOURS PRN Route: PO  PRN Reason: mild pain  Start: 10/29/18 1238   End: 18   Admin Instructions: up until discharged.  Notify provider if pain is not controlled with current regimen.   Alternate ibuprofen with acetaminophen if ordered.  Maximum acetaminophen dose from all sources = 75 mg/kg/day not to exceed 4 grams/day.    Admin. Amount: 2 tablet (2 × 325 mg tablet)  Last Admin: 11/06/18 2158  Dispense Loc: [facility] ADS 5B1  Administrations Remainin  POC: IR Post-procedure      2146 (650 mg)-Given          05 (650 mg)-Given       2158 (650 mg)-Given               Dose: 0.3-0.5 mg  Freq: ONCE Route: IV  Start: 18   End:  "18   Admin Instructions: For ordered IV doses 0.1-4 mg give IV Push undiluted. Administer each 2mg over 2-5 minutes.    Admin. Amount: 0.3-0.5 mg  Last Admin: 18  Dispense Loc: [facility] ADS 5B1  Administrations Remainin         0523 (0.5 mg)-Given            Discontinued Medications  Medications 18         Freq: EVERY 1 HOUR PRN Route: Top  PRN Reason: pain  PRN Comment: with VAD insertion or accessing implanted port.  Start: 10/25/18 2015   End: 18   Admin Instructions: Do NOT give if patient has a history of allergy to any local anesthetic or any \"jose daniel\" product.   Apply 30 minutes prior to VAD insertion or port access.  MAX Dose:  2.5 g (  of 5 g tube)    Dispense Loc: [facility] ADS 5B1         0800-Med Discontinued           Dose: 1 packet  Freq: 4 TIMES DAILY Route: PO  Start: 18   End: 18   Admin. Amount: 1 packet  Last Admin: 1849  Dispense Loc: [facility]         1103 (1 packet)-Given       1334 (1 packet)-Given       1603 (1 packet)-Given       2017 (1 packet)-Given        0749 (1 packet)-Given       0914-Med Discontinued                 INTERAGENCY TRANSFER FORM - NOTES (H&P, Discharge Summary, Consults, Procedures, Therapies)   10/25/2018                       UNIT [address] [place] [address] BANK: [phone]               History & Physicals      H&P by Susi Gonzalez MD at 10/25/2018  8:25 PM     Author:  Susi Gonzalez MD Service:  General Medicine Author Type:  Resident    Filed:  10/26/2018  3:19 AM Date of Service:  10/25/2018  8:25 PM Creation Time:  10/25/2018  8:25 PM    Status:  Attested :  Susi Gonzalez MD (Resident)    Cosigner:  Orion Daley MD at 10/26/2018  5:09 PM        Attestation signed by Orion Daley MD at 10/26/2018  5:09 PM        Pt was seen, case reviewed with Dr Gonzalez                               History and Physical     Mayra Stokes " MRN# [id number]   YOB: 1955 Age: 63 year old      Date of Admission: 10/25/2018  Primary care provider: No primary care provider on file.            Assessment and Plan:[TL1.1]   62yo F with recent dx of hypercalcemia, R femur lytic bone lesion, presenting for evaluation and management of pathologic R femur fx.[TL1.2]      # hypercalcemia[TL1.3]: mod-severe. Hypercalcemic since 9/10/18 to 13.1, initially thought to be secondary to MM. Has had calcitonin nose spray, and pamidronate x2 per latest Heme/Onc note. May have had some transient effect after the first dose of pamidronate since she did have a normal Ca of 9.8 on 9/24. Other labs:[TL1.4]     PTHrP elevated 9/17 at 2.9  Vit A elevated to 135 9/12  PTH[TL1.3] appropriately[TL1.4] low 4 9/10[TL1.3]  Currently asx, though levels are concerning to Medicine, Ortho, and Anesthesia in the context of her urgent need for surgery.   I did discuss her case with Endocrine fellow Dr. Geri Zaldivar. She agreed with aggressive IVF and calcitonin acutely to reduce calcium levels. Not clear that immediate administration of zoledronic acid would be beneficial--usually takes 3-4 days to have effect, and not clear how she would respond given no long-lasting response to two doses of pamidronate   - anesthesia[TL1.4] team[TL1.5] would be most comfortable with proceeding if Ca is in 10-11 range  -[TL1.4] will give NS, calcitonin overnight  - Bravo placed at [facility], keep for strict I/O for mgt of hypercalcemia. Goal  ml/hr   - Endocrine c/s in AM[TL1.5]  - telemetry[TL1.2]     # preop  # chronic diastolic HF: history of severe right sided HF[TL1.3],[TL1.5] cor pulmonale due to long-standing untreated FRANSISCO. Admitted 2/20/18-3/6 with PNA, respiratory failure--diuresed about 100# during this hospitalization. Echo showed pulm HTN, RVH, with no prior dx of FRANSISCO. Saw Cardiology for the first time 5/5 after the hospitalization. TTE had been done on 4/27 showing  resolution of volume overload, RV strain that had been present. Recommended more consistent use of home BiPAP and follow up echo in 6 months.   # FRANSISCO: referred for sleep study after hospitalization in Feb but there are no records available regarding results--not clear that she ever did one. Is supposed to be on BiPAP for sleep, but she reports that she never uses it because she is always up at night to void[TL1.3]. VBG suggests[TL1.6] some chronic CO2 retention[TL1.5]   - RCRI --1 risk factor, risk of cardiac death 1.0%  - however, clinically her risk of heart failure, respiratory failure[TL1.6] is higher, especially in the setting of requiring aggressive fluid administration for hypercalcemia[TL1.4]   - on furosemide 40mg PO BID at home[TL1.6]--switch to intermittent IV dosing while hospitalized   - will attempt to get pre-op TTE. She appears to be compensated at the moment but her underlying pulm HTN could have certainly worsened in the past 6 months since her last echo. If echo cannot be done before surgery, surgery does not need to be delayed, but she would likely still benefit from echo during this hospitalization  - limit Na, oral fluids  - daily weights, strict I/O  - ct home metoprolol   - hold enalapril[TL1.4]   - discussed with Ortho resident Dr. Sheng Bello concerns re: calcium level, risk of HF and that she would be most medically optimized if surgery were to happen later in the day[TL1.5]    # lytic bone lesions, hypercalcemia, polyclonal gammopathy[TL1.3]:[TL1.6]   # R femur lytic bone lesion  # R anterior 4th rib lytic lesion     S[TL1.5]uspected underlying malignancy with bone mets or[TL1.3] primary[TL1.6] MM though work up so far has not been revealing. Mostly work up has been geared towards suspicion of MM or plasmacytoma[TL1.3].[TL1.6]     Skeletal survey 9/17-- done after R rib lesion was found on CT chest. Survey only showed additional R femur lesion and no other bony lesions[TL1.5]   Normal  peripheral smear 9/12  SPEP did not show monoclonal spike--elevated alpha 1 and alpha 2 globulins, nl beta and gamma 9/12  UPEP showed elevated total protein, no monoclonal spike   Serum Igs showed high IgE, normal A, G, M, D (9/17)  Elevated protein with protein gap since Sept   Right femur biopsied 9/28 --> sample showed normal bone with thickened trabeculae  Bone marrow biopsied 9/28 --> not clear if adequate sample, showed normal cellularity[TL1.3]     Interestingly, she has had a mass on her left neck for 10-15 years, mobile, non tender, not enlarging or changing in any way. 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 Leukocytosis of 20[TL1.6]K[TL1.7] on presentation on 10/24. Was started on vanc and pip-tazo at [facility] 10/25 prior to transfer due to concern for sepsis.[TL1.6]     I suspect systemic inflammation[TL1.5] in response[TL1.7] to fx and underlying process rather than current infection. Blood cultures x2 are pending at [facility]---I called to see if anything is growing but there are no lab personnel overnight.[TL1.5] Normal[TL1.6] white count on[TL1.7] 9/10 and 9/1[TL1.6]7[TL1.7]    - repeat blood cultures x2 here  - no further abx[TL1.5]    # acute macrocytic anemia: macrocytosis since Sept, anemia likely a little dilutional. Automated diff shows some abnormal morphologies, nucleated RBCs, hypersegmented PMNs    - smear, B12, folate[TL1.2]     # pain: bilateral knee pain from OA, fracture pain     - scheduled APAP  - PRN oxycodone and IV hydromorphone[TL1.5]     # DM2: diet controlled though last A1c was done in 2016[TL1.7]     Lines and tubes:[TL1.1] PIV[TL1.8]    FEN:[TL1.1] NS at 200cc/hr, cardiac diet[TL1.8]   DVT PPx:[TL1.1] SCDs. Hold pharm ppx pending surgery[TL1.5]  Code status:[TL1.1] DNR, discussed with patient. She would not want to be resuscitated in the setting of cardiac or respiratory arrest. She does understand that this would need to be reversed for surgery[TL1.5]  Disposition:[TL1.1] [facility][TL1.5]     Will be fully staffed in AM       Marybel Gonzalez MD   Internal Medicine/Pediatrics  [facility]                         Chief Complaint:[TL1.1]   Right leg pain[TL1.7]    History is obtained from the patient and chart review         History of Present Illness:   Mayra Stokes is a 63 year old female with a history of[TL1.1] chronic diastolic HF, pulm HTN, FRANSISCO, morbid obesity, recent dx hypercalcemia and lytic bone lesions[TL1.7] who presents with[TL1.1] pathologic R femur fracture. She was transferred from [facility] for operative management.     She was diagnosed with hypercalcemia on 9/10 on routine labs during a preop for right knee replacement. See A/P for previous work up, which ultimately revealed a right femur lytic lesion that was biopsied 9/28. On 10/23, she was in the shower and just stepped out, when suddenly her right leg buckled and she fell over. She was in severe pain and had to be transferred by ambulance to [facility]. She had a pathologic R femur fracture and was admitted for further management. Ultimately it was determined that she needed a higher level of care, so she was transferred to the [facility] for orthopedic evaluation.     She has otherwise been feeling like her usual self this week, aside from the worsening R leg and knee pain.     She has had stable hyperca to ~12-13 since 9/10. Has had calcitonin nose spray and 2 doses of pamidronate but no sustained response. See A/P for further details on previous work up.[TL1.7]               Past Medical History:[TL1.1]     Past Medical History:   Diagnosis Date      Chronic cor pulmonale (H)      Chronic diastolic heart failure (H)      DM type 2 (diabetes mellitus, type 2) (H)     diet controlled     Hypertension      Mass of left side of neck     since ~2003     Morbid obesity (H)      Obstructive sleep apnea[TL1.9]      No history of cancer  No problems with anesthesia previously  No bleeding/clotting problems[TL1.7]          Past Surgical History:[TL1.1]     Past Surgical History:   Procedure Laterality Date     LAP ADJUSTABLE GASTRIC BAND       SHOULDER SURGERY Left     shoulder replacement[TL1.9]             Social History:[TL1.1]     Social History   Substance Use Topics     Smoking status: Not on file     Smokeless tobacco: Not on file     Alcohol use Not on file[TL1.9]   retired, lives alone, not   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  Prior to right knee pain acting up a few months ago, she was independent with ADLs[TL1.7]          Family History:[TL1.1]   No family hx of cancer, leuk[TL1.10]emia, lymphoma[TL1.5]           Allergies:[TL1.1]   No Known Allergies[TL1.9]          Medications:[TL1.1]     ASA 81 daily   Enalapril 5mg daily   Furosemide 40mg BID   Metoprolol 100mg BID   norco prn  tizanadine PRN  Calcitonin 200u/spray[TL1.10]           Review of Systems:   The 10 point Review of Systems is negative other than noted in the HPI           Physical Exam:[TL1.1]   /64  Pulse 105  Temp 99.4  F (37.4  C) (Oral)  Resp 18  SpO2 94%[TL1.9]    GEN: Alert, well-nourished, appears stated age, NAD  HEAD/NECK: NCAT. Neck supple.[TL1.1] Left anterior neck mass ~2cm in diameter, mobile, non tender, no cervical or supraclavicular LAD[TL1.7]   ENT: Normal conjunctivae. Oropharynx[TL1.1] poorly visible[TL1.7]  CV:[TL1.1] tachy, regular, no m/r/g[TL1.7]  RESP: breathing comfortably[TL1.1], lung sounds distant, CTA anteriorly[TL1.7]  ABD/GI: soft, NTND. No rebound, no guarding. Normal BS  :[TL1.1] Bravo in place[TL1.7]  DERM: no suspicious  lesions or rashes, no jaundice  EXT: warm, well-perfused.[TL1.1] 1+[TL1.7] pitting edema, 2+ pedal pulses bilaterally  NEURO: AOx3. CN2-12 intact.[TL1.1] Strength and sensation grossly intact[TL1.7]   PSYCH: appropriate mood and affect      LABS[TL1.1]  Reviewed[TL1.5]     IMAGING[TL1.1]  No images available to view[TL1.5]     DIAGNOSIS:  RIGHT FEMUR, BIOPSY       --FRAGMENTS OF BENIGN BONE WITH THICKENED TRABECULAE,         CONSISTENT WITH HYPERCALCEMIA       --NO EVIDENCE OF ATYPICAL PLASMA CELLS    TTE 4/27/18  1.  Normal systolic function of the left ventricle. Ejection fraction 60% without any wall motion abnormalities.  Also no significant hypertrophy is appreciated on this study. May be related to image quality.    2.  Right ventricular size and contractility is normal.    3.  Grade I diastolic dysfunction of the left ventricle.    4.  Mild left atrial enlargement.    5.  No significant valvular abnormalities by 2D and Doppler analysis.[TL1.1]          Revision History        User Key Date/Time User Provider Type Action    > TL1.9 10/26/2018  3:19 AM Susi Gonzalez MD Resident Sign     TL1.7 10/26/2018  2:57 AM Susi Gonzalez MD Resident      TL1.2 10/26/2018  2:46 AM Susi Gonzalez MD Resident Share     [N/A] 10/26/2018  2:34 AM Susi Gonzalez MD Resident Share     TL1.5 10/26/2018  2:30 AM Susi Gonzalez MD Resident Share     TL1.4 10/26/2018  2:03 AM Susi Gonzalez MD Resident Share     TL1.6 10/26/2018  1:47 AM Susi Gonzalez MD Resident Share     TL1.3 10/26/2018  1:32 AM Susi Gonzalez MD Resident Share     TL1.8 10/25/2018 10:41 PM Susi Gonzalez MD Resident Share     TL1.10 10/25/2018 10:32 PM Susi Gonzalez MD Resident Share     TL1.1 10/25/2018  8:26 PM Susi Gonzalez MD Resident Share                  Discharge Summaries     No notes of this type exist for this encounter.         Consult Notes      Consults by Ronda Lind MD at 11/4/2018 12:23 PM     Author:  Ronda Lind MD  "Service:  Palliative Author Type:  Physician    Filed:  2018  9:56 AM Date of Service:  2018 12:23 PM Creation Time:  2018 12:16 PM    Status:  Signed :  Ronda Lind MD (Physician)     Consult Orders:    1. Palliative Care Adult IP Consult: Patient to be seen: Routine within 24 hrs; Call back #: [phone]; Okay to see 18. Pt with new metastatic SCC of unclear primary, hyperCa 2/t paraneoplastic syndrome, pathologic femur fx s/p ORIF. She does n... [[id number]] ordered by Avani Bell MD at 18 0926                [facility]    Palliative Care Consultation Note    Patient: Mayra Stokes  Date of Admission:  10/25/2018    Requesting provider/team: medicine team  Reason for consult: Goals of care  Decisional support  Patient and family support    Recommendations:[EU1.1]  Goals of care:   Mayra would like ongoing care close to her home as she is currently at 150+ miles from home and not near any support.  Mayra knows that the doctors still need to figure out where her cancer \"came from\" and while she wants this information she also feels overwhelmed by all the information that is coming at her.  Her plan is to get out of the hospital and go to a rehab facility near her home to get stronger.  When she has had some time to recover from surgery and get stronger she then feels she will be better able to hear all the information about her cancer and at that point we will make a plan for how to move forward.  Duty is not sure she wants to have any cancer treatment or not as her   5 months after his cancer diagnosis receiving chemotherapy that made him feel miserable and she wonders if his last months would have been better quality without chemotherapy.  Mayra knows that with the severe degeneration of both of her knees rehab is going to be very difficult and possibly impossible for her.  She therefore does not want to discuss how she " is going to move forward until she has had some time outside of the hospital and near family.  -Schedule appointment with patient's primary care provider near her home soon after discharge to make sure patient has a medical person helping to oversee her complicated care and at advocate for her as she begins to make decisions moving forward  -Try to schedule all needed follow-up appointments with medical providers that are close to her home as Mayra and her family do not drive into the [facility].    Symptom management: No pain when patient is sitting.  She does have pain after doing physical therapy which is well controlled with tramadol.  Continue tramadol at current dose[EU1.2]    These recommendations have been discussed with[EU1.1] medicine team[EU1.2].    Thank you for the opportunity to participate in the care of this patient and family. Our team will[EU1.1] sign off[EU1.2]. Please feel free to contact the on-call Palliative provider with any urgent needs.     Ronda Lind  Pager: [phone]  [facility] Inpatient Team Consult pager [phone] (M-F 8-4:30)  After-hours Answering Service [phone]   Palliative Clinic: [phone]       Assessment:  Mayra Stokes is a 63 year old female with PMH of FRANSISCO, heart failure, HTN and morbid obesity who was transferred from [facility] on 10/25/18 for evaluation of pathologic right distal femur fracture, s/p right distal femur biopsy, tumor curettage/excision and ORIF on 10/26/18. 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       I met with[EU1.1] Mayra[EU1.2] today to introduce the Palliative Care team and services we provide; such as symptom management, assistance navigating chronic illness and goals for treatment, counseling, psychosocial and spiritual support.[EU1.1]     Symptoms:   Right hip/groin pain: Pain not present when she is not moving.  She  does have pain after physical therapy which is well controlled with tramadol  Social:         Living situation: Lives alone in [place], [place]       Support system: Her entire family including her brothers and sisters and 94-year-old mother all live in the area within 20 minutes of her.       Actual/Potential Caregiver: Mayra states she has lots of support from her family       Functional status: Was independent prior to this hospitalization.  Able to bathe and dress self clean house drive.       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  Retired because her knees were starting to hurt her and she wanted to have more time to spend with her mother.  Coping: Mayra feels she is coping well with all the information that she is getting and although she currently feels overwhelmed by it all she knows that when she leaves the hospital and has time to get stronger she will be able to process it all better.  Spiritual/Anglican:    Spiritual background: Faith  Spiritual needs: Does not want to see a  or  while in the hospital.  Has her  at home and feels well supported.    Prognostic Information: Mayra feels she cannot have any prognostic information and tell she knows more about the cancer although she wonders aloud if she could die quickly like her  did or live for years even without treatment for her cancer as she knows some people who have had very slow growing cancers and had been able to live years with them.  She knows she needs more information before prognosis can be given.  Advance Care Planning: Advanced care planning not discussed at visit today.  Full code.[EU1.2]    History of Present Illness   Sources of History:[EU1.1]patient and electronic health record[EU1.2]    Mayra Stokes is a 63 year old female with PMH of  FRANSISCO, heart failure, HTN and morbid obesity who was transferred from [facility] on 10/25/18 for evaluation of pathologic right distal femur fracture, s/p right distal femur biopsy, tumor curettage/excision and ORIF on 10/26/18. 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  Specialist did do a bone marrow biopsy which looked normal and also did a biopsy of the bone which was also normal and then she fractured her hip after a fall and that is when the cancer in her bone was seen.  Now they are waiting on further testing to determine what kind of cancer it is.[EU1.2]    Review of Systems: 10 point ROS neg other than the symptoms noted above in the HPI and here  Pain:[EU1.1] Pain in her right hip after physical therapy[EU1.2]  Fatigue:[EU1.1] 1[EU1.2]  Nausea:[EU1.1] 0[EU1.2]  Depressive Symptoms:[EU1.1] 0[EU1.2]  Anxiety:[EU1.1] 0[EU1.2]  Drowsiness:[EU1.1] 0[EU1.2]  Poor Appetite:[EU1.1] 0[EU1.2]  Shortness of Breath:[EU1.1] 0[EU1.2]     Past Medical History:   Past Medical History:   Diagnosis Date     Chronic cor pulmonale (H)      Chronic diastolic heart failure (H)      DM type 2 (diabetes mellitus, type 2) (H)     diet controlled     Hypercalcemia 09/2018     Hypertension      Mass of left side of neck     since ~2003     Morbid obesity (H)      Obstructive sleep apnea      Pathologic fracture of femur (H) 10/24/2018          Past Surgical History:   Past Surgical History:   Procedure Laterality Date     BIOPSY BONE LOWER EXTREMITY Right 10/26/2018    Procedure: BIOPSY RIGHT FEMUR AND EXCISION OF TUMOR;  Surgeon: Maximo Wilkinson MD;  Location: [facility] OR     LAP ADJUSTABLE GASTRIC BAND       OPEN REDUCTION INTERNAL FIXATION FEMUR DISTAL Right 10/26/2018    Procedure: OPEN REDUCTION  INTERNAL FIXATION RIGHT DISTAL FEMUR;  Surgeon: Maximo Wilkinson MD;  Location: [facility] OR     SHOULDER SURGERY Left     shoulder replacement             Family History:[EU1.1]   Mother alive at age 94  Siblings alive[EU1.2]       Allergies:   No Known Allergies         Medications:   I have reviewed this patient's medication profile and medications given in the past 24 hours.         Physical Exam:   Vital Signs: Temp: 98.9  F (37.2  C) Temp src: Oral BP: 145/69 Pulse: 88 Heart Rate: 76 Resp: 20 SpO2: 93 % O2 Device: Nasal cannula Oxygen Delivery: 1 LPM  Weight: 321 lbs 13.95 oz    Physical Exam:  General:[EU1.1] Alert, sitting in bedside recliner, appears stated age, in no acute distress[EU1.2]  Eyes: EOMI, sclera clear  HEENT: NC/AT; mucous membranes[EU1.1] moist[EU1.2]  Lungs:[EU1.1] No increased work of breathing,[EU1.2] speaking full sentences   Neuro: A&O x 3; CN II-XII grossly intact;   Neuropsych:[EU1.1] Alert, good eye contact, affect full, engaged, sensorium intact[EU1.2]     Data reviewed:[EU1.1]   Reviewed[EU1.2]         Attestation:   Thank you for the opportunity to continue to participate in the care of this patient and family.  Please feel free to contact on-call palliative provider with any emergent needs.    We can be reached via team pager [phone] (answered 8-4:30Monday-Friday); after-hours answering service ([phone]); Main Palliative Clinic - [phone]      This patient has been seen and evaluated by me and discussed with[EU1.1] medicine team[EU1.2].  I have reviewed today's vital signs, medications, labs and imaging. Total time on the floor involved in the patient's care:[EU1.1] 70[EU1.2] minutes.  Greater than 50% of my time was spent counseling and/or coordination of care regarding symptoms and goals.     Ronda Lind MD  Palliative Care Physician  Pager [phone]  [facility] Inpatient Team Consult pager [phone] (M-F 8-4:30)  After-hours Answering Service  [phone][EU1.1]           Revision History        User Key Date/Time User Provider Type Action    > EU1.2 2018  9:56 AM Ronda Lind MD Physician Sign     EU1.1 2018 12:16 PM Ronda Lind MD Physician             Consults by Angelica Hernández MD at 2018  8:46 AM     Author:  Angelica Hernández MD Service:  Pulmonology Author Type:  Physician    Filed:  2018  1:33 PM Date of Service:  2018  8:46 AM Creation Time:  2018  8:46 AM    Status:  Attested :  Angelica Hernández MD (Physician)    Cosigner:  Radu Kang MD at 2018  3:57 PM         Consult Orders:    1. Pulmonary General Adult IP Consult: Patient to be seen: Routine within 24 hrs; Call back #: [id number]; evaluate appropriateness for bronch to determine if lung is primary source of malignancy; Consultant may enter orders: Yes [[id number]] ordered by Avani Bell MD at 18           Attestation signed by Radu Kang MD at 2018  3:57 PM        Attestation:  Pulmonary Attending Note:    I saw, examined the patient and reviewed the chart.  I agree with the resident's note which we formulated together and represent our mutual findings, assessment and plan. Briefly, the patient is seen for Lung consolidation and recent diagnosis of metastatic bone lesion causing pathological fracture. Case discussed with pathology. There is also large liver mass like lesion. PET scan would be next step to decide on biopsy.    JOSUE Kang MD                               PULMONARY CONSULT  Date of service: 2018    Patient: Mayra Stokes      : 1955      MRN: [id number]    We were consulted for evaluation of possible lung primary cancer            History of Present Illness:   63F with HFpEF, HTN, DMT2, morbid obesity BMI 54,[MS1.1] former smoker (1/ PPD for > 20 years)[MS1.2] recent dx of hypercalcemia (2018) w/ extensive OSH w/u who was transferred for  evaluation of pathologic right distal femur fracture, s/p right distal femur biopsy, tumor curettage/excision and ORIF on 10/26/18. Pathology with metastatic squamous cell carcinoma[MS1.1] of unknown primary.[MS1.3]     Patient with new diagnosis of hypercalcemia on 9/10 on routine labs. Work up revealed a right femur lytic lesion that was biopsied 9/28. On 10/23, she had a pathologic R femur fracture s/p R distal femur biopsy, tumor curettage and excision, and ORIF on 10/26. Biopsy showed metastatic squamous cell carcinoma and primary is unknown. CT c/a/p shows numerous liver lesions, lytic R femur bone lesions, endometrial thickening of uterus, RUL consolidation with filling of a segmental bronchus. Per heme/onc and pathology, most likely primary site is  tract or lung.      On presentation today, patient[MS1.1] denies cough, dyspnea, hemoptysis. She is requiring 3L NC oxygen here. Does not have home oxygen. She is former smoker 1/4 PPD for +40 years, stopped 1.5 years ago due to cost. She worked in a [employer]. No hx of other exposures. Has extensive family hx of cancer. Breast cancer in mother, one sisters, and two maternal aunts. Gyn cancer in another sister. She has not been tested for BRCA mutation. Father had colon cancer.  She reports vaginal bleeding two weeks ago.[MS1.3]           Review of Symptoms:   10-point ROS reviewed, & found negative w/ exceptions noted in the HPI.          Past Medical History:[MS1.1]     Past Medical History:   Diagnosis Date     Chronic cor pulmonale (H)      Chronic diastolic heart failure (H)      DM type 2 (diabetes mellitus, type 2) (H)     diet controlled     Hypercalcemia 09/2018     Hypertension      Mass of left side of neck     since ~2003     Morbid obesity (H)      Obstructive sleep apnea      Pathologic fracture of femur (H) 10/24/2018       Past Surgical History:   Procedure Laterality Date     BIOPSY BONE LOWER EXTREMITY Right 10/26/2018     Procedure: BIOPSY RIGHT FEMUR AND EXCISION OF TUMOR;  Surgeon: Maximo Wilkinson MD;  [facility]     LAP ADJUSTABLE GASTRIC BAND       OPEN REDUCTION INTERNAL FIXATION FEMUR DISTAL Right 10/26/2018    Procedure: OPEN REDUCTION INTERNAL FIXATION RIGHT DISTAL FEMUR;  Surgeon: Maximo Wilkinson MD;  [facility]     SHOULDER SURGERY Left     shoulder replacement[MS1.4]            Allergies:[MS1.1]     No Known Allergies[MS1.4]          Outpatient Medications:[MS1.1]     Current Facility-Administered Medications   Medication     acetaminophen (TYLENOL) tablet 650 mg     acetaminophen (TYLENOL) tablet 975 mg     enalapril (VASOTEC) tablet 5 mg     enoxaparin (LOVENOX) injection 40 mg     glycerin (adult) Suppository 1 suppository     ibuprofen (ADVIL/MOTRIN) tablet 400 mg     influenza recomb quadrivalent PF (FLUBLOK) injection 0.5 mL     lidocaine (LMX4) cream     lidocaine (LMX4) cream     lidocaine 1 % 0.5-5 mL     lidocaine 1 % 1 mL     magnesium sulfate 2 g in NS intermittent infusion ([facility]ar[employer] or [facility]d)     magnesium sulfate 4 g in 100 mL sterile water (premade)     melatonin tablet 1 mg     metoprolol tartrate (LOPRESSOR) tablet 100 mg     naloxone (NARCAN) injection 0.1-0.4 mg     omeprazole (priLOSEC) CR capsule 20 mg     ondansetron (ZOFRAN-ODT) ODT tab 4 mg    Or     ondansetron (ZOFRAN) injection 4 mg     polyethylene glycol (MIRALAX/GLYCOLAX) Packet 17 g     potassium chloride (KLOR-CON) Packet 20-40 mEq     potassium chloride 10 mEq in 100 mL sterile water intermittent infusion (premix)     potassium chloride 20 mEq in 50 mL intermittent infusion     potassium chloride SA (K-DUR/KLOR-CON M) CR tablet 20-40 mEq     potassium phosphate 15 mmol in D5W 250 mL intermittent infusion     potassium phosphate 20 mmol in D5W 250 mL intermittent infusion     potassium phosphate 20 mmol in D5W 500 mL intermittent infusion     potassium phosphate 25 mmol in D5W 500 mL intermittent infusion     sodium chloride  "(PF) 0.9% PF flush 10 mL     sodium chloride (PF) 0.9% PF flush 10-20 mL     sodium chloride (PF) 0.9% PF flush 3 mL     sodium chloride (PF) 0.9% PF flush 3 mL     traMADol (ULTRAM) half-tab 25 mg[MS1.5]           Family History:[MS1.1]   Multiple family members with cancer.[MS1.2]     Mother breast cancer  Sister breast cancer[MS1.3]             Social History:[MS1.1]     Social History   Substance Use Topics     Smoking status: Not on file     Smokeless tobacco: Not on file     Alcohol use Not on file[MS1.4]             Physical Exam:[MS1.1]   /86 (BP Location: Right arm, Cuff Size: Adult Regular)  Pulse 88  Temp 97.8  F (36.6  C) (Axillary)  Resp 16  Ht 1.651 m (5' 5\")  Wt 148 kg (326 lb 4.5 oz)  SpO2 95%  BMI 54.3 kg/m2[MS1.4]    General: NAD[MS1.1], obese[MS1.3]  HEENT: Anicteric sclera, EOMI, MMM, OP unobstructed, w/o erythema/discharge  CV: RRR, no m/r/g  Lungs:[MS1.1] Clear anteriorly. 3L NC[MS1.3]    Abd: Soft,[MS1.1] obese, non-tender[MS1.3]  Ext: WWP,[MS1.1] right leg wrapped in ace bandage[MS1.3]   Skin: No rashes, cyanosis, or jaundice  Neuro: AAOx3,[MS1.1] speech normal, face symmetric[MS1.3]           Data:   Labs (all laboratory studies reviewed by me)    Imaging (all imaging studies reviewed by me):    CT CHEST/ABDOMEN/PELVIS W CONTRAST[MS1.1] 10/28/18[MS1.2]  1. Numerous, irregular hypodensities scattered throughout the liver,  the largest measuring up to 11.1 cm in hepatic segment 6, favor  metastatic disease until proven otherwise.   2. Lytic lesion of the right fourth rib with associated cortical  erosion, which suspicious for osseous metastatic disease with  pathologic fracture.   3. Heterogeneous appearance of the uterus with appearance of  endometrial thickening and probably fibroids as well. Cannot exclude  endometrial cancer. Recommend ultrasound for confirmation.  4. Haziness and small volume fluid around the pancreas suggestive of  acute pancreatitis. No evidence of " pancreatic mass.  5. Indeterminate 1.5 cm left adrenal nodule.   6. Main pulmonary artery is dilated to 4.7 cm, suggestive of pulmonary  arterial hypertension.  7. Consolidation in the right upper lobe with filling of a segmental  bronchus. Differential includes mucus plugging with associated  atelectasis, aspiration, or infectious consolidation. Less likely  endobronchial or compressive mass could have this appearance.     Pelvic US:  The pelvis was scanned in standard fashion with a  transabdominal transducer(s) using both grey scale and color Doppler  techniques. Unable to do transvaginal exam due to patient having right  femoral fracture and body habitus.[MS1.1]      CT CAP w/o contrast 10/8/18  Heart size is normal.  No pericardial effusion.  Dilation of the main pulmonary  artery up to 4.5 cm.  The aorta great vessels are normal in course and caliber.  Thyroid is not well visualized.  No pathologic appearing mediastinal or hilar  lymph nodes.  No pleural effusion or pneumothorax.  No focal airspace disease.  Bilateral upper lobe linear opacities likely related to prior scarring.  No  discrete pulmonary nodules.  Abdomen/Pelvis  Limited evaluation of the abdominal viscera without intravenous contrast and  due to patient body habitus.  The liver is unremarkable.  Gallbladder is  dilated.  Pancreas, spleen, and adrenal glands are normal.  Kidneys are  unremarkable, no hydronephrosis or hydroureter.  Bladder is decompressed.  Uterus and adnexa grossly unremarkable.  Colon, appendix, and small bowel are  normal.  No free fluid or free air.  No abdominal or pelvic adenopathy.  Gastric band in place.  Nonaneurysmal atherosclerosis of the abdominal  vasculature.  Bones/soft tissues: Expansile lytic lesion involving the right anterior 4th rib  (series 3, image 48).  Otherwise osseous structures and soft tissues are  unremarkable.    IMPRESSION:  1. Single expansile lytic right 4th rib lesion of indeterminate  etiology.  2. Dilated main pulmonary artery suggesting pulmonary hypertension.  3. Bilateral upper lobe scarring likely related to prior infectious process.  Focal wall[MS1.2]    Procedures:   Ultrasound-guided fine needle aspiration of left parotid region mass 10/29/2018:   Neck, left parotid, ultrasound guided fine needle aspiration:   - Benign neoplasm.   - Benign mixed tumor (pleomorphic adenoma).     Bone Biopsy[MS1.1] R femur[MS1.2]:   METASTATIC SQUAMOUS CELL CARCINOMA.[MS1.1]    BMBx flow 9/28/18  Bone marrow, flow cytometric immunophenotyping:  Normal immunophenotyping results.  No monotypic B-cell  population or increase in blasts identified.  No monotypic  plasma cell population is identified.[MS1.2]    TTE 4/27/18  1.  Normal systolic function of the left ventricle. Ejection fraction 60% without any wall motion abnormalities.  Also no significant hypertrophy is appreciated on this study. May be related to image quality.    2.  Right ventricular size and contractility is normal.    3.  Grade I diastolic dysfunction of the left ventricle.    4.  Mild left atrial enlargement.    5.  No significant valvular abnormalities by 2D and Doppler analysis.     Colonoscopy 2015:        Many diverticula were found in the sigmoid colon.       A 4 mm polyp was found in the rectum. The polyp was sessile. The polyp        was removed with a cold biopsy forceps. Resection and retrieval were        complete.       Five sessile polyps were found in the sigmoid colon. The polyps were 3        to 4 mm in size. These polyps were removed with a cold biopsy forceps.        Resection and retrieval were complete.       Two sessile polyps were found in the transverse colon. The polyps were 4        to 5 mm in size. The polyps were removed with a cold snare and cold        biopsy forceps.       The exam was otherwise without abnormality.       Impressions/Recommendations:   63F with HFpEF, HTN, DMT2, morbid obesity BMI 54,[MS1.1]  former smoker (1/4 PPD for > 20 years)[MS1.2] recent dx of hypercalcemia (9/2018) w/ extensive OSH w/u who was transferred for evaluation of pathologic right distal femur fracture, s/p right distal femur biopsy, tumor curettage/excision and ORIF on 10/26/18. Pathology with metastatic squamous cell carcinoma[MS1.1] of unknown primary.[MS1.3]     # Metastatic squamous cell carcinoma, unknown primary:   Patient with hypercalcemia, pathologic femur fracture s/p biopsy which showed metastatic squamous cell carcinoma.[MS1.1] Unknown[MS1.3] primary. Based on bone biopsy tumor markers and evaluation by heme/onc[MS1.1] and pathology[MS1.3], suspected primary is lung or  tract.[MS1.1] Patient with new vaginal bleeding two weeks ago and extensive FH of breast cancer (mother, two aunts, and sister) and sister with some gynecological cancer.[MS1.3] Seen by Gyn/Onc and[MS1.1] p[MS1.3]dariusz is for endometrial biopsy as an outpatient[MS1.1] after healing of femur fracture.[MS1.3] CT scan[MS1.6] with RUL consolidation[MS1.7]. Recommend a PET scan to better visualize area of consolidation. Discussed with heme/onc team and their plan is for a PET scan as well. Will follow and await PET scan results.     Thank you for the consult.[MS1.6] Patient seen & discussed w/  Dr. Kang, who is in agreement.     Angelica Hernández MD  Internal Medicine PGY3  Pager: [phone][MS1.1]       Revision History        User Key Date/Time User Provider Type Action    > MS1.6 11/2/2018  1:33 PM Angelica Hernández MD Physician Sign     MS1.7 11/2/2018 11:37 AM Angelica Hernández MD Physician      MS1.3 11/2/2018 10:47 AM Angelica Hernández MD Physician      MS1.2 11/2/2018  9:31 AM Angelica Hernández MD Physician      MS1.5 11/2/2018  9:06 AM Angelica Hernández MD Physician      MS1.4 11/2/2018  9:04 AM Angelica Hernández MD Physician      MS1.1 11/2/2018  8:46 AM Angelica Hernández MD Physician             Consults by Narcisa Dasilva,  MD at 10/31/2018  4:38 PM     Author:  Narcisa Dasilva MD Service:  Gyn/Onc Author Type:  Physician    Filed:  11/1/2018 12:55 PM Date of Service:  10/31/2018  4:38 PM Creation Time:  10/31/2018  4:38 PM    Status:  Signed :  Narcisa Dasilva MD (Physician)         [facility] GynOnc Consult Note    Mayra Stokes MRN# [id number]   Age: 63 year old YOB: 1955     Date of Admission:  10/25/2018    Consulting Provider: Silvia Toribio MD              Chief Complaint:   Squamous cell carcinoma of unknown primary,[DW1.1] post-menopausal bleeding with[DW1.2] thickened endometrium on imaging          History of Present Illness:   This patient is a 63 year old[DW1.1] G0[DW1.2] female[DW1.1] with a PMH of HTN, FRANSISCO, obesity, and HFpEF who is currently admitted to the [facility] service for evaluation of a pathologic right distal femur fracture. Patient underwent right femur biopsy, tumor curettage/excision and ORIF on 10/26/18 by orthopedic surgery with pathology consistent with a poorly differentiated squamous cell carcinoma.     Patient reports she is feeling pain in her leg, but no other complaints. She denies any abdominal pain. She has lost weight recently secondary to watching her diet - she states the weight loss has been slow and very intentional. She denies night sweats, fevers, chills, dysuria, urinary retention. She does have severe constipation, her last BM was 10 days ago. She reports that two weeks ago she noticed blood while wiping, thinks she had vaginal spotting for 2 days but this has since resolved.     Patient has never had children. She has gotten regular pap smears, with no abnormals. Her last pap was on 11/23/16 and was NILM and negative for HR HPV. She underwent menopause 15 years ago and has not had any postmenopausal bleeding until the episode 2 weeks ago.[DW1.2]             Past Medical History:[DW1.1]     Past Medical History:   Diagnosis Date     Chronic cor pulmonale (H)  "     Chronic diastolic heart failure (H)      DM type 2 (diabetes mellitus, type 2) (H)     diet controlled     Hypercalcemia 09/2018     Hypertension      Mass of left side of neck     since ~2003     Morbid obesity (H)      Obstructive sleep apnea      Pathologic fracture of femur (H) 10/24/2018[DW1.3]            Past Surgical History:[DW1.1]      Past Surgical History:   Procedure Laterality Date     BIOPSY BONE LOWER EXTREMITY Right 10/26/2018    Procedure: BIOPSY RIGHT FEMUR AND EXCISION OF TUMOR;  Surgeon: Maximo Wilkinson MD;  Location: [facility] OR     LAP ADJUSTABLE GASTRIC BAND       OPEN REDUCTION INTERNAL FIXATION FEMUR DISTAL Right 10/26/2018    Procedure: OPEN REDUCTION INTERNAL FIXATION RIGHT DISTAL FEMUR;  Surgeon: Maximo Wilkinson MD;  Location: [facility] OR     SHOULDER SURGERY Left     shoulder replacement[DW1.3]            Social History:[DW1.1]     Social History   Substance Use Topics     Smoking status: Not on file     Smokeless tobacco: Not on file     Alcohol use Not on file[DW1.3]            Family History:[DW1.1]     Father with colon cancer. Sister had hysterectomy for \"possible ovarian cancer\" but patient is unsure. No family history of bleeding or clotting disorder.[DW1.2]          Allergies:[DW1.1]   No Known Allergies[DW1.3]         Medications:[DW1.1]     No current facility-administered medications on file prior to encounter.   No current outpatient prescriptions on file prior to encounter.[DW1.3]         Review of Systems:     CONSTITUTIONAL: Negative for  fevers, chills, fatigue, anorexia[DW1.1]. + for intentional[DW1.2] weight loss[DW1.1].[DW1.2]  SKIN: Negative for rashes, lumps, or bumps  HEENT: Negative for vision changes, changes in hearing, sinus drainage, sore throat  RESPIRATORY: Negative for  cough, shortness of breath, dyspnea and wheezing, hemoptysis  CARDIOVASCULAR: Negative for  chest pain, dyspnea, palpitations,[DW1.1] +[DW1.2] edema  GASTROINTESTINAL: Negative for nausea, " vomiting, change in bowel habits, diarrhea,[DW1.1] +[DW1.2] constipatio[DW1.1]n[DW1.2], reflux, hematemesis and hemtochezia  GENITOURINARY: Negative for frequency, dysuria, nocturia, urinary incontinence and hematuria  MUSCULOSKELETAL: Negative for muscle weakness[DW1.1]. + joint pain (knees)[DW1.2]  NEUROLOGIC: Negative for headaches, syncope, weakness, numbness, tingling, memory problems, or incoordination  PSYCHIATRIC: Negative for anxiety and depression  HEMATOLOGIC/LYMPHATIC:  Negative for easy bruising, bleeding and lymphadenopathy  ENDOCRINE:  Negative for heat intolerance and cold intolerance         Physical Exam:[DW1.1]     Vitals:    10/31/18 1330 10/31/18 1412 10/31/18 1555 10/31/18 1623   BP: 157/89 144/81  (!) 151/94   BP Location:  Right arm  Right arm   Cuff Size:       Pulse:       Resp:   20    Temp:   97.9  F (36.6  C)    TempSrc:   Oral    SpO2: 95% 97%  96%   Weight:       Height:[DW1.3]         Constitutional:[DW1.1] Pleasant, obese[DW1.2] female, no acute distress  HEENT: No[DW1.1]rmocephalic, atraumatic[DW1.2]   Cardiovascular:[DW1.1] Well perfused[DW1.2]  Respiratory:[DW1.1] Non-labored breathing on supplemental O2 via NC, no coughs or wheezes[DW1.2]  Gastrointestinal: Abdomen[DW1.1] obese,[DW1.2] soft, non-tender.   Pelvic Exam:     -: External genitalia normal well-estrogenized, healthy tissue.  No obvious excoriations, lesions, or rashes.     -Bimanual:[DW1.1] Vaginal mucosa without palpable lesion. Cervix unable to be palpated secondary to body habitus.[DW1.2]  Neuro:[DW1.1] Cranial nerves grossly intact.[DW1.2]  Extremities:[DW1.1] Right leg immobile secondary to femur fracture. 2+ LE edema bilaterally.[DW1.2]  Skin: No suspicious lesions or rashes  Psychiatric: mentation appears normal and affect normal/bright         Data:[DW1.1]     Results for orders placed or performed during the hospital encounter of 10/25/18 (from the past 24 hour(s))   Calcium   Result Value Ref Range     Calcium 11.7 (H) 8.5 - 10.1 mg/dL   Lactic acid level STAT for sepsis protocol   Result Value Ref Range    Lactate for Sepsis Protocol 1.3 0.7 - 2.0 mmol/L   CBC with platelets   Result Value Ref Range    WBC 12.5 (H) 4.0 - 11.0 10e9/L    RBC Count 2.56 (L) 3.8 - 5.2 10e12/L    Hemoglobin 8.2 (L) 11.7 - 15.7 g/dL    Hematocrit 27.8 (L) 35.0 - 47.0 %     (H) 78 - 100 fl    MCH 32.0 26.5 - 33.0 pg    MCHC 29.5 (L) 31.5 - 36.5 g/dL    RDW 13.1 10.0 - 15.0 %    Platelet Count 197 150 - 450 10e9/L   Renal panel   Result Value Ref Range    Sodium 136 133 - 144 mmol/L    Potassium 3.4 3.4 - 5.3 mmol/L    Chloride 97 94 - 109 mmol/L    Carbon Dioxide 33 (H) 20 - 32 mmol/L    Anion Gap 6 3 - 14 mmol/L    Glucose 128 (H) 70 - 99 mg/dL    Urea Nitrogen 21 7 - 30 mg/dL    Creatinine 0.57 0.52 - 1.04 mg/dL    GFR Estimate >90 >60 mL/min/1.7m2    GFR Estimate If Black >90 >60 mL/min/1.7m2    Calcium 10.9 (H) 8.5 - 10.1 mg/dL    Phosphorus 2.4 (L) 2.5 - 4.5 mg/dL    Albumin 2.1 (L) 3.4 - 5.0 g/dL   Magnesium   Result Value Ref Range    Magnesium 1.7 1.6 - 2.3 mg/dL   Calcium   Result Value Ref Range    Calcium 10.9 (H) 8.5 - 10.1 mg/dL[DW1.3]     Imaging   CT A/P 10/25/18:   IMPRESSION:   1. Numerous, irregular hypodensities scattered throughout the liver,  the largest measuring up to 11.1 cm in hepatic segment 6, favor  metastatic disease until proven otherwise.   2. Lytic lesion of the right fourth rib with associated cortical  erosion, which suspicious for osseous metastatic disease with  pathologic fracture.   3. Heterogeneous appearance of the uterus with appearance of  endometrial thickening and probably fibroids as well. Cannot exclude  endometrial cancer. Recommend ultrasound for confirmation.  4. Haziness and small volume fluid around the pancreas suggestive of  acute pancreatitis. No evidence of pancreatic mass.  5. Indeterminate 1.5 cm left adrenal nodule.   6. Main pulmonary artery is dilated to 4.7 cm,  suggestive of pulmonary  arterial hypertension.  7. Consolidation in the right upper lobe with filling of a segmental  bronchus. Differential includes mucus plugging with associated  atelectasis, aspiration, or infectious consolidation. Less likely  endobronchial or compressive mass could have this appearance.    Surgical pathology 10/26/18: FEMUR TUMOR BIOPSY  - METASTATIC SQUAMOUS CELL CARCINOMA, poorly differentiated, fragmented.   - Bone remodeling and normocellular bone marrow (approx. 35-40%) with   trilineage hematopoiesis with   maturation.[DW1.2]            Assessment and Plan:   Assessment: 63 year old female with[DW1.1] a pathologic distal femur fracture secondary to biopsy proven squamous cell carcinoma of unknown primary. This is unlikely to be a cervical primary, as the patient has never had an abnormal pap smear and has no history of HPV. No visible lesions on the vulva. Patient does have a thickened endometrial stripe on imaging, would recommend an endometrial biopsy. Pelvic exam was severely limited due to the patient's leg immobility and body habitus, therefore recommend Gyn follow-up after her mobility has improved.[DW1.2]      Problem List[DW1.1]  - Squamous cell carcinoma of unknown origin   - Post-menopausal bleeding with thickened endometrial stripe on CT[DW1.2]    Plan:[DW1.1]  Referral to Gyn Onc clinic for pelvic exam and endometrial biopsy as an outpatient.[DW1.2]     Doris Simmons MD   Resident Physician, PGY1  Obstetrics, Gynecology, and Women's Health[DW1.1]       Patient seen and examined with resident.  Pt with recent pathologic fracture and SCC of unknown primary.  Given her pap smear history with negative HPV in 2016 this is unlikely to be a primary cervical malignancy.  Vulvar or vaginal pathology remain possibilities given very limited exam due to patient habitus and immobility due to recent fracture.  She also has a thickened EMS on CT and will need endometrial  sampling when she is more mobile.    Would recommend follow up in our gynecologic oncology clinic for further evaluation once she is more mobile and could tolerate a pelvic exam.    She also needs follow up with colorectal surgery as an anal lesion could be the source as well and she is not up to date on colonoscopy/rectal exams.    Will sign off for now, please call with any questions.    Narcisa Reeves MD  Gynecologic Oncology  [facility][CR1.1]           Revision History        User Key Date/Time User Provider Type Action    > CR1.1 11/1/2018 12:55 PM Narcisa Dasilva MD Physician Sign     DW1.3 10/31/2018  7:05 PM Doris Simmons MD Resident Sign     DW1.2 10/31/2018  6:40 PM Doris Simmons MD Resident      [N/A] 10/31/2018  5:33 PM Doris Simmons MD Resident Share     [N/A] 10/31/2018  5:11 PM Doris Simmons MD Resident Share     [N/A] 10/31/2018  4:52 PM Doris Simmons MD Resident Share     DW1.1 10/31/2018  4:43 PM Doris Simmons MD Resident Share            Consults by Radha Salter RPA at 10/29/2018  8:57 AM     Author:  Radha Salter RPA Service:  Interventional Radiology Author Type:  Radiology Practioner Assistant    Filed:  10/29/2018 10:10 AM Date of Service:  10/29/2018  8:57 AM Creation Time:  10/29/2018  8:53 AM    Status:  Addendum :  Radha Salter RPA (Radiology Practioner Assistant)     Consult Orders:    1. Interventional Radiology Adult/Peds IP Consult: Patient to be seen: Routine within 24 hours; Call back #: [id number]; 64YO F with R-sided parotid mass. Images are in PACS from OSH. Thanks! [[id number]] ordered by Sotero Berumen MD at 10/29/18 0748                    Patient is on Neuroradiology schedule 10/2[SY1.1]9[SY1.2]/18 for a FIne needle aspiration of left parotid mass.   Labs WNL for procedure.   Orders for NPO, scrubs  have been entered.   Consent  will be done prior to procedure.   [place] [phone] for estimated time of procedure.     Discussed with Minoo MAGANA.     Radha Salter IR RPA  [phone] Call pager  [phone] pager        [SY1.1]     Revision History        User Key Date/Time User Provider Type Action    > SY1.2 10/29/2018 10:10 AM Radha Salter RPA Radiology Practioner Assistant Addend     SY1.1 10/29/2018  8:57 AM Radha Salter RPA Radiology Practioner Assistant Sign            Consults by Parish Lopez MD at 10/28/2018 12:00 PM     Author:  Parish Lopez MD Service:  Otolaryngology/ENT Author Type:  Resident    Filed:  10/28/2018  4:47 PM Date of Service:  10/28/2018 12:00 PM Creation Time:  10/28/2018  4:08 PM    Status:  Signed :  Parish Lopez MD (Resident)    Cosigner:  Jose Angel Stoddard MD at 11/2/2018 11:54 AM         Consult Orders:    1. ENT IP Consult: 63F with hypercalcemia, metastatic cancer of unknown origin, with 3.2cm left parotid tail mass. Requesting evaluation and management strategies.; Consultant may enter orders: No; Patient to be seen: Routine - within 24 hours; Call ... [[id number]] ordered by Sotero Berumen MD at 10/28/18 0841                Otolaryngology Consult Note  October 28, 2018    CC:[DC1.1] Left parotid mass[DC1.2]    HPI: Mayra Stokes is a 63 year old female with a past medical history of FRANSISCO, HFpEF, HTN who is admitted for a pathologic femur fracture and evaluation hypercalcemia of malignancy. She was found to have a lytic lesion of the right 4th rib that was biopsied and found to be SCC. There is no known primary. ENT is consulted for evaluation of a left parotid mass[DC1.1].    Mrs. Stokes reports that she first noticed a mass in her left cheek 14 or 15 years ago. She was evaluated by her PCP who told her that because it was mobile, it was likely benign. Mrs. Stokes says she thinks she was told it was a collection of fat. Her PCP  "has kept a close eye on the mass and checked it at each visit. Mrs. Stokes reports that the mass has not grown in size, changed in shape or changed in consistency. She has had no overlying skin changes or ulceration. The mass is not painful. She has had no facial weakness, otalgia, otorrhea, dysphagia, odynophagia, or voice changes. She has not noticed any other neck masses.    PMH:[DC1.2] FRANSISCO, HFpEF, HTN[DC1.1], DM2    PSH: no head or neck surgery    SH:  -Smoking: smoked 1 pack per week for 30 years. Quit last year  -Alcohol: occasional[DC1.2]    ROS: 12 point review of systems is negative unless noted in HPI.    PHYSICAL EXAM:[DC1.1]  /75 (BP Location: Left arm)  Pulse 96  Temp 98.1  F (36.7  C) (Oral)  Resp 16  Ht 1.651 m (5' 5\")  Wt 141 kg (310 lb 13.6 oz)  SpO2 95%  BMI 51.73 kg/m2[DC1.3]    Gen: Sitting up in bed, alert, oriented  Resp: Nonlabored breathing on room air, no wheezing or stridor. Voice is strong  Ears: No otorrhea  Nose: No active rhinorrhea  Mouth: MMM, tongue and uvula midline. No ulcers, plaques or masses in the oral cavity or oropharynx  Face: 3cm smooth, round, mobile mass postero-inferior to the angle of the left mandible. No overlying ulceration or skin changes. Non-tender to palpation. No fluctuance.   Neck: No palpable LAD  Neuro: HB1/6 bilaterally. V1/V2/V3 intact bilaterally  Eyes: EOMI with no nystagmus[DC1.2]    ROUTINE IP LABS (Last four results)  BMP[DC1.1]  Recent Labs  Lab 10/28/18  1149 10/28/18  0405 10/27/18  1909 10/27/18  1127 10/27/18  0447 10/26/18  2249 10/26/18  1240   NA  --  137  --   --  138 137 139   POTASSIUM  --  4.4  --   --  4.7 4.4 4.1   CHLORIDE  --  103  --   --  105 106 103   DEANDRE 12.2* 12.8* 12.5* 11.8* 11.6* 11.8* 12.5*   CO2  --  27  --   --  26 26 29   BUN  --  15  --   --  20 18 17   CR  --  0.71  --   --  0.83 0.79 0.70   GLC  --  121*  --   --  126* 129* 129*[DC1.3]     CBC[DC1.1]  Recent Labs  Lab 10/28/18  0405 10/27/18  6037 " "10/26/18  2249 10/26/18  1656   WBC 14.1* 12.8* 15.3* 10.8   RBC 2.80* 2.96* 2.98* 3.01*   HGB 8.9* 9.5* 9.6* 9.6*   HCT 31.3* 33.2* 32.8* 32.1*   * 112* 110* 107*   MCH 31.8 32.1 32.2 31.9   MCHC 28.4* 28.6* 29.3* 29.9*   RDW 13.2 13.3 13.3 12.8    250 233 228[DC1.3]     INR[DC1.1]  Recent Labs  Lab 10/26/18  0600   INR 1.32*[DC1.3]       Imaging:[DC1.1]  CT Neck/nasopharynx 9/18/18  \"IMPRESSION:  1. 3.2 cm mass arising from the left parotid tail corresponding to the palpable abnormality. Differential considerations would include a primary parotid neoplasm, both benign and malignant, abnormal lymph node, or metastatic disease.  2. No lymphadenopathy or masses elsewhere within the neck.\"[DC1.2]    Assessment and Plan  Mayra Stokes is a 63 year old female with a past medical history of FRANSISCO, HFpEF, HTN who is admitted for a pathologic femur fracture and evaluation hypercalcemia of malignancy. She was found to have a lytic lesion of the right 4th rib that was biopsied and found to be SCC. There is no known primary. ENT is consulted for evaluation of a left parotid mass, which she has had for 14-15 years. Given that the mass has not changed in size, shape, or consistency, or become painful, it is unlikely to represent malignancy. Given its long-term stability, it may represent a pleiomorphic adenoma or a Warthin's tumor; both are benign but do have the potential for malignant transformation.    - US-guided FNA[DC1.1] of the left parotid mass[DC1.2] (ordered for you)  - Rest of care per primary team    Patient discussed with Dr. Richi Lopez MD  Otolaryngology-Head & Neck Surgery PGY2  Please contact ENT with questions by dialing [phone] and entering job code [phone] when prompted.[DC1.1]     Revision History        User Key Date/Time User Provider Type Action    > [N/A] 10/28/2018  4:47 PM Parish Lopez MD Resident Sign     DC1.2 10/28/2018  4:47 PM Parish Lopez MD Resident Sign     " "DC1.3 10/28/2018  4:09 PM Parish Lopez MD Resident      DC1.1 10/28/2018  4:08 PM Parish Lopez MD Resident             Consults by Teo Myers MD at 10/27/2018  7:26 AM     Author:  Teo Myers MD Service:  Oncology Author Type:  Physician    Filed:  10/28/2018  8:22 AM Date of Service:  10/27/2018  7:26 AM Creation Time:  10/27/2018  7:25 AM    Status:  Signed :  Teo Myers MD (Physician)     Consult Orders:    1. Hematology & Oncology IP Consult: hypercalcemia, pathologic femur fracture; Consultant may enter orders: Yes; Patient to be seen: Routine - within 24 hours [[id number]] ordered by Orion Daley MD at 10/26/18 1213                [facility] [place]   Hematology/Oncology Consult Note    Mayra Stokes MRN# [id number]   Age: 63 year old YOB: 1955          Reason for Consult:[NZ1.1]   \"hypercalcemia, pathologic femur fracture\"[NZ1.2]         Assessment and Plan:   Mayra Stokes is a 63 year old[NZ1.1] F with PMH of morbid obesity s/p gastric banding procedure, cor pulmonale/diastolic CHF, osteoarthritis presenting as a transfer from [facility] due to pathologic fracture.  She was initially noted to have hypercalcemia on pre-op evaluation 9/2018 and was referred to oncologist, Dr. Granados, for further workup.  Extensive workup by Dr. Granados was thus far unrevealing.  Workup for hypercalcemia included CT CAP without contrast notable for R 4th rib lytic lesion that was biopsies (metastatic squamous cell carcinoma per path report in [facility]).  Kidneys were noted to be unremarkable, but on [facility] oncology review there may be a L renal lesion that would warrant repeat imaging with CT contrast.  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  Bone marrow biopsy was completed 9/28/18 " with normal immunophenotyping results, no monotypic B-cell population or increased blast population, no monotypic plasma cell population.  Plasma cell FISH was unable to be completed due to insufficient number of cells.  She did have a parotid mass that has been present for years per her report, but CT was concerning for possible parotid neoplasms, absent additional lymphadenopathy.  Protein electrophoresis was unremarkable for monoclonal protein, immunoglobulin light chains unremarkable, serologies notable for elevated IgE only, PB smear unremarkable, urine electrophoresis with elevated total protein only.      She is now s/p repair by orthopedics for pathologic fracture of distal R femur with curettage of bone and biopsy.  Femur XR was notable for radiolucent area of bone destruction around femur, near site of prior CT guided biopsy.  Pathology is pending currently, but will likely provide insight into location of primary malignancy.      In the mean time, she has shown to be refractory to IVF, diuretics, and pamindronate x2 doses for her hypercalcemia, all given prior to transfer.  Calcium total was high as 14.8 prior to transfer and was downtrending since admission at [facility] and administration of denosumab.  There is extensive workup for other causes of hypercalcemia, including PTH, vitamin D fractions pending.      At this point, the primary malignancy remains unclear.  Evaluation of pathologic fx site should be revealing and additional imaging of CAP with contrast given normal renal function will assist with completion of staging.  Additionally, calcium is slowly trending down, but will need to be monitored and follow up on pending labs for remaining hypercalcemia workup.[NZ1.2]      Summary of Recommendations:[NZ1.1]    CT CAP with contrast when able to do so to evaluate for primary malignancy source of squamous cell carcinoma (bx confirmed from R rib).   OK to delay a day or two for renal recovery post-op.       Follow bone pathology results.      Agree with endocrinology recommendation of denosumab for refractory hypercalcemia.     Follow pending hypercalcemia workup labs.     Will need to obtain slides for pathology review at [facility].[NZ1.2]     Thank you for involving us in the care of this patient. We will continue to follow during the hospitalization.    Patient was seen and plan of care developed with Dr. Myers.    Lucian Bales MD/PhD  Heme/Onc Fellow, PGY4[NZ1.1]  10/27/2018[NZ1.3]  [phone][NZ1.1]    I independently examined this patient and my assessment is in agreement with the above note.  I reviewed all tests and past medical history and my evaluation agrees with the findings in the note.  This is a fairly complicated case.  Plan as above; we may need a PET at some point also.     Teo Myers M.D.  Professor  Hematology, Oncology and Transplantation[KS1.1]             History of Present Illness:   History obtained from chart review and confirmed with patient[NZ1.1] and family member at bedside.[NZ1.2]      Mayra Stokes is a 63 year old F with PMH of obesity s/p gastric banding procedure, cor pulmonale, osteoarthritis, presenting for new pathologic fracture of R femur distal shaft s/p biopsy and curettage and repair.  Pt initially presented for pre-op evaluation for TKA 9/2018 and was noted to have marked hypercalcemia[NZ1.1].  Referred to oncology for further workup.  Extensive workup has thus far come up without primary source as noted in assessment above.  She fell and suffered R femur fracture on 10/24, which was deemed to be pathologic fracture and was transferred to [facility] for repair and further workup of malignancy.  She underwent surgical repair the afternoon of 10/26 and was transferred to the [facility] due to medical complexity.    On initial consultation, she notes she has not been feeling well since 2/2017, but otherwise did not note specific symptoms.  She did say she was  "overall feeling better 8/2018 prior to workup for R knee TKA.  She did endorse b/l knee pain at that time, likely secondary to osteoarthritis.  She did note R thigh pain as well and has chronic low back pain that has been mostly stable.  She denies fevers, chills, night sweats, nausea, vomiting, CP, SOB, abdominal pain.  She does note decreased appetite over the past few months and fatigue.  Denies symptoms of bleeding or bruising, no hematochezia, melena, hematuria.  She has lost weight s/p gastric banding procedure.  She notes post-op pain in R leg, but denies new numbness/tingling or focal weakness.  She states parotid mass has \"been present for years\" and has not changed in size or become painful.  She denies new lumps/bumps/LAD.  She denies other complaints.[NZ1.2]         Review of Systems:   A comprehensive ROS was performed with the patient and was found to be negative with the exception of that noted in the HPI above.       Past Medical History:     Past Medical History:   Diagnosis Date     Chronic cor pulmonale (H)      Chronic diastolic heart failure (H)      DM type 2 (diabetes mellitus, type 2) (H)     diet controlled     Hypercalcemia 09/2018     Hypertension      Mass of left side of neck     since ~2003     Morbid obesity (H)      Obstructive sleep apnea      Pathologic fracture of femur (H) 10/24/2018[NZ1.3]            Past Surgical History:[NZ1.1]     Past Surgical History:   Procedure Laterality Date     LAP ADJUSTABLE GASTRIC BAND       SHOULDER SURGERY Left     shoulder replacement[NZ1.3]            Social History:[NZ1.1]     Former smoker 1/4 PPD x20+ years.  She is unsure how long she smoked.   No EtOH.    No recreational drugs.   No OTC supplements.[NZ1.2]            Family History:[NZ1.1]   She notes several family members with cancer.  One family member with colon cancer in \"old age.\"  She does not think any family members had cancer before age 50, but isn't entirely sure.  No family " "history of bleeding or clotting disorders.[NZ1.2]          Allergies:[NZ1.1]   No Known Allergies[NZ1.3]         Medications:[NZ1.1]     Prescriptions Prior to Admission   Medication Sig Dispense Refill Last Dose     ASPIRIN PO Take 81 mg by mouth daily        calcitonin, salmon, (MIACALCIN) 200 UNIT/ACT nasal spray Spray 1 spray into one nostril alternating nostrils daily Alternate nostril each day.        ENALAPRIL MALEATE PO Take 5 mg by mouth daily         Furosemide (LASIX PO) Take 40 mg by mouth 2 times daily        Metoprolol Tartrate (LOPRESSOR PO) Take 100 mg by mouth 2 times daily        TiZANidine HCl (ZANAFLEX PO) Take 4 mg by mouth daily as needed for muscle spasms[NZ1.3]               Physical Exam:[NZ1.1]   /77 (BP Location: Left arm)  Pulse 108  Temp 97.4  F (36.3  C) (Axillary)  Resp 16  Ht 1.651 m (5' 5\")  Wt 141 kg (310 lb 13.6 oz)  SpO2 95%  BMI 51.73 kg/m2  Vitals:    10/26/18 0500 10/27/18 0447   Weight: 149.7 kg (330 lb) 141 kg (310 lb 13.6 oz)[NZ1.3]     General:[NZ1.1] obese,[NZ1.2] sitting in bed, in no acute distress.  Heme/Lymph: No overt bleeding. No cervical or supraclavicular adenopathy.  Skin: No concerning lesions, rash, jaundice, cyanosis, erythema, or ecchymoses on exposed surfaces.[NZ1.1]  R leg with surgical dressing, unable to inspect[NZ1.2]   HEENT: NCAT. EOMI, anicteric sclera. Oral mucosa pink and moist with no lesions or thrush.  Respiratory: Non-labored breathing[NZ1.1] on face mask[NZ1.2],[NZ1.1] reduced[NZ1.2] air exchange,[NZ1.1] no wheezing or rhonchi bilaterally[NZ1.2]  Cardiovascular: Regular rate and rhythm.   Gastrointestinal: Normoactive bowel sounds. Abdomen soft, non-distended, and non-tender. No palpable masses or organomegaly.  Extremities:[NZ1.1] RLE in surgical wrap, LLE with 1+ edema.  B/l toes WWP to touch.[NZ1.2]   Neurologic: A&O x 3, speech normal,[NZ1.1] symmetric facies, linear thought process.[NZ1.2]           Data:   I have personally " reviewed the following labs/imaging:  CBC[NZ1.1]  Recent Labs  Lab 10/27/18  0447 10/26/18  2249 10/26/18  1656 10/26/18  0600   WBC 12.8* 15.3* 10.8 15.1*   RBC 2.96* 2.98* 3.01* 3.24*   HGB 9.5* 9.6* 9.6* 10.4*   HCT 33.2* 32.8* 32.1* 34.1*   * 110* 107* 105*   MCH 32.1 32.2 31.9 32.1   MCHC 28.6* 29.3* 29.9* 30.5*   RDW 13.3 13.3 12.8 12.7    233 228 267[NZ1.3]     CMP[NZ1.1]  Recent Labs  Lab 10/27/18  0447 10/26/18  2249 10/26/18  1240 10/26/18  0715  10/25/18  2113    137 139 138  --  136   POTASSIUM 4.7 4.4 4.1 4.4  --  4.4   CHLORIDE 105 106 103 103  --  102   CO2 26 26 29 28  --  26   ANIONGAP 7 6 7 7  --  8   * 129* 129* 128*  --  123*   BUN 20 18 17 19  --  26   CR 0.83 0.79 0.70 0.76  --  0.87   GFRESTIMATED 70 74 84 76  --  66   GFRESTBLACK 84 89 >90 >90  --  80   DEANDRE 11.6* 11.8* 12.5* 12.5*  < > 13.2*   MAG  --  1.4*  --   --   --  1.6   PHOS  --  2.1*  --   --   --  2.0*   PROTTOTAL  --  6.9  --   --   --  7.5   ALBUMIN  --  2.3*  --   --   --  2.6*   BILITOTAL  --  0.4  --   --   --  0.5   ALKPHOS  --  154*  --   --   --  176*   AST  --  37  --   --   --  27   ALT  --  26  --   --   --  26   < > = values in this interval not displayed.[NZ1.3]  INR[NZ1.1]  Recent Labs  Lab 10/26/18  0600   INR 1.32*[NZ1.3]     Labs from [facility] prior to transfer:   BMBx flow 9/28/18  NOTE)  Bone marrow, flow cytometric immunophenotyping:    Normal immunophenotyping results.  No monotypic B-cell  population or increase in blasts identified.  No monotypic  plasma cell population is identified.    Urine electrophoresis 9/28/18  IMPRESSION   (NOTE)  All fractions present, no apparent M-spike.    See Immunofixation.     COLLECTION DURATION 24   Comment:    Unit: h  CORRECTED ON 09/24 AT 1401: PREVIOUSLY REPORTED AS: 24     VOLUME 4000   Comment:    Unit: mL  CORRECTED ON 09/24 AT 1401: PREVIOUSLY REPORTED AS: 4000     CONCENTRATION 16   Comment:    Unit: mg/dL     UR TOTAL PROTEIN 640 (H)    Comment:    Reference range: <229  Unit: mg/24 h  (NOTE)    -------------------ADDITIONAL INFORMATION-------------------  On 06/27/2017 the total protein assay method changed  resulting in approximately a 15% increase in protein  values.  CORRECTED ON 09/24 AT 1401: PREVIOUSLY REPORTED AS: 640 Reference range:   <229 Unit: mg/24 h     UR ALBUMIN 63   Comment:    Unit: %     ALPHA 1 GLOBULIN 5   Comment:    Unit: %     ALPHA 2 GLOBULIN 8   Comment:    Unit: %     BETA GLOBULIN 11   Comment:    Unit: %     GAMMA GLOBULIN 12   Comment:    Unit: %     A/G RATIO 1.74     IMMUNOELP   No monoclonal protein detected.  Comment:    (NOTE)      PB smear 9/17/18  (NOTE)  Normal erythrocyte, platelet, and leukocyte morphology.  Serologies 9/17/18  Component Name  9/17/2018     171 IgA   1394 IgG   164 IgM   2 IgD   141.0 (H) IgE     Urine Ig light chains 9/12/18  KAPPA TOTAL LIGHT CHAIN, U <0.9000   Comment:    Reference range: <0.9000  Unit: mg/dL  (NOTE)    -------------------ADDITIONAL INFORMATION-------------------  This test was developed and its performance characteristics  determined by [facility] in a manner consistent with  CLIA requirements. This test has not been cleared or  approved by the U.S. Food and Drug Administration.     LAMBDA TOATL LIGHT CHAIN, U <0.7000   Comment:    Reference range: <0.7000  Unit: mg/dL  (NOTE)    -------------------ADDITIONAL INFORMATION-------------------  This test was developed and its performance characteristics  determined by [facility] in a manner consistent with  CLIA requirements. This test has not been cleared or  approved by the U.S. Food and Drug Administration.     KAPPA/LAMBDA TLC RATIO, U   Reference range: 0.7000 to 6.20  Comment:    (NOTE)  Ratio not calculated because the Total Kappa and Total  Lambda values are less than the reportable range.    Test Performed by:  [address] [address]        Monoclonal protein study 9/12/18  PROTEIN, TOTAL 8.0 (H)   Comment:    Reference range: 6.3  to  7.9  Unit: g/dL     ALBUMIN 3.6   Comment:    Reference range: 3.4 to 4.7  Unit: g/dL     ALPHA 1 GLOBULIN 0.4 (H)   Comment:    Reference range: 0.1 to 0.3  Unit: g/dL     ALPHA 2 GLOBULIN 1.3 (H)   Comment:    Reference range: 0.6 to 1.0  Unit: g/dL     BETA GLOBULIN 1.1   Comment:    Reference range: 0.7 to 1.2  Unit: g/dL     GAMMA GLOBULIN 1.6   Comment:    Reference range: 0.6 to 1.6  Unit: g/dL     A/G RATIO 0.83     IMPRESSION   (NOTE)  No apparent monoclonal protein on serum electrophoresis.    See Immunofixation.     IMMUNOFIXATION SERUM   No monoclonal protein detected.  Comment:      XR bone survey 9/17/18  FINDINGS:  AP chest, AP and lateral cervical, lumbar, and thoracic spine, swimmer's  lateral thoracic spine, lateral skull, AP bilateral humeri, AP bilateral  forearm, AP bilateral femur, AP bilateral tibia-fibula, and AP pelvis are  submitted for interpretation.        CHEST: Streaky density is seen extending superolaterally from both upper shiv.  Appearance has improved and this likely represent scarring from previous  pneumonia.  Prominent degenerative changes seen at the right AC joint.  There  are no lytic or blastic lesions.        SKULL:   There are no lytic or blastic lesions.        CERVICAL SPINE: Multilevel degenerative change and osteopenia.  There are no  lytic or blastic lesions.        THORACIC SPINE: Multilevel degenerative change and osteopenia.  There are no  lytic or blastic lesions.        LUMBAR SPINE: Multilevel degenerative change and osteopenia.  There is grade 1  anterolisthesis of L3 on L4.  Multilevel facet hypertrophy is noted.  There are  no lytic or blastic lesions.        ABDOMEN/PELVIS: Lap band is incompletely imaged.  Respiratory motion limits  assessment.  There are no lytic or blastic lesions.        UPPER EXTREMITIES: Degenerative change at the right AC  joint.  Total left  shoulder arthroplasty noted.  There are no lytic or blastic lesions.        LOWER EXTREMITIES: Significant degenerative changes seen in both knees.  This  is been recently evaluated.  There is a moth-eaten appearance to the distal  right femoral diaphysis with some cortical involvement particularly medially.  This likely represents plasmacytoma and even in retrospect cannot be visualized  on the previous knee films.  Smaller lesions are seen in the mid femur.  Diffuse edema seen at both ankles.        IMPRESSION:  Mild the appearance the distal right femoral diaphysis with some cortical  involvement medially.  This corresponds with plasmacytoma.    Osteopenia with multilevel degenerative change.    Diffuse edema around both ankles.    Lap band incompletely imaged.    Scarring in the upper lungs.    CT Neck/nasopharynx 9/18/18    FINDINGS:  A marker is placed in the upper left neck in the area of concern.  Deep to the  marker, there is a soft tissue mass associated with the left parotid tail  measuring approximately 2.9 x 2.2 x 3.2 cm in size (image 55 series 2, image 47  series 12).  There is some parotid tissue seen extending along the anterior and  lateral margin of the mass, and this likely is arising from the parotid tail.  There is a punctate calcification along the posterior superior margin of this  mass.  There is a vein along the medial margin of the mass.  The right parotid,  bilateral submandibular, and thyroid glands are unremarkable in appearance on  this noncontrast study.  There is streak artifact within the lower neck and  upper chest.  No pathologically enlarged lymph nodes are seen within the neck.  No focal fluid collection.  There is streak artifact from prior dental  restorations.  On these noncontrast images, the oral cavity, oropharynx,  nasopharynx, and larynx are unremarkable in appearance.  Trace atherosclerotic  calcifications are seen.  Review of the osseous structures  demonstrate  degenerative changes of the spine, most pronounced at C4-5 through C6-C7.  No  focal destructive osseous lesions are seen.  Paranasal sinuses are without  significant mucosal thickening and no air-fluid levels are present.  Mastoid  air cells are clear.  There is trace soft tissue density within the left  external auditory canal, presumably cerumen.  Evaluation of the visualized  portions the lungs demonstrates respiratory motion and scarring in the left  upper lobe.  No apical pneumothorax.  Trace atherosclerotic calcifications.    IMPRESSION:  1. 3.2 cm mass arising from the left parotid tail corresponding to the palpable  abnormality.  Differential considerations would include a primary parotid  neoplasm, both benign and malignant, abnormal lymph node, or metastatic disease.  2. No lymphadenopathy or masses elsewhere within the neck.    Marrow/smear 10/4/18    PERIPHERAL BLOOD SMEAR VALUES:  WBC 8.0 x 103/ul; RBC 3.55 x 106/ul ;  hemoglobin 12.1 g/dl; hematocrit 36.9 % .0 fl; MCH 34.1 pg; MCHC  32.8 g/dl; RDW 14.4%; platelets 235 x 103/ul; MPV 8.1 fl.  Differential:  25% neutrophils; 13% lymphocytes; 9% monocytes; 3% eosinophils; and 0%  basophils.      PERIPHERAL BLOOD SMEAR MORPHOLOGY, HISTOGRAM DISTRIBUTIONS AND SCATTERPLOT:  Red blood cells are decreased overall and macrocytic. No rouleaux formation  is identified.  Leukocytes are normal in overall number with relative and absolute  lymphopenia. Distribution is otherwise within normal limits with no  dyspoietic leukocytes present. Platelets are normal in number and  morphology.      BONE MARROW ASPIRATE, TOUCH PREPARATIONS AND BUFFY COAT SMEAR:  The  aspirate appears somewhat hemodilute and aspiculate.  However, no  morphologic dysplasia of erythroid and myeloid precursors identified.  Megakaryocytes are morphologically normal in number and morphology. No  increased or morphologically abnormal plasma cells are identified. Blasts  are not  increased. Differential: 49% neutrophils (segmented and bands); 21%  myeloid precursors (myelocytes/metamyelocytes/promyelocyts), 20%  lymphocytes; 6% monocytes; 2% eosinophils; 0% plasma cells, 2% blasts, and  0% basophils. Nucleated red cells: 32. M:E ratio: 2.1      BONE MARROW CORE BIOPSY AND PARTICLE BLOCK:  The core biopsy shows marked  aspirate artifact and fragments of normocellular marrow (overall 30%) with  trilineage hematopoiesis.  immunostain shows no evidence of overall  increased plasma cells or abnormal clusters of plasma cells.      SPECIMEN ADEQUACY:  Suboptimal.      IMMUNOPHENOTYPING:  No monotypic B-cell population or increase in blasts  identified.  No monotypic  plasma cell population is identified (See separate flow cytometry report  for additional details, performed at [facility] [email or web address]).      Blasts:  Not increased by CD45/side scatter and CD34.  B-cells:  No monotypic; normal expression pattern of CD19, CD10, surface  kappa and lambda.  T-cells/NK-cells:  No aberrant phenotype by CD3 and CD16.  Plasma cells:  No monotypic; normal expression pattern of CD19, CD38, CD45,  , and cytoplasmic kappa/lambda.      PLASMA CELL PROLIFERATIVE FISH STUDIES: Quantity Not Sufficient.  An insufficient number of plasma cells was available for this FISH test.  This result does not exclude the presence of a plasma cell proliferative  disorder (See separate FISH report, performed at [facility] [email or web address]).              IRON STAINS:  Iron stains were performed on the core biopsy specimen and an  aspirate smear.  Storage iron appears mildly increased. No increased  sideroblast iron is identified.      Immunostaining for  performed on block A-1 highlights scatter positive  cells, supportive of the diagnosis.  These stains were ordered after  examination of the HE slides and were necessary to achieve the above  diagnosis.    R femur biopsy 9/28/18  RIGHT FEMUR, BIOPSY       --FRAGMENTS  "OF BENIGN BONE WITH THICKENED TRABECULAE,         CONSISTENT WITH HYPERCALCEMIA       --NO EVIDENCE OF ATYPICAL PLASMA CELLS      GROSS:  The specimen in a container labeled \"CT needle biopsy right femur  lesion\" consists of multiple cores of tissue ranging from 0.2 cm to 1.0 cm  in length with a diameter of 0.2 cm.  There is a piece that is going to be  decalcified and put in A-2 and then all the other pieces in A-1.    CT CAP w/o contrast 10/8/18      Heart size is normal.  No pericardial effusion.  Dilation of the main pulmonary  artery up to 4.5 cm.  The aorta great vessels are normal in course and caliber.  Thyroid is not well visualized.  No pathologic appearing mediastinal or hilar  lymph nodes.  No pleural effusion or pneumothorax.  No focal airspace disease.  Bilateral upper lobe linear opacities likely related to prior scarring.  No  discrete pulmonary nodules.        Abdomen/Pelvis        Limited evaluation of the abdominal viscera without intravenous contrast and  due to patient body habitus.  The liver is unremarkable.  Gallbladder is  dilated.  Pancreas, spleen, and adrenal glands are normal.  Kidneys are  unremarkable, no hydronephrosis or hydroureter.  Bladder is decompressed.  Uterus and adnexa grossly unremarkable.  Colon, appendix, and small bowel are  normal.  No free fluid or free air.  No abdominal or pelvic adenopathy.  Gastric band in place.  Nonaneurysmal atherosclerosis of the abdominal  vasculature.        Bones/soft tissues: Expansile lytic lesion involving the right anterior 4th rib  (series 3, image 48).  Otherwise osseous structures and soft tissues are  unremarkable.    IMPRESSION:  1. Single expansile lytic right 4th rib lesion of indeterminate etiology.  2. Dilated main pulmonary artery suggesting pulmonary hypertension.  3. Bilateral upper lobe scarring likely related to prior infectious process.  Focal wall    R 4th rib bx 10/25/18    MICROSCOPIC " EXAMINATION:  Performed.  Histologic sessions reveal the  presence of microscopic foci of metastatic carcinoma that is by morphology  and immunohistochemistry consistent with a squamous cell carcinoma.  This  carcinoma could have originated in an occult mucosal site in the upper  respiratory tract or from the parotid gland; however, other sites cannot be  excluded.  Clinical and radiologic correlation is recommended.  Immunoperoxidase stains performed on block A-1 show the tumor cells are  positive for keratin AE1/AE3 and  while negative for CDX2, CK7, CK20,  CD20, PAX8, GATA3, TTF-1, ER, and MT.  Immunoperoxidase stains performed at  [facility] reveal that the tumor cells are positive for p40 and negative  for hepatocyte, SOX10 and SF1.  This immunoprofile supports the above  Diagnosis.    XR R femur 10/24/18    FINDINGS:  Mildly displaced fracture through the distal 3rd femur diaphysis extending  through moth-eaten bone lesion in that measures approximately 15 cm in length.  This lesion is likely metastatic deposit given patient's age and appearance.  Oncologic workup to evaluate for any potential primary suggested.  At some  point, PET-CT may be of benefit to evaluate for any primary malignancy,  consider lung primary.  Multiple myeloma deposit is a consideration.    IMPRESSION:  1. Displaced pathologic fracture of the distal femur diaphysis.[NZ1.2]     Revision History        User Key Date/Time User Provider Type Action    > KS1.1 10/28/2018  8:22 AM Teo Myers MD Physician Sign     NZ1.2 10/27/2018  3:30 PM Lucian Bales MD Resident Sign     NZ1.3 10/27/2018  7:26 AM Lucian Bales MD Resident      NZ1.1 10/27/2018  7:25 AM Lucian Bales MD Resident             Consults by Christiana Baptiste MD at 10/26/2018  1:58 PM     Author:  Christiana Baptiste MD Service:  Endocrinology Author Type:  Physician    Filed:  10/26/2018  3:53 PM Date of Service:  10/26/2018   1:58 PM Creation Time:  10/26/2018  1:58 PM    Status:  Signed :  Christiana Baptiste MD (Physician)     Consult Orders:    1. Endocrine NON-Diabetes Adult IP Consult: Patient to be seen: Routine within 24 hrs; Call back #: [email or web address] [email or web address]; hypercalcemia; Consultant may enter orders: Yes [[id number]] ordered by Susi Gonzalez MD at 10/25/18 2240                Endocrinology Fellow note    Chief complaint:  Mayra is a 63 year old female seen in consultation at the request of .      HISTORY OF PRESENT ILLNESS  Mayra Stokes is a 63 year old female with h/o diastolic heart failure, pulmonary HTN, morbid obesity, hypercalcemia who was referred from OSH for R femoral fracture management. Endocrine was consulted for hypercalcemia management.     Patient has h/o hypercalcemia in 9/2018 from routine labs for R TKA. She was seen by oncology in [facility] for that. The w/u was as below:  9/10/2018 Ca: 13.1, PTH 4 (), 25-OH vitamin D 12.3 (),   9/12/2018: SPEP: no monoclonal, UPEP: elevated total protein, no monoclonal.         : Vitamin A 135 (32.8-78), TSH 0.68 (0.47-5.00)  9/17/2018: Serum light chain high IgE       : Skeletal survey showed R femur lesion, R rib lesion       : Ca 13.6, PTHrP 2.9 (<2)  9/18/2018: CT neck L parotid tail mass 3.2 cm                  : Pamidronate 60 mg  9/20/2018: Ca 12.2. Started Calcitonin 1 spray nasal dialy  9/24/2018: Ca 9.8  9/28/2018: R femur biopsies: normal       : Bone marrow biopsy: normal cellularity  10/8/2018: Ca 13.6. Pamidronate 60 mg   10/11/2018: Ca 12.4  10/16/2018: CT guided R 4th rib biopsy  10/24/2018: Ca 14.8    Unclear etiology of hypercalcemia per heme/onc in [facility].     The patient was doing okay, able to walk normally, until 10/24 that she was in a bathroom and she said her leg gave out and she fell on the floor. She then was sent to the hospital. Found right femoral fracture.  Calcium on admission was 14.8.  He was transferred to  [facility] for orthopedic surgery evaluation.    Overnight she was given IV fluid, calcitonin 600 mg, Lasix.  Her calcium came down to 12.5 on 10/26. Plan was to get a surgery today afternoon.    Patient denies family history of high calcium, parathyroid disease.  She said cancer was running in her father's side, unknown what kind of cancer, but her dad does not have cancer.  Denies over-the-counter supplements, multivitamins, vitamin A.  She denies history of smoking, alcohol drinking, drugs.      REVIEW OF SYSTEMS    10 system ROS otherwise as per the HPI or negative[JS1.1]    Past Medical History  Past Medical History:   Diagnosis Date     Chronic cor pulmonale (H)      Chronic diastolic heart failure (H)      DM type 2 (diabetes mellitus, type 2) (H)     diet controlled     Hypertension      Mass of left side of neck     since ~2003     Morbid obesity (H)      Obstructive sleep apnea[JS1.2]        Medications  Current Facility-Administered Medications   Medication     [Auto Hold] acetaminophen (TYLENOL) tablet 975 mg     [Auto Hold] cefTRIAXone (ROCEPHIN) 1 g vial to attach to  mL bag for ADULTS or NS 50 mL bag for PEDS     [Auto Hold] furosemide (LASIX) injection 20 mg     [Auto Hold] HYDROmorphone (PF) (DILAUDID) injection 0.5 mg     [Auto Hold] lidocaine (LMX4) cream     [Auto Hold] lidocaine 1 % 1 mL     [Auto Hold] melatonin tablet 1 mg     [Auto Hold] metoprolol tartrate (LOPRESSOR) tablet 100 mg     [Auto Hold] naloxone (NARCAN) injection 0.1-0.4 mg     [Auto Hold] ondansetron (ZOFRAN-ODT) ODT tab 4 mg    Or     [Auto Hold] ondansetron (ZOFRAN) injection 4 mg     [Auto Hold] oxyCODONE IR (ROXICODONE) tablet 10-15 mg     [Auto Hold] polyethylene glycol (MIRALAX/GLYCOLAX) Packet 17 g     [Auto Hold] senna-docusate (SENOKOT-S;PERICOLACE) 8.6-50 MG per tablet 1 tablet    Or     [Auto Hold] senna-docusate (SENOKOT-S;PERICOLACE) 8.6-50 MG per tablet 2 tablet     [Auto Hold] sodium chloride (PF)  "0.9% PF flush 3 mL     [Auto Hold] sodium chloride (PF) 0.9% PF flush 3 mL     Facility-Administered Medications Ordered in Other Encounters   Medication     lidocaine 2% injection (MDV)     propofol (DIPRIVAN) injection 10 mg/mL vial     rocuronium (ZEMURON) injection       No current outpatient prescriptions on file.[JS1.3]       Allergies[JS1.1]  No Known Allergies[JS1.3]      Family History  Patient denies family history of high calcium, parathyroid disease.  She said cancer was running in her father's side, unknown what kind of cancer, but her dad does not have cancer.     Social History[JS1.1]  Social History   Substance Use Topics     Smoking status: Not on file     Smokeless tobacco: Not on file     Alcohol use Not on file[JS1.2]       Physical Exam[JS1.1]  /65 (BP Location: Right arm)  Pulse 105  Temp 99.3  F (37.4  C) (Oral)  Resp 17  Ht 1.651 m (5' 5\")  Wt 149.7 kg (330 lb)  SpO2 94%  BMI 54.91 kg/m2  Body mass index is 54.91 kg/(m^2).[JS1.3]  GENERAL : In bed , awake and alert ,in distress due to right leg pain, obese  SKIN: Normal color, normal temperature, texture.  EYES: PERRL, EOMI, No scleral icterus,  No proptosis, conjunctival redness, stare, retraction  MOUTH: Moist, pink; pharynx clear  NECK: No visible masses. No palpable adenopathy, or masses.   THYROID:  Normal  RESP: Lungs clear to auscultation bilaterally  CARDIAC: Regular rate and rhythm, normal S1 S2  ABDOMEN: Normal bowel sounds; soft, nontender, no HSM or masses       NEURO: awake, alert, responds appropriately to questions.  Cranial nerves intact.    EXTREMITIES: No clubbing, cyanosis or edema.    DATA REVIEW  10/25/2108  Ca 13.2  Phos 2.0  Cr 0.87  Alb 2.6    10/26/2018  PTH <7  Ca 12.5    ASSESSMENT/PLAN:   Mayra Stokes is a 63 year old female with h/o diastolic heart failure, pulmonary HTN, morbid obesity, hypercalcemia who was referred from OSH for R femoral fracture management. Endocrine was consulted for hypercalcemia " management.     #Hypercalcemia, non-PTH related  Ca in 12-14.8 range, dx in 9/2018, seen by heme/onc in [facility]. Had extensive w/u which showed low PTH, Low 25-OH vitamin D, low Phos, elevated vitamin A, mildly elevated PTHrP 2.9 (<2), lytic lesion at R femur which was biopsied and the result was normal, and right 4th rib which also biopsied but result pending. Bone marrow biopsy was normal cellular. SPEP,UPEP did not show monoclonal. Light chain showed elevated IgE only with elevated protein.     The w/u so far is most consistent with hypercalcemic of malignancy, non-PTH related due to low PTH appropriate response. Could be PTHrP related, or myeloma? But the so far myeloma w/u has been not impressive. Vitamin A toxicity could cause hypercalcemia, but she denies taking vitamin A, unclear why the level is high.     She was treated with Calcitonin and Pamidronate 60 mg x2 on 9/18, 10/8/2018. He calcium continues to be high, this indicates resistance to bisphosphonate. There are number of case reports and case series of Denosumab for management of hypercalcemia of malignancy, particularly in patient with persistent hypercalcemia despite bisphosphonate.     - Appreciate heme/onc input  - Continue IVF and lasix  - Can try Calcitonin for 24-48 hours  - Consider Denosumab if persistent elevation of calcium despite IVF, lasix and calcitonin  - Check calcium q8hr.  - Pending result: PTHrP, Vitamin D, 1,25 OH vitamin D for complete w/u  - Ortho will send the bone from the OR today to path -- will wait for the result.    Patient was seen and discussed with .[JS1.1]    Ahmet Bonilla[JS1.4] MD  Endocrine fellow, PGY-4  [phone][JS1.1]    --- Addendum----  I saw the patient with endocrine fellow Dr. Bonilla and directly examined patient and discussed. Agree above note and plan.     Hypercalcemia, pathologic fracture- source still no clear but followed by oncology, refractory case to bisphosphonate, and will try  denosumab.       Christiana Baptiste MD  Staff Physician  Endocrinology and Metabolism  [facility]  License: [id number]  Pager: [phone][TA1.1]       Revision History        User Key Date/Time User Provider Type Action    > TA1.1 10/26/2018  3:53 PM Christiana Baptiste MD Physician Sign     JS1.4 10/26/2018  3:13 PM Ahmet Bonilla MD Resident Sign     JS1.2 10/26/2018  2:53 PM Ahmet Bonilla MD Resident      JS1.3 10/26/2018  1:59 PM Ahmet Bonilla MD Resident      JS1.1 10/26/2018  1:58 PM Ahmet Bonilla MD Resident             Consults by Karin Bello MD at 10/25/2018 10:04 PM     Author:  Karin Bello MD Service:  Orthopedics Author Type:  Resident    Filed:  10/26/2018  8:21 AM Date of Service:  10/25/2018 10:04 PM Creation Time:  10/25/2018 10:04 PM    Status:  Attested :  Karin Bello MD (Resident)    Cosigner:  Maximo Wilkinson MD at 10/26/2018  8:45 AM         Consult Orders:    1. Orthopaedic Surgery Adult/Peds IP Consult: Patient to be seen: Routine within 24 hrs; Call back #: [email or web address]; pathologic right femur fx; Consultant may enter orders: Yes [[id number]] ordered by Susi Gonzalez MD at 10/25/18 2211           Attestation signed by Maximo Wilkinson MD at 10/26/2018  8:45 AM (Updated)        Attending MD (Dr. Maximo Wilkinson) Attestation :  This patient was seen and evaluated by me including a history, exam, and interpretation of all imaging and/or lab data.  Either a training physician (resident/fellow), who also saw the patient, or scribe has documented the visit in the attached note.    I am concerned for possible light chain myeloma or other malignancy with paraneoplastic syndrome causing hypercalcemia.    I discussed the risks, benefits, alternatives regarding the proposed surgery, ie biopsy with possible curettage and internal fixation, with the patient who both demonstrated understanding and indicated a desire to proceed with the surgery  as planned.      MD Emil Martinez Family Professor  Oncology and Adult Reconstructive Surgery  Dept Orthopaedic Surgery, [facility]  [phone] office, [phone] pager  [email or web address], Orthopaedic Surgery Consultation    Mayra Stokes MRN# [id number]   Age: 63 year old YOB: 1955     Date of Admission:  10/25/2018    Reason for consult:[YS1.1]  Pathologic right distal femur fracture[YS1.2]       Requesting physician:[YS1.1] Dr. Susi Gonzalez[YS1.2]         Assessment and Plan:   Assessment:[YS1.1]  Pathologic right distal femur fracture in a 63-year-old female with a history of poorly compensated CHF, cor pulmonale, FRANSISCO, morbid obesity, and hypercalcemia of unknown origin with lytic bone lesions[YS1.2]    Plan:[YS1.1]  - Curettage, augmentation, and ORIF right distal femur possibly on[YS1.2] 10/26/18[YS1.3] with Dr. Wilkinson at [place] OR pending medical clearance.   - Nonweightbearing on the right lower extremity in knee immobilizer[YS1.2]  - Consent[YS1.1] will be obtained[YS1.2]   - NPO midnight[YS1.1]  - MRI right femur this morning for pre-operative planning; lytic lesion may extend further proximally into the femoral shaft[YS1.2]  - Hospitalist clearance[YS1.1]: Pending echocardiogram this morning.  She has severe right-sided heart failure and cor pulmonale due to long-standing untreated FRANSISCO.    - Endocrine consult  -[YS1.2] Anesthesia consult[YS1.1]: In addition to her diastolic heart failure, there is concern that the hypercalcemia that may pose a risk intraoperatively for a cardiac event.  Goal is to bring calcium down to 10-11 range preoperatively. Per Endocrine and Hospitalist teams, aggressive IV fluid resuscitation and calcitonin are being administered to acutely reduce her calcium levels.[YS1.2]  - Pre-op labs: CBC, BMP, PT/INR, Type/Screen  - MM labs: Free kappa and lambda light chains urine test, protein timed  urine, creatinine timed urine[YS1.1]  - Hold anticoagulation until post-op    Karin Patricio [facility]  Orthopaedic PGY4  Pager: [phone][YS1.2]            History of Present Illness:   Patient was seen and examined by me. History, PMH, Meds, SH, complete ROS (10 organ systems) and PE reviewed with patient and prior medical records.[YS1.1]      This is a 63-year-old female with a history of chronic diastolic heart failure, pulmonary hypertension, FRANSISCO, morbid obesity, recently diagnosed hypercalcemia, and lytic bone lesions, who presents with a pathologic right distal femur fracture after a ground-level fall on 10/24/2018.  She was transferred from [facility] for operative management.     In regards to her right lower extremity, she initially had pain attributed to osteoarthritis.  However, ever since the biopsy of her right femur lytic lesion on 9/28/2018, she has felt worsening right leg and knee pain, especially with weightbearing.  On 10/23/2018, she stepped out of the shower when suddenly her right leg gave out and she fell over.  Immediate pain and inability to bear weight on her right lower extremity.  Transferred by ambulance to [facility].  X-ray showed a pathologic right femur fracture and she was ultimately transferred to the [facility] for orthopedic evaluation.  Currently, she localizes her pain just proximal to the knee.  Denies pain elsewhere.  Denies any new numbness, tingling, or motor weakness.  No head trauma or loss of consciousness associated with her fall 2 days ago.  Leukocytosis of 20,000 on admission on 10/24/2018; prior to that, she had a normal white cell count of 8 on 9/24/18.  Currently she is afebrile with stable vital signs.  Started on vancomycin and piperacillin-tazobactam at [facility] prior to transfer.  Patient herself denies any fevers, chills, malaise, or other illness symptoms.    Initially, she was diagnosed with hypercalcemia on 9/10/2018  on routine labs during a preoperative visit for right total knee arthroplasty.  Her calcium levels have been stable in the 12-13 range.  No response to hydration,, calcitonin nose spray and pamidronate.  She was hypercalcemic to 13.1, initially thought to be secondary to multiple myeloma or bone metastasis secondary to underlying malignancy.  Workup so far has not been revealing.  After a right rib lesion was found on CT chest, skeletal survey on 9/17/2018 showed only an additional right distal femur lesion and no other bony lesions.  Biopsy of the right femur on 9/28/2018 showed normal bone with thickened trabeculae.  Bone marrow biopsy on 9/28/2018 was not an adequate sample, this not conclusive for any plasma cell proliferative disorder, and showed normal cellularity.  She had a normal peripheral smear on 9/12/2018.  SPEP did not show a monoclonal spike (elevated alpha-1 and alpha-2 globulins, normal beta and gamma)  (9/12/2018).  UPEP showed elevated total protein, no monoclonal spike.  Serum immunoglobulin panel showed a high IgE and normal A, G, M, and D (9/17/2018).  Immunoglobulin total light chain urine study showed <0.9 kappa, <0.7 lambda, both within normal limits (9/12/2018).  Immunoglobulin free light chains showed elevated kappa and lambda free light chains (9/17/18).  Low PTH 4 (9/17/18).  Elevated PTHrP 2.9  (9/17/18).    Of note, left carotid mass has been present for 10-15 years, and it is mobile, nontender, and unchanged in size.  CT neck without contrast on 9/18/2018 showed that the mass arose from the left parotid tail.[YS1.2]          Past Medical History:[YS1.1]   Chronic diastolic heart failure  Pulmonary hypertension  FRANSISCO  Morbid obesity  Hypercalcemia of unknown origin[YS1.2]          Past Surgical History:[YS1.1]   Laparoscopic band surgery  Left total shoulder arthroplasty[YS1.2]          Social History:[YS1.1]   Lives alone.  Non-smoker.  Retired, previously worked in a factory.  Prior  to her right knee pain that flared up a few months ago, she was a community ambulator at baseline with no assistive devices.  Independent with ADLs.[YS1.2]          Family History:   No family history on file.           Medications:     Current Facility-Administered Medications   Medication     acetaminophen (TYLENOL) tablet 650 mg     HYDROmorphone (PF) (DILAUDID) injection 0.3-0.5 mg     lidocaine (LMX4) cream     lidocaine 1 % 1 mL     melatonin tablet 1 mg     naloxone (NARCAN) injection 0.1-0.4 mg     ondansetron (ZOFRAN-ODT) ODT tab 4 mg    Or     ondansetron (ZOFRAN) injection 4 mg     oxyCODONE IR (ROXICODONE) tablet 10-15 mg     polyethylene glycol (MIRALAX/GLYCOLAX) Packet 17 g     senna-docusate (SENOKOT-S;PERICOLACE) 8.6-50 MG per tablet 1 tablet    Or     senna-docusate (SENOKOT-S;PERICOLACE) 8.6-50 MG per tablet 2 tablet     sodium chloride (PF) 0.9% PF flush 3 mL     sodium chloride (PF) 0.9% PF flush 3 mL     sodium chloride 0.9% infusion             Allergies:    Allergies not on file         Review of Systems:   A comprehensive 10 point review of systems (constitutional, ENT, cardiac, peripheral vascular, respiratory, GI, , Musculoskeletal, skin, Neurological) was performed and found to be negative except as described in this note.           Physical Exam:   COMPLETE EXAMINATION:   VITAL SIGNS: /57  Pulse 109  Temp 98.9  F (37.2  C) (Oral)  Resp 15  SpO2 96%  RESP:[YS1.1] Non labored breathing[YS1.2]  ABD:[YS1.1] Benign[YS1.2]  SKIN:[YS1.1] Grossly normal[YS1.2]   LYMPHATIC:[YS1.1]  Grossly normal[YS1.2]  NEURO:[YS1.1]  Grossly normal[YS1.2]   VASCULAR:[YS1.1] Satisfactory perfusion of all extremities[YS1.2]  MUSCULOSKELETAL:[YS1.1] Focused examination of right lower extremity shows diffuse swelling and palpable crepitus at the distal femur.  No gross deformity.  Tender to palpation at the fracture site.  Minimally tender at the right hip, groin, upper thigh, lower leg, ankle, and foot.   Skin is closed and intact.  Fires EHL, FHL, tibialis interior, and gastrocnemius muscles with 5/5 strength.  Sensation intact to light touch in SP, DP, sural, saphenous, tibial nerve distributions.  2+ DP and PT pulses.  Feet are warm and well-perfused.[YS1.2]          Data:   All pertinent laboratory data reviewed  All imaging studies reviewed by me.[YS1.1]    IMAGING: Right femur x-rays show an oblique fracture through a permeative, lytic lesion in the right distal femur.  The permeative lesion extends proximal to the fracture into the distal third of the femoral shaft.  No other fractures or dislocations are noted.  Difficult to visualize any other lytic lesions in the femur due to patient's soft tissue shadow.[YS1.2]    Recent Labs   Lab Test  10/25/18   2113   HGB  10.6*   WBC  17.0*     No results for input(s): FTYP, FNEU, FOTH, FCOL, FAPR, FWBC in the last 69902 hours.    Signed:    This consultation[YS1.1] has been[YS1.2] discussed with [YS1.1] Minh[YS1.2], Attending Physician.    Karin Bello[YS1.1]       Revision History        User Key Date/Time User Provider Type Action    > YS1.3 10/26/2018  8:21 AM Karin Bello MD Resident Sign     YS1.2 10/26/2018  7:20 AM Karin Bello MD Resident      YS1.1 10/26/2018  6:00 AM Karin Bello MD Resident Share                     Progress Notes - Physician (Notes for yesterday and today)      Progress Notes by Silvia Toribio MD at 11/7/2018  7:27 PM     Author:  Silvia Toribio MD Service:  General Medicine Author Type:  Physician    Filed:  11/7/2018  7:33 PM Date of Service:  11/7/2018  7:27 PM Creation Time:  11/7/2018  7:27 PM    Status:  Attested :  Silvia Toribio MD (Physician)    Cosigner:  Nelly Mcgovern MD at 11/8/2018  5:21 AM        Attestation signed by Nelly Mcgovern MD at 11/8/2018  5:21 AM        Attestation:  Physician Attestation   I, Nelly Mcgovern, saw this patient  with the resident and agree with the resident/fellow's findings and plan of care as documented in the note.      I personally reviewed vital signs, medications, labs and imaging.    Key findings: 63 year old woman presenting with hypercalcemia, pathologic femur fracture, with new diagnosis of metastatic squamous cell carcinoma of as-yet undetermined primary. Awaiting placement in TCU. Patient prefers to follow-up closer to [facility], will need follow-up with oncology and radiation oncology, and with PET scan, urgently (within one week) upon discharged.     Nelly Mcgovern MD  Date of Service (when I saw the patient): 11/7/18                               [facility] [place]    Internal Medicine Progress Note - Damaris 4     Changes today:  - Follow up:    - Ortho- in 2 weeks after discharge with Dr. White    - Gyn onc- in 3-4 weeks in [place] (or with a gynecologist in [facility]) for endometrial biopsy   - Will need to establish with an oncologist in [facility]   - Will need dental follow up before next dose of denosumab   - Will need to establish with radiation oncology in [facility]   - Will need to have colorectal evaluation for possible source of SCC  - Renal panel, magnesium levels in AM  - Increase neutraphos to 2 packets TID today  - TCU referrals made by  today     Assessment & Plan   63F w/ FRANSISCO, HFpEF, HTN, morbid obesity, HLD, and recent dx of hypercalcemia (9/2018) w/ extensive OSH w/u now transferred for evaluation of pathologic right distal femur fracture, s/p right distal femur biopsy, tumor curettage/excision and ORIF on 10/26/18, admitted for management of hypercalcemia that is now within normal.  Workup of primary tumors ongoing.  Currently working on finding TCU placement so that patient can work on rehab and be closer to family.     # Hypercalcemia  # Metastatic squamous cell carcinoma of unknown primary  -Tumor from right  femur shows metastatic squamous cell, likely lung or  primary  -Gynecology/oncology consulted, unable to get bedside pelvic exam, signed off 11/1   - Follow up in clinic in 3-4 weeks for pelvic exam and endometrial biopsy  - Daily renal panel, magnesium  - Strict intake and output  - Follow up on PTHrp (pending) drawn on 10/26  - Appreciate orthopedics, endocrinology and oncology consultation recommendations   - Will need ortho follow up with Dr. White in 2 weeks after discharge   - Follow up with medical oncology and radiation oncology within 1 week of discharge from [facility]   - Consider exome sequencing with lung cancer panel as an outpatient   - Will need dental evaluation prior to next dose of denosumab     Pathologic Right distal femur fracture s/p distal femur bx, tumor curettage and excision, ORIF: Patient suffered mechanical fall 10/24/18. Presented to OSH ED w/ imaging consistent w/ pathologic fracture of right distal femur. Transferred to [facility] for further orthopedic evaluation. Now s/p ORIF and tumor curettage/excision on 10/26. Intraoperative pathology impression of poorly differentiated metastatic carcinoma.   -PT/OT evaluation and management; needs TCU placement  -Orthopedic consultation; appreciate recommendations and assistance with management.   - Touch down weight bearing     # Hypoxic, hypercarbic respiratory failure, resolved  # Chronic hypercapnea, likely secondary to obesity hypoventilation and is at high risk for FRANSISCO  Declines using BiPap at night, says she has poor sleep with it on.   - Acetaminophen 975mg q8h for pain  - Tramadol 25mg q6h for breakthrough pain     # Hypomagnesemia, Hypophosphatemia:    - Check daily Mg, Phos  - Monitor and replace per protocol  - Start Neutra-Phos today 3 times daily for persistently low phosphorus      # UTI: UA form OSH 10/24/18 w/ > 100 WBC, LE, and many bacteria. Received Zosyn prior to transfer to [facility] 10/25/18. UC notable for  pan-sensitive E. Coli. S/p ceftriaxone x3 days (10/26-10/28).    # HTN: BP stable on admission. PTA enalapril, lopressor.   - Continue lopressor with hold parameters SBP less than 100 or HR less than 60  - Continue PTA enalapril 5mg every day       # HFpEF, hx of Cor Pulmonale:  ECHO 4/2018 w/ EF 60%, normal systolic function, NRWMA, normal RV function, Grade I diastolic dysfunction, mild left atrial enlargement, PA pressure is 7 mmHg plus RAP (normal). Per OSH report- dramatic change in RV size and contractility, no pulmonary HTN compared to previous study 2/2018. ECHO on admission EF 60-65%, normal LV function, mild RV dilation, IVC normal w/ preserved respiratory variation. No pericardial effusion. RAP 3 mmHg. PTA lasix, lopressor.   - Continue metoprolol  - Daily weights  - Volume management as above     Diet: Regular Diet Adult  Room Service  Fluids: none, tolerating oral fluids  Lines: PIV  Bravo Catheter: not present    DVT Prophylaxis: SCDs, enoxaparin 40mg q24h  Code Status: Full Code    Expected discharge: Tomorrow morning at 10AM to TCU    Patient staffed with Dr. Mcgovern.    MD Damaris Snider [address] [place] [address]  Pager: [phone]  Please see sticky note for cross cover information    Interval History   No acute events over night. Nursing notes reviewed. Wants to get near home as soon as possible. Talked to oncology this morning, with renewed urgency about getting follow up. Still having right-sided abdominal pain, unchanged from before and only with movement. Appetite has been good. Denies shortness of breath. Says that PT has really been working her.    Physical Exam   Vital Signs: Temp: 97  F (36.1  C) Temp src: Oral BP: 126/58 Pulse: 114 Heart Rate: 114 Resp: 17 SpO2: 93 % O2 Device: None (Room air)    Weight: 337 lbs 4.86 oz  Constitutional: NAD, lying in bed today   Head: Normocephalic and atraumatic.   Eyes: Conjunctivae are normal. Moist mucus membranes  Cardiovascular: RRR  without murmurs or gallops  Pulmonary/Chest: Difficult exam d/t body habitus. Distant breath sounds, clear to auscultation bilaterally.  Musculoskeletal:  No cyanosis or edema, 2+ radial pulses  Skin: Skin is warm and dry. No rash noted to exposed skin areas.[SL1.1]     Revision History        User Key Date/Time User Provider Type Action    > SL1.1 11/7/2018  7:33 PM Silvia Toribio MD Physician Sign            Progress Notes by Silvia Toribio MD at 11/6/2018  5:00 PM     Author:  Silvia Toribio MD Service:  General Medicine Author Type:  Physician    Filed:  11/6/2018  5:43 PM Date of Service:  11/6/2018  5:00 PM Creation Time:  11/6/2018  5:00 PM    Status:  Attested :  Silvia Toribio MD (Physician)    Cosigner:  Nelly Mcgovern MD at 11/7/2018  8:17 AM        Attestation signed by Nelly Mcgovern MD at 11/7/2018  8:17 AM        Attestation:  Physician Attestation   I, Nelly Mcgovern, saw this patient with the resident and agree with the resident/fellow's findings and plan of care as documented in the note.      I personally reviewed vital signs, medications, labs and imaging.    Key findings: 63 year old woman with recent diagnosis of metastatic squamous cell carcinoma in the setting of hypercalcemia and pathological femur fracture. Stable for discharge pending placement in TCU.     Nelly Mcgovern MD  Date of Service (when I saw the patient): 11/6/18                               [facility], [place]    Internal Medicine Progress Note - Damaris 4     Changes today:  - Follow up:    - Ortho- in 2 weeks after discharge with Dr. White    - Gyn onc- in 3-4 weeks in [place] (or with a gynecologist in [facility]) for endometrial biopsy   - Will need to establish with an oncologist in [facility], consider exome sequencing with lung cancer panel as an outpatient   - Renal panel, magnesium levels in AM  - Start Neutra-Phos 3 times daily  today  - TCU referrals made by  today     Assessment & Plan   63F w/ FRANSISCO, HFpEF, HTN, morbid obesity, HLD, and recent dx of hypercalcemia (9/2018) w/ extensive OSH w/u now transferred for evaluation of pathologic right distal femur fracture, s/p right distal femur biopsy, tumor curettage/excision and ORIF on 10/26/18, admitted for management of hypercalcemia that is now within normal.  Workup of primary tumors ongoing.  Currently working on finding TCU placement so that patient can work on rehab and be closer to family.     # Hypercalcemia  # Metastatic squamous cell carcinoma of unknown primary  -Tumor from right femur shows metastatic squamous cell, likely lung or  primary   -Gynecology/oncology consulted, unable to get bedside pelvic exam, signed off 11/1    - Follow up in clinic in 3-4 weeks for pelvic exam and endometrial biopsy   - Pulm consulted, appreciate recs  - Monitor calcium daily  - Daily renal panel, magnesium  - Strict intake and output  - Follow up on PTHrp (pending) drawn on 10/26  - Appreciate orthopedics, endocrinology and oncology consultation recommendations   - Will need ortho follow up with Dr. White in 2 weeks after discharge   - PET scan to be done in network      Pathologic Right distal femur fracture s/p distal femur bx, tumor curettage and excision, ORIF: Patient suffered mechanical fall 10/24/18. Presented to OSH ED w/ imaging consistent w/ pathologic fracture of right distal femur. Transferred to [facility] for further orthopedic evaluation. Now s/p ORIF and tumor curettage/excision on 10/26. Intraoperative pathology impression of poorly differentiated metastatic carcinoma.   -PT/OT evaluation and management; needs TCU placement  -Orthopedic consultation; appreciate recommendations and assistance with management.   - Touch down weight bearing     # Hypoxic, hypercarbic respiratory failure, resolved  # Chronic hypercapnea, likely secondary to obesity hypoventilation  and is at high risk for FRANSISCO  Declines using BiPap at night, says she has poor sleep with it on.   - Acetaminophen 975mg q8h for pain  - Tramadol 25mg q6h for breakthrough pain     # Hypomagnesemia, Hypophosphatemia:    - Check daily Mg, Phos  - Monitor and replace per protocol  -Start Neutra-Phos today 3 times daily for persistently low phosphorus      # UTI: UA form OSH 10/24/18 w/ > 100 WBC, LE, and many bacteria. Received Zosyn prior to transfer to [facility] 10/25/18. UC notable for pan-sensitive E. Coli. S/p ceftriaxone x3 days (10/26-10/28).    # HTN: BP stable on admission. PTA enalapril, lopressor.   - Continue lopressor with hold parameters SBP less than 100 or HR less than 60  - Continue PTA enalapril 5mg every day       # HFpEF, hx of Cor Pulmonale:  ECHO 4/2018 w/ EF 60%, normal systolic function, NRWMA, normal RV function, Grade I diastolic dysfunction, mild left atrial enlargement, PA pressure is 7 mmHg plus RAP (normal). Per OSH report- dramatic change in RV size and contractility, no pulmonary HTN compared to previous study 2/2018. ECHO on admission EF 60-65%, normal LV function, mild RV dilation, IVC normal w/ preserved respiratory variation. No pericardial effusion. RAP 3 mmHg. PTA lasix, lopressor.   - Continue metoprolol  - Daily weights  - Volume management as above     Diet: Regular Diet Adult  Fluids: none, tolerating oral fluids  Lines: PIV  Bravo Catheter: not present    DVT Prophylaxis: SCDs, enoxaparin 40mg q24h  Code Status: Full Code    Expected discharge: Referrals made to TCU's near the patient's home.  Likely 2-3 days while we arrange  for receiving facility.    Patient staffed with Dr. Mcgovern.    MD Damaris Snider 4  [facility]  Pager: [phone]  Please see sticky note for cross cover information    Interval History   No acute events over night. Nursing notes reviewed.  Had a BM this morning,  as well as some bowel urgency right after she eats.  Has  frequent urination, was on the bedpan several times today when I tried to go see her.  Denies pain, continues to work with physical and Occupational Therapy.  Agreeable to discharge to TCU, continues to want to be closer to family and her support system[SL1.1]. Is excited to get out and start decorating for the holidays.[SL1.2]     Physical Exam   Vital Signs: Temp: 97.7  F (36.5  C) Temp src: Oral BP: 145/79 Pulse: 90 Heart Rate: 96 Resp: 18 SpO2: 98 % O2 Device: None (Room air)    Weight: 321 lbs 13.95 oz  Constitutional: NAD, lying in bed today   Head: Normocephalic and atraumatic.   Eyes: Conjunctivae are normal. Moist mucus membranes  Cardiovascular: RRR without murmurs or gallops  Pulmonary/Chest: Difficult exam d/t body habitus. Distant breath sounds, clear to auscultation bilaterally.  Musculoskeletal:  No cyanosis or edema, 2+ DP pulses bilaterally.  Skin: Skin is warm and dry. No rash noted to exposed skin areas.[SL1.1]     Revision History        User Key Date/Time User Provider Type Action    > SL1.2 11/6/2018  5:43 PM Silvia Toribio MD Physician Sign     SL1.1 11/6/2018  5:00 PM Silvia Toribio MD Physician                   Procedure Notes     No notes of this type exist for this encounter.      Progress Notes - Therapies (Notes from 11/05/18 through 11/08/18)     No notes of this type exist for this encounter.                                          INTERAGENCY TRANSFER FORM - LAB / IMAGING / EKG / EMG RESULTS   10/25/2018                       [facility] [facility]: [phone]            Unresulted Labs (24h ago through future)    Start       Ordered    11/03/18 0600  Platelet count  EVERY 72 HOURS,   Timed     Comments:  If no result is listed, this lab has not been done the past 365 days. LATEST LAB RESULT: Platelet Count (10e9/L)       Date                     Value                 10/31/2018               197              ----------    11/02/18 0938         Lab Results - 3 Days      Renal  panel [[id number]] (Abnormal)  Resulted: 11/08/18 0655, Result status: Final result    Ordering provider: Nelly Mcgovern MD  11/08/18 0001 Resulting lab: [facility]    Specimen Information    Type Source Collected On   Blood  11/08/18 0547          Components       Value Reference Range Flag Lab   Sodium 138 133 - 144 mmol/L  51   Potassium 4.5 3.4 - 5.3 mmol/L  51   Chloride 106 94 - 109 mmol/L  51   Carbon Dioxide 23 20 - 32 mmol/L  51   Anion Gap 9 3 - 14 mmol/L  51   Glucose 116 70 - 99 mg/dL H 51   Urea Nitrogen 19 7 - 30 mg/dL  51   Creatinine 0.49 0.52 - 1.04 mg/dL L 51   GFR Estimate >90 >60 mL/min/1.7m2  51   Comment:  Non  GFR Calc   GFR Estimate If Black >90 >60 mL/min/1.7m2  51   Comment:  African American GFR Calc   Calcium 8.3 8.5 - 10.1 mg/dL L 51   Phosphorus 2.4 2.5 - 4.5 mg/dL L 51   Albumin 2.6 3.4 - 5.0 g/dL L 51            Magnesium [[id number]]  Resulted: 11/08/18 0655, Result status: Final result    Ordering provider: Nelly Mcgovern MD  11/08/18 0001 Resulting lab: [facility]    Specimen Information    Type Source Collected On   Blood  11/08/18 0547          Components       Value Reference Range Flag Lab   Magnesium 2.0 1.6 - 2.3 mg/dL  51            Renal panel [[id number]] (Abnormal)  Resulted: 11/07/18 0812, Result status: Final result    Ordering provider: Nelly Mcgovern MD  11/07/18 0711 Resulting lab: [facility]    Specimen Information    Type Source Collected On   Blood  11/07/18 0738          Components       Value Reference Range Flag Lab   Sodium 138 133 - 144 mmol/L  51   Potassium 4.7 3.4 - 5.3 mmol/L  51   Chloride 106 94 - 109 mmol/L  51   Carbon Dioxide 23 20 - 32 mmol/L  51   Anion Gap 9 3 - 14 mmol/L  51   Glucose 116 70 - 99 mg/dL H 51   Urea Nitrogen 17 7 - 30 mg/dL  51   Creatinine 0.52 0.52 - 1.04 mg/dL  51   GFR Estimate >90 >60  mL/min/1.7m2  51   Comment:  Non  GFR Calc   GFR Estimate If Black >90 >60 mL/min/1.7m2  51   Comment:  African American GFR Calc   Calcium 8.9 8.5 - 10.1 mg/dL  51   Phosphorus 1.9 2.5 - 4.5 mg/dL L 51   Albumin 2.6 3.4 - 5.0 g/dL L 51            Magnesium [[id number]]  Resulted: 11/07/18 0812, Result status: Final result    Ordering provider: Nelly Mcgovern MD  11/07/18 0711 Resulting lab: [facility]    Specimen Information    Type Source Collected On   Blood  11/07/18 0738          Components       Value Reference Range Flag Lab   Magnesium 2.1 1.6 - 2.3 mg/dL  51            Glucose by meter [[id number]] (Abnormal)  Resulted: 11/06/18 2001, Result status: Final result    Ordering provider: Sotero Berumen MD  11/06/18 1948 Resulting lab: POINT OF CARE TEST, GLUCOSE    Specimen Information    Type Source Collected On     11/06/18 1948          Components       Value Reference Range Flag Lab   Glucose 143 70 - 99 mg/dL H 170            Platelet count [[id number]]  Resulted: 11/06/18 0914, Result status: Final result    Ordering provider: Avani Bell MD  11/06/18 0610 Resulting lab: [facility]    Specimen Information    Type Source Collected On     11/06/18 0841          Components       Value Reference Range Flag Lab   Platelet Count 316 150 - 450 10e9/L  51            Magnesium [[id number]]  Resulted: 11/06/18 0612, Result status: Final result    Ordering provider: Avani Bell MD  11/06/18 0000 Resulting lab: [facility]    Specimen Information    Type Source Collected On   Blood  11/06/18 0545          Components       Value Reference Range Flag Lab   Magnesium 2.1 1.6 - 2.3 mg/dL  51            Renal panel [[id number]] (Abnormal)  Resulted: 11/06/18 0612, Result status: Final result    Ordering provider: Avani Bell MD  11/06/18 0000 Resulting lab:  [facility]    Specimen Information    Type Source Collected On   Blood  11/06/18 0545          Components       Value Reference Range Flag Lab   Sodium 135 133 - 144 mmol/L  51   Potassium 5.0 3.4 - 5.3 mmol/L  51   Chloride 104 94 - 109 mmol/L  51   Carbon Dioxide 25 20 - 32 mmol/L  51   Anion Gap 6 3 - 14 mmol/L  51   Glucose 115 70 - 99 mg/dL H 51   Urea Nitrogen 14 7 - 30 mg/dL  51   Creatinine 0.56 0.52 - 1.04 mg/dL  51   GFR Estimate >90 >60 mL/min/1.7m2  51   Comment:  Non  GFR Calc   GFR Estimate If Black >90 >60 mL/min/1.7m2  51   Comment:  African American GFR Calc   Calcium 9.4 8.5 - 10.1 mg/dL  51   Phosphorus 1.5 2.5 - 4.5 mg/dL L 51   Albumin 2.6 3.4 - 5.0 g/dL L 51            Platelet count [[id number]]  Resulted: 11/06/18 0610, Result status: Final result    Ordering provider: Avani Bell MD  11/06/18 0000 Resulting lab: [facility]    Specimen Information    Type Source Collected On   Blood  11/06/18 0545          Components       Value Reference Range Flag Lab   Platelet Count Canceled, Test credited 150 - 450 10e9/L  51   Comment:         Unsatisfactory specimen - clotted  NOTIFIED JOHNNY LOMBARDI U5B RN AT 0609 11/06/18. LINDA              Procalcitonin [[id number]]  Resulted: 11/05/18 1218, Result status: Final result    Ordering provider: Silvia Toribio MD  11/05/18 0505 Resulting lab: [facility]    Specimen Information    Type Source Collected On     11/05/18 0505          Components       Value Reference Range Flag Lab   Procalcitonin 0.13 ng/ml  226   Comment:         0.05-0.24 ng/ml Low risk of systemic bacterial infection. Local bacterial   infection possible.  Recommendation: Assess other clinical features of   infection. Discourage antibiotics unless strong clinical suspicion for serious   infection.              Renal panel [[id number]] (Abnormal)  Resulted: 11/05/18 0630, Result status:  Final result    Ordering provider: Avani Bell MD  11/05/18 0000 Resulting lab: [facility]    Specimen Information    Type Source Collected On   Blood  11/05/18 0505          Components       Value Reference Range Flag Lab   Sodium 137 133 - 144 mmol/L  51   Potassium 4.7 3.4 - 5.3 mmol/L  51   Chloride 104 94 - 109 mmol/L  51   Carbon Dioxide 27 20 - 32 mmol/L  51   Anion Gap 6 3 - 14 mmol/L  51   Glucose 109 70 - 99 mg/dL H 51   Urea Nitrogen 12 7 - 30 mg/dL  51   Creatinine 0.44 0.52 - 1.04 mg/dL L 51   GFR Estimate >90 >60 mL/min/1.7m2  51   Comment:  Non  GFR Calc   GFR Estimate If Black >90 >60 mL/min/1.7m2  51   Comment:  African American GFR Calc   Calcium 9.8 8.5 - 10.1 mg/dL  51   Phosphorus 1.6 2.5 - 4.5 mg/dL L 51   Albumin 2.5 3.4 - 5.0 g/dL L 51            Magnesium [[id number]]  Resulted: 11/05/18 0630, Result status: Final result    Ordering provider: Avani Bell MD  11/05/18 0000 Resulting lab: [facility]    Specimen Information    Type Source Collected On   Blood  11/05/18 0505          Components       Value Reference Range Flag Lab   Magnesium 2.1 1.6 - 2.3 mg/dL  51            Lactic acid level STAT for sepsis protocol [[id number]]  Resulted: 11/04/18 2309, Result status: Final result    Ordering provider: Avani Bell MD  11/04/18 2017 Resulting lab: [facility]    Specimen Information    Type Source Collected On   Blood  11/04/18 2102          Components       Value Reference Range Flag Lab   Lactate for Sepsis Protocol 0.8 0.7 - 2.0 mmol/L  51            Testing Performed By     Lab - Abbreviation Name Director Address Valid Date Range    51 - Unknown [facility] Unknown [address] [address] 12/31/14 1010 - Present    170 - Unknown POINT OF CARE TEST, GLUCOSE Unknown Unknown 10/31/11 1114 - Present    226 -  Unknown [facility] Unknown [address] [facility] [address] 14 0955 - Present               ECG/EMG Results      ECHO COMPLETE WITH OPTISON [[id number]]  Resulted: 10/26/18 1012, Result status: Edited Result - FINAL    Ordering provider: Susi Cavazos MD  10/26/18 0008 Resulted by: Rohit Pettit MD    Performed: 10/26/18 1034 - 10/26/18 1103 Resulting lab: RADIOLOGY RESULTS    Narrative:       [id number]  [id number]  [id number]  [id number]           [facility]  Echocardiography Laboratory  [address] [place] [address]     Name: BEAU DE  MRN: [id number]  : 1955  Study Date: 10/26/2018 10:12 AM  Age: 63 yrs  Gender: Female  Patient Location: [facility]  Reason For Study: Heart Failure  Ordering Physician: SUSI CAVAZOS  Referring Physician: JOHN LÓPEZ  Performed By: Sanjiv Collier RDCS     BSA: 2.5 m2  Height: 66 in  Weight: 330 lb  BP: 113/65 mmHg  _____________________________________________________________________________  __        Procedure  Complete Portable Echo Adult. Contrast Optison. Technically difficult study.  Optison (NDC #2320-9105-92) given intravenously. Patient was given 6 ml  mixture of 3 ml Optison and 6 ml saline. 3 ml wasted.  _____________________________________________________________________________  __        Interpretation Summary  Technically difficult study.  Global and regional left ventricular function is normal with an EF of 60-65%.  Mild right ventricular dilation is present.  RV base function is normal but free wall is mildly reduced.  No significant valve abnormalities present.  The inferior vena cava was normal in size with preserved respiratory  variability.  Estimated mean right atrial pressure is 3 mmHg (normal).  No pericardial effusion is present.     Previous study not available for comparison.     _____________________________________________________________________________  __         Left Ventricle  Global and regional left ventricular function is normal with an EF of 60-65%.  Left ventricular wall thickness is normal. Left ventricular size is normal.  Diastolic function not assessed due to tachycardia. No regional wall motion  abnormalities are seen.     Right Ventricle  Global right ventricular function is normal. Mild right ventricular dilation  is present.     Atria  The left atrium appears normal. The right atria appears normal. The atrial  septum is intact as assessed by color Doppler .     Mitral Valve  The mitral valve is normal.        Aortic Valve  The aortic valve is tricuspid. Mild aortic valve sclerosis is present. Trace  aortic insufficiency is present.     Tricuspid Valve  Trace tricuspid insufficiency is present.     Pulmonic Valve  The pulmonic valve is normal. Trace pulmonic insufficiency is present.     Vessels  The aorta root is normal. The thoracic aorta is normal. Proximal aorta indexes  to normal for BSA. The pulmonary artery cannot be assessed. The inferior vena  cava was normal in size with preserved respiratory variability. Sinuses of  Valsalva 3.3 cm. Ascending aorta 3.4 cm. Estimated mean right atrial pressure  is 3 mmHg (normal).     Pericardium  No pericardial effusion is present.        Compared to Previous Study  Previous study not available for comparison.     Attestation  I have personally viewed the imaging and agree with the interpretation and  report as documented by the fellow, Alanna Araiza, and/or edited by me.  _____________________________________________________________________________  __     MMode/2D Measurements & Calculations  IVSd: 1.1 cm  LVIDd: 5.2 cm  LVIDs: 3.3 cm  LVPWd: 0.87 cm  FS: 36.6 %  LV mass(C)d: 197.0 grams  LV mass(C)dI: 79.6 grams/m2  Ao root diam: 3.4 cm  asc Aorta Diam: 3.4 cm  LVOT diam: 2.4 cm  LVOT area: 4.6 cm2  LA Volume (BP): 75.0 ml     LA Volume Index (BP): 30.4 ml/m2  RWT: 0.33            _____________________________________________________________________________  __           Report approved by: Carlos MCCORMICK 10/26/2018 12:05 PM       1    Type Source Collected On     10/26/18 1012          View Image (below)        Echocardiogram Complete [[id number]]  Resulted: 10/26/18 1036, Result status: In process    Ordering provider: Susi Gonzalez MD  10/26/18 0008 Performed: 10/26/18 1034 - 10/26/18 1034    Resulting lab: [facility]                   Encounter-Level Documents:     There are no encounter-level documents.      Order-Level Documents:     There are no order-level documents.

## 2018-10-25 NOTE — PROGRESS NOTES
[facility]  Transfer Triage Note    Date of call: 10/25/18  Time of call: 12:26 PM    Reason for Transfer:Procedure can be done here and not at referring hospital  Diagnosis: Hypercalcemia and pathologic femur fracture    Outside Records: Not available  Additional records requested to be faxed to [phone].    Stability of Patient: Patient is vitally stable, with no critical labs, and will likely remain stable throughout the transfer process    Expected Time of Arrival for Transfer: 8-24 hours    Recommendations for Management and Stabilization: Not needed    Additional Comments   Patient with hypercalcemia since Sept 2018, found to have lytic lesions, but 2 biopsies negative (3rd pending), presented with displaced femur fracture and Ca of 14.5.  Admitted to [place], awaiting transfer.  [facility] has beds however [place] is very full.  Images being pushed.  Will page Dr. Wilkinson once available.  Outside provider has my cell with questions.    Lexis Hsu MD    Images pushed and reviewed by Ortho resident.  Spoke w/Dr. Thuy Zuniga.  Plan to admit patient to Medicine on [facility]  Once she arrives:  1. Consult Ortho  2. MRI of femur  3. Bedrest  4. Pain control  5. Treat UTI and f/u sensitivities    Lexis Hsu MD, MSEd  Hospitalist  Professor of Medicine  [facility]  [phone]

## 2018-10-26 NOTE — CONSULTS
Endocrinology Fellow note    Chief complaint:  Mayra is a 63 year old female seen in consultation at the request of .      HISTORY OF PRESENT ILLNESS  Mayra Stokes is a 63 year old female with h/o diastolic heart failure, pulmonary HTN, morbid obesity, hypercalcemia who was referred from OSH for R femoral fracture management. Endocrine was consulted for hypercalcemia management.     Patient has h/o hypercalcemia in 9/2018 from routine labs for R TKA. She was seen by oncology in [facility] for that. The w/u was as below:  9/10/2018 Ca: 13.1, PTH 4 (), 25-OH vitamin D 12.3 (),   9/12/2018: SPEP: no monoclonal, UPEP: elevated total protein, no monoclonal.         : Vitamin A 135 (32.8-78), TSH 0.68 (0.47-5.00)  9/17/2018: Serum light chain high IgE       : Skeletal survey showed R femur lesion, R rib lesion       : Ca 13.6, PTHrP 2.9 (<2)  9/18/2018: CT neck L parotid tail mass 3.2 cm                  : Pamidronate 60 mg  9/20/2018: Ca 12.2. Started Calcitonin 1 spray nasal dialy  9/24/2018: Ca 9.8  9/28/2018: R femur biopsies: normal       : Bone marrow biopsy: normal cellularity  10/8/2018: Ca 13.6. Pamidronate 60 mg   10/11/2018: Ca 12.4  10/16/2018: CT guided R 4th rib biopsy  10/24/2018: Ca 14.8    Unclear etiology of hypercalcemia per heme/onc in [facility].     The patient was doing okay, able to walk normally, until 10/24 that she was in a bathroom and she said her leg gave out and she fell on the floor. She then was sent to the hospital. Found right femoral fracture.  Calcium on admission was 14.8.  He was transferred to [facility] for orthopedic surgery evaluation.    Overnight she was given IV fluid, calcitonin 600 mg, Lasix.  Her calcium came down to 12.5 on 10/26. Plan was to get a surgery today afternoon.    Patient denies family history of high calcium, parathyroid disease.  She said cancer was running in her father's side, unknown what kind of cancer, but her dad does not  have cancer.  Denies over-the-counter supplements, multivitamins, vitamin A.  She denies history of smoking, alcohol drinking, drugs.      REVIEW OF SYSTEMS    10 system ROS otherwise as per the HPI or negative    Past Medical History  Past Medical History:   Diagnosis Date     Chronic cor pulmonale (H)      Chronic diastolic heart failure (H)      DM type 2 (diabetes mellitus, type 2) (H)     diet controlled     Hypertension      Mass of left side of neck     since ~2003     Morbid obesity (H)      Obstructive sleep apnea        Medications  Current Facility-Administered Medications   Medication     [Auto Hold] acetaminophen (TYLENOL) tablet 975 mg     [Auto Hold] cefTRIAXone (ROCEPHIN) 1 g vial to attach to  mL bag for ADULTS or NS 50 mL bag for PEDS     [Auto Hold] furosemide (LASIX) injection 20 mg     [Auto Hold] HYDROmorphone (PF) (DILAUDID) injection 0.5 mg     [Auto Hold] lidocaine (LMX4) cream     [Auto Hold] lidocaine 1 % 1 mL     [Auto Hold] melatonin tablet 1 mg     [Auto Hold] metoprolol tartrate (LOPRESSOR) tablet 100 mg     [Auto Hold] naloxone (NARCAN) injection 0.1-0.4 mg     [Auto Hold] ondansetron (ZOFRAN-ODT) ODT tab 4 mg    Or     [Auto Hold] ondansetron (ZOFRAN) injection 4 mg     [Auto Hold] oxyCODONE IR (ROXICODONE) tablet 10-15 mg     [Auto Hold] polyethylene glycol (MIRALAX/GLYCOLAX) Packet 17 g     [Auto Hold] senna-docusate (SENOKOT-S;PERICOLACE) 8.6-50 MG per tablet 1 tablet    Or     [Auto Hold] senna-docusate (SENOKOT-S;PERICOLACE) 8.6-50 MG per tablet 2 tablet     [Auto Hold] sodium chloride (PF) 0.9% PF flush 3 mL     [Auto Hold] sodium chloride (PF) 0.9% PF flush 3 mL     Facility-Administered Medications Ordered in Other Encounters   Medication     lidocaine 2% injection (MDV)     propofol (DIPRIVAN) injection 10 mg/mL vial     rocuronium (ZEMURON) injection       No current outpatient prescriptions on file.       Allergies  No Known Allergies      Family History  Patient  "denies family history of high calcium, parathyroid disease.  She said cancer was running in her father's side, unknown what kind of cancer, but her dad does not have cancer.     Social History  Social History   Substance Use Topics     Smoking status: Not on file     Smokeless tobacco: Not on file     Alcohol use Not on file       Physical Exam  /65 (BP Location: Right arm)  Pulse 105  Temp 99.3  F (37.4  C) (Oral)  Resp 17  Ht 1.651 m (5' 5\")  Wt 149.7 kg (330 lb)  SpO2 94%  BMI 54.91 kg/m2  Body mass index is 54.91 kg/(m^2).  GENERAL : In bed , awake and alert ,in distress due to right leg pain, obese  SKIN: Normal color, normal temperature, texture.  EYES: PERRL, EOMI, No scleral icterus,  No proptosis, conjunctival redness, stare, retraction  MOUTH: Moist, pink; pharynx clear  NECK: No visible masses. No palpable adenopathy, or masses.   THYROID:  Normal  RESP: Lungs clear to auscultation bilaterally  CARDIAC: Regular rate and rhythm, normal S1 S2  ABDOMEN: Normal bowel sounds; soft, nontender, no HSM or masses       NEURO: awake, alert, responds appropriately to questions.  Cranial nerves intact.    EXTREMITIES: No clubbing, cyanosis or edema.    DATA REVIEW  10/25/2108  Ca 13.2  Phos 2.0  Cr 0.87  Alb 2.6    10/26/2018  PTH <7  Ca 12.5    ASSESSMENT/PLAN:   Mayra Stokes is a 63 year old female with h/o diastolic heart failure, pulmonary HTN, morbid obesity, hypercalcemia who was referred from OSH for R femoral fracture management. Endocrine was consulted for hypercalcemia management.     #Hypercalcemia, non-PTH related  Ca in 12-14.8 range, dx in 9/2018, seen by heme/onc in [facility]. Had extensive w/u which showed low PTH, Low 25-OH vitamin D, low Phos, elevated vitamin A, mildly elevated PTHrP 2.9 (<2), lytic lesion at R femur which was biopsied and the result was normal, and right 4th rib which also biopsied but result pending. Bone marrow biopsy was normal cellular. SPEP,UPEP did not show " monoclonal. Light chain showed elevated IgE only with elevated protein.     The w/u so far is most consistent with hypercalcemic of malignancy, non-PTH related due to low PTH appropriate response. Could be PTHrP related, or myeloma? But the so far myeloma w/u has been not impressive. Vitamin A toxicity could cause hypercalcemia, but she denies taking vitamin A, unclear why the level is high.     She was treated with Calcitonin and Pamidronate 60 mg x2 on 9/18, 10/8/2018. He calcium continues to be high, this indicates resistance to bisphosphonate. There are number of case reports and case series of Denosumab for management of hypercalcemia of malignancy, particularly in patient with persistent hypercalcemia despite bisphosphonate.     - Appreciate heme/onc input  - Continue IVF and lasix  - Can try Calcitonin for 24-48 hours  - Consider Denosumab if persistent elevation of calcium despite IVF, lasix and calcitonin  - Check calcium q8hr.  - Pending result: PTHrP, Vitamin D, 1,25 OH vitamin D for complete w/u  - Ortho will send the bone from the OR today to path -- will wait for the result.    Patient was seen and discussed with .    Ahmet Bonilla MD  Endocrine fellow, PGY-4  [phone]    --- Addendum----  I saw the patient with endocrine fellow Dr. Bonilla and directly examined patient and discussed. Agree above note and plan.     Hypercalcemia, pathologic fracture- source still no clear but followed by oncology, refractory case to bisphosphonate, and will try denosumab.       Christiana Baptiste MD  Staff Physician  Endocrinology and Metabolism  [facility]  License: [id number]  Pager: [phone]

## 2018-10-26 NOTE — CONSULTS
[facility], Orthopaedic Surgery Consultation    Mayra Stokes MRN# [id number]   Age: 63 year old YOB: 1955     Date of Admission:  10/25/2018    Reason for consult:  Pathologic right distal femur fracture       Requesting physician: Dr. Susi Gonzalez         Assessment and Plan:   Assessment:  Pathologic right distal femur fracture in a 63-year-old female with a history of poorly compensated CHF, cor pulmonale, FRANSISCO, morbid obesity, and hypercalcemia of unknown origin with lytic bone lesions    Plan:  - Curettage, augmentation, and ORIF right distal femur possibly on 10/26/18 with Dr. Wilkinson at [place] OR pending medical clearance.   - Nonweightbearing on the right lower extremity in knee immobilizer  - Consent will be obtained   - NPO midnight  - MRI right femur this morning for pre-operative planning; lytic lesion may extend further proximally into the femoral shaft  - Hospitalist clearance: Pending echocardiogram this morning.  She has severe right-sided heart failure and cor pulmonale due to long-standing untreated FRANSISCO.    - Endocrine consult  - Anesthesia consult: In addition to her diastolic heart failure, there is concern that the hypercalcemia that may pose a risk intraoperatively for a cardiac event.  Goal is to bring calcium down to 10-11 range preoperatively. Per Endocrine and Hospitalist teams, aggressive IV fluid resuscitation and calcitonin are being administered to acutely reduce her calcium levels.  - Pre-op labs: CBC, BMP, PT/INR, Type/Screen  - MM labs: Free kappa and lambda light chains urine test, protein timed urine, creatinine timed urine  - Hold anticoagulation until post-op    Karin Patrciio [facility]  Orthopaedic PGY4  Pager: [phone]            History of Present Illness:   Patient was seen and examined by me. History, PMH, Meds, SH, complete ROS (10 organ systems) and PE reviewed with patient and prior medical records.      This is a 63-year-old female with a history of  chronic diastolic heart failure, pulmonary hypertension, FRANSISCO, morbid obesity, recently diagnosed hypercalcemia, and lytic bone lesions, who presents with a pathologic right distal femur fracture after a ground-level fall on 10/24/2018.  She was transferred from [facility] for operative management.     In regards to her right lower extremity, she initially had pain attributed to osteoarthritis.  However, ever since the biopsy of her right femur lytic lesion on 9/28/2018, she has felt worsening right leg and knee pain, especially with weightbearing.  On 10/23/2018, she stepped out of the shower when suddenly her right leg gave out and she fell over.  Immediate pain and inability to bear weight on her right lower extremity.  Transferred by ambulance to [facility].  X-ray showed a pathologic right femur fracture and she was ultimately transferred to the [facility] for orthopedic evaluation.  Currently, she localizes her pain just proximal to the knee.  Denies pain elsewhere.  Denies any new numbness, tingling, or motor weakness.  No head trauma or loss of consciousness associated with her fall 2 days ago.  Leukocytosis of 20,000 on admission on 10/24/2018; prior to that, she had a normal white cell count of 8 on 9/24/18.  Currently she is afebrile with stable vital signs.  Started on vancomycin and piperacillin-tazobactam at [facility] prior to transfer.  Patient herself denies any fevers, chills, malaise, or other illness symptoms.    Initially, she was diagnosed with hypercalcemia on 9/10/2018 on routine labs during a preoperative visit for right total knee arthroplasty.  Her calcium levels have been stable in the 12-13 range.  No response to hydration,, calcitonin nose spray and pamidronate.  She was hypercalcemic to 13.1, initially thought to be secondary to multiple myeloma or bone metastasis secondary to underlying malignancy.  Workup so far has not been revealing.  After a right  rib lesion was found on CT chest, skeletal survey on 9/17/2018 showed only an additional right distal femur lesion and no other bony lesions.  Biopsy of the right femur on 9/28/2018 showed normal bone with thickened trabeculae.  Bone marrow biopsy on 9/28/2018 was not an adequate sample, this not conclusive for any plasma cell proliferative disorder, and showed normal cellularity.  She had a normal peripheral smear on 9/12/2018.  SPEP did not show a monoclonal spike (elevated alpha-1 and alpha-2 globulins, normal beta and gamma)  (9/12/2018).  UPEP showed elevated total protein, no monoclonal spike.  Serum immunoglobulin panel showed a high IgE and normal A, G, M, and D (9/17/2018).  Immunoglobulin total light chain urine study showed <0.9 kappa, <0.7 lambda, both within normal limits (9/12/2018).  Immunoglobulin free light chains showed elevated kappa and lambda free light chains (9/17/18).  Low PTH 4 (9/17/18).  Elevated PTHrP 2.9  (9/17/18).    Of note, left carotid mass has been present for 10-15 years, and it is mobile, nontender, and unchanged in size.  CT neck without contrast on 9/18/2018 showed that the mass arose from the left parotid tail.          Past Medical History:   Chronic diastolic heart failure  Pulmonary hypertension  FRANSISCO  Morbid obesity  Hypercalcemia of unknown origin          Past Surgical History:   Laparoscopic band surgery  Left total shoulder arthroplasty          Social History:   Lives alone.  Non-smoker.  Retired, previously worked in a factory.  Prior to her right knee pain that flared up a few months ago, she was a community ambulator at baseline with no assistive devices.  Independent with ADLs.          Family History:   No family history on file.           Medications:     Current Facility-Administered Medications   Medication     acetaminophen (TYLENOL) tablet 650 mg     HYDROmorphone (PF) (DILAUDID) injection 0.3-0.5 mg     lidocaine (LMX4) cream     lidocaine 1 % 1 mL      melatonin tablet 1 mg     naloxone (NARCAN) injection 0.1-0.4 mg     ondansetron (ZOFRAN-ODT) ODT tab 4 mg    Or     ondansetron (ZOFRAN) injection 4 mg     oxyCODONE IR (ROXICODONE) tablet 10-15 mg     polyethylene glycol (MIRALAX/GLYCOLAX) Packet 17 g     senna-docusate (SENOKOT-S;PERICOLACE) 8.6-50 MG per tablet 1 tablet    Or     senna-docusate (SENOKOT-S;PERICOLACE) 8.6-50 MG per tablet 2 tablet     sodium chloride (PF) 0.9% PF flush 3 mL     sodium chloride (PF) 0.9% PF flush 3 mL     sodium chloride 0.9% infusion             Allergies:    Allergies not on file         Review of Systems:   A comprehensive 10 point review of systems (constitutional, ENT, cardiac, peripheral vascular, respiratory, GI, , Musculoskeletal, skin, Neurological) was performed and found to be negative except as described in this note.           Physical Exam:   COMPLETE EXAMINATION:   VITAL SIGNS: /57  Pulse 109  Temp 98.9  F (37.2  C) (Oral)  Resp 15  SpO2 96%  RESP: Non labored breathing  ABD: Benign  SKIN: Grossly normal   LYMPHATIC:  Grossly normal  NEURO:  Grossly normal   VASCULAR: Satisfactory perfusion of all extremities  MUSCULOSKELETAL: Focused examination of right lower extremity shows diffuse swelling and palpable crepitus at the distal femur.  No gross deformity.  Tender to palpation at the fracture site.  Minimally tender at the right hip, groin, upper thigh, lower leg, ankle, and foot.  Skin is closed and intact.  Fires EHL, FHL, tibialis interior, and gastrocnemius muscles with 5/5 strength.  Sensation intact to light touch in SP, DP, sural, saphenous, tibial nerve distributions.  2+ DP and PT pulses.  Feet are warm and well-perfused.          Data:   All pertinent laboratory data reviewed  All imaging studies reviewed by me.    IMAGING: Right femur x-rays show an oblique fracture through a permeative, lytic lesion in the right distal femur.  The permeative lesion extends proximal to the fracture into the  distal third of the femoral shaft.  No other fractures or dislocations are noted.  Difficult to visualize any other lytic lesions in the femur due to patient's soft tissue shadow.    Recent Labs   Lab Test  10/25/18   2113   HGB  10.6*   WBC  17.0*     No results for input(s): FTYP, FNEU, FOTH, FCOL, FAPR, FWBC in the last 05399 hours.    Signed:    This consultation has been discussed with Dr. Wilkinson, Attending Physician.    Karin Bello

## 2018-10-26 NOTE — OP NOTE
Date of operation: October 26, 2018    Preoperative diagnosis:   1. Right femur pathologic fracture  2. Super morbid obesity, Body mass index is 51.73 kg/(m^2).   3. Type 2 DM  4. Pulmonary hypertension with Cor Pulm  5. 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 Right femur biopsy  2. Right femur tumor excision and curettage  3. Right femur open reduction internal fixation       Attending surgeon: Maximo Wilkinson MD    Fellow surgeon: Anthony Sun MD    Resident surgeon: Mary Damon MD    Anesthesia: General endotracheal    Estimated blood loss: 300 ml    Drains: Quintin-Sears x1    Complications: None    Specimen: Right femur tumor biopsy for intra-operative fresh frozen evaluation.  Right femur tumor for permanent surgical pathology.    Implants: Synthes VA LCP lateral distal femoral plate, 14-hole, with associated locking 5.0 mm and nonlocking 4.5 mm screws    22-Modifier: A 22 modifier should be applied to this case given the complexity which included need for intraoperative review of pathology by Dr. Wilkinson to make further decisions regarding the operative management of the patient's pathologic fracture.  Furthermore, her medical complexity, see pre-op diagnoses, warranted greater pre and post-op care and her obesity increased the level of difficulty performing her surgery.    Indications for procedure: The patient is a 63-year-old female who developed progressively increasing right thigh pain over the past month.  Unfortunately, on October 23, she took a step when her leg gave out on her at which point she sustained a right distal femoral shaft fracture.  She was found to have a femoral lesion felt to be representative of malignancy.  She was transferred to the [facility] for further management.  After discussion of the risks, benefits and alternatives she  elected to undergo the aforementioned procedure.    Description of procedure: The patient was met in the preoperative holding area where with her participation in the right lower extremity was correctly identified as the operative site.  Informed consent was was obtained and reviewed by the staff surgeon.  She was taken to the operating room with our anesthesia colleagues where she was placed supine on the operating room table.  All bony prominences were well-padded.  A bump was placed underneath her right buttock to keep the leg internally rotated.  General endotracheal anesthesia was induced without complication.  Antibiotics were administered prior to the start of the procedure.  The right lower extremity was then prepped and draped in the usual sterile fashion.    In accordance with hospital policy, a timeout was held prior to the start of the procedure correctly identifying the patient, procedure, laterality and allergies.  All present in the room were in agreement.  The right lower extremity was then elevated for exsanguination and the tourniquet was insufflated to 300 mmHg.  The first part of the operation included the right femur biopsy for immediate intraoperative fresh frozen pathology.  Utilizing intraoperative x-ray the fracture and associated pathologic lesion was identified and approximately 3 cm longitudinal incision was made on the lateral aspect of the thigh.  Soft tissue dissection was carried out with electrocautery with hemostasis maintained throughout the dissection.  The iliotibial band was encountered and also sharply incised using a #10 scalpel.  It was at this point that the fracture site was encountered and utilizing a pituitary a sample of the femoral lesion was obtained and sent for fresh frozen evaluation with our pathologist.  Dr. Wilkinson accompanied the pathology for review which was consistent with metastatic carcinoma.  Based on this review of the intraoperative pathology, the decision  was then made to proceed with tumor excision and curettage followed by open reduction internal fixation of a pathologic fracture.      There was no intraoperative evidence of any septic process present.    Her previous surgical incision was then extended for a lateral approach to the distal femur.  The skin was incised with a #10 scalpel.  Soft tissue was dissected down to the iliotibial band using electrocautery with hemostasis maintained throughout the dissection.  Iliotibial band was then exposed and excised using electrocautery in line with our skin incision.  The vastus lateralis was then encountered.  This was bluntly incised along the posterior aspect utilizing a combination of a Kaba elevator as well as electrocautery.  Care was taken to identify any perforating branches which were subsequently cauterized or tied with silk suture.  It was at this point that the distal femoral shaft fracture was completely exposed.  The tumor was then excised using a combination of curettes, rongeurs and pituitary.  Both the proximal femoral shaft as well as the distal femur were completely cleared of tumor.  Based on preoperative measurements off of the x-ray as well as MRI indicating how proximal the tumor extended, we continued our excision until we are at this level within the femoral shaft.  The femoral canals were thoroughly irrigated with sterile normal saline and suctioned to confirm that the tumor was completely excised.    The femoral shaft was then reduced using a combination of traction and internal rotation.  A clamp was placed across the fracture site to maintain the reduction.  A lateral 14 hole distal femoral plate was then selected for the appropriate length.  This was positioned along the lateral aspect of the femur with care taken to remain on the bone to the more proximal aspect where screws will be placed using a minimally invasive technique.  With appropriate positioning the plate was slid underneath the  reduction clamp and adjustments were made until proper positioning of the plate as well as the fracture reduction were achieved.  The reduction in her plate positioning were confirmed using intraoperative fluoroscopy.  A nonlocking screw was then placed in the distal aspect of the plate within the femoral condyles.  An additional nonlocking screw was then placed in the proximal femoral shaft which was proximal to the fracture site to secure the plate to the bone.  The remaining distal femoral screw holes were then filled with locking screws all while utilizing intraoperative fluoroscopy to confirm that these were not breaching the medial cortex and not within the intercondylar notch.  Additional locking screws were added to the proximal aspect of the femoral shaft using the plate aiming arm and minimally invasive technique.  Using the aiming arm the trocar was inserted along the femoral shaft and engaged into the plate.  Bicortical locking screws were then placed of appropriate length.  A final nonlocking screw was inserted just distal to the fracture site to aid in further anatomic reduction and compression.      It should be noted that during final hardware placement the tourniquet was let down at 120 minutes.  Hemostasis was achieved after this was let down.    Final intraoperative fluoroscopic images were then taken to confirm reduction and hardware placement.  The wounds were then thoroughly irrigated with sterile normal saline.  A subfascial drain was then placed through the distal thigh incision.  The proximal stab incisions were then closed with interrupted 2-0 Vicryl in a running 3-0 subcuticular PDS.  The distal femoral incision was then closed with interrupted Ethibond and the iliotibial band followed by interrupted 0 Vicryl in the subcutaneous fat.  The subcutaneous skin was then closed with interrupted 2-0 Vicryl in a running 3-0 subcuticular PDS.  Sterile dressings of an Aquacel were then  placed.    The patient was then awoken from general anesthesia and taken to the postoperative recovery room in stable condition.  All counts were correct at the end of the case.    Dr. Wilkinson was scrubbed or immediately available for all critical portions of the procedure.    Postoperative plan: The patient will be transferred to the [facility] for further management given her complex comorbidities.  She will be on the internal medicine service for further evaluation and management.  She will be nonweightbearing with the right lower extremity.  She will be knee range of motion as tolerated.  She will complete 4 weeks of aspirin 325 mg twice daily for DVT prophylaxis.  She will complete 24 hours of perioperative antibiotics.  Surgical pathology will be followed up on.    Mary Damon MD    Attending MD (Dr. Maximo Wilkinson) Attestation:  I was present during the key portions of the procedure and I was immediately available for the entire procedure between opening and closing.    Maximo Wilkinson MD  [facility] Professor  Oncology and Adult Reconstructive Surgery  Dept Orthopaedic Surgery, formerly [facility]

## 2018-10-26 NOTE — PROGRESS NOTES
Pt transferred to OR for surgery. Pt belonging w/pt in plastic bag (cell phone, glasses, IS, socks, lotion). Pt's friend Magui notified per pt and also informed that pt would likely transfer to [facility] after surgery.

## 2018-10-26 NOTE — TELEPHONE ENCOUNTER
SURGERY PLAN (PRE-OP PLAN)    Patient Position (indicated by x):    Supine   x  Supine with torso rolled up on a bump   x  Split drape with top bar     Revision LAURA drape with plastic side bags for leg     Extremity drape     Shoulder pack drape     Laparotomy drape   x  Bravo catheter          General Equipment Requests (indicated by x):    x  C-Arm with C-Armor drape     C-Arm (video capable, GE 9900 model)     O-Arm with Stealth imaging     Fracture Table   x  Regular OR Table   x  Radiolucent triangle     Cell saver     Minh Biopsy trephine set w/ K-wire & pituitary rongeurs   x  Small pituitary rongeur   x  Minh's angled curettes, narrow shaft     Bone graft, kapner gouges     Midas Corbin Medtronic chip, electric motor     Phenol 5%     Pruden BMAC stem cell   x  Simplex P cement, 2 packs    Cement gun    (Back-up)   x  Zometa 4 mg, 2 vials    (Back-up, don't open)     Trauma/Fixation Requests (indicated by x):  x  Large black handle bone hook   x  Small bone hook     Locking periarticular plates - AO   x  Beth retrograde femoral nail system     Pelvic recon plates (curved & straight), 3.5 & 4.5 mm     Giana zaire-prosthetic fracture plate     DHS hip screw     Winthrop Gamma nail     AO, DCS 95 degree plate/screw     AO, 95 degree condylar blade plate     AO, 3.0, 3.5, 4.0, 4.5 mm Cannulated screws     AO, 6.5, 7.0, 7.3 mm Cannulated screws- Stainless Steel     AO, 6.5 mm Cannulated screws- Titanium     Giana cerclage cable plates     AO IM nails     AO Large Ex Fix     AO Small Ex Fix     Large pelvic bone clamps     Large fx reduction forcepts - AO     Bone clamps - Large (Quintin, James, Jose)     Bone clamps - Medium (Quintin)     Specimens and cultures (indicated by x):   x  Tissue cultures, aerobic and anaerobic without gram stain   x  Frozen section   x  pathology specimens - fresh     pathology specimens - formalin     Plan:  1. Open biopsy right femur lesion  2. Excision and curettage right  femur lesion  3. Insertion retrograde intra-medullary nail right femur    Anthony Sun MD  Orthopaedic Surgery, Adult Reconstruction Fellow  Pager [phone]

## 2018-10-26 NOTE — ANESTHESIA POSTPROCEDURE EVALUATION
Anesthesia POST Procedure Evaluation    Patient: Mayra Stokes   MRN:     [id number] Gender:   female   Age:    63 year old :      1955        Preoperative Diagnosis: Pathological Femur Fracture   Procedure(s):  BIOPSY RIGHT FEMUR AND EXCISION OF TUMOR  OPEN REDUCTION INTERNAL FIXATION RIGHT DISTAL FEMUR   Postop Comments: No value filed.       Anesthesia Type:  General    Reportable Event: NO     PAIN: Uncomplicated   Sign Out status: Comfortable, Well controlled pain     PONV: No PONV   Sign Out status:  No Nausea or Vomiting     Neuro/Psych: Uneventful perioperative course   Sign Out Status: Preoperative baseline; Age appropriate mentation     Airway/Resp.: Uneventful perioperative course   Sign Out Status: Non labored breathing, age appropriate RR; Resp. Status within EXPECTED Parameters     CV: Uneventful perioperative course   Sign Out status: Appropriate BP and perfusion indices; Appropriate HR/Rhythm     Disposition:   Sign Out in:  PACU  Disposition:  Floor  Recovery Course: Uneventful  Follow-Up: Not required           Last Anesthesia Record Vitals:  CRNA VITALS  10/26/2018 1739 - 10/26/2018 1821      10/26/2018             Ht Rate: 133    Resp Rate (observed): (!)  6          Last PACU/Preop Vitals:  Vitals:    10/26/18 0447 10/26/18 0500 10/26/18 0824   BP:   113/65   Pulse:      Resp: 17 17 17   Temp:   37.4  C (99.3  F)   SpO2: 95% 93% 94%         Electronically Signed By: Dereje Crane MD, MD, 2018, 6:21 PM

## 2018-10-26 NOTE — PLAN OF CARE
Problem: Patient Care Overview  Goal: Plan of Care/Patient Progress Review  Outcome: Declining        VS:   VSS except tachy (110-120). On tele (Sinus tachy). LS clear. No CP/SOB. O2 sats above 94% on RA.    Output:   Output approx 300/hr, matching IVMF rate well. Clear, pale yellow urine. BS+/hypoactive. Pt denies passing gas, cannot recall last BM   Activity:   Bedrest, assist x2 to repo   Skin: Some bruising   Pain:   Admin of dilaudid x1 and oxycodone w/decrease in pain   Neuro/CMS:   Very lethargic but arouses to voice. A&Ox4. Pulses+, color +. Pt denies n/t. Strength in RLE severely impaired   Dressing(s):   Immobilizer on RLE removed per Dr Wilkinson   Diet:   NPO    LDA:   L PIV infusing    Equipment:      Plan:   Surgery today   Additional Info:   Ca level 7.0 with redraw at noon

## 2018-10-26 NOTE — BRIEF OP NOTE
[facility], [place]    Brief Operative Note    Pre-operative diagnosis: Pathological Femur Fracture  Post-operative diagnosis * No post-op diagnosis entered *  Procedure: Procedure(s):  BIOPSY RIGHT FEMUR AND EXCISION OF TUMOR  OPEN REDUCTION INTERNAL FIXATION RIGHT DISTAL FEMUR  Surgeon: Surgeon(s) and Role:     * Maximo Wilkinson MD - Primary     * Anthony Sun MD - Assisting     * Mary Damon MD - Resident - Assisting  Anesthesia: General   Estimated blood loss: 300 mL  Drains: Quintin-Sears  Specimens:   ID Type Source Tests Collected by Time Destination   1 : RIGHT UPPER LEG Tissue Leg, Right ANAEROBIC BACTERIAL CULTURE, TISSUE CULTURE AEROBIC BACTERIAL Maximo Wilkinson MD 10/26/2018  5:00 PM    A : right femur tumor  Tissue Leg, Right SURGICAL PATHOLOGY EXAM Maximo Wilkinson MD 10/26/2018  2:43 PM    B : right femur tumor Tissue Leg, Right SURGICAL PATHOLOGY EXAM Maximo Wilkinson MD 10/26/2018  2:50 PM    C : right femur tumor Tissue Leg, Right SURGICAL PATHOLOGY EXAM Maximo Wilkinson MD 10/26/2018  2:50 PM    D : RIGHT FEMUR LESION Tissue Leg, Right SURGICAL PATHOLOGY EXAM Maximo Wilkinson MD 10/26/2018  4:00 PM      Findings:   Intra operative pathology suggestive of adenocarcinoma.  Complications: None.  Implants:   Implant Name Type Inv. Item Serial No.  Lot No. LRB No. Used   IMP PLATE SYN [id number] VA LCP [facility] CNDYLR 14H RT [id number] Metallic Hardware/Davis IMP PLATE SYN [id number] VA LCP CVD CNDYLR 14H RT [id number]  SYNTHES-STRATEC  Right 1   IMP SCR SYN CAN CONICAL 5.0X80MM SS [phone] Metallic Hardware/Davis IMP SCR SYN CAN CONICAL 5.0X80MM SS [phone]  SYNTHES-STRATEC  Right 1     Internal Medicine Primary  Activity: Up with assist until independent. Knee ROM as tolerated.   Weight bearing status: NWB RLE.  Pain management: Per primary.    Antibiotics: Ancef x 24 hours.  Diet: Begin with clear fluids and progress diet as tolerated.    DVT prophylaxis:  mg BID x 4 weeks and mechanical.  Imaging: POD#1.  Labs: Hgb POD#1-3.  Bracing/Splinting: None.   Dressings: Keep clean, dry and intact x 7 days.   Elevation: Elevate RLE on pillows to keep above the level of the heart as much as possible.   Drains: Document output per shift, discontinue at Orthopaedic Surgery discretion.   Physical Therapy/Occupational Therapy: Eval and treat.  Cultures: Pending, follow culture results closely.    Follow-up: Dr. Wilkinson in 2 weeks.  Disposition: Pending progress with therapies, pain control on orals, and medical stability, anticipate discharge to TCU on POD #3-4.    Mary Damon MD  Orthopaedic Surgery Resident, PGY-4  Pager: [phone]    For questions about this patient during weekday business hours, please attempt to contact me at my pager prior to contacting the Orthopaedic Surgery resident on call. On the weekends and overnight, please page the Orthopaedic Surgery resident on call. Thank you!

## 2018-10-26 NOTE — PLAN OF CARE
Problem: Patient Care Overview  Goal: Plan of Care/Patient Progress Review  Outcome: No Change  Note for the night shift.  D: Pt rested quietly most of the night except for turns, then had a lot of pain. More comfortable on her right side than her left side. SInus tach all night. IV's running at 300cc/hr except while she was down in MRI. 0550 pt went to MRI per cart on a hover cheyanne.Was in MRI from 0550 to 0650. Pt had long leg immobilizer on her right leg. Ankles have edema, right >left. Skin very warm to touch. Likes to sleep with feet out from under the covers. No numbness or tingling in feet. NPO incase she goes to surgery today. Lungs clear and sats good on 2L NC. Labs drawn several times during the night as ordered. Alert and oriented. Call light with in reach.Able to make needs known. A: No change tonight.  P: Monitor closely.  Supportive cares. Medicate for pain as needed. Prep for surgery as needed.

## 2018-10-26 NOTE — ANESTHESIA CARE TRANSFER NOTE
Patient: Mayra Stokes    Procedure(s):  BIOPSY RIGHT FEMUR AND EXCISION OF TUMOR  OPEN REDUCTION INTERNAL FIXATION RIGHT DISTAL FEMUR    Diagnosis: Pathological Femur Fracture  Diagnosis Additional Information: No value filed.    Anesthesia Type:   No value filed.     Note:  Airway :Face Mask  Patient transferred to:PACU  Comments: Regular respirations and patent airway. VSS. , RR 24, Sp02 97%, Temp 37.2. IV patent and infusing. Pt resting comfortably and states that she does not have pain. Report given to RN  Handoff Report: Identifed the Patient, Identified the Reponsible Provider, Reviewed the pertinent medical history, Discussed the surgical course, Reviewed Intra-OP anesthesia mangement and issues during anesthesia, Set expectations for post-procedure period and Allowed opportunity for questions and acknowledgement of understanding      Vitals: (Last set prior to Anesthesia Care Transfer)    CRNA VITALS  10/26/2018 1739 - 10/26/2018 1813      10/26/2018             Ht Rate: 133    Resp Rate (observed): (!)  6                Electronically Signed By: EFFIE Arshad CRNA  October 26, 2018  6:13 PM

## 2018-10-26 NOTE — PROGRESS NOTES
Report taken form SILAS Walker in [facility] @ 3092. Pt arrived @ 2015 via EMS with personal belongings.     Pt. is A&Ox4. 02 sats are 94% on 2. Lung sounds are clear bilaterally with both anterior and posterior. Bowel sounds are audible in all 4 quadrants. CMS and Neuros are intact. Denies numbness and tingling in all extremities. Has pain in the R leg, in knee mobilizer. Pt state pain is manageably controlled. Pt. denies nausea, CP, SOB, lightheadedness, and dizziness.    Bravo Catheter is patent. PIV is patent and infusing NS. PCDs are in place to BLEs. Pt. is oriented to the room and call light system and the call light is within reach. Continue to monitor.

## 2018-10-26 NOTE — PROGRESS NOTES
SURGERY PLAN (PRE-OP PLAN)    Patient Position (indicated by x):    Supine   x  Supine with torso rolled up on a bump     Floppy lateral on torso length bean bag     Lateral decubitus, bean bag, full length     Lateral decubitus, Wixson hip positioner     Safety paddle side supports x 2 clamped to side rail     Lithotomy, both legs in yellow padded leg sapp     Lithotomy, single leg in yellow padded leg sapp     Prone on blanket rolls/round gel pad     Prone on Asher (arched) frame on Quintin table     Single thigh in orange arthroscopy clamp     Beach chair semi recumbent     Spider limb positioner     Arm out on radiolucent arm table     Split drape with top bar     Revision LAURA drape with plastic side bags for leg   x  Split drape with top bar   x  sterile tourniquet   x  radiolucent triangles     Bravo catheter          General Equipment Requests (indicated by x):    x  C-Arm with C-Armor drape     C-Arm (video capable, Arav 9900 model)     O-Arm with Stealth imaging     Fracture Table     Quintin XR Table   x  SurgiGraphic 6000 (diving board) fluoro table     Cell saver   x  Minh Biopsy trephine set w/ K-wire & pituitary rongeurs   x  Small pituitary rongeur   x  Minh's angled curettes, narrow shaft   x  Bone graft, kapner gouges     Midas Corbin Medtronic chip, electric motor     Phenol 5%     Ponce BMAC stem cell     Vancomycin 1 gram powder     Zometa 4 mg vials     Depo Medrol steroid          (1) Portable hand held radiation detector machine for sentinel node biopsy and (2) Lymphazurin     [place] Osteotomes     Trauma/Fixation Requests (indicated by x):    Large Frag AO plates     Small Frag AO plates   x  Locking periarticular plates - AO ;lateral distal femoral     Locking periarticular prox humerus plate - Prince/Nephew     Pelvic recon plates (curved & straight), 3.5 & 4.5 mm     Giana zaire-prosthetic fracture plate     DHS hip screw     Beth Gamma nail     AO, DCS 95 degree plate/screw      "AO, 95 degree condylar blade plate     AO, 3.0, 3.5, 4.0, 4.5 mm Cannulated screws     AO, 6.5, 7.0, 7.3 mm Cannulated screws- Stainless Steel     AO, 6.5 mm Cannulated screws- Titanium     Giana cerclage cable plates     AO IM nails     AO Large Ex Fix     AO Small Ex Fix     [place] distal lateral femur plate      Large fx reduction forcepts - AO   x  CPC Large Bone clamp tray - Large (James Ribeiro, Jose)   x  Bone clamps - Medium (Quintin)     LAURA Requests (indicated by x):    Biomet ECHO Bimetric ingrowth stem     Biomet Integral cemented stem     Biomet RB cup, 28+32 heads     Biomet Bipolar cup     Biomet Constrained Freedom liner with heads     Prince NephWildTangent BHR resurfacing LAURA with [email or web address] navigation unit (need 2 weeks advance notice)     DePuy S-ROM long stems     [place] Restoration modular long stem     Biomet ALVARADO long stems, both interlocked and splined stems     Biomet Regenerex TM cup + augments     Beth Tritanium TM cup +  augments     [place] GAP II acet cage + cemented poly cup     Biomet OSS prox femur replacement LAURA     Biomet OSS Compress fixation     IM flexible reamers + guidewire, < 9 mm     IM flexible reamers + guidewire, > 9 mm     Allograft: femoral head     Allograft: distal femur     Allograft: proximal tibia     Bone mill     Allograft cancellous chips     Allograft cancellous crushed     Mee Gorman Troch Claw + cable, insertion instruments     Mee Gorman beaded cerclage cable, green cable  x2 , insertion instruments     Giana CTR Troch cable plate        Periarticular reduction forceps (medium and large) aka \"King Kevin\"    Blunt Pelvic Retractor (.55, Blunt Hohmann with  slight bend)    Laura angle pelvic osteotome    Laminar  (Linda style, paddle style)    Pelvic Reduction Clamps tray     AO pelvic instruments and clamps (angled) Blunt Hohmann & angled Kaba, large bone reduction forceps     Implant Extraction/Cement spacer tools (indicated by x): " "    Biomet Ultrasound cement removal     Midas Corbin, AC-1 (1mm tiny dissecting tip with wire guide gold handpiece)     Flexible osteotomes (both black and wood handled with new replacement blades)     Universal stem extractor     \"L\" hook & hoop style stem extractor stem extractor (DePuy AML stem extractor)     Giana Explant cup removal osteotomes     Suction tip with debris trap (clear plastic disposable)     Biomet offset implant impactor (white handle in [employer]'s cart)     Sacha IM canal long shaft cement removal gouges, pituitary ronguers)     Biomet Spacer One hip stem spacer molds with trials     Biomet Articulating knee spacer molds with trials     Biomet [place] Blue gentamycin PMMA and Vanco 1 g vial/package PMMA     Segal Rods, large + kelsey cutter     Specimens and cultures (indicated by x):     Tissue cultures, aerobic and anaerobic without gram stain   x  Frozen section   x  pathology specimens - fresh   x  pathology specimens - formalin       "

## 2018-10-26 NOTE — H&P
History and Physical     Mayra Stokes MRN# [id number]   YOB: 1955 Age: 63 year old      Date of Admission: 10/25/2018  Primary care provider: No primary care provider on file.            Assessment and Plan:   64yo F with recent dx of hypercalcemia, R femur lytic bone lesion, presenting for evaluation and management of pathologic R femur fx.      # hypercalcemia: mod-severe. Hypercalcemic since 9/10/18 to 13.1, initially thought to be secondary to MM. Has had calcitonin nose spray, and pamidronate x2 per latest Heme/Onc note. May have had some transient effect after the first dose of pamidronate since she did have a normal Ca of 9.8 on 9/24. Other labs:     PTHrP elevated 9/17 at 2.9  Vit A elevated to 135 9/12  PTH appropriately low 4 9/10  Currently asx, though levels are concerning to Medicine, Ortho, and Anesthesia in the context of her urgent need for surgery.   I did discuss her case with Endocrine fellow Dr. Geri Zaldivar. She agreed with aggressive IVF and calcitonin acutely to reduce calcium levels. Not clear that immediate administration of zoledronic acid would be beneficial--usually takes 3-4 days to have effect, and not clear how she would respond given no long-lasting response to two doses of pamidronate   - anesthesia team would be most comfortable with proceeding if Ca is in 10-11 range  - will give NS, calcitonin overnight  - Bravo placed at [facility], keep for strict I/O for mgt of hypercalcemia. Goal  ml/hr   - Endocrine c/s in AM  - telemetry     # preop  # chronic diastolic HF: history of severe right sided HF, cor pulmonale due to long-standing untreated FRANSISCO. Admitted 2/20/18-3/6 with PNA, respiratory failure--diuresed about 100# during this hospitalization. Echo showed pulm HTN, RVH, with no prior dx of FRANSISCO. Saw Cardiology for the first time 5/5 after the hospitalization. TTE had been done on 4/27 showing resolution of volume overload, RV strain that had been present.  Recommended more consistent use of home BiPAP and follow up echo in 6 months.   # FRANSISCO: referred for sleep study after hospitalization in Feb but there are no records available regarding results--not clear that she ever did one. Is supposed to be on BiPAP for sleep, but she reports that she never uses it because she is always up at night to void. VBG suggests some chronic CO2 retention   - RCRI --1 risk factor, risk of cardiac death 1.0%  - however, clinically her risk of heart failure, respiratory failure is higher, especially in the setting of requiring aggressive fluid administration for hypercalcemia   - on furosemide 40mg PO BID at home--switch to intermittent IV dosing while hospitalized   - will attempt to get pre-op TTE. She appears to be compensated at the moment but her underlying pulm HTN could have certainly worsened in the past 6 months since her last echo. If echo cannot be done before surgery, surgery does not need to be delayed, but she would likely still benefit from echo during this hospitalization  - limit Na, oral fluids  - daily weights, strict I/O  - ct home metoprolol   - hold enalapril   - discussed with Ortho resident Dr. Sheng Bello concerns re: calcium level, risk of HF and that she would be most medically optimized if surgery were to happen later in the day    # lytic bone lesions, hypercalcemia, polyclonal gammopathy:   # R femur lytic bone lesion  # R anterior 4th rib lytic lesion     Suspected underlying malignancy with bone mets or primary MM though work up so far has not been revealing. Mostly work up has been geared towards suspicion of MM or plasmacytoma.     Skeletal survey 9/17-- done after R rib lesion was found on CT chest. Survey only showed additional R femur lesion and no other bony lesions   Normal peripheral smear 9/12  SPEP did not show monoclonal spike--elevated alpha 1 and alpha 2 globulins, nl beta and gamma 9/12  UPEP showed elevated total protein, no monoclonal  spike   Serum Igs showed high IgE, normal A, G, M, D (9/17)  Elevated protein with protein gap since Sept   Right femur biopsied 9/28 --> sample showed normal bone with thickened trabeculae  Bone marrow biopsied 9/28 --> not clear if adequate sample, showed normal cellularity     Interestingly, she has had a mass on her left neck for 10-15 years, mobile, non tender, not enlarging or changing in any way. Her H/O doctor was going to refer her to ENT for possible biopsy. CT neck without contrast 9/18 showed that the mass was arising from the left parotid tail    - will need H/O consultation for further work up, likely after stabilization and surgery     # acute leukocytosis: with fever to ~38 deg at [facility] on 10/25 in the AM. Leukocytosis of 20K on presentation on 10/24. Was started on vanc and pip-tazo at [facility] 10/25 prior to transfer due to concern for sepsis.     I suspect systemic inflammation in response to fx and underlying process rather than current infection. Blood cultures x2 are pending at [facility]---I called to see if anything is growing but there are no lab personnel overnight. Normal white count on 9/10 and 9/17    - repeat blood cultures x2 here  - no further abx    # acute macrocytic anemia: macrocytosis since Sept, anemia likely a little dilutional. Automated diff shows some abnormal morphologies, nucleated RBCs, hypersegmented PMNs    - smear, B12, folate     # pain: bilateral knee pain from OA, fracture pain     - scheduled APAP  - PRN oxycodone and IV hydromorphone     # DM2: diet controlled though last A1c was done in 2016     Lines and tubes: PIV   FEN: NS at 200cc/hr, cardiac diet   DVT PPx: SCDs. Hold pharm ppx pending surgery  Code status: DNR, discussed with patient. She would not want to be resuscitated in the setting of cardiac or respiratory arrest. She does understand that this would need to be reversed for surgery  Disposition: IMC     Will be fully staffed in [facility]  John Gonzalez MD   Internal Medicine/Pediatrics  [facility]                         Chief Complaint:   Right leg pain    History is obtained from the patient and chart review         History of Present Illness:   Mayra Stokes is a 63 year old female with a history of chronic diastolic HF, pulm HTN, FRANSISCO, morbid obesity, recent dx hypercalcemia and lytic bone lesions who presents with pathologic R femur fracture. She was transferred from [facility] for operative management.     She was diagnosed with hypercalcemia on 9/10 on routine labs during a preop for right knee replacement. See A/P for previous work up, which ultimately revealed a right femur lytic lesion that was biopsied 9/28. On 10/23, she was in the shower and just stepped out, when suddenly her right leg buckled and she fell over. She was in severe pain and had to be transferred by ambulance to [facility]. She had a pathologic R femur fracture and was admitted for further management. Ultimately it was determined that she needed a higher level of care, so she was transferred to the [facility] for orthopedic evaluation.     She has otherwise been feeling like her usual self this week, aside from the worsening R leg and knee pain.     She has had stable hyperca to ~12-13 since 9/10. Has had calcitonin nose spray and 2 doses of pamidronate but no sustained response. See A/P for further details on previous work up.               Past Medical History:     Past Medical History:   Diagnosis Date     Chronic cor pulmonale (H)      Chronic diastolic heart failure (H)      DM type 2 (diabetes mellitus, type 2) (H)     diet controlled     Hypertension      Mass of left side of neck     since ~2003     Morbid obesity (H)      Obstructive sleep apnea      No history of cancer  No problems with anesthesia previously  No bleeding/clotting problems          Past Surgical History:     Past Surgical History:   Procedure Laterality Date     LAP ADJUSTABLE  GASTRIC BAND       SHOULDER SURGERY Left     shoulder replacement             Social History:     Social History   Substance Use Topics     Smoking status: Not on file     Smokeless tobacco: Not on file     Alcohol use Not on file   retired, lives alone, not   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  Prior to right knee pain acting up a few months ago, she was independent with ADLs          Family History:   No family hx of cancer, leukemia, lymphoma           Allergies:   No Known Allergies          Medications:     ASA 81 daily   Enalapril 5mg daily   Furosemide 40mg BID   Metoprolol 100mg BID   norco prn  tizanadine PRN  Calcitonin 200u/spray           Review of Systems:   The 10 point Review of Systems is negative other than noted in the HPI           Physical Exam:   /64  Pulse 105  Temp 99.4  F (37.4  C) (Oral)  Resp 18  SpO2 94%    GEN: Alert, well-nourished, appears stated age, NAD  HEAD/NECK: NCAT. Neck supple. Left anterior neck mass ~2cm in diameter, mobile, non tender, no cervical or supraclavicular LAD   ENT: Normal conjunctivae. Oropharynx poorly visible  CV: tachy, regular, no m/r/g  RESP: breathing comfortably, lung sounds distant, CTA anteriorly  ABD/GI: soft, NTND. No rebound, no guarding. Normal BS  : Bravo in place  DERM: no suspicious lesions or rashes, no jaundice  EXT: warm, well-perfused. 1+ pitting edema, 2+ pedal pulses bilaterally  NEURO: AOx3. CN2-12 intact. Strength and sensation grossly intact   PSYCH: appropriate mood and affect      LABS  Reviewed     IMAGING  No images available to view     DIAGNOSIS:  RIGHT FEMUR, BIOPSY       --FRAGMENTS OF BENIGN BONE WITH THICKENED TRABECULAE,         CONSISTENT WITH HYPERCALCEMIA       --NO EVIDENCE OF ATYPICAL PLASMA CELLS    TTE 4/27/18  1.  Normal systolic function of the left ventricle. Ejection fraction 60% without any wall motion abnormalities.  Also no significant hypertrophy is appreciated on this  study. May be related to image quality.    2.  Right ventricular size and contractility is normal.    3.  Grade I diastolic dysfunction of the left ventricle.    4.  Mild left atrial enlargement.    5.  No significant valvular abnormalities by 2D and Doppler analysis.

## 2018-10-26 NOTE — PROGRESS NOTES
Pt was seen, case reviewed with moonlighter, Anesthesiology, Heme-Onc, Endocrinology, Ortho    Circumstances of admission to this hospital reviewed    Pt states she is comfortable at rest, + R LE pain with movement  She denies chest pain, SOB, dizziness    Low grade T  BP 110s/  HR 110s-120s  02 sats mid 90s 2 LPM (not able to tolerate CPAP)  UO 2425 yesterday    Sleepy, easily alerts, appears comfortable, fully oriented  RR 12, unlabored  Oral mucosa moist  Lungs clear  CV rrr HR 110s, no M  Abd soft, protuberant  No presacral edema  1 + LE edema B  No calf tenderness      Results for BEAU DE (MRN [id number]) as of 10/26/2018 10:31   Ref. Range 10/26/2018 07:15   Sodium Latest Ref Range: 133 - 144 mmol/L 138   Potassium Latest Ref Range: 3.4 - 5.3 mmol/L 4.4   Chloride Latest Ref Range: 94 - 109 mmol/L 103   Carbon Dioxide Latest Ref Range: 20 - 32 mmol/L 28   Urea Nitrogen Latest Ref Range: 7 - 30 mg/dL 19   Creatinine Latest Ref Range: 0.52 - 1.04 mg/dL 0.76   GFR Estimate Latest Ref Range: >60 mL/min/1.7m2 76   GFR Estimate If Black Latest Ref Range: >60 mL/min/1.7m2 >90   Calcium Latest Ref Range: 8.5 - 10.1 mg/dL 12.5 (H)   Anion Gap Latest Ref Range: 3 - 14 mmol/L 7   Calcium Ionized Latest Ref Range: 4.4 - 5.2 mg/dL 7.0 (H)   Glucose Latest Ref Range: 70 - 99 mg/dL 128 (H)   Results for BEAU DE (MRN [id number]) as of 10/26/2018 10:31   Ref. Range 10/26/2018 06:00 10/26/2018 06:44   WBC Latest Ref Range: 4.0 - 11.0 10e9/L 15.1 (H)    Hemoglobin Latest Ref Range: 11.7 - 15.7 g/dL 10.4 (L)    Hematocrit Latest Ref Range: 35.0 - 47.0 % 34.1 (L)    Platelet Count Latest Ref Range: 150 - 450 10e9/L 267    RBC Count Latest Ref Range: 3.8 - 5.2 10e12/L 3.24 (L)    MCV Latest Ref Range: 78 - 100 fl 105 (H)    MCH Latest Ref Range: 26.5 - 33.0 pg 32.1    MCHC Latest Ref Range: 31.5 - 36.5 g/dL 30.5 (L)    RDW Latest Ref Range: 10.0 - 15.0 % 12.7    Diff Method Unknown Manual Differential    % Neutrophils  Latest Units: % 86.8    % Lymphocytes Latest Units: % 4.4    % Monocytes Latest Units: % 7.0    % Eosinophils Latest Units: % 0.0    % Basophils Latest Units: % 0.0    % Myelocytes Latest Units: % 1.8    Absolute Neutrophil Latest Ref Range: 1.6 - 8.3 10e9/L 13.1 (H)        Assessment    Pathologic fracture R distal femur with lytic bone lesion; etiology not clear, suspect MM or plasmacytoma. To have fracture repaired today.      Hypercalcemia, likely related to malignancy, refractory to outpt management.  Ionized calcium 7 this am, after initial therapy with IV fluids, first dose of Calcitonin. Endocrinology recommends continued IV fluids with lasix, calcitonin. Hopefully, calcium will be easier to manage after removal of tumor. Anesthesia is aware of hypercalcemia, plans to proceed with surgery, given urgency of situation.     CHF, diastolic,with severe pulmonary HTN, likely secondary to FRANSISCO.  This may be challenging to manage in the zaire op setting, with need for close volume status and electrolyte monitoring    HTN, stable on Metoprolol alone    Tachycardia, likely secondary to pain, ? malignancy    FRANSISCO, intolerant of CPAP.  Resp status appears adequate     Fever, leukocytosis, unclear if secondary to malignancy vs UTI ( E coli on recent UTI)      Plan  Repeat ionized calcium, BMP at 1200  Continue vigorous IV fluids, IV lasix, close monitoring of Is/Os  Calcitonin ordered for this am  Will give Denosumab, per Heme Onc rec  IMC status  Rocephin for UTI  Repeat Echo today  Low flow 02, capnography  PCD  Anticipate need for IMC or ICU care post op on [place]  Endocrinology, Heme Onc consults, will likely see post op

## 2018-10-26 NOTE — ANESTHESIA PREPROCEDURE EVALUATION
)Anesthesia Pre-Procedure Evaluation    Patient: Mayra Stokes   MRN:     [id number] Gender:   female   Age:    63 year old :      1955        Preoperative Diagnosis: * No surgery found *          No past medical history on file.  No past surgical history on file.    Anesthesia Evaluation     .             ROS/MED HX    ENT/Pulmonary:     (+)sleep apnea, , . .    Neurologic:       Cardiovascular:     (+) hypertension----. : . . . :. . Previous cardiac testing Echodate:2018results:EF 60%, normal RV function. Grade I diastolic dysfunction. Significantly improved form 2018 when she was hospitalized for pneumonia and cor pulmonale.date: results: date: results: date: results:          METS/Exercise Tolerance:     Hematologic:         Musculoskeletal: Comment: Lytic bone lesions with negative biopsy, pathologic femur fracture        GI/Hepatic:         Renal/Genitourinary:         Endo: Comment: hypercalcemia    (+) Obesity, .      Psychiatric:         Infectious Disease:         Malignancy:         Other:    (+) H/O chronic opiod use ,                        PHYSICAL EXAM:   Mental Status/Neuro: A/A/O   Airway: Mallampati: III  Mouth/Opening: Full  TM distance: > 6 cm  Neck ROM: Full   Respiratory: Auscultation: CTAB     Resp. Rate: Normal     Resp. Effort: Normal      CV: Rhythm: Regular  Rate: Age appropriate  Heart: Normal Sounds   Comments:      Dental: Normal                Lab Results   Component Value Date    WBC 17.0 (H) 10/25/2018    HGB 10.6 (L) 10/25/2018    HCT 33.6 (L) 10/25/2018     10/25/2018     10/25/2018    POTASSIUM 4.4 10/25/2018    CHLORIDE 102 10/25/2018    CO2 26 10/25/2018    BUN 26 10/25/2018    CR 0.87 10/25/2018     (H) 10/25/2018    DEANDRE 13.2 (H) 10/25/2018    PHOS 2.0 (L) 10/25/2018    MAG 1.6 10/25/2018    ALBUMIN 2.6 (L) 10/25/2018    PROTTOTAL 7.5 10/25/2018    ALT 26 10/25/2018    AST 27 10/25/2018    ALKPHOS 176 (H) 10/25/2018    BILITOTAL 0.5  10/25/2018       Preop Vitals  BP Readings from Last 3 Encounters:   10/25/18 103/57    Pulse Readings from Last 3 Encounters:   10/25/18 109      Resp Readings from Last 3 Encounters:   10/25/18 15    SpO2 Readings from Last 3 Encounters:   10/25/18 96%      Temp Readings from Last 1 Encounters:   10/25/18 37.2  C (98.9  F) (Oral)    Ht Readings from Last 1 Encounters:   No data found for Ht      Wt Readings from Last 1 Encounters:   No data found for Wt    There is no height or weight on file to calculate BMI.     LDA:  Peripheral IV 10/24/18 Left Upper forearm (Active)   Site Assessment WDL 10/25/2018  9:00 PM   Line Status Infusing 10/25/2018  9:00 PM   Phlebitis Scale 0-->no symptoms 10/25/2018  9:00 PM   Infiltration Site Treatment Method  None 10/25/2018  9:00 PM   Extravasation? No 10/25/2018  9:00 PM   Number of days:1       Assessment:   ASA SCORE: 3       Documentation: H&P complete; Preop Testing complete; Consents complete   Proceeding: Proceed without further delay  Tobacco Use:  Active user of Tobacco     Plan:   Anes. Type:  General   Pre-Induction: Midazolam IV   Induction:  IV (Standard)   Airway: Oral ETT; CMAC/VL   Access/Monitoring: PIV; 2nd PIV   Maintenance: Balanced   Emergence: Procedure Site   Logistics: Observation/Admission     Postop Pain/Sedation Strategy:  Standard-Options: Opioids PRN     PONV Management:  Adult Risk Factors: Female, Postop Opioids  Prevention: Ondansetron     CONSENT: Direct conversation   Plan and risks discussed with: Patient   Blood Products: Consented (ALL Blood Products)     Comments for Plan/Consent:  Based on chart evaluation (care everywhere), patient without obvious absolute contraindication to semi-urgent surgery. Patient with subacute to chronic hypercalcemia, current ionized calcium of 7.6. Discussed with orthopedic resident IV hydration, however defer beyond this to hospitalist management.      Has recent echo as cardiac evaluation. Final plan per  attending anesthesiologist on day of surgery.                          Donte Branch MD

## 2018-10-26 NOTE — PLAN OF CARE
Problem: Patient Care Overview  Goal: Plan of Care/Patient Progress Review  Outcome: No Change        VS:   /57  Pulse 109  Temp 98.9  F (37.2  C) (Oral)  Resp 15  SpO2 96%     Output:   Bravo, no BM and denies passing gas    Lungs Upper lobes clear, lower lobes diminished    Activity:   Bed rest    Skin: Unable to view - patient has a R leg knee immobilizer     Pain:   Managed with IV dilaudid and Oral Oxy   Neuro/CMS:   R leg weakness -   Dressing(s):   None    Diet:   NPO   LDA:   Bravo    Equipment:      Plan:   Ortho consult for possible surgery 10/26/18   Additional Info:

## 2018-10-27 NOTE — PROGRESS NOTES
Orthopaedic Surgery Progress Note:       Subjective:   Patient reports tender leg overnight, but got some sleep. Events of night ([facility], transfer, etc) noted. Thank you to teams for involvement. . Pain well controlled on current regimen. Denies new onset tingling/numbness in operative extremity. Denies fever/chills/SOB/nause/vomiting.     Objective:   Temp:  [97.4  F (36.3  C)-99  F (37.2  C)] 98.4  F (36.9  C)  Pulse:  [108-127] 108  Heart Rate:  [] 102  Resp:  [12-28] 18  BP: (107-160)/(61-94) 136/93  FiO2 (%):  [40 %-60 %] 40 %  SpO2:  [78 %-99 %] 96 %    Intake/Output Summary (Last 24 hours) at 10/27/18 1006  Last data filed at 10/27/18 1000   Gross per 24 hour   Intake             4411 ml   Output             3860 ml   Net              551 ml       Gen: NAD. Resting comfortably in bed. Sleepy but rousable  Resp: Breathing comfortably on RA  LE:  Dressing/ACE is c/d/i.   Drain is draining bloody/serous fluid. 35 ml to 0000, ? ml since  Circulatory: DP Pulse Intact, Foot Perfused   Neurologic: intact    Labs:    Recent Labs  Lab 10/27/18  0447 10/27/18  0228 10/26/18  2249 10/26/18  1656   WBC 12.8*  --  15.3* 10.8   HGB 9.5*  --  9.6* 9.6*     --  233 228   CRP  --  140.0*  --   --         Assessment & Plan:   Assessment and Plan: Mayra Stokes is a 63 year old female with PMH including FRANSISCO, HTN, DM II, CHF and pulmonary HTN transferred from an OSH after sustaining a R distal femoral shaft pathologic fracture now s/p R distal femur biopsy, tumor curettage and excision, and ORIF on 10/26 with Dr. Wilkinson.   Await imaging today/    Internal Medicine Primary  Activity: Up with assist until independent. Knee ROM as tolerated.   Weight bearing status: NWB RLE.  Pain management: Per primary.    Antibiotics: Ancef x 24 hours.  Diet: Begin with clear fluids and progress diet as tolerated.   DVT prophylaxis:  mg BID x 4 weeks and mechanical.  Imaging: POD#1.  Labs: Hgb POD#1-3.  Bracing/Splinting: None.    Dressings: Keep clean, dry and intact x 7 days.   Elevation: Elevate RLE on pillows to keep above the level of the heart as much as possible.   Drains: Document output per shift, discontinue at Orthopaedic Surgery discretion.   Physical Therapy/Occupational Therapy: Eval and treat.  Cultures: Pending, follow culture results closely.    Follow-up: Dr. Wilkinson in 2 weeks.  Disposition: Transfer to [place] - Internal Medicine primary for more complex management. Pending progress with therapies, pain control on orals, and medical stability, anticipate discharge to TCU on POD #3-4.    Dr Nolan Engle 10/27/2018  Orthopedic Fellow  Pager: [phone]

## 2018-10-27 NOTE — PLAN OF CARE
Problem: Fracture Orthopaedic (Adult)  Goal: Signs and Symptoms of Listed Potential Problems Will be Absent, Minimized or Managed (Fracture Orthopaedic)  Signs and symptoms of listed potential problems will be absent, minimized or managed by discharge/transition of care (reference Fracture Orthopaedic (Adult) CPG).   Pt oriented x4, lethargic on and off this shift. VSS. Pt denies SOB but is still requiring O2 and will desat without it to low 80s. Pt on bipap 40% this am and placed back on at 1400 with a VBG recheck at 1900. Pt tolerating oxiplus mask at 6-9L. Pt c/o right leg pain, but somewhat tolerable. Oxycodone given x1, then all narcs d/c'd, and Toradol ordered. Bravo patent with good UOP. No flatus per pt. JULIAN drain with 15ml out total this shift. Right leg ace wrapped. Good CMS and good pulses. Mag replaced. Pt's code status changed to full code per pt request. Will cont to closely monitor.     Problem: Diabetes Comorbidity  Goal: Diabetes  Patient comorbidity will be monitored for signs and symptoms of hyperglycemia or hypoglycemia. Problems will be absent, minimized or managed by discharge/transition of care.   Outcome: No Change  WNL    Problem: Chronic Respiratory Difficulty Comorbidity  Goal: Chronic Respiratory Difficulty  Patient comorbidity will be monitored for signs and symptoms of Respiratory Difficulty (Chronic) condition.  Problems will be absent, minimized or managed by discharge/transition of care.   Outcome: No Change  Still requiring O2 and bipap. O2 needs stable.

## 2018-10-27 NOTE — PLAN OF CARE
Problem: Patient Care Overview  Goal: Plan of Care/Patient Progress Review  PT 6B: Up with lift supporting R knee in extension. Pt will need a recliner and to be moved to a lift room.     Discharge Planner PT   Patient plan for discharge: Open to rehab  Current status: Evaluation complete and treatment indicated. Engaged pt in B LE strengthening/ROM exercise, bed mobility at max A x 1-2, and education on POC and precautions.   Barriers to return to prior living situation: medical status, WB restriction, pain, home set up (4 steps to enter)  Recommendations for discharge: TCU  Rationale for recommendations: Pt is well below baseline for mobility and would benefit from continued rehab to return to PLOF. Pt is limited by impaired ROM, activity tolerance, balance, and strength.        Entered by: Peri Ragsdale 10/27/2018 11:48 AM

## 2018-10-27 NOTE — PROGRESS NOTES
"Orthopaedic Surgery Progress Note   October 26, 2018    Subjective: Evaluated in PACU. Pain appears controlled. Bravo catheter. Denies n/t.     Objective: /72  Pulse 127  Temp 99  F (37.2  C) (Oral)  Resp 19  Ht 1.651 m (5' 5\")  Wt 149.7 kg (330 lb)  SpO2 97%  BMI 54.91 kg/m2    Drain: Not yet recorded.     General: NAD, alert and oriented, cooperative with exam.   Cardio: RRR, extremities wwp.   Respiratory: Non-labored breathing.  MSK: RLE: Toes wwp, DP 2+, bcr in all toes. +EHL/FHL/GSC/TA. SILT SP/DP/Sa/Palumbo/T. Dressing c/d/i. Drain with serosanguinous output.    Labs:   Hgb 9.6  Plts 228    Assessment and Plan: Mayra Stokes is a 63 year old female with PMH including FRANSISCO, HTN, DM II, CHF and pulmonary HTN transferred from an OSH after sustaining a R distal femoral shaft pathologic fracture now s/p R distal femur biopsy, tumor curettage and excision, and ORIF on 10/26 with Dr. Wilkinson.     Internal Medicine Primary  Activity: Up with assist until independent. Knee ROM as tolerated.   Weight bearing status: NWB RLE.  Pain management: Per primary.    Antibiotics: Ancef x 24 hours.  Diet: Begin with clear fluids and progress diet as tolerated.   DVT prophylaxis:  mg BID x 4 weeks and mechanical.  Imaging: POD#1.  Labs: Hgb POD#1-3.  Bracing/Splinting: None.   Dressings: Keep clean, dry and intact x 7 days.   Elevation: Elevate RLE on pillows to keep above the level of the heart as much as possible.   Drains: Document output per shift, discontinue at Orthopaedic Surgery discretion.   Physical Therapy/Occupational Therapy: Eval and treat.  Cultures: Pending, follow culture results closely.    Follow-up: Dr. Wilkinson in 2 weeks.  Disposition: Transfer to [place] - Internal Medicine primary for more complex management. Pending progress with therapies, pain control on orals, and medical stability, anticipate discharge to TCU on POD #3-4.     Mary Damon MD  Orthopaedic Surgery Resident, PGY-4  Pager: [phone]     For questions about this patient during weekday business hours, please attempt to contact me at my pager prior to contacting the Orthopaedic Surgery resident on call. On the weekends and overnight, please page the Orthopaedic Surgery resident on call. Thank you!

## 2018-10-27 NOTE — PROVIDER NOTIFICATION
Notified gold gross cover of increase O2 need on oxy mask, pt A/Ox4 at this time and does not endore SOB. Pt switched to home BiPAP on 5L to maintain sats >90%. Per RT CAPNO does not give accurate readings with BiPAP. Per provider ok to discharge CAPNO at this time.   Will continue to monitor per POC.

## 2018-10-27 NOTE — PROGRESS NOTES
Admission          10/25/2018  8:04 PM  -----------------------------------------------------------  Reason for admission: post op for : BIOPSY RIGHT FEMUR AND EXCISION OF TUMOR  OPEN REDUCTION INTERNAL FIXATION RIGHT DISTAL FEMUR  Primary team notified of pt arrival.  Admitted from: [facility] PACU.  Via: stretcher  Accompanied by: paramedic transport.   Belongings: family brought glass and home C-Biap  Admission Profile: in progress., pt sleepy unable to answer any questions.  Teaching: orientation to unit and call light- call light within reach, call don't fall, use of console, meal times, when to call for the RN, and enforced importance of safety   Access: PIV  Telemetry:Placed on pt  Ht./Wt.: complete  2 RN Skin Assessment Completed By: in progress, pt declined turning or skin check at this time.   Pt status: Pt sleepy but oriented , denied pain or discomfort at this time.     Temp:  [98.2  F (36.8  C)-99.4  F (37.4  C)] 98.7  F (37.1  C)  Pulse:  [105-127] 108  Heart Rate:  [106-130] 121  Resp:  [13-29] 20  BP: (110-160)/(64-94) 132/82  SpO2:  [91 %-98 %] 94 %

## 2018-10-27 NOTE — PLAN OF CARE
Problem: Patient Care Overview  Goal: Plan of Care/Patient Progress Review  OT: holding OT at this time, per PT pt with significant pain and will only tolerate one therapy at this time, will initiate OT as indicated.

## 2018-10-27 NOTE — PROGRESS NOTES
PACU to Inpatient Nursing Handoff    Patient Mayra Stokes is a 63 year old female who speaks Data Unavailable.   Procedure Procedure(s):  BIOPSY RIGHT FEMUR AND EXCISION OF TUMOR  OPEN REDUCTION INTERNAL FIXATION RIGHT DISTAL FEMUR   Surgeon(s) Primary: Maximo Wilkinson MD  Assisting: Anthony Sun MD  Resident - Assisting: Mary Damon MD     No Known Allergies    Isolation  No active isolations     Past Medical History   has a past medical history of Chronic cor pulmonale (H); Chronic diastolic heart failure (H); DM type 2 (diabetes mellitus, type 2) (H); Hypertension; Mass of left side of neck; Morbid obesity (H); and Obstructive sleep apnea.    Anesthesia General   Dermatome Level     Preop Meds Not applicable   Nerve block Not applicable   Intraop Meds Not applicable  fentanyl (Sublimaze): 150 mcg total  hydromorphone (Dilaudid): 1.5 mg total  ketamine (Ketalar): 50 mg given  ondansetron (Zofran): last given at 1659   Local Meds No   Antibiotics Rocephin given on the floor (see Mar)      Pain Patient Currently in Pain: sleeping: patient not able to self report  Comfort: tolerable with discomfort  Pain Control: partially effective   PACU meds  hydromorphone (Dilaudid): 0.5 mg (total dose) last given at 1915    PCA / epidural No   Capnography     Telemetry ECG Rhythm: Sinus tachycardia   Inpatient Telemetry Monitor Ordered? Yes        Labs Glucose Lab Results   Component Value Date     10/26/2018       Hgb Lab Results   Component Value Date    HGB 9.6 10/26/2018       INR Lab Results   Component Value Date    INR 1.32 10/26/2018      PACU Imaging Not applicable     Wound/Incision Incision/Surgical Site 10/26/18 Right Leg (Active)   Incision Assessment UTV 10/26/2018  6:11 PM   Closure Approximated;Sutures;Adhesive strip(s) 10/26/2018  5:49 PM   Dressing Intervention Clean, dry, intact 10/26/2018  6:11 PM   Number of days:0      CMS Peripheral Neurovascular WDL: WDL (10/26/18 1811)  All  Extremities Temperature: warm (10/26/18 1811)  All Extremities Color: no discoloration (10/26/18 1811)  All Extremities Sensation: no numbness;no tingling (10/26/18 1811)   Equipment Not applicable   Other LDA       IV Access Peripheral IV 10/26/18 Right Hand (Active)   Site Assessment WDL 10/26/2018  7:31 PM   Line Status Infusing 10/26/2018  7:31 PM   Phlebitis Scale 0-->no symptoms 10/26/2018  7:31 PM   Infiltration Scale 0 10/26/2018  7:31 PM   Number of days:0      Blood Products Not applicable    mL   Intake/Output Date 10/26/18 0700 - 10/27/18 0659   Shift 7620-5025 8833-7926 9960-5848 24 Hour Total   I  N  T  A  K  E   I.V. 1000 900  1900    Shift Total  (mL/kg) 1000  (6.68) 900  (6.01)  1900  (12.69)   O  U  T  P  U  T   Urine 1750 450  2200    Blood  300  300    Shift Total  (mL/kg) 1750  (11.69) 750  (5.01)  2500  (16.7)   Weight (kg) 149.69 149.69 149.69 149.69        Drains / Bravo Closed/Suction Drain 1 Right;Lateral Leg Bulb 15 Persian (Active)   Site Description [facility] 10/26/2018  6:11 PM   Dressing Status Normal: Clean, Dry & Intact 10/26/2018  6:11 PM   Drainage Appearance Bloody/Bright Red 10/26/2018  6:11 PM   Status To bulb suction 10/26/2018  6:11 PM   Number of days:0       Urethral Catheter  (Active)   Tube Description [facility] 10/26/2018  6:11 PM   Catheter Care Done 10/26/2018 11:08 AM   Collection Container Standard 10/26/2018  6:11 PM   Securement Method Securing device (Describe) 10/26/2018 12:14 PM   Rationale for Continued Use Strict 1-2 Hour I&O 10/26/2018 12:14 PM   Urine Output 200 mL 10/26/2018 12:14 PM   Number of days:1      Time of void PreOp Void Prior to Procedure:  (cath) (10/26/18 1214)    PostOp      Diapered? No   Bladder Scan     PO  (npo) (10/26/18 1214)  tolerating sips     Vitals    B/P: 135/72  T: 99  F (37.2  C)    Temp src: Oral  P:  Pulse: 127 (10/26/18 1815)    Heart Rate: 117 (10/26/18 1930)     R: 19  O2:  SpO2: 97 %    O2 Device: Simple face mask (10/26/18 1930)     Oxygen Delivery: 9 LPM (10/26/18 1930)         Family/support present cousin in waiting roomn   Patient belongings Patient Belongings: clothing;glasses  Disposition of Belongings: Locker   Patient transported on bed   DC meds/scripts (obs/outpt) Not applicable   Inpatient Pain Meds Released? No       Special needs/considerations None   Tasks needing completion None       Gisselle Tipton RN  ASCOM [id number]

## 2018-10-27 NOTE — PROGRESS NOTES
[facility], [place]    Internal Medicine Progress Note - Gold Service      Assessment & Plan   Mayra Stokes is a 63 year old female admitted on 10/25/2018. She has PMH of FRANSISCO, HFpEF, Cor Pulmonale, morbid obesity, HTN, HLD, and recent dx of hypercalcemia (9/2018) w/ extensive OSH w/u who was transferred from OSH to [facility] for evaluation of pathologic Right distal femur fracture, now s/p right distal femur biopsy, tumor curettage/excision and ORIF, per Orthopedics 10/26/18. Patient transferred to [facility] medical for ongoing care.     # Pathologic Right distal femur fracture s/p distal femur bx, tumor curettage and excision, ORIF POD#0: Patient suffered mechanical fall 10/24/18. Presented to OSH ED w/maging consistent w/ pathologic fracture of right distal femur. Patient transferred to [facility] for further Orthopedic evaluation. Now, s/p ORIF and tumor curettage/excision. Tolerated procedure well. EBL 300ml Intraoperative pathology suggestive of adenocarcinoma.   - NWB RLE  - Elevate RLE as able  - monitor JULIAN suzanne output  - Follow biopsy and culture data  - Appreciate Orthopedic Assistance and recommendations   - ADAT  - 325 mg ASA daily x 4 weeks and mechanical prophylaxis  - PT/OT  - F/u w/  in two weeks  - Will likely need TCU placement   - BCx2. Broaden abx if spikes  - Add CRP and procal  - Discontinue IV dilaudid. Continue Oxycodone 5-10 mg q3h PRN for pain  - PT/OT  - CBC, BMP in am     # Hypercalcemia: Since 9/2018. Extensive Heme/Onc w/u at OSH PTA. S/p treatment w/ pamidronate and calcitonin spray ordered, BM bx negative for multiple myeloma.  CT C/A/P- no evidence of malignancy, though lytic right fourth rib lesion. Has neck mass/parotid mass and is to have ENT consult in near future.   S/p bone poole biopsy of right iliac bone and right femur- neg for multiple myeloma or malignancy. Ca on admission 13.2 --> 11.8, though corrected for  hypoalbuminemia: 132, Ical 7.6 --> 7.0. S/p Calcitonin 600 units IM at 0730 10/26/18- reassess efficacy at 24-48h.  Endocrine consulted w/ recommendation for IVF, q8h IV lasix, q8h Ca, Ical. Labs pending. PTA calcitonin nasal spray. Decreased IVF d/t worsening respiratory stratus 250cc/hr --> 100cc/hr. Continue q8h IV lasix, Give additional 20 mg IV lasix x1 as CXR w/ pulmonary congestion. To date, unclear etiology of hypercalcemia.   - Continue IVF  - Continue q8h lasix and Ca  - Ical in am   - Give additional 20 mg IV lasix x1  - Monitor UOP and Strict I&O  - Appreciate Endocrine assistance and recommendations- multiple labs ordered and pending. FR ordered- 1L   - Prolia ordered - 60 mg injection. This medication is available [place] [facility] not available at inpatient pharmacy. Please discuss with Pharmacy in am. Discussed w/ pharmacist and concern that dose may be lower than the 120 mg that is typically given. Please clarify in am     - Heme/Onc consult placed. Please discuss with in am     # Hypoxic, hypercarbic respiratory failure: Noted to be requiring 10L NC to maintain sats upon admission from [facility]. Placed on home BiPAP, though was unable to maintain O2 sats. RT consulted and placed on BiPAP at 60% FiO2 with improvement in sats. VB.29/61/104/29.   CXR w/ pulmonary vascular congestion. BP stable, Tachycardia to 108, though appears recent BL. Likely multifactorial in setting of large volume IVF for hypercalcemia as above, anesthesia, narcotic medications, obesity hypoventilation. Will obtain ABG and give additional IV lasix dose. Hgb stable. EKG w/ sinus tachycardia.   - BiPAP  - IV lasix  - ABG  - Pulse ox  - Tele  - Continue to closely monitor      # Hypomagnesemia, Hypophosphatemia: Mg 1.4, Phos 2.1.   - Monitor and replace per protocol    # UTI: UA form OSH 10/24/18 w/ > 100 WBC, LE, and many bacteria. Received Zosyn prior to transfer to [facility] 10/25/18.  - Ceftriaxone  - UA/UC  - No UC from  OSH per Care everywehre review.      # FRANSISCO: PTA BiPAP.   - Resume once respiratory status improved.     # HTN: BP stable on admission. PTA enalapril, lopressor.   - Continue lopressor with hold parameters SBP less than 100 or HR less than 60  - Enalapril has been held in perioperative period  - Resume as abble    # HFpEF, hx of Cor Pulmonale:  ECHO 4/2018 w/ EF 60%, normal systolic function, NRWMA, normal RV function, Grade I diastolic dysfunction, mild left atrial enlargement, PA pressure is 7 mmHg plus RAP (normal). Per OSH report- dramatic change in RV size and contractility, no pulmonary HTN compared to previous study 2/2018. ECHO on admission EF 60-65%, normal LV function, mild RV dilation, IVC normal w/ preserved respiratory variation. No pericardial effusion. RAP 3 mmHg. PTA lasix, lopressor.   - Continue Lopressor  - Daily weights  - IV lasix as above  - Monitor       Diet: Fluid restriction 1000 ML FLUID  Advance Diet as Tolerated: Clear Liquid Diet  Fluids: NS At 100 ml/hr  Bravo Catheter: in place, indication: Strict 1-2 Hour I&O    DVT Prophylaxis: Pneumatic Compression Devices  Code Status: DNR    Expected discharge: 4 - 7 days, recommended to transitional care unit once safe disposition plan/ TCU bed available.    The patient's care was discussed with the Attending Physician, Dr. Hanks.    Candace Neff PA-C  Internal Medicine Hospitalist Service  [facility]  Pager: [phone]    Please see sticky note for cross cover information    Interval History   Information limited as patient very somnolent in postoperative period. Able to answer simple questions, though not large amounts of detail offered. We briefly discussed plan as outlined above and patient agreeable.     Currently, patient reports moderate amount of pain in RLE. No other current complaints.     Patient does not endorse: headaches, changes in vision, chest pain, palpitations, upper respiratory symptoms of  rhinorrhea or congestion, shortness of breath, cough, sputum production, wheezing, abdominal pain, nausea, emesis, constipation, diarrhea, dysuria, edema, rashes, weakness, focal neurologic deficits, recent travel, illness, fever, chills.      Data reviewed today: I reviewed all medications, new labs and imaging results over the last 24 hours. I personally reviewed recent imaging impressions, daily labs, progress notes. care everywhere recprds. EKG    Physical Exam   Vital Signs: Temp: 98.7  F (37.1  C) Temp src: Axillary BP: 132/82 Pulse: 108 Heart Rate: 121 Resp: 20 SpO2: 94 % O2 Device: Oxymask Oxygen Delivery: 4 LPM  Weight: 330 lbs 0 oz    Physical Exam   Constitutional: Obese female lying in bed. Somnolent, though arouses to voice. NAD. Able to answer simple questions appropriately.   HEENT:   Head: Normocephalic and atraumatic.   Eyes: Conjunctivae are normal. Pupils are equal, round, and reactive to light.  Pharynx has no erythema or exudate, mucous membranes are mdry  Neck:   No adenopathy, no bony tenderness  Cardiovascular: Tachycardia, though without murmurs or gallops  Pulmonary/Chest: Difficult exam d/t body habitus. Bilateral rales.  with no wheezes or retractions. No respiratory distress on O2 via FM.  GI: Obese soft with good bowel sounds.  Non-tender, non-distended, with no guarding, no rebound, no peritoneal signs.   Back:  No bony or CVA tenderness   Musculoskeletal:  RLE wrapped in ACE bandage. JULIAN drain present with serosanguinous drainage. Patient moves both LE and UE extremities   Skin: Skin is warm and dry. No rash noted to exposed skin areas.   Neurological: Somnolent, though oriented to person, place, and time. Nonfocal exam  Psychiatric:  Somnolent             Data     Recent Labs  Lab 10/26/18  1656 10/26/18  1240 10/26/18  0715 10/26/18  0600 10/26/18  0040 10/25/18  2113   WBC 10.8  --   --  15.1*  --  17.0*   HGB 9.6*  --   --  10.4*  --  10.6*   *  --   --  105*  --  105*   PLT  228  --   --  267  --  260   INR  --   --   --  1.32*  --   --    NA  --  139 138  --   --  136   POTASSIUM  --  4.1 4.4  --   --  4.4   CHLORIDE  --  103 103  --   --  102   CO2  --  29 28  --   --  26   BUN  --  17 19  --   --  26   CR  --  0.70 0.76  --   --  0.87   ANIONGAP  --  7 7  --   --  8   DEANDRE  --  12.5* 12.5*  --  13.3* 13.2*   GLC  --  129* 128*  --   --  123*   ALBUMIN  --   --   --   --   --  2.6*   PROTTOTAL  --   --   --   --   --  7.5   BILITOTAL  --   --   --   --   --  0.5   ALKPHOS  --   --   --   --   --  176*   ALT  --   --   --   --   --  26   AST  --   --   --   --   --  27

## 2018-10-27 NOTE — PROGRESS NOTES
[facility], [place]    Internal Medicine Progress Note - Gold Service      Assessment & Plan   63F w/ FRANSISCO, HFpEF, HTN, morbid obesity, HLD, and recent dx of hypercalcemia (9/2018) w/ extensive OSH w/u now transferred for evaluation of pathologic right distal femur fracture, s/p right distal femur biopsy, tumor curettage/excision and ORIF on 10/26/18, with ongoing management for hypercalcemia and workup of cause.    # Hypercalcemia: Developed in 9/2018 with ranges of 12-15. Has had fairly extensive workup for source outside of our system. This has included low PTH, low 25OH-VD, low phosphorous, elevated vitamin A, mildly elevated PTHrP. SPEP/UPEP did not note a monoclonal spike, and light chain showed elevated IGE with elevated protein. CT C/A/P without contrast - no evidence of malignancy, though lytic right fourth rib lesion. Biopsy of these R femur was negative, biopsy of the rib (pending), and biopsy of the bone marrow with reportedly normal cellularity.  She was treated with pamidronate 9/18/18 and 10/8/18, as well as calcitonin (spray)   Has neck mass/parotid mass and is to have ENT consult in near future. Ca on admission here 13.2, higher when corrected for hypoalbuminemia. S/p Calcitonin 600 units IM at 0730 10/26/18.  Etiology of hypercalcemia remains unclear.  Dx:  - Follow up pathology report from biopsy on 10/26  - CT CAP with contrast per discussion with hematology; plan to monitor renal function today and obtain tomorrow if stable.   - Monitor calcium/iCal levels every 8 hours to assess effect of below therapeutics   - Daily comprehensive metabolic panel, CBC  - Strict intake and output  - Awaiting workup including PTHrP, 25OH -Vit D, 1,25OH-VD  - Appreciate endocrinology and oncology consultation recommendations.  Tx:  - Lasix 20mg IV lasix every 8 hours  - Continue NS @ 100cc/hr; can adjust based on respiratory function and calcium levels  - Appreciate Endocrine assistance  and recommendations: will hold dose today with denosumab 120mg and rather continue calcitonin 400U Q12H x24 hours.      Pathologic Right distal femur fracture s/p distal femur bx, tumor curettage and excision, ORIF POD#1: Patient suffered mechanical fall 10/24/18. Presented to OSH ED w/ imaging consistent w/ pathologic fracture of right distal femur. Transferred to [facility] for further Orthopedic evaluation. Now s/p ORIF and tumor curettage/excision. Tolerated procedure well. EBL 300ml. Intraoperative pathology suggestive of adenocarcinoma.   Dx:   -Awaiting biopsy pathologic report  -Awaiting culture data: BCx x2, tissue cultures from operation, UCx (after antibiotics)  Tx:  -Monitor JULIAN drain output  -PT/OT evaluation and management; likely to need TCU placement; NWB RLE  -Orthopedic consultation; appreciate recommendations. Plan for follow up with Dr. Wilkinson in 2 weeks.  -Pain control with PO oxycodone 5-10mg Q3H in postoperative period.  -Advance diet as tolerated    # Hypoxic, hypercarbic respiratory failure: Post operatively requiring increased oxygen and BiPAP. CXR w/ pulmonary vascular congestion. Hgb stable. EKG w/ sinus tachycardia. Likely multifactorial in setting of large volume IVF for hypercalcemia as above, anesthesia, narcotic medications, obesity hypoventilation.    Dx:   -Follow serial ABGs for improvement in ventilation with use of BiPAP  -Volume management as above  -Continuous pulse oximetry, telemetry  Tx:  -BiPAP as needed  -Hold opiates/opioids and use NSAID therapy for pain  -Oxygen therapy to maintain saturations > 90%    # Hypomagnesemia, Hypophosphatemia:    - Check daily Mg, Phos  - Monitor and replace per protocol     # UTI: UA form OSH 10/24/18 w/ > 100 WBC, LE, and many bacteria. Received Zosyn prior to transfer to [facility] 10/25/18. No UC from OSH per Care everywhere review.  - Ceftriaxone 1g daily for presumed UTI  - UA/UC      # FRANSISCO: PTA BiPAP.   - Resume nightly once  respiratory status improved.     # HTN: BP stable on admission. PTA enalapril, lopressor.   - Continue lopressor with hold parameters SBP less than 100 or HR less than 60  - Enalapril has been held in perioperative period; resume as able.     # HFpEF, hx of Cor Pulmonale:  ECHO 4/2018 w/ EF 60%, normal systolic function, NRWMA, normal RV function, Grade I diastolic dysfunction, mild left atrial enlargement, PA pressure is 7 mmHg plus RAP (normal). Per OSH report- dramatic change in RV size and contractility, no pulmonary HTN compared to previous study 2/2018. ECHO on admission EF 60-65%, normal LV function, mild RV dilation, IVC normal w/ preserved respiratory variation. No pericardial effusion. RAP 3 mmHg. PTA lasix, lopressor.   - Continue metoprolol  - Daily weights  - Volume management as above      Diet: Fluid restriction 2000 ML FLUID  Regular Diet Adult  Fluids: IVF with NS @100cc/hr  Bravo Catheter: in place, indication: Strict 1-2 Hour I&O    DVT Prophylaxis: Pneumatic Compression Devices and ASA 325mg daily  Code Status: Full Code    Expected discharge: 4 - 7 days, recommended to transitional care unit once adequate pain management/ tolerating PO medications, antibiotic plan established and respiratory function stable.    The patient's care was discussed with the Bedside Nurse and Patient.    Sotero Berumen MD  Internal Medicine Staff Hospitalist Service  [facility]  Pager: [phone]  Please see sticky note for cross cover information    Interval History   Transferred overnight. This morning, feels somewhat better, but remains fatigued and short of breath. Requests to change her code status to FULL CODE after discussion.      Data reviewed today: I reviewed all medications, new labs and imaging results over the last 24 hours. I personally reviewed no images or EKG's today.    Physical Exam   Vital Signs: Temp: 98  F (36.7  C) Temp src: Oral BP: 129/77 Pulse: 108 Heart Rate: 90 Resp: 16  SpO2: 94 % O2 Device: BiPAP/CPAP Oxygen Delivery: 6 LPM  Weight: 310 lbs 13.58 oz  Constitutional: Obese female lying in bed. Fatigued appearing. NAD. Able to answer simple questions.  Head: Normocephalic and atraumatic.   Eyes: Conjunctivae are normal. Pupils are equal, round, and reactive to light.  Pharynx has no erythema or exudate, mucous membranes are mdry  Neck:   No adenopathy, no bony tenderness  Cardiovascular: RRR without murmurs or gallops  Pulmonary/Chest: Difficult exam d/t body habitus. Bilateral rales.  with no wheezes or retractions.  GI: Obese soft with good bowel sounds.  Non-tender, non-distended, with no guarding, no rebound, no peritoneal signs.   Back:  No bony or CVA tenderness   Musculoskeletal:  RLE wrapped in ACE bandage. JULIAN drain present with serosanguinous drainage. Patient moves both LE and UE extremities   Skin: Skin is warm and dry. No rash noted to exposed skin areas.        Data     Recent Labs  Lab 10/27/18  1127 10/27/18  0447 10/26/18  2249 10/26/18  1656 10/26/18  1240  10/26/18  0600  10/25/18  2113   WBC  --  12.8* 15.3* 10.8  --   --  15.1*  --  17.0*   HGB  --  9.5* 9.6* 9.6*  --   --  10.4*  --  10.6*   MCV  --  112* 110* 107*  --   --  105*  --  105*   PLT  --  250 233 228  --   --  267  --  260   INR  --   --   --   --   --   --  1.32*  --   --    NA  --  138 137  --  139  < >  --   --  136   POTASSIUM  --  4.7 4.4  --  4.1  < >  --   --  4.4   CHLORIDE  --  105 106  --  103  < >  --   --  102   CO2  --  26 26  --  29  < >  --   --  26   BUN  --  20 18  --  17  < >  --   --  26   CR  --  0.83 0.79  --  0.70  < >  --   --  0.87   ANIONGAP  --  7 6  --  7  < >  --   --  8   DEANDRE 11.8* 11.6* 11.8*  --  12.5*  < >  --   < > 13.2*   GLC  --  126* 129*  --  129*  < >  --   --  123*   ALBUMIN  --   --  2.3*  --   --   --   --   --  2.6*   PROTTOTAL  --   --  6.9  --   --   --   --   --  7.5   BILITOTAL  --   --  0.4  --   --   --   --   --  0.5   ALKPHOS  --   --  154*  --   --    --   --   --  176*   ALT  --   --  26  --   --   --   --   --  26   AST  --   --  37  --   --   --   --   --  27   < > = values in this interval not displayed.    Recent Results (from the past 24 hour(s))   XR Surgery REJI Fluoro L/T 5 Min    Narrative    This exam was marked as non-reportable because it will not be read by a   radiologist or a [facility] non-radiologist provider.             XR Chest Port 1 View    Narrative    Exam: XR CHEST PORT 1 VW, 10/27/2018 12:49 AM    Indication: hypoxia;     Comparison: None available    Findings:   AP view of the chest. Heart size is enlarged. Prominent main pulmonary  artery. Perihilar opacities. Mild patchy bibasilar opacities. No  pleural effusion or pneumothorax. Upper abdomen is unremarkable.      Impression    Impression:   1. Pulmonary vascular congestion.  2. Prominent main pulmonary artery, suggestive of pulmonary  hypertension.  3. Bibasilar opacities, likely atelectasis.    I have personally reviewed the examination and initial interpretation  and I agree with the findings.    KIMI REINA MD     Medications     sodium chloride 100 mL/hr at 10/27/18 0802       acetaminophen  975 mg Oral Q8H     aspirin  81 mg Oral BID     calcitonin  400 Units Subcutaneous Q12H     cefTRIAXone  1 g Intravenous Q24H     furosemide  20 mg Intravenous Q8H     metoprolol tartrate  100 mg Oral BID     senna-docusate  1 tablet Oral BID    Or     senna-docusate  2 tablet Oral BID     sodium chloride (PF)  3 mL Intracatheter Q8H

## 2018-10-27 NOTE — PROGRESS NOTES
"Endocrine Consult Follow Up   Patient: Mayra Stokes   MRN: [id number]  Date of Service: 10/27/2018    Subjective:  Mayra had surgery last night. She has some pain but feels it is getting better controlled. She denies excessive thirst or urination, no NV, no constipation.     Medications:  Current Facility-Administered Medications   Medication     acetaminophen (TYLENOL) tablet 975 mg     aspirin EC tablet 81 mg     calcitonin (MIACALCIN) injection 400 Units     cefTRIAXone (ROCEPHIN) 1 g vial to attach to  mL bag for ADULTS or NS 50 mL bag for PEDS     furosemide (LASIX) injection 20 mg     ketorolac (TORADOL) injection 30 mg     lidocaine (LMX4) cream     lidocaine 1 % 1 mL     magnesium sulfate 4 g in 100 mL sterile water (premade)     melatonin tablet 1 mg     metoprolol tartrate (LOPRESSOR) tablet 100 mg     naloxone (NARCAN) injection 0.1-0.4 mg     naloxone (NARCAN) injection 0.1-0.4 mg     ondansetron (ZOFRAN-ODT) ODT tab 4 mg    Or     ondansetron (ZOFRAN) injection 4 mg     polyethylene glycol (MIRALAX/GLYCOLAX) Packet 17 g     potassium phosphate 15 mmol in D5W 250 mL intermittent infusion     potassium phosphate 20 mmol in D5W 250 mL intermittent infusion     potassium phosphate 20 mmol in D5W 500 mL intermittent infusion     potassium phosphate 25 mmol in D5W 500 mL intermittent infusion     senna-docusate (SENOKOT-S;PERICOLACE) 8.6-50 MG per tablet 1 tablet    Or     senna-docusate (SENOKOT-S;PERICOLACE) 8.6-50 MG per tablet 2 tablet     sodium chloride (PF) 0.9% PF flush 3 mL     sodium chloride (PF) 0.9% PF flush 3 mL     sodium chloride 0.9% infusion         Physical Examination:  Blood pressure 129/77, pulse 108, temperature 98  F (36.7  C), temperature source Oral, resp. rate 16, height 1.651 m (5' 5\"), weight 141 kg (310 lb 13.6 oz), SpO2 94 %.  GEN: Awake, fatigued, BIPAP on, no distress.  HEENT: NCAT.  RESP: non labored breathing  ABD: Soft, non-tender, and non-distended, with normal BS. "   SKIN: Normal skin temperature and turgor with no lesions.   NEURO: alert, interactive    Labs:   Most Recent 3 BMP's:  Recent Labs   Lab Test  10/27/18   1127  10/27/18   0447  10/26/18   2249  10/26/18   1240   NA   --   138  137  139   POTASSIUM   --   4.7  4.4  4.1   CHLORIDE   --   105  106  103   CO2   --   26  26  29   BUN   --   20  18  17   CR   --   0.83  0.79  0.70   ANIONGAP   --   7  6  7   DEANDRE  11.8*  11.6*  11.8*  12.5*   GLC   --   126*  129*  129*   DATA REVIEW     10/26/2018  PTH <7  Ca 12.5  25 oh vit D pending  1,25 oh vit D - pending  PTHrP pending  Vitamin A level pending   Free k/l light chains pending      Assessment:  Mayra Stokes is a 63 year old female with h/o diastolic heart failure, pulmonary HTN, morbid obesity, hypercalcemia who was referred from OSH for R femoral fracture management. Endocrine was consulted for hypercalcemia management.      #Hypercalcemia, non-PTH related  Ca in 12-14.8 range, dx in 9/2018, seen by heme/onc in [facility]. Had extensive w/u which showed low PTH, Low 25-OH vitamin D, low Phos, elevated vitamin A, mildly elevated PTHrP 2.9 (<2), lytic lesion at R femur which was biopsied and the result was normal, and right 4th rib which also biopsied but result pending. Bone marrow biopsy was normal cellular. SPEP,UPEP did not show monoclonal. Light chain showed elevated IgE only with elevated protein.      The w/u so far is most consistent with hypercalcemic of malignancy, non-PTH related due to low PTH appropriate response. Could be PTHrP related, or myeloma? But the so far myeloma w/u has been not impressive. Vitamin A toxicity could cause hypercalcemia, but she denies taking vitamin A, unclear why the level is high.      She was treated with Calcitonin and Pamidronate 60 mg x2 on 9/18, 10/8/2018. He calcium continues to be high, this indicates resistance to bisphosphonate. There are number of case reports and case series of Denosumab for management of hypercalcemia  of malignancy, particularly in patient with persistent hypercalcemia despite bisphosphonate.     She is s/p surgical intervention on her fracture, and ortho feels that underlying cause of hypercalcemia may have been removed. They are hesitant to start denosumab at this point.      - Appreciate heme/onc input  - appreciate Ortho input  - Continue IVF and lasix, Calcitonin for 24-48 hours  - will consider Denosumab 120mg subcutaneous if persistent elevation of calcium despite IVF, lasix and calcitonin, and surgical intervention  - Check calcium q8hr.  - Pending result: PTHrP, Vitamin D, 1,25 OH vitamin D, vit A, light chains, bone path       Geri Zaldivar MD  Endocrinology and Diabetes Fellow,[id number]  [phone]    --- Addendum----  I saw the patient with endocrine fellow Dr. Zaldivar and directly examined patient and discussed. Agree above note and plan.     Christiana Baptiste MD  Staff Physician  Endocrinology and Metabolism  [facility]  License: [id number]  Pager: [phone]

## 2018-10-27 NOTE — CONSULTS
"[facility], [place]   Hematology/Oncology Consult Note    Mayra Stokes MRN# [id number]   Age: 63 year old YOB: 1955          Reason for Consult:   \"hypercalcemia, pathologic femur fracture\"         Assessment and Plan:   Mayra Stokes is a 63 year old F with PMH of morbid obesity s/p gastric banding procedure, cor pulmonale/diastolic CHF, osteoarthritis presenting as a transfer from [facility] due to pathologic fracture.  She was initially noted to have hypercalcemia on pre-op evaluation 9/2018 and was referred to oncologist, Dr. Granados, for further workup.  Extensive workup by Dr. Granados was thus far unrevealing.  Workup for hypercalcemia included CT CAP without contrast notable for R 4th rib lytic lesion that was biopsies (metastatic squamous cell carcinoma per path report in [facility]).  Kidneys were noted to be unremarkable, but on [facility] oncology review there may be a L renal lesion that would warrant repeat imaging with CT contrast.  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  Bone marrow biopsy was completed 9/28/18 with normal immunophenotyping results, no monotypic B-cell population or increased blast population, no monotypic plasma cell population.  Plasma cell FISH was unable to be completed due to insufficient number of cells.  She did have a parotid mass that has been present for years per her report, but CT was concerning for possible parotid neoplasms, absent additional lymphadenopathy.  Protein electrophoresis was unremarkable for monoclonal protein, immunoglobulin light chains unremarkable, serologies notable for elevated IgE only, PB smear unremarkable, urine electrophoresis with elevated total protein only.      She is now s/p repair by orthopedics for pathologic fracture of distal R femur with curettage of bone and " biopsy.  Femur XR was notable for radiolucent area of bone destruction around femur, near site of prior CT guided biopsy.  Pathology is pending currently, but will likely provide insight into location of primary malignancy.      In the mean time, she has shown to be refractory to IVF, diuretics, and pamindronate x2 doses for her hypercalcemia, all given prior to transfer.  Calcium total was high as 14.8 prior to transfer and was downtrending since admission at [facility] and administration of denosumab.  There is extensive workup for other causes of hypercalcemia, including PTH, vitamin D fractions pending.      At this point, the primary malignancy remains unclear.  Evaluation of pathologic fx site should be revealing and additional imaging of CAP with contrast given normal renal function will assist with completion of staging.  Additionally, calcium is slowly trending down, but will need to be monitored and follow up on pending labs for remaining hypercalcemia workup.      Summary of Recommendations:    CT CAP with contrast when able to do so to evaluate for primary malignancy source of squamous cell carcinoma (bx confirmed from R rib).   OK to delay a day or two for renal recovery post-op.      Follow bone pathology results.      Agree with endocrinology recommendation of denosumab for refractory hypercalcemia.     Follow pending hypercalcemia workup labs.     Will need to obtain slides for pathology review at [facility].     Thank you for involving us in the care of this patient. We will continue to follow during the hospitalization.    Patient was seen and plan of care developed with Dr. Myers.    Lucian Bales MD/PhD  Heme/Onc Fellow, PGY4  10/27/2018  [phone]    I independently examined this patient and my assessment is in agreement with the above note.  I reviewed all tests and past medical history and my evaluation agrees with the findings in the note.  This is a fairly complicated case.  Plan as above; we may  "need a PET at some point also.     Teo Myers M.D.  Professor  Hematology, Oncology and Transplantation             History of Present Illness:   History obtained from chart review and confirmed with patient and family member at bedside.      Mayra Stokes is a 63 year old F with PMH of obesity s/p gastric banding procedure, cor pulmonale, osteoarthritis, presenting for new pathologic fracture of R femur distal shaft s/p biopsy and curettage and repair.  Pt initially presented for pre-op evaluation for TKA 9/2018 and was noted to have marked hypercalcemia.  Referred to oncology for further workup.  Extensive workup has thus far come up without primary source as noted in assessment above.  She fell and suffered R femur fracture on 10/24, which was deemed to be pathologic fracture and was transferred to [facility] for repair and further workup of malignancy.  She underwent surgical repair the afternoon of 10/26 and was transferred to the [facility] due to medical complexity.    On initial consultation, she notes she has not been feeling well since 2/2017, but otherwise did not note specific symptoms.  She did say she was overall feeling better 8/2018 prior to workup for R knee TKA.  She did endorse b/l knee pain at that time, likely secondary to osteoarthritis.  She did note R thigh pain as well and has chronic low back pain that has been mostly stable.  She denies fevers, chills, night sweats, nausea, vomiting, CP, SOB, abdominal pain.  She does note decreased appetite over the past few months and fatigue.  Denies symptoms of bleeding or bruising, no hematochezia, melena, hematuria.  She has lost weight s/p gastric banding procedure.  She notes post-op pain in R leg, but denies new numbness/tingling or focal weakness.  She states parotid mass has \"been present for years\" and has not changed in size or become painful.  She denies new lumps/bumps/LAD.  She denies other complaints.         Review of Systems:   A " "comprehensive ROS was performed with the patient and was found to be negative with the exception of that noted in the HPI above.       Past Medical History:     Past Medical History:   Diagnosis Date     Chronic cor pulmonale (H)      Chronic diastolic heart failure (H)      DM type 2 (diabetes mellitus, type 2) (H)     diet controlled     Hypercalcemia 09/2018     Hypertension      Mass of left side of neck     since ~2003     Morbid obesity (H)      Obstructive sleep apnea      Pathologic fracture of femur (H) 10/24/2018            Past Surgical History:     Past Surgical History:   Procedure Laterality Date     LAP ADJUSTABLE GASTRIC BAND       SHOULDER SURGERY Left     shoulder replacement            Social History:     Former smoker 1/4 PPD x20+ years.  She is unsure how long she smoked.   No EtOH.    No recreational drugs.   No OTC supplements.            Family History:   She notes several family members with cancer.  One family member with colon cancer in \"old age.\"  She does not think any family members had cancer before age 50, but isn't entirely sure.  No family history of bleeding or clotting disorders.          Allergies:   No Known Allergies         Medications:     Prescriptions Prior to Admission   Medication Sig Dispense Refill Last Dose     ASPIRIN PO Take 81 mg by mouth daily        calcitonin, salmon, (MIACALCIN) 200 UNIT/ACT nasal spray Spray 1 spray into one nostril alternating nostrils daily Alternate nostril each day.        ENALAPRIL MALEATE PO Take 5 mg by mouth daily         Furosemide (LASIX PO) Take 40 mg by mouth 2 times daily        Metoprolol Tartrate (LOPRESSOR PO) Take 100 mg by mouth 2 times daily        TiZANidine HCl (ZANAFLEX PO) Take 4 mg by mouth daily as needed for muscle spasms               Physical Exam:   /77 (BP Location: Left arm)  Pulse 108  Temp 97.4  F (36.3  C) (Axillary)  Resp 16  Ht 1.651 m (5' 5\")  Wt 141 kg (310 lb 13.6 oz)  SpO2 95%  BMI 51.73 " kg/m2  Vitals:    10/26/18 0500 10/27/18 0447   Weight: 149.7 kg (330 lb) 141 kg (310 lb 13.6 oz)     General: obese, sitting in bed, in no acute distress.  Heme/Lymph: No overt bleeding. No cervical or supraclavicular adenopathy.  Skin: No concerning lesions, rash, jaundice, cyanosis, erythema, or ecchymoses on exposed surfaces.  R leg with surgical dressing, unable to inspect   HEENT: NCAT. EOMI, anicteric sclera. Oral mucosa pink and moist with no lesions or thrush.  Respiratory: Non-labored breathing on face mask, reduced air exchange, no wheezing or rhonchi bilaterally  Cardiovascular: Regular rate and rhythm.   Gastrointestinal: Normoactive bowel sounds. Abdomen soft, non-distended, and non-tender. No palpable masses or organomegaly.  Extremities: RLE in surgical wrap, LLE with 1+ edema.  B/l toes WWP to touch.   Neurologic: A&O x 3, speech normal, symmetric facies, linear thought process.           Data:   I have personally reviewed the following labs/imaging:  CBC  Recent Labs  Lab 10/27/18  0447 10/26/18  2249 10/26/18  1656 10/26/18  0600   WBC 12.8* 15.3* 10.8 15.1*   RBC 2.96* 2.98* 3.01* 3.24*   HGB 9.5* 9.6* 9.6* 10.4*   HCT 33.2* 32.8* 32.1* 34.1*   * 110* 107* 105*   MCH 32.1 32.2 31.9 32.1   MCHC 28.6* 29.3* 29.9* 30.5*   RDW 13.3 13.3 12.8 12.7    233 228 267     CMP  Recent Labs  Lab 10/27/18  0447 10/26/18  2249 10/26/18  1240 10/26/18  0715  10/25/18  2113    137 139 138  --  136   POTASSIUM 4.7 4.4 4.1 4.4  --  4.4   CHLORIDE 105 106 103 103  --  102   CO2 26 26 29 28  --  26   ANIONGAP 7 6 7 7  --  8   * 129* 129* 128*  --  123*   BUN 20 18 17 19  --  26   CR 0.83 0.79 0.70 0.76  --  0.87   GFRESTIMATED 70 74 84 76  --  66   GFRESTBLACK 84 89 >90 >90  --  80   DEANDRE 11.6* 11.8* 12.5* 12.5*  < > 13.2*   MAG  --  1.4*  --   --   --  1.6   PHOS  --  2.1*  --   --   --  2.0*   PROTTOTAL  --  6.9  --   --   --  7.5   ALBUMIN  --  2.3*  --   --   --  2.6*   BILITOTAL  --   0.4  --   --   --  0.5   ALKPHOS  --  154*  --   --   --  176*   AST  --  37  --   --   --  27   ALT  --  26  --   --   --  26   < > = values in this interval not displayed.  INR  Recent Labs  Lab 10/26/18  0600   INR 1.32*     Labs from [facility] prior to transfer:   BMBx flow 9/28/18  NOTE)  Bone marrow, flow cytometric immunophenotyping:    Normal immunophenotyping results.  No monotypic B-cell  population or increase in blasts identified.  No monotypic  plasma cell population is identified.    Urine electrophoresis 9/28/18  IMPRESSION   (NOTE)  All fractions present, no apparent M-spike.    See Immunofixation.     COLLECTION DURATION 24   Comment:    Unit: h  CORRECTED ON 09/24 AT 1401: PREVIOUSLY REPORTED AS: 24     VOLUME 4000   Comment:    Unit: mL  CORRECTED ON 09/24 AT 1401: PREVIOUSLY REPORTED AS: 4000     CONCENTRATION 16   Comment:    Unit: mg/dL     UR TOTAL PROTEIN 640 (H)   Comment:    Reference range: <229  Unit: mg/24 h  (NOTE)    -------------------ADDITIONAL INFORMATION-------------------  On 06/27/2017 the total protein assay method changed  resulting in approximately a 15% increase in protein  values.  CORRECTED ON 09/24 AT 1401: PREVIOUSLY REPORTED AS: 640 Reference range:   <229 Unit: mg/24 h     UR ALBUMIN 63   Comment:    Unit: %     ALPHA 1 GLOBULIN 5   Comment:    Unit: %     ALPHA 2 GLOBULIN 8   Comment:    Unit: %     BETA GLOBULIN 11   Comment:    Unit: %     GAMMA GLOBULIN 12   Comment:    Unit: %     A/G RATIO 1.74     IMMUNOELP   No monoclonal protein detected.  Comment:    (NOTE)      PB smear 9/17/18  (NOTE)  Normal erythrocyte, platelet, and leukocyte morphology.  Serologies 9/17/18  Component Name  9/17/2018     171 IgA   1394 IgG   164 IgM   2 IgD   141.0 (H) IgE     Urine Ig light chains 9/12/18  KAPPA TOTAL LIGHT CHAIN, U <0.9000   Comment:    Reference range: <0.9000  Unit: mg/dL  (NOTE)    -------------------ADDITIONAL INFORMATION-------------------  This test was developed  and its performance characteristics  determined by [facility] in a manner consistent with  CLIA requirements. This test has not been cleared or  approved by the U.S. Food and Drug Administration.     LAMBDA TOATL LIGHT CHAIN, U <0.7000   Comment:    Reference range: <0.7000  Unit: mg/dL  (NOTE)    -------------------ADDITIONAL INFORMATION-------------------  This test was developed and its performance characteristics  determined by [facility] in a manner consistent with  CLIA requirements. This test has not been cleared or  approved by the U.S. Food and Drug Administration.     KAPPA/LAMBDA TLC RATIO, U   Reference range: 0.7000 to 6.20  Comment:    (NOTE)  Ratio not calculated because the Total Kappa and Total  Lambda values are less than the reportable range.    Test Performed by:  [address] [address]       Monoclonal protein study 9/12/18  PROTEIN, TOTAL 8.0 (H)   Comment:    Reference range: 6.3  to  7.9  Unit: g/dL     ALBUMIN 3.6   Comment:    Reference range: 3.4 to 4.7  Unit: g/dL     ALPHA 1 GLOBULIN 0.4 (H)   Comment:    Reference range: 0.1 to 0.3  Unit: g/dL     ALPHA 2 GLOBULIN 1.3 (H)   Comment:    Reference range: 0.6 to 1.0  Unit: g/dL     BETA GLOBULIN 1.1   Comment:    Reference range: 0.7 to 1.2  Unit: g/dL     GAMMA GLOBULIN 1.6   Comment:    Reference range: 0.6 to 1.6  Unit: g/dL     A/G RATIO 0.83     IMPRESSION   (NOTE)  No apparent monoclonal protein on serum electrophoresis.    See Immunofixation.     IMMUNOFIXATION SERUM   No monoclonal protein detected.  Comment:      XR bone survey 9/17/18  FINDINGS:  AP chest, AP and lateral cervical, lumbar, and thoracic spine, swimmer's  lateral thoracic spine, lateral skull, AP bilateral humeri, AP bilateral  forearm, AP bilateral femur, AP bilateral tibia-fibula, and AP pelvis are  submitted for interpretation.        CHEST: Streaky density is seen extending superolaterally  from both upper shiv.  Appearance has improved and this likely represent scarring from previous  pneumonia.  Prominent degenerative changes seen at the right AC joint.  There  are no lytic or blastic lesions.        SKULL:   There are no lytic or blastic lesions.        CERVICAL SPINE: Multilevel degenerative change and osteopenia.  There are no  lytic or blastic lesions.        THORACIC SPINE: Multilevel degenerative change and osteopenia.  There are no  lytic or blastic lesions.        LUMBAR SPINE: Multilevel degenerative change and osteopenia.  There is grade 1  anterolisthesis of L3 on L4.  Multilevel facet hypertrophy is noted.  There are  no lytic or blastic lesions.        ABDOMEN/PELVIS: Lap band is incompletely imaged.  Respiratory motion limits  assessment.  There are no lytic or blastic lesions.        UPPER EXTREMITIES: Degenerative change at the right AC joint.  Total left  shoulder arthroplasty noted.  There are no lytic or blastic lesions.        LOWER EXTREMITIES: Significant degenerative changes seen in both knees.  This  is been recently evaluated.  There is a moth-eaten appearance to the distal  right femoral diaphysis with some cortical involvement particularly medially.  This likely represents plasmacytoma and even in retrospect cannot be visualized  on the previous knee films.  Smaller lesions are seen in the mid femur.  Diffuse edema seen at both ankles.        IMPRESSION:  Mild the appearance the distal right femoral diaphysis with some cortical  involvement medially.  This corresponds with plasmacytoma.    Osteopenia with multilevel degenerative change.    Diffuse edema around both ankles.    Lap band incompletely imaged.    Scarring in the upper lungs.    CT Neck/nasopharynx 9/18/18    FINDINGS:  A marker is placed in the upper left neck in the area of concern.  Deep to the  marker, there is a soft tissue mass associated with the left parotid tail  measuring approximately 2.9 x 2.2 x 3.2  cm in size (image 55 series 2, image 47  series 12).  There is some parotid tissue seen extending along the anterior and  lateral margin of the mass, and this likely is arising from the parotid tail.  There is a punctate calcification along the posterior superior margin of this  mass.  There is a vein along the medial margin of the mass.  The right parotid,  bilateral submandibular, and thyroid glands are unremarkable in appearance on  this noncontrast study.  There is streak artifact within the lower neck and  upper chest.  No pathologically enlarged lymph nodes are seen within the neck.  No focal fluid collection.  There is streak artifact from prior dental  restorations.  On these noncontrast images, the oral cavity, oropharynx,  nasopharynx, and larynx are unremarkable in appearance.  Trace atherosclerotic  calcifications are seen.  Review of the osseous structures demonstrate  degenerative changes of the spine, most pronounced at C4-5 through C6-C7.  No  focal destructive osseous lesions are seen.  Paranasal sinuses are without  significant mucosal thickening and no air-fluid levels are present.  Mastoid  air cells are clear.  There is trace soft tissue density within the left  external auditory canal, presumably cerumen.  Evaluation of the visualized  portions the lungs demonstrates respiratory motion and scarring in the left  upper lobe.  No apical pneumothorax.  Trace atherosclerotic calcifications.    IMPRESSION:  1. 3.2 cm mass arising from the left parotid tail corresponding to the palpable  abnormality.  Differential considerations would include a primary parotid  neoplasm, both benign and malignant, abnormal lymph node, or metastatic disease.  2. No lymphadenopathy or masses elsewhere within the neck.    Marrow/smear 10/4/18    PERIPHERAL BLOOD SMEAR VALUES:  WBC 8.0 x 103/ul; RBC 3.55 x 106/ul ;  hemoglobin 12.1 g/dl; hematocrit 36.9 % .0 fl; MCH 34.1 pg; MCHC  32.8 g/dl; RDW 14.4%; platelets  235 x 103/ul; MPV 8.1 fl.  Differential:  25% neutrophils; 13% lymphocytes; 9% monocytes; 3% eosinophils; and 0%  basophils.      PERIPHERAL BLOOD SMEAR MORPHOLOGY, HISTOGRAM DISTRIBUTIONS AND SCATTERPLOT:  Red blood cells are decreased overall and macrocytic. No rouleaux formation  is identified.  Leukocytes are normal in overall number with relative and absolute  lymphopenia. Distribution is otherwise within normal limits with no  dyspoietic leukocytes present. Platelets are normal in number and  morphology.      BONE MARROW ASPIRATE, TOUCH PREPARATIONS AND BUFFY COAT SMEAR:  The  aspirate appears somewhat hemodilute and aspiculate.  However, no  morphologic dysplasia of erythroid and myeloid precursors identified.  Megakaryocytes are morphologically normal in number and morphology. No  increased or morphologically abnormal plasma cells are identified. Blasts  are not increased. Differential: 49% neutrophils (segmented and bands); 21%  myeloid precursors (myelocytes/metamyelocytes/promyelocyts), 20%  lymphocytes; 6% monocytes; 2% eosinophils; 0% plasma cells, 2% blasts, and  0% basophils. Nucleated red cells: 32. M:E ratio: 2.1      BONE MARROW CORE BIOPSY AND PARTICLE BLOCK:  The core biopsy shows marked  aspirate artifact and fragments of normocellular marrow (overall 30%) with  trilineage hematopoiesis.  immunostain shows no evidence of overall  increased plasma cells or abnormal clusters of plasma cells.      SPECIMEN ADEQUACY:  Suboptimal.      IMMUNOPHENOTYPING:  No monotypic B-cell population or increase in blasts  identified.  No monotypic  plasma cell population is identified (See separate flow cytometry report  for additional details, performed at [facility]).      Blasts:  Not increased by CD45/side scatter and CD34.  B-cells:  No monotypic; normal expression pattern of CD19, CD10, surface  kappa and lambda.  T-cells/NK-cells:  No aberrant phenotype by CD3 and CD16.  Plasma cells:  No  "monotypic; normal expression pattern of CD19, CD38, CD45,  , and cytoplasmic kappa/lambda.      PLASMA CELL PROLIFERATIVE FISH STUDIES: Quantity Not Sufficient.  An insufficient number of plasma cells was available for this FISH test.  This result does not exclude the presence of a plasma cell proliferative  disorder (See separate FISH report, performed at [facility]).              IRON STAINS:  Iron stains were performed on the core biopsy specimen and an  aspirate smear.  Storage iron appears mildly increased. No increased  sideroblast iron is identified.      Immunostaining for  performed on block A-1 highlights scatter positive  cells, supportive of the diagnosis.  These stains were ordered after  examination of the HE slides and were necessary to achieve the above  diagnosis.    R femur biopsy 9/28/18  RIGHT FEMUR, BIOPSY       --FRAGMENTS OF BENIGN BONE WITH THICKENED TRABECULAE,         CONSISTENT WITH HYPERCALCEMIA       --NO EVIDENCE OF ATYPICAL PLASMA CELLS      GROSS:  The specimen in a container labeled \"CT needle biopsy right femur  lesion\" consists of multiple cores of tissue ranging from 0.2 cm to 1.0 cm  in length with a diameter of 0.2 cm.  There is a piece that is going to be  decalcified and put in A-2 and then all the other pieces in A-1.    CT CAP w/o contrast 10/8/18      Heart size is normal.  No pericardial effusion.  Dilation of the main pulmonary  artery up to 4.5 cm.  The aorta great vessels are normal in course and caliber.  Thyroid is not well visualized.  No pathologic appearing mediastinal or hilar  lymph nodes.  No pleural effusion or pneumothorax.  No focal airspace disease.  Bilateral upper lobe linear opacities likely related to prior scarring.  No  discrete pulmonary nodules.        Abdomen/Pelvis        Limited evaluation of the abdominal viscera without intravenous contrast and  due to patient body habitus.  The liver is unremarkable.  Gallbladder " is  dilated.  Pancreas, spleen, and adrenal glands are normal.  Kidneys are  unremarkable, no hydronephrosis or hydroureter.  Bladder is decompressed.  Uterus and adnexa grossly unremarkable.  Colon, appendix, and small bowel are  normal.  No free fluid or free air.  No abdominal or pelvic adenopathy.  Gastric band in place.  Nonaneurysmal atherosclerosis of the abdominal  vasculature.        Bones/soft tissues: Expansile lytic lesion involving the right anterior 4th rib  (series 3, image 48).  Otherwise osseous structures and soft tissues are  unremarkable.    IMPRESSION:  1. Single expansile lytic right 4th rib lesion of indeterminate etiology.  2. Dilated main pulmonary artery suggesting pulmonary hypertension.  3. Bilateral upper lobe scarring likely related to prior infectious process.  Focal wall    R 4th rib bx 10/25/18    MICROSCOPIC EXAMINATION:  Performed.  Histologic sessions reveal the  presence of microscopic foci of metastatic carcinoma that is by morphology  and immunohistochemistry consistent with a squamous cell carcinoma.  This  carcinoma could have originated in an occult mucosal site in the upper  respiratory tract or from the parotid gland; however, other sites cannot be  excluded.  Clinical and radiologic correlation is recommended.  Immunoperoxidase stains performed on block A-1 show the tumor cells are  positive for keratin AE1/AE3 and  while negative for CDX2, CK7, CK20,  CD20, PAX8, GATA3, TTF-1, ER, and ME.  Immunoperoxidase stains performed at  [facility] reveal that the tumor cells are positive for p40 and negative  for hepatocyte, SOX10 and SF1.  This immunoprofile supports the above  Diagnosis.    XR R femur 10/24/18    FINDINGS:  Mildly displaced fracture through the distal 3rd femur diaphysis extending  through moth-eaten bone lesion in that measures approximately 15 cm in length.  This lesion is likely metastatic deposit given patient's age and appearance.  Oncologic workup to  evaluate for any potential primary suggested.  At some  point, PET-CT may be of benefit to evaluate for any primary malignancy,  consider lung primary.  Multiple myeloma deposit is a consideration.    IMPRESSION:  1. Displaced pathologic fracture of the distal femur diaphysis.

## 2018-10-27 NOTE — PLAN OF CARE
"Problem: Patient Care Overview  Goal: Plan of Care/Patient Progress Review  Outcome: No Change  /77 (BP Location: Left arm)  Pulse 108  Temp 97.4  F (36.3  C) (Axillary)  Resp 16  Ht 1.651 m (5' 5\")  Wt 141 kg (310 lb 13.6 oz)  SpO2 95%  BMI 51.73 kg/m2    Shift: 0255-6559  Neuro: Disoriented to place at beginning of shift, oriented x 4 this AM. Lethargic, sometimes sleeps through cares. Able to arouse and answer questions.    Cardiac: ST/ST. BP stable, no s/sx of bleeding or compartment syndrome   Respiratory: 02 needs increased to 10L oxy mask at 0000. Hospital BiPAP placed with 60% Fi02 to maintain sats. Able to titrate down to 40% Fi02.   GI/: Bravo inplace for diuresis and strict 1-2 hour output. No BM, BS hypoactive, no flatus overnight.   Diet/Appetite: CLD with 1000cc FR. However pt c/o mouth being very dry and drank approx 400cc with pills. Provider Andrea aware and will revaluate FR when AM team comes.   Activity: NWB to RLE. Turn and repo as pt allows. Mostly microturns alternating to L side and back.   Pain: PRN oxy given x 1. Pt offered ice packs but decline. Turn and repo as allowed to assist with pain management.   Skin: 2 RN skin assessment done with Sah[facility] GRIDER. Some blanchable redness to coccyx noted as well as redness in panus and skin fold in groin. Inter dry applied and pr encouraged to turn and repo.   LDA's: PIV with MIVF. JULIAN to suction.   Other: Phos currently infusing. Will need Mg replaced. Lactic acid 0.7. Blood cultures drawn and ABG collected.    Plan: Plan to monitor per POC and notify provider with changed.         "

## 2018-10-28 NOTE — PROGRESS NOTES
" 10/27/18 1100   Quick Adds   Type of Visit Initial PT Evaluation      Language english   Living Environment   Lives With alone   Living Arrangements mobile home   Home Accessibility bed and bath on same level;stairs to enter home;tub/shower is not walk in;grab bars present (bathtub)   Number of Stairs to Enter Home 4   Number of Stairs Within Home 0   Living Environment Comment Pt reporting living in mobile home with CHRISTINE and tub shower inside. Pt reporting she retired ~Nov of 2017. Pt has a friend Lora that can help her if needed.    Self-Care   Dominant Hand right   Usual Activity Tolerance moderate   Current Activity Tolerance poor   Regular Exercise no   Equipment Currently Used at Home wheelchair, manual;shower chair;walker, rolling   Activity/Exercise/Self-Care Comment Pt reporting she has a shower chair but doesnt use because it is not a tub bench (which is what she really needs per report). She also has a 4WW and manual WC available.    Functional Level Prior   Ambulation 1-->assistive equipment   Transferring 1-->assistive equipment   Toileting 0-->independent   Bathing 0-->independent   Dressing 0-->independent   Fall history within last six months yes   Number of times patient has fallen within last six months 1   Which of the above functional risks had a recent onset or change? ambulation;transferring;toileting;bathing;dressing;fall history   Prior Functional Level Comment Pt reporting she was IND in all mobility until spring of this year when she was hosptilized with sepsis and went to a \"nursing home\" for a few weeks to recover. She was using FWW at the SNF, but then was able to progress her mobility back to IND. However, since July, she has had increased L knee pain and has been using a 4WW for mobility since then. She reports she was preparing for a L total knee replacement, but fell this week exiting the shower. She reports her LEs have become generally weaker and her mobility has been " declining. She reports that she has been still IND in ADLs at home.    General Information   Onset of Illness/Injury or Date of Surgery - Date 10/26/18  (date of PT orders)   Referring Physician Mary Damon MD   Patient/Family Goals Statement to get back to life   Pertinent History of Current Problem (include personal factors and/or comorbidities that impact the POC) Pt is a 63 year old female with PMH including FRANSISCO, HTN, DM II, CHF and pulmonary HTN transferred from an OSH after sustaining a R distal femoral shaft pathologic fracture now s/p R distal femur biopsy, tumor curettage and excision, and ORIF on 10/26 with Dr. Wilkinson. - per fellow note   Precautions/Limitations fall precautions   Weight-Bearing Status - RLE nonweight-bearing  (knee ROM as tolerated)   General Observations Pt sleeping in bed with HOB slighly elevated, R LE wrapped in ACE bandage from foot to groin, PIV, oxymask delivering 6L O2   General Info Comments Activity: up with assist. NWB. R knee ROM as tolerated.    Cognitive Status Examination   Orientation orientation to person, place and time   Level of Consciousness alert   Follows Commands and Answers Questions 100% of the time;able to follow multistep instructions   Personal Safety and Judgment intact   Memory intact   Cognitive Comment Pt slightly disoriented to exact location, but able to state in a hospital in [place] - pt has changed hospitals multiple times since fall   Pain Assessment   Patient Currently in Pain (RN managing)   Integumentary/Edema   Integumentary/Edema Comments Difficult to assess on R LE secondary to ACE bandage wrapping. L LE 1-2+ pitting. B UE with edema but non pitting. Pt reports she does not wear compression stockings at baseline.    Posture    Posture Forward head position;Protracted shoulders   Range of Motion (ROM)   ROM Comment R hip to ~30 deg flex, R knee to ~30 deg flex, R knee lacking ~5 deg from extension. L LE WFL and limited most by soft  "tissue.    Strength   Strength Comments B UE 5/5 grossly. Hip flex on L 3-/5, hip flex on R 2/5. Knee ext on R 3/5, on L 4/5. Knee flex on R 3+/5, on L 4/5. Ankle DF 4/5 on R, 5/5 on L   Bed Mobility   Bed Mobility Comments Max A for rolling with HOB elevated and use of railings.    Transfer Skills   Transfer Comments Not appropriate to assess today.   Gait   Gait Comments Not appropriate to assess today.    Balance   Balance Comments Poor   Sensory Examination   Sensory Perception Comments Pt reporting some tingling to R LE.    General Therapy Interventions   Planned Therapy Interventions ADL retraining;balance training;bed mobility training;gait training;neuromuscular re-education;strengthening;ROM;stretching;transfer training;risk factor education;wheelchair management/propulsion training;home program guidelines;progressive activity/exercise   Clinical Impression   Criteria for Skilled Therapeutic Intervention yes, treatment indicated   PT Diagnosis Impaired functional mobility   Influenced by the following impairments Impaired strength, posture, ROM, activity tolerance, balance   Functional limitations due to impairments Impaired mobility, limiting return to community at PLOF   Clinical Presentation Evolving/Changing   Clinical Presentation Rationale PMhx, curretn medical managementand O2 needs, home set up, social support, PLOF, current mobility, post op precautions   Clinical Decision Making (Complexity) Moderate complexity   Therapy Frequency` 5 times/week   Predicted Duration of Therapy Intervention (days/wks) 2 weeks   Anticipated Equipment Needs at Discharge (TBD pending discharge location. )   Anticipated Discharge Disposition Transitional Care Facility   Risk & Benefits of therapy have been explained Yes   Patient, Family & other staff in agreement with plan of care Yes   [facility] AM-PAC TM \"6 Clicks\"   2016, Trustees of [facility], under license to [employer].  All rights reserved.   6 " "Clicks Short Forms Basic Mobility Inpatient Short Form   [facility] AM-PAC  \"6 Clicks\" V.2 Basic Mobility Inpatient Short Form   1. Turning from your back to your side while in a flat bed without using bedrails? 2 - A Lot   2. Moving from lying on your back to sitting on the side of a flat bed without using bedrails? 2 - A Lot   3. Moving to and from a bed to a chair (including a wheelchair)? 1 - Total   4. Standing up from a chair using your arms (e.g., wheelchair, or bedside chair)? 1 - Total   5. To walk in hospital room? 1 - Total   6. Climbing 3-5 steps with a railing? 1 - Total   Basic Mobility Raw Score (Score out of 24.Lower scores equate to lower levels of function) 8   Total Evaluation Time   Total Evaluation Time (Minutes) 9     "

## 2018-10-28 NOTE — PLAN OF CARE
PT 6B: Up with use of mechanical lift    Discharge Planner PT   Patient plan for discharge: Rehab  Current status: Engaged pt in supine B LE strengthening/ROM exercises with R knee flex tolerated to ~45 degrees and to neutral extension. Pt more lethargic this afternoon and wearing BiPap 40% FiO2.   Barriers to return to prior living situation: medical status, post op precautions, home set up  Recommendations for discharge: TCU  Rationale for recommendations: Pt is below baseline for mobility and would benefit from continued rehab to return to PLOF. Pt is usually MOD I to IND, lives alone, but has support from sister/friends. Pt is limited by weakness, impaired balance, and below baseline activity tolerance. Pt also limited by L knee pain/joint integrity impairments.        Entered by: Peri Ragsdale 10/28/2018 2:50 PM

## 2018-10-28 NOTE — PROGRESS NOTES
[facility] [place]    Internal Medicine Progress Note - Gold Service      Assessment & Plan   63F w/ FRANSISCO, HFpEF, HTN, morbid obesity, HLD, and recent dx of hypercalcemia (9/2018) w/ extensive OSH w/u now transferred for evaluation of pathologic right distal femur fracture, s/p right distal femur biopsy, tumor curettage/excision and ORIF on 10/26/18, with ongoing management for hypercalcemia and workup of source.     # Hypercalcemia: Developed in 9/2018 with ranges of 12-15, higher when considering hypoalbuminemia. Has had extensive workup for source outside of our system. This has included low PTH, low 25OH-VD, low phosphorous, elevated vitamin A, elevated PTHrP. SPEP/UPEP did not note a monoclonal spike, and light chain analysis showed increased serum free light chains with a normal ratio (normal urine light chain analysis). CT C/A/P without contrast - no evidence of malignancy, though lytic right fourth rib lesion. Biopsy of these R femur was negative, biopsy of the rib notable for a squamous cell carcinoma, and biopsy of the bone marrow with reportedly normal cellularity. 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  She was treated with pamidronate 9/18/18 and 10/8/18, as well as calcitonin (spray)   Ca on admission here 13.2, higher when corrected for hypoalbuminemia. S/p Calcitonin 600 units IM at 0730 10/26/18.  Etiology of hypercalcemia remains unclear.  Dx:  - Follow up pathology report from femur biopsy on 10/26 performed by orthopedics.  - CT CAP with contrast per discussion with hematology; plan to obtain today.  - ENT consult related to parotid mass, as no known primary malignancy at present.   - Monitor calcium/iCal levels every 8 hours to assess effect of below therapeutics   - Daily comprehensive metabolic panel, CBC  - Strict intake and output  - Awaiting workup including PTHrP, 25OH -Vit D, 1,25OH-VD, Vit A  - Appreciate orthopedics, endocrinology and  oncology consultation recommendations.  Tx:  - Lasix 30mg IV lasix every 8 hours  - Continue NS @ 175cc/hr; can adjust based on respiratory function and calcium levels  - Will discuss dose today with denosumab 120mg  - Continue calcitonin 400U Q12H x24 hours.        Pathologic Right distal femur fracture s/p distal femur bx, tumor curettage and excision, ORIF POD#2: Patient suffered mechanical fall 10/24/18. Presented to OSH ED w/ imaging consistent w/ pathologic fracture of right distal femur. Transferred to [facility] for further orthopedic evaluation. Now s/p ORIF and tumor curettage/excision. Tolerated procedure well. EBL 300ml. Intraoperative pathology impression of poorly differentiated metastatic carcinoma.   Dx:   -Awaiting biopsy pathologic report  -Awaiting culture data: BCx x2, tissue cultures from operation  Tx:  -Monitor JULIAN drain output  -PT/OT evaluation and management; likely to need TCU placement; NWB RLE  -Orthopedic consultation; appreciate recommendations and assistance with management.   -Pain control with torodol.  -Advance diet as tolerated     # Hypoxic, hypercarbic respiratory failure: Post operatively requiring increased oxygen and BiPAP. CXR w/ pulmonary vascular congestion. Hgb stable. EKG w/ sinus tachycardia. Likely multifactorial in setting of large volume IVF for hypercalcemia as above, anesthesia, narcotic medications, obesity hypoventilation.    Dx:   -Follow serial gases for improvement in ventilation with use of BiPAP  -Volume management as above  -Continuous pulse oximetry, telemetry  Tx:  -BiPAP as needed  -Hold opiates/opioids and use NSAID therapy for pain  -Oxygen therapy to maintain saturations > 90%     # Hypomagnesemia, Hypophosphatemia:    - Check daily Mg, Phos  - Monitor and replace per protocol      # UTI: UA form OSH 10/24/18 w/ > 100 WBC, LE, and many bacteria. Received Zosyn prior to transfer to [facility] 10/25/18. UC notable for pan-sensitive E. Coli.  -  Ceftriaxone 1g daily for 3 day course, to end today    # FRANSISCO: PTA BiPAP, used infrequently due to trouble with mask.   - Resume nightly once respiratory status improved.     # HTN: BP stable on admission. PTA enalapril, lopressor.   - Continue lopressor with hold parameters SBP less than 100 or HR less than 60  - Enalapril has been held in perioperative period; resume as able.      # HFpEF, hx of Cor Pulmonale:  ECHO 4/2018 w/ EF 60%, normal systolic function, NRWMA, normal RV function, Grade I diastolic dysfunction, mild left atrial enlargement, PA pressure is 7 mmHg plus RAP (normal). Per OSH report- dramatic change in RV size and contractility, no pulmonary HTN compared to previous study 2/2018. ECHO on admission EF 60-65%, normal LV function, mild RV dilation, IVC normal w/ preserved respiratory variation. No pericardial effusion. RAP 3 mmHg. PTA lasix, lopressor.   - Continue metoprolol  - Daily weights  - Volume management as above       Diet: Low Calcium Diet  Fluids: NS @ 175cc/hr  Bravo Catheter: in place, indication: Strict 1-2 Hour I&O    DVT Prophylaxis:  BID  Code Status: Full Code    Expected discharge: 4 - 7 days, recommended to transitional care unit once adequate pain management/ tolerating PO medications, safe disposition plan/ TCU bed available and completed workup for hypercalcemia and stable outpatient management plan.    The patient's care was discussed with the Bedside Nurse, Patient and Patient's Family.    Sotero Berumen MD  Internal Medicine Staff Hospitalist Service  [facility]  Pager: [phone]  Please see sticky note for cross cover information    Interval History   More awake this morning, interested in discussing treatment plan.     Data reviewed today: I reviewed all medications, new labs and imaging results over the last 24 hours. I personally reviewed no images or EKG's today.    Physical Exam   Vital Signs: Temp: 98  F (36.7  C) Temp src: Axillary BP:  142/82 Pulse: 96 Heart Rate: 98 Resp: 18 SpO2: 94 % O2 Device: BiPAP/CPAP Oxygen Delivery: 5 LPM  Weight: 310 lbs 13.58 oz  Constitutional: NAD, lying in bed and conversant.   Head: Normocephalic and atraumatic.   Eyes: Conjunctivae are normal. Pupils are equal, round, and reactive to light.  Pharynx has no erythema or exudate, mucous membranes are mdry  Cardiovascular: RRR without murmurs or gallops  Pulmonary/Chest: Difficult exam d/t body habitus. Bilateral rales.  with no wheezes or retractions.  GI: soft with good bowel sounds.  Non-tender, non-distended, with no guarding, no rebound, no peritoneal signs.   Back:  No bony or CVA tenderness   Musculoskeletal:  RLE wrapped in ACE bandage.   Skin: Skin is warm and dry. No rash noted to exposed skin areas.          Data     Recent Labs  Lab 10/28/18  0405 10/27/18  1909 10/27/18  1127 10/27/18  0447 10/26/18  2249  10/26/18  0600   WBC 14.1*  --   --  12.8* 15.3*  < > 15.1*   HGB 8.9*  --   --  9.5* 9.6*  < > 10.4*   *  --   --  112* 110*  < > 105*     --   --  250 233  < > 267   INR  --   --   --   --   --   --  1.32*     --   --  138 137  < >  --    POTASSIUM 4.4  --   --  4.7 4.4  < >  --    CHLORIDE 103  --   --  105 106  < >  --    CO2 27  --   --  26 26  < >  --    BUN 15  --   --  20 18  < >  --    CR 0.71  --   --  0.83 0.79  < >  --    ANIONGAP 7  --   --  7 6  < >  --    DEANDRE 12.8* 12.5* 11.8* 11.6* 11.8*  < >  --    *  --   --  126* 129*  < >  --    ALBUMIN 2.3*  --   --   --  2.3*  --   --    PROTTOTAL 6.7*  --   --   --  6.9  --   --    BILITOTAL 0.3  --   --   --  0.4  --   --    ALKPHOS 149  --   --   --  154*  --   --    ALT 26  --   --   --  26  --   --    AST 31  --   --   --  37  --   --    < > = values in this interval not displayed.    Recent Results (from the past 24 hour(s))   XR Femur Right 2 Views    Narrative    EXAMINATION: XR FEMUR RT 2 VW  10/27/2018 2:34 PM     CLINICAL HISTORY:  post op;      COMPARISON: MRI  dated 10/26/2018 was included for correlation    FINDINGS: Postsurgical changes of ORIF of a pathological fracture of  the right femur this lateral plate and screw fixation. The plate  extends from the proximal right femur to the lateral epicondyle about  the knee joint. There is near-anatomic alignment, the hardware is  intact, a surgical drain is in place. Lucent lesion that correlates  with the lesion seen on prior MRI is noted.      Impression    IMPRESSION: Postsurgical changes of ORIF of the femoral pathological  fracture. Near-anatomic alignment.    JUTTA ELLERMANN, MD     Medications     sodium chloride 175 mL/hr at 10/28/18 0840       acetaminophen  975 mg Oral Q8H     aspirin  325 mg Oral BID     furosemide  30 mg Intravenous Q8H     iopamidol  135 mL Intravenous Once     metoprolol tartrate  100 mg Oral BID     omeprazole  20 mg Oral QAM AC     senna-docusate  1 tablet Oral BID    Or     senna-docusate  2 tablet Oral BID     sodium chloride (PF)  3 mL Intracatheter Q8H     sodium chloride (PF)  84 mL Intravenous Once

## 2018-10-28 NOTE — PROGRESS NOTES
Medicine cross cover note -    Ca uptrending.  Will increase NS to 175 ccs/hr, lasix to 30 mg q8h.

## 2018-10-28 NOTE — PROGRESS NOTES
Orthopaedic Surgery Progress Note:        Subjective:   Patient resting in bed. Leg pain ;'not too bad' today compared to yesterday  Did not get out of best yesterday  Ongoing management dilemma of recalcitrant hyercalcaemia noted, Endo input, etc  Total Ca remains high this AM (11.8) no ionized level back as yet  Note N/S fluid increase rate yesterday    Objective:   Dressings dry  Neurological intact         Assessment & Plan:   Going well  Int med primary team, thank you for ongoing management  denosumab is a reasonable idea and will assist with any localised osteoclastic activity in tumour region  XR femur at some point

## 2018-10-28 NOTE — PROGRESS NOTES
"Endocrine Consult Follow Up   Patient: Mayra Stokes   MRN: [id number]  Date of Service: 10/28/18    Subjective:  Mayra reports feeling ok today, denies significant pain. On BIPAP or CPAP this am. Denies questions.      Medications:  Current Facility-Administered Medications   Medication     acetaminophen (TYLENOL) tablet 975 mg     aspirin EC tablet 325 mg     furosemide (LASIX) injection 30 mg     ketorolac (TORADOL) injection 30 mg     lidocaine (LMX4) cream     lidocaine 1 % 1 mL     magnesium sulfate 4 g in 100 mL sterile water (premade)     melatonin tablet 1 mg     metoprolol tartrate (LOPRESSOR) tablet 100 mg     naloxone (NARCAN) injection 0.1-0.4 mg     omeprazole (priLOSEC) CR capsule 20 mg     ondansetron (ZOFRAN-ODT) ODT tab 4 mg    Or     ondansetron (ZOFRAN) injection 4 mg     polyethylene glycol (MIRALAX/GLYCOLAX) Packet 17 g     potassium phosphate 15 mmol in D5W 250 mL intermittent infusion     potassium phosphate 20 mmol in D5W 250 mL intermittent infusion     potassium phosphate 20 mmol in D5W 500 mL intermittent infusion     potassium phosphate 25 mmol in D5W 500 mL intermittent infusion     senna-docusate (SENOKOT-S;PERICOLACE) 8.6-50 MG per tablet 1 tablet    Or     senna-docusate (SENOKOT-S;PERICOLACE) 8.6-50 MG per tablet 2 tablet     sodium chloride (PF) 0.9% PF flush 3 mL     sodium chloride (PF) 0.9% PF flush 3 mL     sodium chloride 0.9% infusion         Physical Examination:  Blood pressure 135/75, pulse 96, temperature 98.1  F (36.7  C), temperature source Oral, resp. rate 16, height 1.651 m (5' 5\"), weight 141 kg (310 lb 13.6 oz), SpO2 95 %.  GEN: Awake, fatigued, BIPAP on, no distress.  HEENT: NCAT.  RESP: non labored breathing  ABD: Soft, non-tender, and non-distended, with normal BS.   SKIN: Normal skin temperature and turgor with no lesions.   NEURO: alert, interactive    Labs:   Most Recent 3 BMP's:  Recent Labs   Lab Test  10/28/18   0405  10/27/18   1909  10/27/18   1127  " 10/27/18   0447  10/26/18   2249   NA  137   --    --   138  137   POTASSIUM  4.4   --    --   4.7  4.4   CHLORIDE  103   --    --   105  106   CO2  27   --    --   26  26   BUN  15   --    --   20  18   CR  0.71   --    --   0.83  0.79   ANIONGAP  7   --    --   7  6   DEANDRE  12.8*  12.5*  11.8*  11.6*  11.8*   GLC  121*   --    --   126*  129*   DATA REVIEW     10/26/2018  PTH <7  Ca 12.5  25 oh vit D pending  1,25 oh vit D - pending  PTHrP pending  Vitamin A level pending   Free k/l light chains pending    Path [facility] femur:   PRELIMINARY   INTRAOPERATIVE DIAGNOSIS   PRELIMINARY INTRAOPERATIVE FROZEN SECTION DIAGNOSIS:   Right femur tumor:   - Metastatic carcinoma, poorly differentiated.    Path care everywhere RIB lesion  DIAGNOSIS:  BONE, RIGHT FOURTH RIB LESION, CORE BIOPSY       --METASTATIC SQUAMOUS CELL CARCINOMA    Assessment:  Mayra Stokes is a 63 year old female with h/o diastolic heart failure, pulmonary HTN, morbid obesity, hypercalcemia who was referred from OSH for R femoral fracture management, now known to be pathologic associated with metastatic carcinoma, undifferentiated. Endocrine was consulted for hypercalcemia management.      #humoral hypercalcemia of malignancy   -s/p calcitonin as OP  -s/p pamidronate x2 as OP  -s/p aggressive fluids after admission  -s/p calcitonin subcutaneous as IP  -s/p lasix as IP  -Calcium decreased to 11s, but is slowly rising back to 12s, corrected even higher. Pt denies symptoms associated with hypercalcemia at this point.   -s/p ORIF and bx of femur fracture - c/w metastatic carcinoma, undifferentiated on prelim, OSH path returned as squamous cell carcinoma  -ortho would like to defer treatment with denosumab in order to avoid suppression of natural bone turnover and healing after surgery.   -pt continues to be hypercalcemic, but overall stable. Will likely continue to be hypercalcemic and expect patient will need denosumab eventually, however will defer this decision  to ortho and hem/onc given underlying cause.         Geri Zaldivar MD  Endocrinology and Diabetes Fellow,[id number]  [phone]    --- Addendum----  I saw the patient with -endocrine fellow Dr. Zaldivar and directly examined patient and discussed. Agree above note and plan.     Christiana Baptiste MD  Staff Physician  Endocrinology and Metabolism  [facility]  License: [id number]  Pager: [phone]

## 2018-10-28 NOTE — PROGRESS NOTES
- Dr. Nolan Engle (Orthopaedic Fellow)     Discussed with Dr Wilkinson  Ortho would prefer  Not use Denusomab at this point, re: potential interference with fracture healing    Thanks

## 2018-10-28 NOTE — PLAN OF CARE
Problem: Fracture Orthopaedic (Adult)  Goal: Signs and Symptoms of Listed Potential Problems Will be Absent, Minimized or Managed (Fracture Orthopaedic)  Signs and symptoms of listed potential problems will be absent, minimized or managed by discharge/transition of care (reference Fracture Orthopaedic (Adult) CPG).     Pt A&Ox4. VSS. Pt on oxiplus and NC at 6-7L and took breaks on bipap at 40% throughout the day. Pain tolerable, toradol given x1. Scant drainage from JULINA drain. CMS+ and good pulses to extremities. Bravo patent with good UOP. Flatus +. Phos replaced per protocol. Pt down for CT and tolerated well on oxiplus mask.  MIVF at 175ml/hr. Family member, Magui, at bedside and very helpful with cares. Will cont to closely monitor.

## 2018-10-28 NOTE — PLAN OF CARE
Problem: Patient Care Overview  Goal: Plan of Care/Patient Progress Review  Neuro: A&Ox4. Was awake most of the evening and had visited with her cousin .   Cardiac: SR. VSS.   Respiratory: Sating 90-94 on 5 LPM by oxi-plus or BiPAP 40%. Pt desats to low 80's when attempt to wean her O2.   GI/: hickey patent draining adequate urine output. Bowel sounds continue to be hypo , no Bm at this shift. Pt denied nausea or vomiting.   Diet/appetite: Tolerating regular diet. Eating well.  Activity:  Assist of 2 to turn and reposition .  Pain: At acceptable level on current regimen.   Skin: No new deficits noted.  LDA's: PIV patent and infusing .   Plan: Continue with POC. Notify primary team with changes.

## 2018-10-28 NOTE — PLAN OF CARE
"Problem: Patient Care Overview  Goal: Plan of Care/Patient Progress Review  Outcome: No Change  /82 (BP Location: Left arm)  Pulse 96  Temp 98  F (36.7  C) (Axillary)  Resp 18  Ht 1.651 m (5' 5\")  Wt 141 kg (310 lb 13.6 oz)  SpO2 94%  BMI 51.73 kg/m2    Shift: 0914-5630  Neuro: A/Ox4, numbness or tingling in extremities.   Cardiac: SR/ST. VSS. +2 pulses in BLE  Respiratory: On BiPAP overnight, requires 40% Fi02 to maintain sats. Able to take pills on 6L nasal cannula.   GI/: Bravo patent. BS hypoactive. No BM overnight.   Diet/Appetite: Low Ca diet. Tolerating well.   Activity: Turn and repo in bed q2h and as tolerated.   Pain: Controlled with scheduled tylenol and repositioning.   Skin: No new deficits noted.   LDA's: PIV infusing MIVF, JULIAN to bulb suction, Bravo.   Other: Will need phos replacement this AM.   Plan: Will continue to monitor per POC.         "

## 2018-10-28 NOTE — CONSULTS
"Otolaryngology Consult Note  October 28, 2018    CC: Left parotid mass    HPI: Mayra Stokes is a 63 year old female with a past medical history of FRANSISCO, HFpEF, HTN who is admitted for a pathologic femur fracture and evaluation hypercalcemia of malignancy. She was found to have a lytic lesion of the right 4th rib that was biopsied and found to be SCC. There is no known primary. ENT is consulted for evaluation of a left parotid mass.    Mrs. Stokes reports that she first noticed a mass in her left cheek 14 or 15 years ago. She was evaluated by her PCP who told her that because it was mobile, it was likely benign. Mrs. Stokes says she thinks she was told it was a collection of fat. Her PCP has kept a close eye on the mass and checked it at each visit. Mrs. Stokes reports that the mass has not grown in size, changed in shape or changed in consistency. She has had no overlying skin changes or ulceration. The mass is not painful. She has had no facial weakness, otalgia, otorrhea, dysphagia, odynophagia, or voice changes. She has not noticed any other neck masses.    PMH: FRANSISCO, HFpEF, HTN, DM2    PSH: no head or neck surgery    SH:  -Smoking: smoked 1 pack per week for 30 years. Quit last year  -Alcohol: occasional    ROS: 12 point review of systems is negative unless noted in HPI.    PHYSICAL EXAM:  /75 (BP Location: Left arm)  Pulse 96  Temp 98.1  F (36.7  C) (Oral)  Resp 16  Ht 1.651 m (5' 5\")  Wt 141 kg (310 lb 13.6 oz)  SpO2 95%  BMI 51.73 kg/m2    Gen: Sitting up in bed, alert, oriented  Resp: Nonlabored breathing on room air, no wheezing or stridor. Voice is strong  Ears: No otorrhea  Nose: No active rhinorrhea  Mouth: MMM, tongue and uvula midline. No ulcers, plaques or masses in the oral cavity or oropharynx  Face: 3cm smooth, round, mobile mass postero-inferior to the angle of the left mandible. No overlying ulceration or skin changes. Non-tender to palpation. No fluctuance.   Neck: No palpable " "LAD  Neuro: HB1/6 bilaterally. V1/V2/V3 intact bilaterally  Eyes: EOMI with no nystagmus    ROUTINE IP LABS (Last four results)  BMP  Recent Labs  Lab 10/28/18  1149 10/28/18  0405 10/27/18  1909 10/27/18  1127 10/27/18  0447 10/26/18  2249 10/26/18  1240   NA  --  137  --   --  138 137 139   POTASSIUM  --  4.4  --   --  4.7 4.4 4.1   CHLORIDE  --  103  --   --  105 106 103   DEANDRE 12.2* 12.8* 12.5* 11.8* 11.6* 11.8* 12.5*   CO2  --  27  --   --  26 26 29   BUN  --  15  --   --  20 18 17   CR  --  0.71  --   --  0.83 0.79 0.70   GLC  --  121*  --   --  126* 129* 129*     CBC  Recent Labs  Lab 10/28/18  0405 10/27/18  0447 10/26/18  2249 10/26/18  1656   WBC 14.1* 12.8* 15.3* 10.8   RBC 2.80* 2.96* 2.98* 3.01*   HGB 8.9* 9.5* 9.6* 9.6*   HCT 31.3* 33.2* 32.8* 32.1*   * 112* 110* 107*   MCH 31.8 32.1 32.2 31.9   MCHC 28.4* 28.6* 29.3* 29.9*   RDW 13.2 13.3 13.3 12.8    250 233 228     INR  Recent Labs  Lab 10/26/18  0600   INR 1.32*       Imaging:  CT Neck/nasopharynx 9/18/18  \"IMPRESSION:  1. 3.2 cm mass arising from the left parotid tail corresponding to the palpable abnormality. Differential considerations would include a primary parotid neoplasm, both benign and malignant, abnormal lymph node, or metastatic disease.  2. No lymphadenopathy or masses elsewhere within the neck.\"    Assessment and Plan  Mayra Stokes is a 63 year old female with a past medical history of FRANSISCO, HFpEF, HTN who is admitted for a pathologic femur fracture and evaluation hypercalcemia of malignancy. She was found to have a lytic lesion of the right 4th rib that was biopsied and found to be SCC. There is no known primary. ENT is consulted for evaluation of a left parotid mass, which she has had for 14-15 years. Given that the mass has not changed in size, shape, or consistency, or become painful, it is unlikely to represent malignancy. Given its long-term stability, it may represent a pleiomorphic adenoma or a Warthin's tumor; both " are benign but do have the potential for malignant transformation.    - US-guided FNA of the left parotid mass (ordered for you)  - Rest of care per primary team    Patient discussed with Dr. Richi Lopez MD  Otolaryngology-Head & Neck Surgery PGY2  Please contact ENT with questions by dialing [phone] and entering job code [phone] when prompted.

## 2018-10-29 NOTE — PROGRESS NOTES
[facility], [place]    Internal Medicine Progress Note - Gold Service    Changes today:  - Start Lasix gtt @5mL/hr  - Denosumab 120mg for hypercalcemia that is worsening despite IVF, calcitonin, bisphosphonate  - Start cardiac telemetry   - Ortho removed JULIAN drain today    Assessment & Plan   63F w/ FRANSISCO, HFpEF, HTN, morbid obesity, HLD, and recent dx of hypercalcemia (9/2018) w/ extensive OSH w/u now transferred for evaluation of pathologic right distal femur fracture, s/p right distal femur biopsy, tumor curettage/excision and ORIF on 10/26/18, with ongoing management for hypercalcemia and workup of source.     # Hypercalcemia: Developed in 9/2018 with ranges of 12-15, higher adjusting for albumin. Workup so far: low PTH, low 25OH-VD, low phosphorous, elevated vitamin A, elevated PTHrP. SPEP/UPEP did note a monoclonal spike, and light chain analysis showed increased serum free light chains with a normal ratio (normal urine light chain analysis). CT C/A/P without contrast - no evidence of malignancy, though lytic right fourth rib lesion. Biopsy of these R femur was negative, biopsy of the rib notable for a squamous cell carcinoma, and biopsy of the bone marrow with reportedly normal cellularity. 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  She was treated with pamidronate 9/18/18 and 10/8/18, as well as calcitonin (spray)   Ca on admission here 13.2, higher when corrected for hypoalbuminemia. S/p Calcitonin 10/26, 10/27, and 10/28.  Etiology of hypercalcemia remains unclear, though likely malignant cause. Some ectopy noted today on med/surg telemetry, prompting concern due to continually rising calcium despite all intervention. Discussed with orthopedics-- we will give denosumab today knowing that it may impair bone healing, as we have run low on options, and the patient's hypercalcemia is worsening despite our best efforts.   Dx:  - Follow up pathology report from femur  biopsy on 10/26 performed by orthopedics.   - Prelim: metastatic carcinoma, poorly differentiated  - ENT consulted regarding L parotid mass   - FNA biopsy today   - Monitor calcium/iCal levels every 8 hours to assess effect of below therapeutics   - Daily comprehensive metabolic panel, CBC   - Strict intake and output  - Awaiting workup including PTHrP, 25OH -Vit D, 1,25OH-VD, Vit A  - Appreciate orthopedics, endocrinology and oncology consultation recommendations.  Tx:  - Start lasix gtt @ 5mg/hr  - Continue NS @ 175cc/hr; can adjust based on respiratory function and calcium levels  - Denosumab 120mg today     Pathologic Right distal femur fracture s/p distal femur bx, tumor curettage and excision, ORIF POD#2: Patient suffered mechanical fall 10/24/18. Presented to OSH ED w/ imaging consistent w/ pathologic fracture of right distal femur. Transferred to [facility] for further orthopedic evaluation. Now s/p ORIF and tumor curettage/excision on 10/26. Tolerated procedure well. EBL 300ml. Intraoperative pathology impression of poorly differentiated metastatic carcinoma.   Dx:   -Awaiting biopsy pathologic report   - Prelim shows metastatic carcinoma, poorly differentiated  Tx:  -Ortho removed JULIAN drain today  -PT/OT evaluation and management; needs TCU placement; NWB RLE  -Orthopedic consultation; appreciate recommendations and assistance with management.   -Pain control with ibuprofen     # Hypoxic, hypercarbic respiratory failure: Post operatively requiring increased oxygen and BiPAP. CXR w/ pulmonary vascular congestion. Hgb stable. EKG w/ sinus tachycardia. Likely multifactorial in setting of large volume IVF for hypercalcemia as above, anesthesia, narcotic medications, obesity hypoventilation. Now improved, on nasal cannula while awake and BiPap only while asleep.   Dx:   -Volume management as above  -Continuous pulse oximetry, telemetry  Tx:  -BiPAP as needed and while asleep  -Hold opiates/opioids and use  NSAID therapy for pain  -Oxygen therapy to maintain saturations > 90%     # Hypomagnesemia, Hypophosphatemia:    - Check daily Mg, Phos  - Monitor and replace per protocol      # UTI: UA form OSH 10/24/18 w/ > 100 WBC, LE, and many bacteria. Received Zosyn prior to transfer to [facility] 10/25/18. UC notable for pan-sensitive E. Coli. S/p ceftriaxone x3 days (10/26-10/28).    # FRANSISCO: PTA BiPAP, used infrequently due to trouble with mask.   - Continue BiPap while asleep    # HTN: BP stable on admission. PTA enalapril, lopressor.   - Continue lopressor with hold parameters SBP less than 100 or HR less than 60  - Enalapril has been held in perioperative period; resume as able.      # HFpEF, hx of Cor Pulmonale:  ECHO 4/2018 w/ EF 60%, normal systolic function, NRWMA, normal RV function, Grade I diastolic dysfunction, mild left atrial enlargement, PA pressure is 7 mmHg plus RAP (normal). Per OSH report- dramatic change in RV size and contractility, no pulmonary HTN compared to previous study 2/2018. ECHO on admission EF 60-65%, normal LV function, mild RV dilation, IVC normal w/ preserved respiratory variation. No pericardial effusion. RAP 3 mmHg. PTA lasix, lopressor.   - Continue metoprolol  - Daily weights  - Volume management as above       Diet: Bariatric Diet Regular  Fluids: NS @ 175cc/hr  Bravo Catheter: in place, indication: Strict 1-2 Hour I&O    DVT Prophylaxis:  BID  Code Status: Full Code    Expected discharge: 4 - 7 days, recommended to transitional care unit once adequate pain management/ tolerating PO medications, safe disposition plan/ TCU bed available and completed workup for hypercalcemia and stable outpatient management plan.    Patient staffed with Dr. Berumen.    MD Damaris Snider 4  [facility]  Pager: [phone]  Please see sticky note for cross cover information    Interval History   No acute events over night. Patient reports that her pain is well-controlled and  "that she isn't really having any discomfort, except with movement. Her drain was taken out of her R leg this morning. Ms. Stokes is amenable to FNA biopsy of L parotid mass today, states that \"we'll figure it out\" and that \"what will happen will happen.\"     Physical Exam   Vital Signs: Temp: 98.7  F (37.1  C) Temp src: Oral BP: (!) 155/92 Pulse: 88 Heart Rate: 99 Resp: 20 SpO2: 95 % O2 Device: Nasal cannula Oxygen Delivery: 4 LPM  Weight: 310 lbs 13.58 oz  Constitutional: NAD, lying in bed and conversant.   Head: Normocephalic and atraumatic.   Eyes: Conjunctivae are normal. Pupils are equal, round, and reactive to light.  Pharynx has no erythema or exudate, mucous membranes are mdry  Cardiovascular: RRR without murmurs or gallops  Pulmonary/Chest: Difficult exam d/t body habitus. Bilateral rales.  with no wheezes or retractions.  GI: soft with good bowel sounds.  Non-tender, non-distended, with no guarding, no rebound, no peritoneal signs.   Back:  No bony or CVA tenderness   Musculoskeletal:  RLE wrapped in ACE bandage.   Skin: Skin is warm and dry. No rash noted to exposed skin areas.        "

## 2018-10-29 NOTE — PROGRESS NOTES
[facility], [place]    Internal Medicine Progress Note - Gold Service    Changes today:  - Start Lasix gtt @5mL/hr  - Denosumab 120mg for hypercalcemia that is worsening despite IVF, calcitonin, bisphosphonate  - Start cardiac telemetry   - Ortho removed JULIAN drain today    Assessment & Plan   63F w/ FRANSISCO, HFpEF, HTN, morbid obesity, HLD, and recent dx of hypercalcemia (9/2018) w/ extensive OSH w/u now transferred for evaluation of pathologic right distal femur fracture, s/p right distal femur biopsy, tumor curettage/excision and ORIF on 10/26/18, with ongoing management for hypercalcemia and workup of source.     # Hypercalcemia: Developed in 9/2018 with ranges of 12-15, higher adjusting for albumin. Workup so far: low PTH, low 25OH-VD, low phosphorous, elevated vitamin A, elevated PTHrP. SPEP/UPEP did note a monoclonal spike, and light chain analysis showed increased serum free light chains with a normal ratio (normal urine light chain analysis). CT C/A/P without contrast - no evidence of malignancy, though lytic right fourth rib lesion. Biopsy of these R femur was negative, biopsy of the rib notable for a squamous cell carcinoma, and biopsy of the bone marrow with reportedly normal cellularity. 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  She was treated with pamidronate 9/18/18 and 10/8/18, as well as calcitonin (spray)   Ca on admission here 13.2, higher when corrected for hypoalbuminemia. S/p Calcitonin 10/26, 10/27, and 10/28.  Etiology of hypercalcemia remains unclear, though likely malignant cause. Some ectopy noted today on med/surg telemetry, prompting concern due to continually rising calcium despite all intervention. Discussed with orthopedics-- we will give denosumab today knowing that it may impair bone healing, as we have run low on options, and the patient's hypercalcemia is worsening despite our best efforts.   Dx:  - Follow up pathology report from femur  biopsy on 10/26 performed by orthopedics.   - Prelim: metastatic carcinoma, poorly differentiated  - ENT consulted regarding L parotid mass   - FNA biopsy today   - Monitor calcium/iCal levels every 8 hours to assess effect of below therapeutics   - Daily comprehensive metabolic panel, CBC   - Strict intake and output  - Awaiting workup including PTHrP, 25OH -Vit D, 1,25OH-VD, Vit A  - Appreciate orthopedics, endocrinology and oncology consultation recommendations.  Tx:  - Start lasix gtt @ 5mg/hr  - Continue NS @ 175cc/hr; can adjust based on respiratory function and calcium levels  - Denosumab 120mg today     Pathologic Right distal femur fracture s/p distal femur bx, tumor curettage and excision, ORIF POD#2: Patient suffered mechanical fall 10/24/18. Presented to OSH ED w/ imaging consistent w/ pathologic fracture of right distal femur. Transferred to [facility] for further orthopedic evaluation. Now s/p ORIF and tumor curettage/excision on 10/26. Tolerated procedure well. EBL 300ml. Intraoperative pathology impression of poorly differentiated metastatic carcinoma.   Dx:   -Awaiting biopsy pathologic report   - Prelim shows metastatic carcinoma, poorly differentiated  Tx:  -Ortho removed JULIAN drain today  -PT/OT evaluation and management; needs TCU placement; NWB RLE  -Orthopedic consultation; appreciate recommendations and assistance with management.   -Pain control with ibuprofen     # Hypoxic, hypercarbic respiratory failure: Post operatively requiring increased oxygen and BiPAP. CXR w/ pulmonary vascular congestion. Hgb stable. EKG w/ sinus tachycardia. Likely multifactorial in setting of large volume IVF for hypercalcemia as above, anesthesia, narcotic medications, obesity hypoventilation. Now improved, on nasal cannula while awake and BiPap only while asleep.   Dx:   -Volume management as above  -Continuous pulse oximetry, telemetry  Tx:  -BiPAP as needed and while asleep  -Hold opiates/opioids and use  NSAID therapy for pain  -Oxygen therapy to maintain saturations > 90%     # Hypomagnesemia, Hypophosphatemia:    - Check daily Mg, Phos  - Monitor and replace per protocol      # UTI: UA form OSH 10/24/18 w/ > 100 WBC, LE, and many bacteria. Received Zosyn prior to transfer to [facility] 10/25/18. UC notable for pan-sensitive E. Coli. S/p ceftriaxone x3 days (10/26-10/28).    # FRANSISCO: PTA BiPAP, used infrequently due to trouble with mask.   - Continue BiPap while asleep    # HTN: BP stable on admission. PTA enalapril, lopressor.   - Continue lopressor with hold parameters SBP less than 100 or HR less than 60  - Enalapril has been held in perioperative period; resume as able.      # HFpEF, hx of Cor Pulmonale:  ECHO 4/2018 w/ EF 60%, normal systolic function, NRWMA, normal RV function, Grade I diastolic dysfunction, mild left atrial enlargement, PA pressure is 7 mmHg plus RAP (normal). Per OSH report- dramatic change in RV size and contractility, no pulmonary HTN compared to previous study 2/2018. ECHO on admission EF 60-65%, normal LV function, mild RV dilation, IVC normal w/ preserved respiratory variation. No pericardial effusion. RAP 3 mmHg. PTA lasix, lopressor.   - Continue metoprolol  - Daily weights  - Volume management as above       Diet: Bariatric Diet Regular  Fluids: NS @ 175cc/hr  Bravo Catheter: in place, indication: Strict 1-2 Hour I&O    DVT Prophylaxis:  BID  Code Status: Full Code    Expected discharge: 4 - 7 days, recommended to transitional care unit once adequate pain management/ tolerating PO medications, safe disposition plan/ TCU bed available and completed workup for hypercalcemia and stable outpatient management plan.    Patient staffed with Dr. Berumen.    MD Damaris Snider 4  [facility]  Pager: [phone]  Please see sticky note for cross cover information    Interval History   No acute events over night. Patient reports that her pain is well-controlled and  "that she isn't really having any discomfort, except with movement. Her drain was taken out of her R leg this morning. Ms. Stokes is amenable to FNA biopsy of L parotid mass today, states that \"we'll figure it out\" and that \"what will happen will happen.\"     Physical Exam   Vital Signs: Temp: 98.7  F (37.1  C) Temp src: Oral BP: (!) 155/92 Pulse: 88 Heart Rate: 99 Resp: 20 SpO2: 95 % O2 Device: Nasal cannula Oxygen Delivery: 4 LPM  Weight: 310 lbs 13.58 oz  Constitutional: NAD, lying in bed and conversant.   Head: Normocephalic and atraumatic.   Eyes: Conjunctivae are normal. Pupils are equal, round, and reactive to light.  Pharynx has no erythema or exudate, mucous membranes are mdry  Cardiovascular: RRR without murmurs or gallops  Pulmonary/Chest: Difficult exam d/t body habitus. Bilateral rales.  with no wheezes or retractions.  GI: soft with good bowel sounds.  Non-tender, non-distended, with no guarding, no rebound, no peritoneal signs.   Back:  No bony or CVA tenderness   Musculoskeletal:  RLE wrapped in ACE bandage.   Skin: Skin is warm and dry. No rash noted to exposed skin areas.      "

## 2018-10-29 NOTE — CONSULTS
Patient is on Neuroradiology schedule 10/29/18 for a FIne needle aspiration of left parotid mass.   Labs WNL for procedure.   Orders for NPO, scrubs  have been entered.   Consent will be done prior to procedure.   Please contact the IR charge RN at [phone] for estimated time of procedure.     Discussed with Minoo MAGANA.     Radha Salter IR RPA  [phone] Call pager  [phone] pager

## 2018-10-29 NOTE — PROGRESS NOTES
"Orthopaedic Surgery Progress Note   October 29, 2018    Subjective: No acute events overnight. Pain controlled. Has not gotten out of bed yet. Tolerating diet. Bravo catheter. +Flatus, no BM. FNA today of L parotid mass.     Objective: /86 (BP Location: Right arm)  Pulse 96  Temp 98.5  F (36.9  C) (Oral)  Resp 18  Ht 1.651 m (5' 5\")  Wt 141 kg (310 lb 13.6 oz)  SpO2 95%  BMI 51.73 kg/m2    Drain: 8 - 10 - 5 cc    General: NAD, alert and oriented, cooperative with exam.   Cardio: RRR, extremities wwp.   Respiratory: Non-labored breathing.  MSK: RLE: Toes wwp, DP 2+, bcr in all toes. +EHL/FHL/GSC/TA. SILT SP/DP/Sa/Palumbo/T. Dressing c/d/i. Drain with serosanguinous output - pulled this AM.    Labs:   WBC 15.1  Hgb 8.8  Plts 231  Surgical pathology: Metastatic carcinoma - final in process. Cxs NGTD.     Assessment and Plan: Mayra Stokes is a 63 year old female with PMH including FRANSISCO, HTN, DM II, CHF and pulmonary HTN transferred from an OSH after sustaining a R distal femoral shaft pathologic fracture now s/p R distal femur biopsy, tumor curettage and excision, and ORIF on 10/26 with Dr. Wilkinson.     Internal Medicine Primary  Hypercalcemia: Paraneoplastic syndrome. Persistent hypercalcemia likely 2/2 persistent primary tumor. Prefer not treating with Denusomab given the potential interference with fracture healing in the setting of a high risk for non-union.   Activity: Up with assist until independent. Knee ROM as tolerated.   Weight bearing status: NWB RLE.  Pain management: Per primary.    Antibiotics: Per primary. Mildred-operative completed.   Diet: Per primary. Okay for a diet from Orthopedic Surgery perspective.   DVT prophylaxis:  mg BID x 4 weeks and mechanical.  Imaging: No further imaging needed at this time.   Labs: PRN.   Bracing/Splinting: None.   Dressings: Keep clean, dry and intact x 7 days.   Elevation: Elevate RLE on pillows to keep above the level of the heart as much as possible. "   Drains: Discontinued.   Physical Therapy/Occupational Therapy: Eval and treat.  Cultures: Pending, follow culture results closely.    Follow-up: Dr. Wilkinson in 4 weeks.  Disposition: Per primary.      Mary Damon MD  Orthopaedic Surgery Resident, PGY-4  Pager: [phone]     For questions about this patient during weekday business hours, please attempt to contact me at my pager prior to contacting the Orthopaedic Surgery resident on call. On the weekends and overnight, please page the Orthopaedic Surgery resident on call. Thank you!

## 2018-10-29 NOTE — PLAN OF CARE
Problem: Patient Care Overview  Goal: Plan of Care/Patient Progress Review  Neuro: A&Ox4. Took frequent naps. Encouraged to maintate day and night cycle , light on/TV on. Pt was receptive to intervention.   Cardiac: SR. VSS.   Respiratory: Sating 94-96% , has been on 4 LPM by  NC or oxi-plus alternating with  35% BiPAP.   GI/: Bravo patent with adequate urine output. + bowel sounds and passing gas but no BM at this shift. Schedule senna given.   Diet/appetite: Tolerating 100% of  regular diet. Eating well.  Activity:  Assist of 2 and lift, to turn and reposition. Air mattress on and functioning.   Pain: At acceptable level on current regimen.   Skin: +CMS on Right leg. Ace wrap on and dressing C/D/I , no sign or symptom  of bleeding or infection noted.   LDA's: PIV patent and infusing well.   Plan: Continue with POC. Notify primary team with changes.

## 2018-10-29 NOTE — PLAN OF CARE
Problem: Patient Care Overview  Goal: Plan of Care/Patient Progress Review  Outcome: No Change  Neuro: A&Ox4.   Cardiac: SR. VSS.   Respiratory: Sating 95% on 4L NC.  Uses BiPAP at night or with sleep.  GI/: Adequate urine output via hickey, Lasix drip started.  No BM since surgery.  Diet/appetite: Tolerating Bariatric Regular diet. NPO this morning for IR neck biopsy, sleepy this afternoon.  Activity:  Mechanical lift for bed mobility.  Pain: At acceptable level on current regimen. Tylenol and Ibuprofen available.  Skin: No new deficits noted. L Neck biopsy site.  LDA's: JULIAN DC'd by MD today.  Incision with surgical dressing intact. Left PIV.      Plan: Continue with POC. Notify primary team with changes.    Problem: Diabetes Comorbidity  Goal: Diabetes  Patient comorbidity will be monitored for signs and symptoms of hyperglycemia or hypoglycemia. Problems will be absent, minimized or managed by discharge/transition of care.   Outcome: No Change  BG WNL    Problem: Chronic Respiratory Difficulty Comorbidity  Goal: Chronic Respiratory Difficulty  Patient comorbidity will be monitored for signs and symptoms of Respiratory Difficulty (Chronic) condition.  Problems will be absent, minimized or managed by discharge/transition of care.   Outcome: No Change  Pt requiring 4L NC and BiPAP at 35% over night.

## 2018-10-29 NOTE — PROVIDER NOTIFICATION
Time of notification: 8:19 PM  Provider notified: cross cover  Patient status: critical lab result of ionized Calcium 7.3 reported.  Temp:  [97.8  F (36.6  C)-99.4  F (37.4  C)] 98.1  F (36.7  C)  Heart Rate:  [] 90  Resp:  [16-20] 18  BP: (114-142)/(70-83) 131/83  FiO2 (%):  [40 %] 40 %  SpO2:  [91 %-97 %] 95 %  Orders received: No new orders at this time. Will continue to monitor.

## 2018-10-29 NOTE — PROGRESS NOTES
Patient Name: Mayra Stokes  Medical Record Number: [id number]  Today's Date: 10/29/2018    Procedure: Left parotid mass fine needle aspiration  Proceduralist: Dr. Hennessy  Procedure start time: 1105  Procedure end time: 1145  Report given to: 6B RN  Other Notes: Pt arrived to IR room 7 from . Consent reviewed. Pt denies any questions or concerns regarding procedure. Pt positioned supine and monitored per protocol. No sedation given. Pathology staff present and responsible for samples. Pt tolerated procedure without any noted complications. Pt transferred back to .    More ROSEN

## 2018-10-29 NOTE — PROGRESS NOTES
[facility], [place]     Neuroradiology Procedure Note    Image Guided Biopsy: Fine needle aspiration left parotid region mass. US guided.     10/29/2018 11:53 AM    Performed by: Damaso Park MD  Authorized by:MD Damaso Barnes MD    Pre Procedure Diagnosis: Left parotid region mass    Post Procedure Diagnosis: Same    Procedure: FNA  under US Guidance     Consent given by: Patient who states understanding of the procedure being performed after discussing the risks, benefits and alternatives.    Time Out: Prior to the start of the procedure and with procedural staff participation, I verbally confirmed the patient s identity using two indicators, relevant allergies, that the procedure was appropriate and matched the consent or emergent situation, and that the correct equipment/implants were available. Immediately prior to starting the procedure I conducted the Time Out with the procedural staff and re-confirmed the patient s name, procedure, and site/side. (The Joint Commission universal protocol was followed.)      Yes    Sedation: None. Local Anesthestic used    Procedure:    Under sterile conditions the patient was positioned lying supine. Under imaging guidance, needle entrance side was defined.  Betadine solution and sterile drapes were utilized.    7 ml of lidocaine 1% without epinephrine was administered at the biopsy entrance site.    Under imaging guidance  Two 25 gauge needles and two 22G gauge needles were advanced into the target lesion.  4 separate specimens were obtained. Pathology was on hand to confirm that the sampling was adequate for diagnosis. The biopsy needle was then removed and manual compression was applied to the skin entrance. The procedure was well tolerated. A repeat imaging of the area revealed no significant bleeding. No immediate complications were noted    Estimated Blood Loss: Minimal    Fluoroscopy Time: Not applicable    SPECIMENS:  Fine needle aspirate for cytological analysis    Complications: None    Condition: Stable Patient is transferred to Inpatient unit for post procedure observation.    Plan: Wait for the pathology result    Comments:Please see dictated note.    Damaso Park 10/29/2018 11:56 AM

## 2018-10-29 NOTE — PLAN OF CARE
"Problem: Patient Care Overview  Goal: Plan of Care/Patient Progress Review  Outcome: No Change  /75 (BP Location: Left arm)  Pulse 96  Temp 98.5  F (36.9  C) (Oral)  Resp 18  Ht 1.651 m (5' 5\")  Wt 141 kg (310 lb 13.6 oz)  SpO2 93%  BMI 51.73 kg/m2    Shift: 2730-8014  Neuro: A/Ox4, denies numbness and tingling.   Cardiac: VSS, +2 pulses in all extremities. Ionized Ca continues to be elevated this AM at 7.3.   Respiratory: On BiPAP overnight. Attempted to wean Fi02 but pt continues to require 35%. Able to tolerate 4L nasal cannula.  GI/: Hickey patent with adequate output. No BM overnight, positive flatus. BS continue to be hypoactive.   Diet/Appetite: Tolerating low Ca diet.  Activity: Turn and repo with Ax2.   Pain: Controled on current regimen.   Skin: Redness to zaire area. Inter dry between skin folds. No new deficit noted.   LDA's: PIV with MIVF, JULIAN to bulb suction, hickey.     Plan: Plan for US of neck today. Will continue to monitor per POC.       Problem: Fracture Orthopaedic (Adult)  Goal: Signs and Symptoms of Listed Potential Problems Will be Absent, Minimized or Managed (Fracture Orthopaedic)  Signs and symptoms of listed potential problems will be absent, minimized or managed by discharge/transition of care (reference Fracture Orthopaedic (Adult) CPG).   Outcome: No Change   10/29/18 0601   Fracture Orthopaedic   Problems Assessed (Orthopaedic Fracture) all   Problems Present (Orthopaedic Fracture) respiratory compromise;situational response         "

## 2018-10-29 NOTE — PROGRESS NOTES
"Endocrine Consult Follow Up   Patient: Mayra Stokes   MRN: [id number]  Date of Service: 10/29/2018    Subjective:  No acute event overnight. Had IR parotid gland biopsy today. She reported feeling better today. Willing to eat first meal of the day due to NPO AMN for biopsy. Right leg pain is better. Denies abdominal pain, constipation.     Physical Examination:  Blood pressure 162/88, pulse 88, temperature 98.5  F (36.9  C), temperature source Oral, resp. rate 12, height 1.651 m (5' 5\"), weight 141 kg (310 lb 13.6 oz), SpO2 96 %.  GEN: Awake, alert, no distress.  HEENT: EOMI.  NECK: No cervical or clavicular LAD.   CV: RRR with no murmur.   RESP: CTA bilaterally.   ABD: Soft, non-tender, and non-distended, with normal BS.   EXT: Right leg wrapped. Pitting edema 1+ both feet.   SKIN: Normal skin temperature and turgor with no lesions.   NEURO: Normoactive reflexes. No tremor.     Labs:   10/26/2018  PTH <7  Ca 12.5  25 oh vit D pending  1,25 oh vit D - pending  PTHrP pending  Vitamin A level: 0.33 (wnl)  Free k/l light chains: elevated Free Kappa light chain 13.9 (0.14-2.42), elevated free Lambda light chain 1.31 (0.02-0.67), Free K/L 10.61 (2.04-10.37)      10/29/2018  Corrected Ca 14.44      10/26/2018  Right femur tumor:  INTRAOPERATIVE DIAGNOSIS   PRELIMINARY INTRAOPERATIVE FROZEN SECTION DIAGNOSIS:   Right femur tumor:   - Metastatic carcinoma, poorly differentiated.       Assessment:   Mayra Stokes is a 63 year old female with h/o diastolic heart failure, pulmonary HTN, morbid obesity, hypercalcemia who was referred from OSH for R femoral fracture management, now known to be pathologic associated with metastatic carcinoma, undifferentiated. Endocrine was consulted for hypercalcemia management.       #humoral hypercalcemia of malignancy   -s/p calcitonin as OP  -s/p pamidronate x2 as OP  -s/p aggressive fluids after admission  -s/p calcitonin subcutaneous as IP  -s/p lasix as IP  -Calcium level was improved after " the above treatment, but rebounded. Pt denies symptoms associated with hypercalcemia at this point.   -s/p ORIF and bx of femur fracture - c/w metastatic carcinoma, undifferentiated on prelim, OSH path returned as squamous cell carcinoma.   -pt continues to be hypercalcemic, but overall stable.   - Denosumab was not given over the weekend due to concern for bone healing by ortho team.     Recommendations  - Agree with Denosumab  - Can try calcitonin 600 mg x1 dose today while waiting for Denosumab effects  - IV hydration  - Lasix only given if volume overloaded. Can cause volume contraction and worsening hypercalcemia.   - Follow up: 25 oh vit D, 1,25 oh vit D, PTHrP       Patient was seen and discussed with .    Ahmet Bonilla MD  Endocrine fellow  [phone]    I saw and examined the patient on 10/29/2018 with Endocrine fellow and agree with the above documentation and plan of care.     Mandie Hermosillo MD  Staff Physician  Endocrinology and Metabolism  [facility]  Pager: [phone]

## 2018-10-29 NOTE — PROVIDER NOTIFICATION
Notified [facility] cross cover of critical ionized Ca of 7.3. No new orders at this time. Will continue to monitor.

## 2018-10-30 NOTE — PROGRESS NOTES
[facility], [place]    Internal Medicine Progress Note - Gold Service    Changes today:  - Follow up pathology on tumor removed from R femur  - FNA needle biopsy of parotid mass shows benign neoplasm  - Low dose tramadol 25mg q6h PRN for breakthrough pain added, as patient's mental status has been stable   - Resume enalapril 5mg every day (PTA medication, held postop-- blood pressures have been high, renal function stable)  - Decrease lasix gtt from 5 -> 4mg/hr in an attempt to keep pt net neutral    Assessment & Plan   63F w/ FRANSISCO, HFpEF, HTN, morbid obesity, HLD, and recent dx of hypercalcemia (9/2018) w/ extensive OSH w/u now transferred for evaluation of pathologic right distal femur fracture, s/p right distal femur biopsy, tumor curettage/excision and ORIF on 10/26/18, with ongoing management for hypercalcemia and workup of source-- hypercalcemia stabilized.      # Hypercalcemia: Developed in 9/2018 with ranges of 12-15, higher adjusting for albumin. Workup so far: low PTH, low 25OH-VD, low phosphorous, elevated vitamin A, elevated PTHrP. SPEP/UPEP did note a monoclonal spike, and light chain analysis showed increased serum free light chains with a normal ratio (normal urine light chain analysis). CT C/A/P without contrast - no evidence of malignancy, though lytic right fourth rib lesion. Biopsy of these R femur was negative, biopsy of the rib notable for a squamous cell carcinoma, and biopsy of the bone marrow with reportedly normal cellularity. 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  She was treated with pamidronate 9/18/18 and 10/8/18, as well as calcitonin (spray)   Ca on admission here 13.2, higher when corrected for hypoalbuminemia. S/p Calcitonin 10/26, 10/27, and 10/28.  Etiology of hypercalcemia remains unclear, though likely malignant cause-- calcium has stabilized after administration of denosumab on 10/29 and starting IVF/lasix drip. Parotid mass was  biopsied 10/29, showed benign neoplasm.  Dx:  - Follow up pathology report from femur biopsy on 10/26 performed by orthopedics.   - Prelim: metastatic carcinoma, poorly differentiated  - Monitor calcium/iCal levels every 8 hours   - Daily CMP, renal panel, magnesium  - Strict intake and output  - Follow up on PTHrp and 1,25-OH vit D levels (pending)  - Appreciate orthopedics, endocrinology and oncology consultation recommendations.  Tx:  - Lasix gtt @ 4mg/hr  - Continue NS @ 175cc/hr; can adjust based on respiratory function and calcium levels     Pathologic Right distal femur fracture s/p distal femur bx, tumor curettage and excision, ORIF POD#2: Patient suffered mechanical fall 10/24/18. Presented to OSH ED w/ imaging consistent w/ pathologic fracture of right distal femur. Transferred to [facility] for further orthopedic evaluation. Now s/p ORIF and tumor curettage/excision on 10/26. Tolerated procedure well. EBL 300ml. Intraoperative pathology impression of poorly differentiated metastatic carcinoma.   Dx:   -Awaiting biopsy pathologic report   - Prelim shows metastatic carcinoma, poorly differentiated  Tx:  -PT/OT evaluation and management; needs TCU placement; NWB RLE  -Orthopedic consultation; appreciate recommendations and assistance with management.      # Hypoxic, hypercarbic respiratory failure: Post operatively requiring increased oxygen and BiPAP. CXR w/ pulmonary vascular congestion. Hgb stable. EKG w/ sinus tachycardia. Likely multifactorial in setting of large volume IVF for hypercalcemia as above, anesthesia, narcotic medications, obesity hypoventilation. Now improved, on nasal cannula while awake and BiPap only while asleep.   Dx:   -Volume management as above  -Continuous pulse oximetry, telemetry  Tx:  -BiPAP as needed and while asleep  -Hold opiates/opioids and use NSAID therapy for pain  -Oxygen therapy to maintain saturations > 90%  - Acetaminophen 975mg q8h for pain  - Restarted tramadol  25mg q6h for breakthrough pain     # Hypomagnesemia, Hypophosphatemia:    - Check daily Mg, Phos  - Monitor and replace per protocol      # UTI: UA form OSH 10/24/18 w/ > 100 WBC, LE, and many bacteria. Received Zosyn prior to transfer to [facility] 10/25/18. UC notable for pan-sensitive E. Coli. S/p ceftriaxone x3 days (10/26-10/28).    # FRANSISCO: PTA BiPAP, used infrequently due to trouble with mask.   - Continue BiPap while asleep    # HTN: BP stable on admission. PTA enalapril, lopressor.   - Continue lopressor with hold parameters SBP less than 100 or HR less than 60  - Resume PTA enalapril 5mg every day       # HFpEF, hx of Cor Pulmonale:  ECHO 4/2018 w/ EF 60%, normal systolic function, NRWMA, normal RV function, Grade I diastolic dysfunction, mild left atrial enlargement, PA pressure is 7 mmHg plus RAP (normal). Per OSH report- dramatic change in RV size and contractility, no pulmonary HTN compared to previous study 2/2018. ECHO on admission EF 60-65%, normal LV function, mild RV dilation, IVC normal w/ preserved respiratory variation. No pericardial effusion. RAP 3 mmHg. PTA lasix, lopressor.   - Continue metoprolol  - Daily weights  - Volume management as above     Diet: Regular Diet Adult  Fluids: NS @ 175cc/hr  Lines: PIV, Bravo  Bravo Catheter: in place, indication: Strict 1-2 Hour I&O    DVT Prophylaxis:  BID  Code Status: Full Code    Expected discharge: 4 - 7 days, recommended to transitional care unit once adequate pain management/ tolerating PO medications, safe disposition plan/ TCU bed available and completed workup for hypercalcemia and stable outpatient management plan.    Patient staffed with Dr. Bell.    MD Damaris Snider 4  [facility]  Pager: [phone]  Please see sticky note for cross cover information    Interval History   No acute events over night. Patient had some breakthrough pain that was not controlled with current regimen, so tramadol was added in  a lower dose than previous (was on 50mg at home, started 25mg). Ms. Stokes without acute complaints, still awaiting pathology results. Informed of parotid FNA results.     Physical Exam   Vital Signs: Temp: 98.4  F (36.9  C) Temp src: Oral BP: (!) 165/92   Heart Rate: 99 Resp: 20 SpO2: 92 % O2 Device: Nasal cannula Oxygen Delivery: 3 LPM  Weight: 310 lbs 13.58 oz  Constitutional: NAD, lying in bed and conversant.   Head: Normocephalic and atraumatic.   Eyes: Conjunctivae are normal. Pharynx has no erythema or exudate, moist mucus membranes  Cardiovascular: RRR without murmurs or gallops  Pulmonary/Chest: Difficult exam d/t body habitus. Distant breath sounds, clear to auscultation bilaterally.  GI: soft with good bowel sounds.  Non-tender, non-distended, with no guarding, no rebound, no peritoneal signs.   Back:  No bony or CVA tenderness   Musculoskeletal:  RLE wrapped in ACE bandage. Able to wiggle toes bilaterally.  Skin: Skin is warm and dry. No rash noted to exposed skin areas.

## 2018-10-30 NOTE — PLAN OF CARE
Problem: Fracture Orthopaedic (Adult)  Goal: Signs and Symptoms of Listed Potential Problems Will be Absent, Minimized or Managed (Fracture Orthopaedic)  Signs and symptoms of listed potential problems will be absent, minimized or managed by discharge/transition of care (reference Fracture Orthopaedic (Adult) CPG).   NEURO: Alert and oriented x4. Calm and cooperative. Rested between cares.   RESPIRATORY: LS diminished, SpO2>95% on 4 LPM NC.   CARDIO: HR- sinus rhythm. BP slightly elevated. Mild swelling BLE.   GI/: BS active, + gas per pt reported. Pt has not had a BM since surgery. Denies discomfort and nausea. Tolerating PO intake. Bravo catheter in place- good urine output.   SKIN: Right hip incision dressing intact. Neck biopsy dressing CDI. Turned q 2 hours.  ACTIVITY: RLE NWB- requires sling per previous RN report. Pt did get OOB from 3132-0422.   PAIN: Reported pain in RLE with repositioning, denied pain at rest.   LINES: PIV infusing, Bravo patent.   PLAN: Continue lasix and fluids to decrease calcium, monitor pt condition.

## 2018-10-30 NOTE — PROGRESS NOTES
"Orthopaedic Surgery Progress Note   October 30, 2018    Subjective: No acute events overnight. Pain controlled. Was able to get out of bed yesterday. Tolerating diet. Bravo catheter. +Flatus, no BM.     Objective: /81 (Cuff Size: Adult Regular)  Pulse 88  Temp 98.8  F (37.1  C) (Axillary)  Resp 17  Ht 1.651 m (5' 5\")  Wt 141 kg (310 lb 13.6 oz)  SpO2 94%  BMI 51.73 kg/m2    General: NAD, alert and oriented, cooperative with exam.   Cardio: RRR, extremities wwp.   Respiratory: Non-labored breathing.  MSK: RLE: Toes wwp, DP 2+, bcr in all toes. +EHL/FHL/GSC/TA. SILT SP/DP/Sa/Palumbo/T. Dressing c/d/i.    Labs:   Cr 0.53  Ca 12.3  Surgical pathology: Metastatic carcinoma - final in process. Cxs NGTD.     Assessment and Plan: Mayra Stokes is a 63 year old female with PMH including FRANSISCO, HTN, DM II, CHF and pulmonary HTN transferred from an OSH after sustaining a R distal femoral shaft pathologic fracture now s/p R distal femur biopsy, tumor curettage and excision, and ORIF on 10/26 with Dr. Wilkinson.     Internal Medicine Primary  Hypercalcemia: Paraneoplastic syndrome. Persistent hypercalcemia likely 2/2 persistent primary tumor. Prefer not treating with Denusomab given the potential interference with fracture healing in the setting of a high risk for non-union but will defer to primary. Prefer Denusomab treatment prior to HD.   Activity: Up with assist until independent. Knee ROM as tolerated.   Weight bearing status: NWB RLE.  Pain management: Per primary.    Antibiotics: Per primary. Mildred-operative completed.   Diet: Per primary. Okay for a diet from Orthopedic Surgery perspective.   DVT prophylaxis:  mg BID x 4 weeks and mechanical.  Imaging: No further imaging needed at this time.   Labs: PRN.   Bracing/Splinting: None.   Dressings: Keep clean, dry and intact x 7 days.   Elevation: Elevate RLE on pillows to keep above the level of the heart as much as possible.   Drains: Discontinued.   Physical " Therapy/Occupational Therapy: Eval and treat.  Cultures: Pending, follow culture results closely.    Follow-up: Dr. Wilkinson in 4 weeks.  Disposition: Per primary.      Mary Damon MD  Orthopaedic Surgery Resident, PGY-4  Pager: [phone]     For questions about this patient during weekday business hours, please attempt to contact me at my pager prior to contacting the Orthopaedic Surgery resident on call. On the weekends and overnight, please page the Orthopaedic Surgery resident on call. Thank you!

## 2018-10-30 NOTE — PLAN OF CARE
Problem: Patient Care Overview  Goal: Plan of Care/Patient Progress Review  Outcome: No Change  Neuro: A&O x 4; uses call light appropriately. Able to make needs known.   Cardiac: SR/ST with HR between 70s-low 100s. BPs on R forearm slightly elevated to 140s-160s/80s. Afebrile.   Respiratory: Sating > 92% on 3-4L NC while awake and 30-35% FiO2 CPAP with sleep. Intermittent wheeze, diminished LS in bases. Denies SOB and or cough.   GI/: Unknown last BM. Active BS and passing gas. Scheduled senna given. Recommend PRN miralax. Bravo for strict I+O with light yellow urine output.   Diet/Appetite: Regular low calorie diet, tolerating well. ACHS BG.   Activity: Lift assist up to chair. AST of 2 for q2Hour turns.   Pain: C/o pain in R hip, well controlled with tylenol and pillow supports.   Skin: R femur dressing CDI. Generalized +2-3 edema. Mois in folds, intra-dry in place.   LDAs: L PIV with lasix gtt at 5mg/hour and NS at 175 ml/hour.     Will need magnesium and phosphorus replaced 10/30. Calcium down to 12.3 from 13.1 following Denosumab 120 mg 10/29.  Continue to wean o2 as able.      Continue with POC. Notify primary team with changes.   Catherine Meek RN            Problem: Diabetes Comorbidity  Goal: Diabetes  Patient comorbidity will be monitored for signs and symptoms of hyperglycemia or hypoglycemia. Problems will be absent, minimized or managed by discharge/transition of care.   Outcome: No Change  ACHS BG WNL.     Problem: Chronic Respiratory Difficulty Comorbidity  Goal: Chronic Respiratory Difficulty  Patient comorbidity will be monitored for signs and symptoms of Respiratory Difficulty (Chronic) condition.  Problems will be absent, minimized or managed by discharge/transition of care.   Outcome: No Change  Chronic FRANSISCO with CPAP at night. Requiring 30-35% FiO2.

## 2018-10-30 NOTE — PLAN OF CARE
Problem: Patient Care Overview  Goal: Plan of Care/Patient Progress Review  PT -   Discharge Planner PT   Patient plan for discharge: TCU  Current status: Instructed pt in LE and UE AROM exercises.  Pt denies R hip pain with all exercises. Pt had R shoulder pain with UE exercises.   Barriers to return to prior living situation: R LE NWB restrictions, impaired strength and functional mobility  Recommendations for discharge: TCU  Rationale for recommendations: Pt would benefit from skilled PT intervention in TCU setting to improve bed mobility, transfers, and LE and UE strength in order to return to PLOF.   Entered by: Rene Cha 10/30/2018 4:39 PM

## 2018-10-31 NOTE — PLAN OF CARE
"Problem: Patient Care Overview  Goal: Plan of Care/Patient Progress Review  /81 (BP Location: Right arm)  Pulse 88  Temp 98.1  F (36.7  C) (Oral)  Resp 16  Ht 1.651 m (5' 5\")  Wt 148.9 kg (328 lb 4.2 oz)  SpO2 98%  BMI 54.63 kg/m2    Neuro: A&Ox4, making needs known.   Cardiac: SR. VSS, afebrile.  Respiratory: Sating mid 90s on 3L nasal canula.  GI/: Adequate urine output via hickey, lasix gtt discontinued and scheduled q6h lasix ordered. No BM, is passing gas, continuing with bowel regimen.  Diet/appetite: Tolerating regular diet. Eating well.  Activity:  Assist of 2, needs lift to be up to recliner chair. Worked with PT today.  Pain: At acceptable level on current regimen. PRN tramadol and scheduled tylenol given.  Skin: No new deficits noted. Dressings dry and intact.  LDA's: PIV x2 on left arm, both infusing. NS decreased to 150mL/hr, lasix gtt discontinued. Magnesium, potassium, and phosphorous replaced today, recheck labs in AM.  Plan: Continue with POC. Notify primary team with changes.        "

## 2018-10-31 NOTE — PROGRESS NOTES
ENT Brief Note  10/31/2018    Reviewed the results of Ms. Stokes's FNA of her parotid gland. 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 These benign tumors do not cause hypercalcemia.    Per chart review, appears that calcium is improving. Primary tumor site still unclear at this time. Ongoing work up continues.    At this time, we would recommend follow up in the ENT Clinic once medically stable and acute hospitalization is over. Typically, we do recommend removal of pleomorphic adenomas, as malignant transformation is rare, but possible over time.    ENT will sign off at this time. Feel free to call with questions or if we can help in any way.    Adryan Mendoza MD  PGY-5, Otolaryngology

## 2018-10-31 NOTE — PLAN OF CARE
"Problem: Patient Care Overview  Goal: Plan of Care/Patient Progress Review  BP (!) 165/92 (BP Location: Right arm)  Pulse 88  Temp 98.4  F (36.9  C) (Oral)  Resp 20  Ht 1.651 m (5' 5\")  Wt 141 kg (310 lb 13.6 oz)  SpO2 92%  BMI 51.73 kg/m2    Neuro: A&Ox4, making needs known.   Cardiac: SR/ST to 100s. VSS, afebrile. BP elevated this afternoon, started on enalapril this evening.  Respiratory: Sating low 90s on 3L nasal canula. 30% CPAP overnight.  GI/: Adequate urine output via hickey, continues on lasix gtt and NS at 175mL/hr. Lasix decreased to 4mg/hr this evening. No BM but is passing gas, increase today in stool softener intake.  Diet/appetite: Tolerating regular diet. Eating well.  Activity:  Bedrest and ceiling lift for movement.  Worked with PT today on bed exercises.  Pain: At acceptable level on current regimen. Started on PRN tramadol today, right leg pain well controlled with that and scheduled tylenol.  Skin: No new deficits noted. Dressings dry and intact.  LDA's: PIV x2 on left arm, both infusing.  Hickey cares performed. No other tubes/drains.  Mg, K, and Phos replaced today.  Phos recheck 1.9, will need replacement again this evening.  Plan: Continue with POC. Notify primary team with changes.        "

## 2018-10-31 NOTE — CONSULTS
[facility] GynOnc Consult Note    Mayra Stokes MRN# [id number]   Age: 63 year old YOB: 1955     Date of Admission:  10/25/2018    Consulting Provider: Silvia Toribio MD              Chief Complaint:   Squamous cell carcinoma of unknown primary, post-menopausal bleeding with thickened endometrium on imaging          History of Present Illness:   This patient is a 63 year old G0 female with a PMH of HTN, FRANSISCO, obesity, and HFpEF who is currently admitted to the [facility] service for evaluation of a pathologic right distal femur fracture. Patient underwent right femur biopsy, tumor curettage/excision and ORIF on 10/26/18 by orthopedic surgery with pathology consistent with a poorly differentiated squamous cell carcinoma.     Patient reports she is feeling pain in her leg, but no other complaints. She denies any abdominal pain. She has lost weight recently secondary to watching her diet - she states the weight loss has been slow and very intentional. She denies night sweats, fevers, chills, dysuria, urinary retention. She does have severe constipation, her last BM was 10 days ago. She reports that two weeks ago she noticed blood while wiping, thinks she had vaginal spotting for 2 days but this has since resolved.     Patient has never had children. She has gotten regular pap smears, with no abnormals. Her last pap was on 11/23/16 and was NILM and negative for HR HPV. She underwent menopause 15 years ago and has not had any postmenopausal bleeding until the episode 2 weeks ago.             Past Medical History:     Past Medical History:   Diagnosis Date     Chronic cor pulmonale (H)      Chronic diastolic heart failure (H)      DM type 2 (diabetes mellitus, type 2) (H)     diet controlled     Hypercalcemia 09/2018     Hypertension      Mass of left side of neck     since ~2003     Morbid obesity (H)      Obstructive sleep apnea      Pathologic fracture of femur (H) 10/24/2018            Past Surgical History:     "  Past Surgical History:   Procedure Laterality Date     BIOPSY BONE LOWER EXTREMITY Right 10/26/2018    Procedure: BIOPSY RIGHT FEMUR AND EXCISION OF TUMOR;  Surgeon: Maximo Wilkinson MD;  Location: [facility] OR     LAP ADJUSTABLE GASTRIC BAND       OPEN REDUCTION INTERNAL FIXATION FEMUR DISTAL Right 10/26/2018    Procedure: OPEN REDUCTION INTERNAL FIXATION RIGHT DISTAL FEMUR;  Surgeon: Maximo Wilkinson MD;  Location: [facility] OR     SHOULDER SURGERY Left     shoulder replacement            Social History:     Social History   Substance Use Topics     Smoking status: Not on file     Smokeless tobacco: Not on file     Alcohol use Not on file            Family History:     Father with colon cancer. Sister had hysterectomy for \"possible ovarian cancer\" but patient is unsure. No family history of bleeding or clotting disorder.          Allergies:   No Known Allergies         Medications:     No current facility-administered medications on file prior to encounter.   No current outpatient prescriptions on file prior to encounter.         Review of Systems:     CONSTITUTIONAL: Negative for  fevers, chills, fatigue, anorexia. + for intentional weight loss.  SKIN: Negative for rashes, lumps, or bumps  HEENT: Negative for vision changes, changes in hearing, sinus drainage, sore throat  RESPIRATORY: Negative for  cough, shortness of breath, dyspnea and wheezing, hemoptysis  CARDIOVASCULAR: Negative for  chest pain, dyspnea, palpitations, + edema  GASTROINTESTINAL: Negative for nausea, vomiting, change in bowel habits, diarrhea, + constipation, reflux, hematemesis and hemtochezia  GENITOURINARY: Negative for frequency, dysuria, nocturia, urinary incontinence and hematuria  MUSCULOSKELETAL: Negative for muscle weakness. + joint pain (knees)  NEUROLOGIC: Negative for headaches, syncope, weakness, numbness, tingling, memory problems, or incoordination  PSYCHIATRIC: Negative for anxiety and depression  HEMATOLOGIC/LYMPHATIC:  Negative for easy " bruising, bleeding and lymphadenopathy  ENDOCRINE:  Negative for heat intolerance and cold intolerance         Physical Exam:     Vitals:    10/31/18 1330 10/31/18 1412 10/31/18 1555 10/31/18 1623   BP: 157/89 144/81  (!) 151/94   BP Location:  Right arm  Right arm   Cuff Size:       Pulse:       Resp:   20    Temp:   97.9  F (36.6  C)    TempSrc:   Oral    SpO2: 95% 97%  96%   Weight:       Height:         Constitutional: Pleasant, obese female, no acute distress  HEENT: Normocephalic, atraumatic   Cardiovascular: Well perfused  Respiratory: Non-labored breathing on supplemental O2 via NC, no coughs or wheezes  Gastrointestinal: Abdomen obese, soft, non-tender.   Pelvic Exam:     -: External genitalia normal well-estrogenized, healthy tissue.  No obvious excoriations, lesions, or rashes.     -Bimanual: Vaginal mucosa without palpable lesion. Cervix unable to be palpated secondary to body habitus.  Neuro: Cranial nerves grossly intact.  Extremities: Right leg immobile secondary to femur fracture. 2+ LE edema bilaterally.  Skin: No suspicious lesions or rashes  Psychiatric: mentation appears normal and affect normal/bright         Data:     Results for orders placed or performed during the hospital encounter of 10/25/18 (from the past 24 hour(s))   Calcium   Result Value Ref Range    Calcium 11.7 (H) 8.5 - 10.1 mg/dL   Lactic acid level STAT for sepsis protocol   Result Value Ref Range    Lactate for Sepsis Protocol 1.3 0.7 - 2.0 mmol/L   CBC with platelets   Result Value Ref Range    WBC 12.5 (H) 4.0 - 11.0 10e9/L    RBC Count 2.56 (L) 3.8 - 5.2 10e12/L    Hemoglobin 8.2 (L) 11.7 - 15.7 g/dL    Hematocrit 27.8 (L) 35.0 - 47.0 %     (H) 78 - 100 fl    MCH 32.0 26.5 - 33.0 pg    MCHC 29.5 (L) 31.5 - 36.5 g/dL    RDW 13.1 10.0 - 15.0 %    Platelet Count 197 150 - 450 10e9/L   Renal panel   Result Value Ref Range    Sodium 136 133 - 144 mmol/L    Potassium 3.4 3.4 - 5.3 mmol/L    Chloride 97 94 - 109 mmol/L     Carbon Dioxide 33 (H) 20 - 32 mmol/L    Anion Gap 6 3 - 14 mmol/L    Glucose 128 (H) 70 - 99 mg/dL    Urea Nitrogen 21 7 - 30 mg/dL    Creatinine 0.57 0.52 - 1.04 mg/dL    GFR Estimate >90 >60 mL/min/1.7m2    GFR Estimate If Black >90 >60 mL/min/1.7m2    Calcium 10.9 (H) 8.5 - 10.1 mg/dL    Phosphorus 2.4 (L) 2.5 - 4.5 mg/dL    Albumin 2.1 (L) 3.4 - 5.0 g/dL   Magnesium   Result Value Ref Range    Magnesium 1.7 1.6 - 2.3 mg/dL   Calcium   Result Value Ref Range    Calcium 10.9 (H) 8.5 - 10.1 mg/dL     Imaging   CT A/P 10/25/18:   IMPRESSION:   1. Numerous, irregular hypodensities scattered throughout the liver,  the largest measuring up to 11.1 cm in hepatic segment 6, favor  metastatic disease until proven otherwise.   2. Lytic lesion of the right fourth rib with associated cortical  erosion, which suspicious for osseous metastatic disease with  pathologic fracture.   3. Heterogeneous appearance of the uterus with appearance of  endometrial thickening and probably fibroids as well. Cannot exclude  endometrial cancer. Recommend ultrasound for confirmation.  4. Haziness and small volume fluid around the pancreas suggestive of  acute pancreatitis. No evidence of pancreatic mass.  5. Indeterminate 1.5 cm left adrenal nodule.   6. Main pulmonary artery is dilated to 4.7 cm, suggestive of pulmonary  arterial hypertension.  7. Consolidation in the right upper lobe with filling of a segmental  bronchus. Differential includes mucus plugging with associated  atelectasis, aspiration, or infectious consolidation. Less likely  endobronchial or compressive mass could have this appearance.    Surgical pathology 10/26/18: FEMUR TUMOR BIOPSY  - METASTATIC SQUAMOUS CELL CARCINOMA, poorly differentiated, fragmented.   - Bone remodeling and normocellular bone marrow (approx. 35-40%) with   trilineage hematopoiesis with   maturation.            Assessment and Plan:   Assessment: 63 year old female with a pathologic distal femur  fracture secondary to biopsy proven squamous cell carcinoma of unknown primary. This is unlikely to be a cervical primary, as the patient has never had an abnormal pap smear and has no history of HPV. No visible lesions on the vulva. Patient does have a thickened endometrial stripe on imaging, would recommend an endometrial biopsy. Pelvic exam was severely limited due to the patient's leg immobility and body habitus, therefore recommend Gyn follow-up after her mobility has improved.      Problem List  - Squamous cell carcinoma of unknown origin   - Post-menopausal bleeding with thickened endometrial stripe on CT    Plan:  Referral to Gyn Onc clinic for pelvic exam and endometrial biopsy as an outpatient.     Doris Simmons MD   Resident Physician, PGY1  Obstetrics, Gynecology, and Women's Health       Patient seen and examined with resident.  Pt with recent pathologic fracture and SCC of unknown primary.  Given her pap smear history with negative HPV in 2016 this is unlikely to be a primary cervical malignancy.  Vulvar or vaginal pathology remain possibilities given very limited exam due to patient habitus and immobility due to recent fracture.  She also has a thickened EMS on CT and will need endometrial sampling when she is more mobile.    Would recommend follow up in our gynecologic oncology clinic for further evaluation once she is more mobile and could tolerate a pelvic exam.    She also needs follow up with colorectal surgery as an anal lesion could be the source as well and she is not up to date on colonoscopy/rectal exams.    Will sign off for now, please call with any questions.    Narcisa Reeves MD  Gynecologic Oncology  [facility]

## 2018-10-31 NOTE — PROGRESS NOTES
Vascular Access Services Notes:    Midline IV catheter insertion was put on-hold until further notice from the care team as requested by 6B primary RN. Pt has PIV x2 & midline may not be needed.        SALVADOR DesirN, RN [facility]

## 2018-11-01 NOTE — PLAN OF CARE
Problem: Patient Care Overview  Goal: Plan of Care/Patient Progress Review  Outcome: Improving  Assumed care . A/O X4. Able to make needs known, uses call light appropriately. Reposition q2hrs, assist of 2. C/o abdominal pain. HR SR-ST, VSS; afebrile; on 3L of O2 per NC with sats maintained above 94%. adequate urine output per hickey. NS MIVF decreased from 150 to 100 ml/hr. Will continue to monitor and follow plan of care.

## 2018-11-01 NOTE — PLAN OF CARE
Problem: Patient Care Overview  Goal: Plan of Care/Patient Progress Review  PT -   Discharge Planner PT   Patient plan for discharge: Rehab  Current status: Instructed pt in LE and UE AROM and AAROM exercises today.  Pt c/o of stomach ache throughout treatment.  Pt denies LE pain with all exercises except placement of towel roll for SAQ.    Barriers to return to prior living situation: Medical status, R LE NWB restrictions, requires assistance with bed mobility, requires mechanical lift tx to chair  Recommendations for discharge: TCU  Rationale for recommendations: Pt would benefit from skilled PT intervention in TCU to improve strength, decrease assistance with bed mobility and transfers with WB restrictions.   Entered by: Rene Cha 11/01/2018 11:48 AM

## 2018-11-01 NOTE — PLAN OF CARE
Problem: Patient Care Overview  Goal: Plan of Care/Patient Progress Review  Outcome: No Change  Neuro: A&Ox4.   Cardiac: SR in 80's. Intermittent tachy into low 100's with activity. -150's/80's. Afebrile.  VSS.   Respiratory: Sating >97% on 4 L NC.   GI/: Adequate urine output through hickey catheter. Catheter care performed. Pink lady enema given, no BM. Last BM 10/22.  Diet/appetite: Tolerating regular diet. Eating well.  Activity:  Mechanical lift assist.   Pain: PRN tramadol given for right leg pain. At acceptable level on current regimen.   Skin: Interdry in back skin folds for moisture. No new deficits noted.  LDA's: 1 left PIV infusing NS at 150 ml/hr. 1 left PIV TKO.     Plan: Triggered sepsis protocol, lactic ordered and was 1.0. Pink lady enema given, no BM, team notified and abdominal x-ray ordered. Pt went down to x-ray at end of shift. Continue with POC. Notify primary team with changes.

## 2018-11-01 NOTE — PROGRESS NOTES
[facility], [place]    Internal Medicine Progress Note - Maroon 4     Changes today:  -Pathology showed squamous cell carcinoma, likely lung or  in origin  -Gyn/onc consult--unable to obtain endometrial biopsy or  cervical exam due to body habitus and difficulty with right leg, feeling unlikely that cervical cancer is primary  -Discontinue Lasix drip, start Lasix 20 mg every 6 hours; decrease NS 175mL/hr -> 150mL/hr  -Calcium checks from every 8 hours to twice a day    Assessment & Plan   63F w/ FRANSISCO, HFpEF, HTN, morbid obesity, HLD, and recent dx of hypercalcemia (9/2018) w/ extensive OSH w/u now transferred for evaluation of pathologic right distal femur fracture, s/p right distal femur biopsy, tumor curettage/excision and ORIF on 10/26/18, with ongoing management for hypercalcemia and workup of primary tumor-- hypercalcemia stabilized.       # Hypercalcemia: Developed in 9/2018 with ranges of 12-15, higher adjusting for albumin. Workup so far: low PTH, low 25OH-VD, low phosphorous, elevated vitamin A, elevated PTHrP. SPEP/UPEP did note a monoclonal spike, and light chain analysis showed increased serum free light chains with a normal ratio (normal urine light chain analysis). CT C/A/P without contrast - no evidence of malignancy, though lytic right fourth rib lesion. Biopsy of these R femur was negative, biopsy of the rib notable for a squamous cell carcinoma, and biopsy of the bone marrow with reportedly normal cellularity. 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  She was treated with pamidronate 9/18/18 and 10/8/18, as well as calcitonin (spray)   Ca on admission here 13.2, higher when corrected for hypoalbuminemia. S/p Calcitonin 10/26, 10/27, and 10/28.  Etiology of hypercalcemia remains unclear, though likely malignant cause-- calcium has stabilized after administration of denosumab on 10/29. Parotid mass was biopsied 10/29, showed benign adenoma.  Dx:  -Tumor  from right femur shows metastatic squamous cell, likely lung or  primary   -Gynecology/oncology consulted today, unable to obtain cervical exam or endometrial biopsy but feel that  cervical cancer is unlikely primary source of the squamous cell carcinoma  - Monitor calcium/iCal levels twice per day  - Daily CMP, renal panel, magnesium  - Strict intake and output  - Follow up on PTHrp (pending)  - Appreciate orthopedics, endocrinology and oncology consultation recommendations.  Tx:  - Lasix 20 mg every 6 hours  - Continue NS @ 150cc/hr; can adjust based on respiratory function and calcium levels     Pathologic Right distal femur fracture s/p distal femur bx, tumor curettage and excision, ORIF: Patient suffered mechanical fall 10/24/18. Presented to OSH ED w/ imaging consistent w/ pathologic fracture of right distal femur. Transferred to [facility] for further orthopedic evaluation. Now s/p ORIF and tumor curettage/excision on 10/26. Tolerated procedure well. EBL 300ml. Intraoperative pathology impression of poorly differentiated metastatic carcinoma.   -PT/OT evaluation and management; needs TCU placement; NWB RLE  -Orthopedic consultation; appreciate recommendations and assistance with management.      # Hypoxic, hypercarbic respiratory failure: Post operatively requiring increased oxygen and BiPAP. CXR w/ pulmonary vascular congestion. Hgb stable. EKG w/ sinus tachycardia. Likely multifactorial in setting of large volume IVF for hypercalcemia as above, anesthesia, narcotic medications, obesity hypoventilation. Now improved, on nasal cannula while awake and BiPap only while asleep.   Dx:   -Volume management as above  -Continuous pulse oximetry, telemetry  Tx:  -BiPAP as needed and while asleep  -Oxygen therapy to maintain saturations > 90%  - Acetaminophen 975mg q8h for pain  - Tramadol 25mg q6h for breakthrough pain     # Hypomagnesemia, Hypophosphatemia:    - Check daily Mg, Phos  - Monitor and replace per  "protocol      # UTI: UA form OSH 10/24/18 w/ > 100 WBC, LE, and many bacteria. Received Zosyn prior to transfer to [facility] 10/25/18. UC notable for pan-sensitive E. Coli. S/p ceftriaxone x3 days (10/26-10/28).    # FRANSISCO: PTA BiPAP, used infrequently due to trouble with mask.   - Continue BiPap while asleep    # HTN: BP stable on admission. PTA enalapril, lopressor.   - Continue lopressor with hold parameters SBP less than 100 or HR less than 60  - Continue PTA enalapril 5mg every day       # HFpEF, hx of Cor Pulmonale:  ECHO 4/2018 w/ EF 60%, normal systolic function, NRWMA, normal RV function, Grade I diastolic dysfunction, mild left atrial enlargement, PA pressure is 7 mmHg plus RAP (normal). Per OSH report- dramatic change in RV size and contractility, no pulmonary HTN compared to previous study 2/2018. ECHO on admission EF 60-65%, normal LV function, mild RV dilation, IVC normal w/ preserved respiratory variation. No pericardial effusion. RAP 3 mmHg. PTA lasix, lopressor.   - Continue metoprolol  - Daily weights  - Volume management as above     Diet: Regular Diet Adult  Fluids: NS @ 150cc/hr  Lines: PIV, Bravo for strict Is/Os  Bravo Catheter: in place, indication: Strict 1-2 Hour I&O    DVT Prophylaxis:  BID per Ortho  Code Status: Full Code    Expected discharge: 2 - 3 days, recommended to transitional care unit once calcium is well managed on oral regimen and plans for follow-up are established.    Patient staffed with Dr. Bell.    MD Damaris Snider 4  [facility]  Pager: [phone]  Please see sticky note for cross cover information    Interval History   No acute events over night.  Patient is agreeable to plan of care and has been watching TV \"for something to do.\"  Physical therapy is going been going okay.  I updated her on her pathology results, which indicates likely primary from lung or  system.  She expressed her understanding and was agreeable to ongoing " workup she is glad that her calcium is going down.    Physical Exam   Vital Signs: Temp: 97.9  F (36.6  C) Temp src: Oral BP: (!) 151/94   Heart Rate: 85 Resp: 20 SpO2: 96 % O2 Device: Nasal cannula Oxygen Delivery: 2 LPM  Weight: 328 lbs 4.24 oz  Constitutional: NAD, up in chair watching TV  Head: Normocephalic and atraumatic.   Eyes: Conjunctivae are normal. Moist mucus membranes  Cardiovascular: RRR without murmurs or gallops  Pulmonary/Chest: Difficult exam d/t body habitus. Distant breath sounds, clear to auscultation bilaterally.  GI: soft with good bowel sounds.  Non-tender, non-distended, with no guarding, no rebound, no peritoneal signs.   Back:  No bony or CVA tenderness   Musculoskeletal:  RLE wrapped in ACE bandage. Able to wiggle toes bilaterally.  Skin: Skin is warm and dry. No rash noted to exposed skin areas.

## 2018-11-01 NOTE — PROGRESS NOTES
Clinical Nutrition Services- Brief Note    Reviewed nutrition risk factors due to LOS. Pt is tolerating a Regular diet, eating well per nursing documentation and patient report. No nutrition issues identified at this time. RD will continue to follow per nutrition protocol.  Yoselin Perez RD, MS, LD  6B- Pager: [phone]

## 2018-11-01 NOTE — PROGRESS NOTES
"Pt has been retaining pink lady enema for 1 hour, states she does not feel any bowel movement but feels \"full.\" Paged team with this information.   Order placed for an abdominal X-ray  "

## 2018-11-01 NOTE — PROGRESS NOTES
"Orthopaedic Surgery Progress Note   November 1, 2018    Subjective: No acute events overnight. Pain controlled. Has gotten out of bed but has not been ambulating. Tolerating diet. Bravo catheter. +BM. Discussed pathology results.     Objective: /85  Pulse 88  Temp 96.9  F (36.1  C) (Axillary)  Resp 20  Ht 1.651 m (5' 5\")  Wt 148.9 kg (328 lb 4.2 oz)  SpO2 94%  BMI 54.63 kg/m2    General: NAD, alert and oriented, cooperative with exam.   Cardio: RRR, extremities wwp.   Respiratory: Non-labored breathing.  MSK: RLE: Toes wwp, DP 2+, bcr in all toes. +EHL/FHL/GSC/TA. SILT SP/DP/Sa/Palumbo/T. Dressing c/d/i.    Labs:   Cr 0.53  Ca 10.6  Surgical pathology: Metastatic squamous cell carcinoma. Cxs NGTD.     Assessment and Plan: Mayra Stokes is a 63 year old female with PMH including FRANSICSO, HTN, DM II, CHF and pulmonary HTN transferred from an OSH after sustaining a R distal femoral shaft pathologic fracture now s/p R distal femur biopsy, tumor curettage and excision, and ORIF on 10/26 with Dr. Wilkinson. Pathology with metastatic squamous cell carcinoma. Primary remains unknown.     Internal Medicine Primary  Hypercalcemia: Paraneoplastic syndrome. Persistent hypercalcemia likely 2/2 persistent primary tumor. Prefer not treating with Denusomab given the potential interference with fracture healing in the setting of a high risk for non-union but will defer to primary who has initiated treatment.   Activity: Up with assist until independent. Knee ROM as tolerated.   Weight bearing status: TTWB RLE.  Pain management: Per primary.    Antibiotics: Per primary. Mildred-operative completed.   Diet: Per primary. Okay for a diet from Orthopedic Surgery perspective.   DVT prophylaxis: Will discuss with primary team changing to Lovenox vs Warfarin vs Xeralto rather than ASA. SCDs.   Imaging: No further imaging needed at this time.   Labs: PRN.   Bracing/Splinting: None.   Dressings: Keep clean, dry and intact x 7 days.   Elevation: " Elevate RLE on pillows to keep above the level of the heart as much as possible.   Drains: Discontinued.   Physical Therapy/Occupational Therapy: Eval and treat.  Cultures: Pending, follow culture results closely.    Follow-up: Dr. Wilkinson in 4 weeks.  Disposition: Per primary.      Mary Damon MD  Orthopaedic Surgery Resident, PGY-4  Pager: [phone]     For questions about this patient during weekday business hours, please attempt to contact me at my pager prior to contacting the Orthopaedic Surgery resident on call. On the weekends and overnight, please page the Orthopaedic Surgery resident on call. Thank you!

## 2018-11-01 NOTE — PLAN OF CARE
"Problem: Patient Care Overview  Goal: Plan of Care/Patient Progress Review  /90 (BP Location: Right arm)  Pulse 88  Temp 98.5  F (36.9  C) (Oral)  Resp 18  Ht 1.651 m (5' 5\")  Wt 148.9 kg (328 lb 4.2 oz)  SpO2 97%  BMI 54.63 kg/m2  Neuro: A&Ox4.   Cardiac: SR - ST VSS.   Respiratory: Sating 97% on 3L NC.  GI/: Adequate urine output via hickey. BM X6  Diet/appetite: Tolerating regular diet. Eating well.  Activity:  Assist of 2 with mechanical lift  Pain: At acceptable level on current regimen.   Skin: No new deficits noted.  LDA's:  Two left PIV. Hickey cath    Plan: Continue with POC. Notify primary team with changes.        "

## 2018-11-02 NOTE — PLAN OF CARE
Problem: Patient Care Overview  Goal: Plan of Care/Patient Progress Review  Discharge Planner PT   Patient plan for discharge: TCU  Current status: Heavy assist supine<>sit with 2 persons. Unable to sit fully upright due to R hip pain. Ceiling lift to chair. R LE A/AROM performed in chair.   Barriers to return to prior living situation: Deconditioning, pain  Recommendations for discharge: TCU  Rationale for recommendations: Current level of function       Entered by: Emmett Balderas 11/02/2018 4:19 PM

## 2018-11-02 NOTE — PROGRESS NOTES
[facility] [place]    Internal Medicine Progress Note - Maroon 4     Changes today:  - discontinue IVF and lasix today  - PET scan ordered per heme/onc and pulm    Assessment & Plan   63F w/ FRANSISCO, HFpEF, HTN, morbid obesity, HLD, and recent dx of hypercalcemia (9/2018) w/ extensive OSH w/u now transferred for evaluation of pathologic right distal femur fracture, s/p right distal femur biopsy, tumor curettage/excision and ORIF on 10/26/18, with ongoing management for hypercalcemia and workup of primary tumor-- hypercalcemia improving.       # Hypercalcemia  # Metastatic squamous cell carcinoma of unknown primary  Dx:  -Tumor from right femur shows metastatic squamous cell, likely lung or  primary   -Gynecology/oncology consulted, unable to get bedside pelvic exam, signed off 11/1    - Follow up in clinic in 3-4 weeks for pelvic exam and endometrial biopsy   - Pulm consulted, appreciate recs  - Monitor calcium/iCal levels twice per day  - Daily renal panel, magnesium  - Strict intake and output  - Follow up on PTHrp (pending) drawn on 10/26  - Appreciate orthopedics, endocrinology and oncology consultation recommendations  - PET scan ordered  Tx:  - discontinue lasix and normal saline today     Pathologic Right distal femur fracture s/p distal femur bx, tumor curettage and excision, ORIF: Patient suffered mechanical fall 10/24/18. Presented to OSH ED w/ imaging consistent w/ pathologic fracture of right distal femur. Transferred to [facility] for further orthopedic evaluation. Now s/p ORIF and tumor curettage/excision on 10/26. Tolerated procedure well. EBL 300ml. Intraoperative pathology impression of poorly differentiated metastatic carcinoma.   -PT/OT evaluation and management; needs TCU placement  -Orthopedic consultation; appreciate recommendations and assistance with management.   - Touch down weight bearing   - Dressing change due today     # Hypoxic, hypercarbic respiratory  failure: Post operatively requiring increased oxygen and BiPAP. CXR w/ pulmonary vascular congestion. Hgb stable. EKG w/ sinus tachycardia. Likely multifactorial in setting of large volume IVF for hypercalcemia as above, anesthesia, narcotic medications, obesity hypoventilation. Now improved, on nasal cannula while awake and BiPap only while asleep.   Dx:   -Volume management as above  -Continuous pulse oximetry, telemetry  Tx:  -BiPAP as needed and while asleep  -Oxygen therapy to maintain saturations > 90%  - Acetaminophen 975mg q8h for pain  - Tramadol 25mg q6h for breakthrough pain     # Hypomagnesemia, Hypophosphatemia:    - Check daily Mg, Phos  - Monitor and replace per protocol      # UTI: UA form OSH 10/24/18 w/ > 100 WBC, LE, and many bacteria. Received Zosyn prior to transfer to [facility] 10/25/18. UC notable for pan-sensitive E. Coli. S/p ceftriaxone x3 days (10/26-10/28).    # FRANSISCO: PTA BiPAP, used infrequently due to trouble with mask.   - Continue BiPap while asleep    # HTN: BP stable on admission. PTA enalapril, lopressor.   - Continue lopressor with hold parameters SBP less than 100 or HR less than 60  - Continue PTA enalapril 5mg every day       # HFpEF, hx of Cor Pulmonale:  ECHO 4/2018 w/ EF 60%, normal systolic function, NRWMA, normal RV function, Grade I diastolic dysfunction, mild left atrial enlargement, PA pressure is 7 mmHg plus RAP (normal). Per OSH report- dramatic change in RV size and contractility, no pulmonary HTN compared to previous study 2/2018. ECHO on admission EF 60-65%, normal LV function, mild RV dilation, IVC normal w/ preserved respiratory variation. No pericardial effusion. RAP 3 mmHg. PTA lasix, lopressor.   - Continue metoprolol  - Daily weights  - Volume management as above     Diet: Regular Diet Adult  Fluids: NS @ 100cc/hr  Lines: PIV, Bravo for strict Is/Os  Bravo Catheter: in place, indication: Strict 1-2 Hour I&O    DVT Prophylaxis: SCDs, enoxaparin 40mg  q24h  Code Status: Full Code    Expected discharge: 2 - 3 days, recommended to transitional care unit once calcium is well managed on oral regimen and plans for follow-up are established.    Patient staffed with Dr. Bell.    MD Damaris Snider [address] [place] [address]  Pager: [phone]  Please see sticky note for cross cover information    Interval History   No acute events over night. Nursing notes reviewed. Has no acute complaints, was groggy when I saw her. Afebrile over night-- understands workup. Spoke with onc fellow last night, had questions about prognosis and workup clarified.     Physical Exam   Vital Signs: Temp: 98.9  F (37.2  C) Temp src: Axillary BP: 133/74   Heart Rate: 78 Resp: 18 SpO2: 98 % O2 Device: BiPAP/CPAP Oxygen Delivery: 4 LPM  Weight: 326 lbs 4.49 oz  Constitutional: NAD, lying in bed today   Head: Normocephalic and atraumatic.   Eyes: Conjunctivae are normal. Moist mucus membranes  Cardiovascular: RRR without murmurs or gallops  Pulmonary/Chest: Difficult exam d/t body habitus. Distant breath sounds, clear to auscultation bilaterally.  GI: soft with good bowel sounds.  Non-tender, non-distended, with no guarding, no rebound, no peritoneal signs.   : Bravo in place, draining clear yellow urine.  Back:  No bony or CVA tenderness   Musculoskeletal:  RLE wrapped in ACE bandage.   Skin: Skin is warm and dry. No rash noted to exposed skin areas.

## 2018-11-02 NOTE — PROGRESS NOTES
[facility], [place]    Internal Medicine Progress Note - Damaris 4     Changes today:  - Decrease NS to 100mL/hr (from 150)  - Lasix 20mg q8h (from q6h)  - Pulm consult    Assessment & Plan   63F w/ FRANSISCO, HFpEF, HTN, morbid obesity, HLD, and recent dx of hypercalcemia (9/2018) w/ extensive OSH w/u now transferred for evaluation of pathologic right distal femur fracture, s/p right distal femur biopsy, tumor curettage/excision and ORIF on 10/26/18, with ongoing management for hypercalcemia and workup of primary tumor-- hypercalcemia improving.       # Hypercalcemia: Developed in 9/2018 with ranges of 12-15, higher adjusting for albumin. Workup so far: low PTH, low 25OH-VD, low phosphorous, elevated vitamin A, elevated PTHrP. SPEP/UPEP did note a monoclonal spike, and light chain analysis showed increased serum free light chains with a normal ratio (normal urine light chain analysis). CT C/A/P without contrast - no evidence of malignancy, though lytic right fourth rib lesion. Biopsy of these R femur was negative, biopsy of the rib notable for a squamous cell carcinoma, and biopsy of the bone marrow with reportedly normal cellularity. 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  She was treated with pamidronate 9/18/18 and 10/8/18, as well as calcitonin (spray)   Ca on admission here 13.2, higher when corrected for hypoalbuminemia. S/p Calcitonin 10/26, 10/27, and 10/28.  Etiology of hypercalcemia remains unclear, though likely malignant cause-- calcium has stabilized after administration of denosumab on 10/29. Parotid mass was biopsied 10/29, showed benign adenoma.  Dx:  -Tumor from right femur shows metastatic squamous cell, likely lung or  primary   -Gynecology/oncology consulted, unable to get bedside pelvic exam    - Follow up in clinic in 3-4 weeks for pelvic exam and endometrial biopsy   - Pulm consulted, appreciate recs  - Monitor calcium/iCal levels twice per day  -  Daily renal panel, magnesium  - Strict intake and output  - Follow up on PTHrp (pending) drawn on 10/26  - Appreciate orthopedics, endocrinology and oncology consultation recommendations.  Tx:  - Lasix 20 mg every 8 hours  - NS @ 100mL/hr     Pathologic Right distal femur fracture s/p distal femur bx, tumor curettage and excision, ORIF: Patient suffered mechanical fall 10/24/18. Presented to OSH ED w/ imaging consistent w/ pathologic fracture of right distal femur. Transferred to [facility] for further orthopedic evaluation. Now s/p ORIF and tumor curettage/excision on 10/26. Tolerated procedure well. EBL 300ml. Intraoperative pathology impression of poorly differentiated metastatic carcinoma.   -PT/OT evaluation and management; needs TCU placement  -Orthopedic consultation; appreciate recommendations and assistance with management.   - OK to start DVT prophylaxis as usual    - Touch down weight bearing     # Hypoxic, hypercarbic respiratory failure: Post operatively requiring increased oxygen and BiPAP. CXR w/ pulmonary vascular congestion. Hgb stable. EKG w/ sinus tachycardia. Likely multifactorial in setting of large volume IVF for hypercalcemia as above, anesthesia, narcotic medications, obesity hypoventilation. Now improved, on nasal cannula while awake and BiPap only while asleep.   Dx:   -Volume management as above  -Continuous pulse oximetry, telemetry  Tx:  -BiPAP as needed and while asleep  -Oxygen therapy to maintain saturations > 90%  - Acetaminophen 975mg q8h for pain  - Tramadol 25mg q6h for breakthrough pain     # Hypomagnesemia, Hypophosphatemia:    - Check daily Mg, Phos  - Monitor and replace per protocol      # UTI: UA form OSH 10/24/18 w/ > 100 WBC, LE, and many bacteria. Received Zosyn prior to transfer to [facility] 10/25/18. UC notable for pan-sensitive E. Coli. S/p ceftriaxone x3 days (10/26-10/28).    # FRANSISCO: PTA BiPAP, used infrequently due to trouble with mask.   - Continue BiPap while  asleep    # HTN: BP stable on admission. PTA enalapril, lopressor.   - Continue lopressor with hold parameters SBP less than 100 or HR less than 60  - Continue PTA enalapril 5mg every day       # HFpEF, hx of Cor Pulmonale:  ECHO 4/2018 w/ EF 60%, normal systolic function, NRWMA, normal RV function, Grade I diastolic dysfunction, mild left atrial enlargement, PA pressure is 7 mmHg plus RAP (normal). Per OSH report- dramatic change in RV size and contractility, no pulmonary HTN compared to previous study 2/2018. ECHO on admission EF 60-65%, normal LV function, mild RV dilation, IVC normal w/ preserved respiratory variation. No pericardial effusion. RAP 3 mmHg. PTA lasix, lopressor.   - Continue metoprolol  - Daily weights  - Volume management as above     Diet: Regular Diet Adult  Fluids: NS @ 100cc/hr  Lines: PIV, Hickey for strict Is/Os  Hickey Catheter: in place, indication: Strict 1-2 Hour I&O    DVT Prophylaxis: SCDs, enoxaparin 40mg q24h  Code Status: Full Code    Expected discharge: 2 - 3 days, recommended to transitional care unit once calcium is well managed on oral regimen and plans for follow-up are established.    Patient staffed with Dr. Bell.    Silvia Toribio MD  [address]  Pager: [phone]  Please see sticky note for cross cover information    Interval History   No acute events over night. Nursing notes reviewed. Got hickey over night for increased BMs, now resolving-- pulled back on bowel regimen. Pain well-controlled with current pain regimen. No fevers, chills over night.    Physical Exam   Vital Signs: Temp: 97.7  F (36.5  C) Temp src: Oral BP: (!) 159/94   Heart Rate: 93 Resp: 18 SpO2: 94 % O2 Device: Nasal cannula Oxygen Delivery: 3 LPM  Weight: 328 lbs 4.24 oz  Constitutional: NAD, lying in bed today   Head: Normocephalic and atraumatic.   Eyes: Conjunctivae are normal. Moist mucus membranes  Cardiovascular: RRR without murmurs or gallops  Pulmonary/Chest:  Difficult exam d/t body habitus. Distant breath sounds, clear to auscultation bilaterally.  GI: soft with good bowel sounds.  Non-tender, non-distended, with no guarding, no rebound, no peritoneal signs.   Back:  No bony or CVA tenderness   Musculoskeletal:  RLE wrapped in ACE bandage. Able to wiggle toes bilaterally.  Skin: Skin is warm and dry. No rash noted to exposed skin areas.

## 2018-11-02 NOTE — PLAN OF CARE
Problem: Patient Care Overview  Goal: Plan of Care/Patient Progress Review  Outcome: Improving  Neuro: A&Ox4.   Cardiac: Afebrile. SR/ST with HR's 's. VSS.   Respiratory: Sating >92% on 3-4L NC and 30-35% BiPAP overnight. LS clear/dminished.  GI/: Bravo intact with adequate urine output. No BM.   Diet/appetite: Tolerating regular diet. Eating well. Denies N/V.  Activity:  Assist of 2, repositioned q2hrs. Toe touch weight bearing with RLE. R knee ROM as tolerated.  Pain: PRN PO Tramadol given q6hrs for R flank pain with moderate relief.   Skin: No new deficits noted.  LDA's: 2 L PIV's infusing NS at 100mL/hr and TKO.    Plan: Replaced Phosphorous overnight. Hypercalcemia resolving. Continue with POC. Notify primary team with changes.

## 2018-11-02 NOTE — PROGRESS NOTES
"Orthopaedic Surgery Progress Note   November 2, 2018    Subjective: No acute events overnight. Pain controlled. Tolerating diet. Bravo catheter. +BM. Working with PT.     Objective: /74 (BP Location: Right arm, Cuff Size: Adult Regular)  Pulse 88  Temp 98.9  F (37.2  C) (Axillary)  Resp 16  Ht 1.651 m (5' 5\")  Wt 148 kg (326 lb 4.5 oz)  SpO2 96%  BMI 54.3 kg/m2    General: NAD, alert and oriented, cooperative with exam.   Cardio: RRR, extremities wwp.   Respiratory: Non-labored breathing.  MSK: RLE: Toes wwp, DP 2+, bcr in all toes. +EHL/FHL/GSC/TA. SILT SP/DP/Sa/Palumbo/T. Dressing c/d/i.    Labs:   Cr 0.46  Ca 9.8  Surgical pathology: Metastatic squamous cell carcinoma. Cxs NGTD.     Assessment and Plan: Mayra Stokes is a 63 year old female with PMH including FRANSISCO, HTN, DM II, CHF and pulmonary HTN transferred from an OSH after sustaining a R distal femoral shaft pathologic fracture now s/p R distal femur biopsy, tumor curettage and excision, and ORIF on 10/26 with Dr. Wilkinson. Pathology with metastatic squamous cell carcinoma. Primary remains unknown.     Internal Medicine Primary  Hypercalcemia: Paraneoplastic syndrome. Persistent hypercalcemia likely 2/2 persistent primary tumor. Prefer not treating with Denusomab given the potential interference with fracture healing in the setting of a high risk for non-union but will defer to primary who has initiated treatment.   Activity: Up with assist until independent. Knee ROM as tolerated.   Weight bearing status: TTWB RLE.  Pain management: Per primary.    Antibiotics: Per primary. Mildred-operative completed.   Diet: Per primary. Okay for a diet from Orthopedic Surgery perspective.   DVT prophylaxis: Lovenox x 4 weeks. SCDs.   Imaging: No further imaging needed at this time.   Labs: PRN.   Bracing/Splinting: None.   Dressings: Keep clean, dry and intact x 7 days.   Elevation: Elevate RLE on pillows to keep above the level of the heart as much as possible.   Drains: " Discontinued.   Physical Therapy/Occupational Therapy: Eval and treat.  Cultures: Pending, follow culture results closely.    Follow-up: Dr. Wilkinson in 4 weeks.  Disposition: Per primary.      Mary Damon MD  Orthopaedic Surgery Resident, PGY-4  Pager: [phone]     For questions about this patient during weekday business hours, please attempt to contact me at my pager prior to contacting the Orthopaedic Surgery resident on call. On the weekends and overnight, please page the Orthopaedic Surgery resident on call. Thank you!

## 2018-11-02 NOTE — CONSULTS
PULMONARY CONSULT  Date of service: 2018    Patient: Mayra Stokes      : 1955      MRN: [id number]    We were consulted for evaluation of possible lung primary cancer            History of Present Illness:   63F with HFpEF, HTN, DMT2, morbid obesity BMI 54, former smoker (1/4 PPD for > 20 years) recent dx of hypercalcemia (2018) w/ extensive OSH w/u who was transferred for evaluation of pathologic right distal femur fracture, s/p right distal femur biopsy, tumor curettage/excision and ORIF on 10/26/18. Pathology with metastatic squamous cell carcinoma of unknown primary.     Patient with new diagnosis of hypercalcemia on 9/10 on routine labs. Work up revealed a right femur lytic lesion that was biopsied . On 10/23, she had a pathologic R femur fracture s/p R distal femur biopsy, tumor curettage and excision, and ORIF on 10/26. Biopsy showed metastatic squamous cell carcinoma and primary is unknown. CT c/a/p shows numerous liver lesions, lytic R femur bone lesions, endometrial thickening of uterus, RUL consolidation with filling of a segmental bronchus. Per heme/onc and pathology, most likely primary site is  tract or lung.      On presentation today, patient denies cough, dyspnea, hemoptysis. She is requiring 3L NC oxygen here. Does not have home oxygen. She is former smoker /4 PPD for +40 years, stopped 1.5 years ago due to cost. She worked in a [employer]. No hx of other exposures. Has extensive family hx of cancer. Breast cancer in mother, one sisters, and two maternal aunts. Gyn cancer in another sister. She has not been tested for BRCA mutation. Father had colon cancer.  She reports vaginal bleeding two weeks ago.           Review of Symptoms:   10-point ROS reviewed, & found negative w/ exceptions noted in the HPI.          Past Medical History:     Past Medical History:   Diagnosis Date     Chronic cor pulmonale (H)      Chronic diastolic heart failure (H)      DM type 2  (diabetes mellitus, type 2) (H)     diet controlled     Hypercalcemia 09/2018     Hypertension      Mass of left side of neck     since ~2003     Morbid obesity (H)      Obstructive sleep apnea      Pathologic fracture of femur (H) 10/24/2018       Past Surgical History:   Procedure Laterality Date     BIOPSY BONE LOWER EXTREMITY Right 10/26/2018    Procedure: BIOPSY RIGHT FEMUR AND EXCISION OF TUMOR;  Surgeon: Maximo Wilkinson MD;  Location: [facility] OR     LAP ADJUSTABLE GASTRIC BAND       OPEN REDUCTION INTERNAL FIXATION FEMUR DISTAL Right 10/26/2018    Procedure: OPEN REDUCTION INTERNAL FIXATION RIGHT DISTAL FEMUR;  Surgeon: Maximo Wilkinson MD;  Location: [facility] OR     SHOULDER SURGERY Left     shoulder replacement            Allergies:     No Known Allergies          Outpatient Medications:     Current Facility-Administered Medications   Medication     acetaminophen (TYLENOL) tablet 650 mg     acetaminophen (TYLENOL) tablet 975 mg     enalapril (VASOTEC) tablet 5 mg     enoxaparin (LOVENOX) injection 40 mg     glycerin (adult) Suppository 1 suppository     ibuprofen (ADVIL/MOTRIN) tablet 400 mg     influenza recomb quadrivalent PF (FLUBLOK) injection 0.5 mL     lidocaine (LMX4) cream     lidocaine (LMX4) cream     lidocaine 1 % 0.5-5 mL     lidocaine 1 % 1 mL     magnesium sulfate 2 g in NS intermittent infusion ([facility] or [facility])     magnesium sulfate 4 g in 100 mL sterile water (premade)     melatonin tablet 1 mg     metoprolol tartrate (LOPRESSOR) tablet 100 mg     naloxone (NARCAN) injection 0.1-0.4 mg     omeprazole (priLOSEC) CR capsule 20 mg     ondansetron (ZOFRAN-ODT) ODT tab 4 mg    Or     ondansetron (ZOFRAN) injection 4 mg     polyethylene glycol (MIRALAX/GLYCOLAX) Packet 17 g     potassium chloride (KLOR-CON) Packet 20-40 mEq     potassium chloride 10 mEq in 100 mL sterile water intermittent infusion (premix)     potassium chloride 20 mEq in 50 mL intermittent infusion     potassium chloride SA  "(K-DUR/KLOR-CON M) CR tablet 20-40 mEq     potassium phosphate 15 mmol in D5W 250 mL intermittent infusion     potassium phosphate 20 mmol in D5W 250 mL intermittent infusion     potassium phosphate 20 mmol in D5W 500 mL intermittent infusion     potassium phosphate 25 mmol in D5W 500 mL intermittent infusion     sodium chloride (PF) 0.9% PF flush 10 mL     sodium chloride (PF) 0.9% PF flush 10-20 mL     sodium chloride (PF) 0.9% PF flush 3 mL     sodium chloride (PF) 0.9% PF flush 3 mL     traMADol (ULTRAM) half-tab 25 mg           Family History:   Multiple family members with cancer.     Mother breast cancer  Sister breast cancer             Social History:     Social History   Substance Use Topics     Smoking status: Not on file     Smokeless tobacco: Not on file     Alcohol use Not on file             Physical Exam:   /86 (BP Location: Right arm, Cuff Size: Adult Regular)  Pulse 88  Temp 97.8  F (36.6  C) (Axillary)  Resp 16  Ht 1.651 m (5' 5\")  Wt 148 kg (326 lb 4.5 oz)  SpO2 95%  BMI 54.3 kg/m2    General: NAD, obese  HEENT: Anicteric sclera, EOMI, MMM, OP unobstructed, w/o erythema/discharge  CV: RRR, no m/r/g  Lungs: Clear anteriorly. 3L NC    Abd: Soft, obese, non-tender  Ext: WWP, right leg wrapped in ace bandage   Skin: No rashes, cyanosis, or jaundice  Neuro: AAOx3, speech normal, face symmetric           Data:   Labs (all laboratory studies reviewed by me)    Imaging (all imaging studies reviewed by me):    CT CHEST/ABDOMEN/PELVIS W CONTRAST 10/28/18  1. Numerous, irregular hypodensities scattered throughout the liver,  the largest measuring up to 11.1 cm in hepatic segment 6, favor  metastatic disease until proven otherwise.   2. Lytic lesion of the right fourth rib with associated cortical  erosion, which suspicious for osseous metastatic disease with  pathologic fracture.   3. Heterogeneous appearance of the uterus with appearance of  endometrial thickening and probably fibroids as " well. Cannot exclude  endometrial cancer. Recommend ultrasound for confirmation.  4. Haziness and small volume fluid around the pancreas suggestive of  acute pancreatitis. No evidence of pancreatic mass.  5. Indeterminate 1.5 cm left adrenal nodule.   6. Main pulmonary artery is dilated to 4.7 cm, suggestive of pulmonary  arterial hypertension.  7. Consolidation in the right upper lobe with filling of a segmental  bronchus. Differential includes mucus plugging with associated  atelectasis, aspiration, or infectious consolidation. Less likely  endobronchial or compressive mass could have this appearance.     Pelvic US:  The pelvis was scanned in standard fashion with a  transabdominal transducer(s) using both grey scale and color Doppler  techniques. Unable to do transvaginal exam due to patient having right  femoral fracture and body habitus.      CT CAP w/o contrast 10/8/18  Heart size is normal.  No pericardial effusion.  Dilation of the main pulmonary  artery up to 4.5 cm.  The aorta great vessels are normal in course and caliber.  Thyroid is not well visualized.  No pathologic appearing mediastinal or hilar  lymph nodes.  No pleural effusion or pneumothorax.  No focal airspace disease.  Bilateral upper lobe linear opacities likely related to prior scarring.  No  discrete pulmonary nodules.  Abdomen/Pelvis  Limited evaluation of the abdominal viscera without intravenous contrast and  due to patient body habitus.  The liver is unremarkable.  Gallbladder is  dilated.  Pancreas, spleen, and adrenal glands are normal.  Kidneys are  unremarkable, no hydronephrosis or hydroureter.  Bladder is decompressed.  Uterus and adnexa grossly unremarkable.  Colon, appendix, and small bowel are  normal.  No free fluid or free air.  No abdominal or pelvic adenopathy.  Gastric band in place.  Nonaneurysmal atherosclerosis of the abdominal  vasculature.  Bones/soft tissues: Expansile lytic lesion involving the right anterior 4th  rib  (series 3, image 48).  Otherwise osseous structures and soft tissues are  unremarkable.    IMPRESSION:  1. Single expansile lytic right 4th rib lesion of indeterminate etiology.  2. Dilated main pulmonary artery suggesting pulmonary hypertension.  3. Bilateral upper lobe scarring likely related to prior infectious process.  Focal wall    Procedures:   Ultrasound-guided fine needle aspiration of left parotid region mass 10/29/2018:   Neck, left parotid, ultrasound guided fine needle aspiration:   - Benign neoplasm.   - Benign mixed tumor (pleomorphic adenoma).     Bone Biopsy R femur:   METASTATIC SQUAMOUS CELL CARCINOMA.    BMBx flow 9/28/18  Bone marrow, flow cytometric immunophenotyping:  Normal immunophenotyping results.  No monotypic B-cell  population or increase in blasts identified.  No monotypic  plasma cell population is identified.    TTE 4/27/18  1.  Normal systolic function of the left ventricle. Ejection fraction 60% without any wall motion abnormalities.  Also no significant hypertrophy is appreciated on this study. May be related to image quality.    2.  Right ventricular size and contractility is normal.    3.  Grade I diastolic dysfunction of the left ventricle.    4.  Mild left atrial enlargement.    5.  No significant valvular abnormalities by 2D and Doppler analysis.     Colonoscopy 2015:        Many diverticula were found in the sigmoid colon.       A 4 mm polyp was found in the rectum. The polyp was sessile. The polyp        was removed with a cold biopsy forceps. Resection and retrieval were        complete.       Five sessile polyps were found in the sigmoid colon. The polyps were 3        to 4 mm in size. These polyps were removed with a cold biopsy forceps.        Resection and retrieval were complete.       Two sessile polyps were found in the transverse colon. The polyps were 4        to 5 mm in size. The polyps were removed with a cold snare and cold        biopsy forceps.       The  exam was otherwise without abnormality.       Impressions/Recommendations:   63F with HFpEF, HTN, DMT2, morbid obesity BMI 54, former smoker (1/4 PPD for > 20 years) recent dx of hypercalcemia (9/2018) w/ extensive OSH w/u who was transferred for evaluation of pathologic right distal femur fracture, s/p right distal femur biopsy, tumor curettage/excision and ORIF on 10/26/18. Pathology with metastatic squamous cell carcinoma of unknown primary.     # Metastatic squamous cell carcinoma, unknown primary:   Patient with hypercalcemia, pathologic femur fracture s/p biopsy which showed metastatic squamous cell carcinoma. Unknown primary. Based on bone biopsy tumor markers and evaluation by heme/onc and pathology, suspected primary is lung or  tract. Patient with new vaginal bleeding two weeks ago and extensive FH of breast cancer (mother, two aunts, and sister) and sister with some gynecological cancer. Seen by Gyn/Onc and plan is for endometrial biopsy as an outpatient after healing of femur fracture. CT scan with RUL consolidation. Recommend a PET scan to better visualize area of consolidation. Discussed with heme/onc team and their plan is for a PET scan as well. Will follow and await PET scan results.     Thank you for the consult. Patient seen & discussed w/  Dr. Kang, who is in agreement.     Angelica Hernández MD  Internal Medicine PGY3  Pager: [phone]

## 2018-11-03 NOTE — PROGRESS NOTES
"Orthopaedic Surgery Progress Note   November 3, 2018    Subjective: No acute events overnight. Pain predominately to her R hip - limited her ability to sit at the EOB yesterday. She was able to be transferred to a chair yesterday. Tolerating diet. Bravo catheter.     Objective: BP (!) 147/94 (BP Location: Right arm)  Pulse 88  Temp 98.9  F (37.2  C) (Axillary)  Resp 20  Ht 1.651 m (5' 5\")  Wt 148 kg (326 lb 4.5 oz)  SpO2 94%  BMI 54.3 kg/m2    General: NAD, alert and oriented, cooperative with exam.   Cardio: RRR, extremities wwp.   Respiratory: Non-labored breathing.  MSK: RLE: Toes wwp, DP 2+, bcr in all toes. +EHL/FHL/GSC/TA. SILT SP/DP/Sa/Palumbo/T. Dressing removed - incisions c/d/i without erythema or drainage - new dressing applied.     Labs:   Cr 0.52  Ca 9.7  Surgical pathology: Metastatic squamous cell carcinoma. Cxs NGTD.     Assessment and Plan: Mayra Stokes is a 63 year old female with PMH including FRANSISCO, HTN, DM II, CHF and pulmonary HTN transferred from an OSH after sustaining a R distal femoral shaft pathologic fracture now s/p R distal femur biopsy, tumor curettage and excision, and ORIF on 10/26 with Dr. Wilkinson. Pathology with metastatic squamous cell carcinoma. Primary remains unknown.     Internal Medicine Primary  Hypercalcemia: Paraneoplastic syndrome. Persistent hypercalcemia likely 2/2 persistent primary tumor. Prefer not treating with Denusomab given the potential interference with fracture healing in the setting of a high risk for non-union but will defer to primary who has initiated treatment.   Activity: Up with assist until independent. Knee ROM as tolerated.   Weight bearing status: TTWB RLE.  Pain management: Per primary.    Antibiotics: Per primary. Mildred-operative completed.   Diet: Per primary. Okay for a diet from Orthopedic Surgery perspective.   DVT prophylaxis: Lovenox x 4 weeks. SCDs.   Imaging: XR Pelvis/R Hip ordered.   Labs: PRN.   Bracing/Splinting: None.   Dressings: Keep " clean, dry and intact - change every other day or PRN.   Elevation: Elevate RLE on pillows to keep above the level of the heart as much as possible.   Drains: Discontinued.   Physical Therapy/Occupational Therapy: Eval and treat.  Cultures: Pending, follow culture results closely.    Follow-up: Dr. Wilkinson in 4 weeks.  Disposition: Per primary.      Mary Damon MD  Orthopaedic Surgery Resident, PGY-4  Pager: [phone]     For questions about this patient during weekday business hours, please attempt to contact me at my pager prior to contacting the Orthopaedic Surgery resident on call. On the weekends and overnight, please page the Orthopaedic Surgery resident on call. Thank you!

## 2018-11-03 NOTE — PROGRESS NOTES
Internal Medicine Daily Note   Date of Service: 11/3/2018  Patient: Mayra Stokes  MRN: [id number]  Admission Date: 10/25/2018  Hospital Day # 9    Assessment & Plan: Mayra Stokes is a 63 year old female with PMH that includes FRANSISCO, HFpEF, HTN, morbid obesity, HLD, and new diagnosis of hypercalcemia 9/2018. Admitted to [facility] from OSH for management of pathologic distal R femur fracture now s/p tumor curettage/excision and ORIF on 10/26/18. Hypercalcemia likely paraneoplastic 2/t metastatic SCC with uncertain primary, at this time suspicion is for lung vs  primary.       # Hypercalcemia: refractory to aggressive treatments, ultimately improving only after denosumab administration per Endo and HemeOnc recs  # Metastatic squamous cell carcinoma: unknown primary, suspected lung vs  primary  --HemeOnc consulted, appreciate recs  --Endo consulted, appreciate recs  --consulted Pulm to discuss utility of bronch per HemeOnc recs, Pulm recs PET scan  --PET scan planned for early next week (Tues)  --decrease to daily Ca checks  --daily BMP, Mg, Ph  --PTHrP drawn 10/26/18 and in process    # Pathologic right distal femur fracture: occurred with fall on 10/24/18, s/p distal femur bx, tumor curettage and excision 10/26/18  # s/p ORIF: 10/26/18 with Ortho  --Ortho consulted and following, appreciate recs  --toe touch weight bearing  --acetaminophen 975mg Q8hrs for pain  --Tramadol 25mg Q6hrs for breakthrough pain    # R sided abdominal pain: mild this AM, localized to R side of abdomen this morning and seems to move around some on the R side per Pt history. Pt states this has been present for 4 days now (just discussed with this provider today). Not changed with bowel movements, R leg movements, engaging abdominal muscles, PO intake. Notes it increases with deep breaths. Repositioning seems to help (lying flatter and on L side better). No rash on exam. Improving over last days and better today. Controlled with current pain  regimen and patient not needing regular dosing for pain. 10/28/18 abdominal CT and 10/31/18 abd xray without acute findings intraabdominally.  --added on WBC and CRP to labs  --trial lido patches  --monitor, if worsening or changing will obtain CT abd/pelvis      # endometrial thickening  # post menopausal vaginal bleeding  --Gyn Onc consulted this admission, unable to do bedside pelvic exam, Gyn Onc recs f/u in clinic in 3-4 weeks for pelvic exam and endometrial bx      # Hypoxic, hypercarbic respiratory failure: improving  Post operatively with increased O2 needs and BiPAP. CXR w/ pulmonary vascular congestion. Hgb stable. EKG w/ sinus tachycardia. Likely multifactorial in setting of large volume IVF for hypercalcemia as above, anesthesia, narcotic medications, obesity hypoventilation. Now improved, on nasal cannula while awake and BiPap only while asleep.   --ordered lasix 20mg IV x1 dose today, will monitor respiratory status  --bipap with sleep  --wean supplemental O2 as tolerated, goal O2 sat >90%     # h/o FRANSISCO: PTA BiPAP prescribed, used infrequently due to trouble with mask.   --bipap with sleep    # HTN: BP stable on admission. PTA enalapril, lopressor.   --continue lopressor (hold parameters SBP less than 100 or HR less than 60)  --Continue PTA enalapril      # HFpEF, h/o Cor Pulmonale:  ECHO 4/2018 w/ EF 60%, normal systolic function, NRWMA, normal RV function, Grade I diastolic dysfunction, mild left atrial enlargement, PA pressure is 7 mmHg plus RAP (normal). Per OSH report- dramatic change in RV size and contractility, no pulmonary HTN compared to previous study 2/2018. ECHO on admission EF 60-65%, normal LV function, mild RV dilation, IVC normal w/ preserved respiratory variation. No pericardial effusion. RAP 3 mmHg. PTA lasix, lopressor.   --continue metoprolol  --daily weights  --close monitoring of volume status     # Goals of care:   Spoke with Mayra at length today, she understands that while we  "don't yet know exactly where her malignancy primary source is, that we do know she has a metastatic SCC and that treatment for this would be palliative and not curative. She voiced understanding of this, and discussed that she has had at least two loved ones close to her go through cancer diagnosis and chemotherapy treatment and she does not want to pursue chemotherapy. She is very clear that quality of life is most important to her, and states that she's lived \"about as long as I thought I would\". She is not sure about whether she would want to come back to the hospital, and wants to talk more about options moving forward. She is open to talking more about hospice if she were eligible for this. She notes she was hopeful for bilateral knee replacements in the near future, but notes \"they probably won't do my knee replacements now with metastatic cancer\". She is agreeable to talking with Palliative Care team for decision support and to discuss options around limiting treatments that do not change her big picture, a consult was placed today for Monday 11/5/18.      Resolved issues:   # UTI: UA form OSH 10/24/18 w/ > 100 WBC, LE, and many bacteria. Received Zosyn prior to transfer to [facility] 10/25/18. UC notable for pan-sensitive E. Coli. Completed treatement with ceftriaxone x3 days (10/26-10/28).        # Pain Assessment:  Current Pain Score 11/3/2018   Patient currently in pain? denies   Pain location Hip   Pain descriptors Aching   - Mayra is experiencing pain due to recent femur fracture. Pain management was discussed and the plan was created in a collaborative fashion.  Mayra's response to the current recommendations: engaged  - Please see the plan for pain management as documented above    Consulting Services: Ortho, Endo, HemeOnc, Pulm    FEN/GI/: regular diet; no MIVF; indwelling urine hickey cath  Lines/tube: none  VTE prophylaxis: enox  Code status: full code  Disposition: Transfer to Adult Med/Surg bed " "today. Anticipate discharge to TCU in 3-4 days pending continued clinical stability, weaning supplemental O2 needs, improved hypercalcemia, completion of inpatient work up of metastatic malignancy and outpatient care plan.    Patient care plan discussed beside with patient, RN.     Avani Bell MD  Internal Medicine Hospitalist & Staff Physician  [facility]  Pager: [phone]    Team: Vandana Ocampo 4  Page Cross Cover between 5pm-6:30am: pager [phone] (Damaris), if no response then page job code [phone].     ___________________________________________________________________    Subjective & Interval Hx:    Pt notes she is feeling relatively well today. Tells me about a R sided abdominal pain this AM that feels \"hot\". Pain is mild this AM, localized to R side of abdomen this morning and seems to move around some on the R side per Pt history. Pt states this has been present for 4 days now (just discussed with this provider today). Not changed with bowel movements, R leg movements, engaging abdominal muscles, PO intake. Notes it increases with deep breaths. Repositioning seems to help (lying flatter and on L side better). Pain has been improving over last 4 days. Pt also asking about not pursuing any treatment for her new malignancy diagnosis and what options would look like moving forward for not treating this.       Last 24 hr care team notes reviewed.     ROS:  4 point ROS including Respiratory, CV, GI and , other than that noted in the HPI, is negative.    Medications:   Reviewed in EPIC, please see EPIC for complete list.    Physical Exam:    Blood pressure 127/67, pulse 88, temperature 98.3  F (36.8  C), temperature source Oral, resp. rate 16, height 1.651 m (5' 5\"), weight 148 kg (326 lb 4.5 oz), SpO2 97 %.  General: NAD, lying comfortably in bed, R leg raised  HEENT: normocephalic, no perioral lesions  Chest/Resp: good air movement bilaterally without wheezing  Heart/CV: RRR at time of " exam  Abdomen/GI: soft, obese, very mildly tender to palpation on R side of abdomen without rebound/guarding  Extremities/MSK: bandaging in place on RLE and c/d/i  Skin: no rash on limited exam including no evidence of zoster  Neuro/Psych: AOx3, no tremor    Lines/Tubes:   Peripheral IV 10/28/18 Left;Anterior Lower forearm (Active)   Site Assessment [facility] 11/3/2018  4:00 AM   Line Status Infusing 11/2/2018 11:00 PM   Phlebitis Scale 0-->no symptoms 11/2/2018  3:21 PM   Infiltration Scale 0 11/2/2018  3:21 PM   Infiltration Site Treatment Method  None 11/2/2018  3:21 PM   Extravasation? No 11/3/2018  4:00 AM   Dressing Intervention New dressing  10/28/2018  7:00 PM   Number of days:6       Peripheral IV 10/30/18 Left Upper forearm (Active)   Site Assessment [facility] 11/3/2018  4:00 AM   Line Status Infusing 11/2/2018 11:00 PM   Phlebitis Scale 0-->no symptoms 11/2/2018  3:21 PM   Infiltration Scale 0 11/2/2018  3:21 PM   Infiltration Site Treatment Method  None 11/2/2018  3:21 PM   Extravasation? No 11/3/2018  4:00 AM   Dressing Intervention New dressing  10/30/2018  6:00 AM   Number of days:4       Labs:   Laboratory data reviewed.    Unresulted Labs Ordered in the Past 30 Days of this Admission     Date and Time Order Name Status Description    10/26/2018 0223 PTH Related Peptide Test In process           Imaging/Studies:  Imaging/Studies findings reviewed.

## 2018-11-03 NOTE — PLAN OF CARE
Problem: Chronic Respiratory Difficulty Comorbidity  Goal: Chronic Respiratory Difficulty  Patient comorbidity will be monitored for signs and symptoms of Respiratory Difficulty (Chronic) condition.  Problems will be absent, minimized or managed by discharge/transition of care.   Outcome: Improving  Attempted to wean off supplemental O2 but O2 sats = 88-90% on room air. Placed back on 1L via NC. Denies shortness of breath, on cpap at night.

## 2018-11-03 NOTE — PLAN OF CARE
Problem: Patient Care Overview  Goal: Plan of Care/Patient Progress Review  Outcome: No Change  Neuro: A&Ox4.   Cardiac: SR. HR's in 70's-80's. VSS. Afebrile.   Respiratory: Sating upper 90's on BiPAP/CPAP.  GI/: Adequate urine output via hickey, BMx1.   Diet/appetite: Tolerating regular diet. Eating well.   Activity:  Up with lift, shifted weight off R LE.   Pain: Tramadol given x2 for R LE pain.   Skin: No new deficits noted.  LDA's: 2 PIV     Plan: Continue with POC. Notify primary team with changes.

## 2018-11-04 NOTE — CONSULTS
"[facility], [place]    Palliative Care Consultation Note    Patient: Mayra Stokes  Date of Admission:  10/25/2018    Requesting provider/team: medicine team  Reason for consult: Goals of care  Decisional support  Patient and family support    Recommendations:  Goals of care:   Mayra would like ongoing care close to her home as she is currently at 150+ miles from home and not near any support.  Mayra knows that the doctors still need to figure out where her cancer \"came from\" and while she wants this information she also feels overwhelmed by all the information that is coming at her.  Her plan is to get out of the hospital and go to a rehab facility near her home to get stronger.  When she has had some time to recover from surgery and get stronger she then feels she will be better able to hear all the information about her cancer and at that point we will make a plan for how to move forward.  Duty is not sure she wants to have any cancer treatment or not as her   5 months after his cancer diagnosis receiving chemotherapy that made him feel miserable and she wonders if his last months would have been better quality without chemotherapy.  Mayra knows that with the severe degeneration of both of her knees rehab is going to be very difficult and possibly impossible for her.  She therefore does not want to discuss how she is going to move forward until she has had some time outside of the hospital and near family.  -Schedule appointment with patient's primary care provider near her home soon after discharge to make sure patient has a medical person helping to oversee her complicated care and at advocate for her as she begins to make decisions moving forward  -Try to schedule all needed follow-up appointments with medical providers that are close to her home as Mayra and her family do not drive into the [employer].    Symptom management: No pain when patient is sitting.  She does have " pain after doing physical therapy which is well controlled with tramadol.  Continue tramadol at current dose    These recommendations have been discussed with medicine team.    Thank you for the opportunity to participate in the care of this patient and family. Our team will sign off. Please feel free to contact the on-call Palliative provider with any urgent needs.     Rondakade Lind  Pager: [phone]  [facility] Inpatient Team Consult pager [phone] (M-F 8-4:30)  After-hours Answering Service [phone]   Palliative Clinic: [phone]       Assessment:  Mayra Stokes is a 63 year old female with PMH of FRANSISCO, heart failure, HTN and morbid obesity who was transferred from [facility] on 10/25/18 for evaluation of pathologic right distal femur fracture, s/p right distal femur biopsy, tumor curettage/excision and ORIF on 10/26/18. 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       I met with Mayra today to introduce the Palliative Care team and services we provide; such as symptom management, assistance navigating chronic illness and goals for treatment, counseling, psychosocial and spiritual support.     Symptoms:   Right hip/groin pain: Pain not present when she is not moving.  She does have pain after physical therapy which is well controlled with tramadol  Social:         Living situation: Lives alone in [place], [place]       Support system: Her entire family including her brothers and sisters and 94-year-old mother all live in the area within 20 minutes of her.       Actual/Potential Caregiver: Mayra states she has lots of support from her family       Functional status: Was independent prior to this hospitalization.  Able to bathe and dress self clean house drive.       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  Retired because her knees were starting to hurt her and she wanted to have more time to spend with her mother.  Coping: Mayra feels she is coping well with all the information that she is getting and although she currently feels overwhelmed by it all she knows that when she leaves the hospital and has time to get stronger she will be able to process it all better.  Spiritual/Anabaptism:    Spiritual background: Worship  Spiritual needs: Does not want to see a  or  while in the hospital.  Has her  at home and feels well supported.    Prognostic Information: Mayra feels she cannot have any prognostic information and tell she knows more about the cancer although she wonders aloud if she could die quickly like her  did or live for years even without treatment for her cancer as she knows some people who have had very slow growing cancers and had been able to live years with them.  She knows she needs more information before prognosis can be given.  Advance Care Planning: Advanced care planning not discussed at visit today.  Full code.    History of Present Illness   Sources of History:patient and electronic health record    Mayra Stokes is a 63 year old female with PMH of FRANSISCO, heart failure, HTN and morbid obesity who was transferred from [facility] on 10/25/18 for evaluation of pathologic right distal femur fracture, s/p right distal femur biopsy, tumor curettage/excision and ORIF on 10/26/18. 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     Mayra tells me that in September of this year she had blood work done and was referred to a blood cancer specialist.  Specialist did do a bone marrow biopsy which looked normal and also did a biopsy of the bone which was also normal and then she fractured her hip  after a fall and that is when the cancer in her bone was seen.  Now they are waiting on further testing to determine what kind of cancer it is.    Review of Systems: 10 point ROS neg other than the symptoms noted above in the HPI and here  Pain: Pain in her right hip after physical therapy  Fatigue: 1  Nausea: 0  Depressive Symptoms: 0  Anxiety: 0  Drowsiness: 0  Poor Appetite: 0  Shortness of Breath: 0     Past Medical History:   Past Medical History:   Diagnosis Date     Chronic cor pulmonale (H)      Chronic diastolic heart failure (H)      DM type 2 (diabetes mellitus, type 2) (H)     diet controlled     Hypercalcemia 09/2018     Hypertension      Mass of left side of neck     since ~2003     Morbid obesity (H)      Obstructive sleep apnea      Pathologic fracture of femur (H) 10/24/2018          Past Surgical History:   Past Surgical History:   Procedure Laterality Date     BIOPSY BONE LOWER EXTREMITY Right 10/26/2018    Procedure: BIOPSY RIGHT FEMUR AND EXCISION OF TUMOR;  Surgeon: Maximo Wilkinson MD;  Location: [facility] OR     LAP ADJUSTABLE GASTRIC BAND       OPEN REDUCTION INTERNAL FIXATION FEMUR DISTAL Right 10/26/2018    Procedure: OPEN REDUCTION INTERNAL FIXATION RIGHT DISTAL FEMUR;  Surgeon: Maximo Wilkinson MD;  Location: [facility] OR     SHOULDER SURGERY Left     shoulder replacement             Family History:   Mother alive at age 94  Siblings alive       Allergies:   No Known Allergies         Medications:   I have reviewed this patient's medication profile and medications given in the past 24 hours.         Physical Exam:   Vital Signs: Temp: 98.9  F (37.2  C) Temp src: Oral BP: 145/69 Pulse: 88 Heart Rate: 76 Resp: 20 SpO2: 93 % O2 Device: Nasal cannula Oxygen Delivery: 1 LPM  Weight: 321 lbs 13.95 oz    Physical Exam:  General: Alert, sitting in bedside recliner, appears stated age, in no acute distress  Eyes: EOMI, sclera clear  HEENT: NC/AT; mucous membranes moist  Lungs: No increased work of breathing,  speaking full sentences   Neuro: A&O x 3; CN II-XII grossly intact;   Neuropsych: Alert, good eye contact, affect full, engaged, sensorium intact     Data reviewed:   Reviewed         Attestation:   Thank you for the opportunity to continue to participate in the care of this patient and family.  Please feel free to contact on-call palliative provider with any emergent needs.    We can be reached via team pager [phone] (answered 8-4:30Monday-Friday); after-hours answering service ([phone]); Main Palliative Clinic - [phone]      This patient has been seen and evaluated by me and discussed with medicine team.  I have reviewed today's vital signs, medications, labs and imaging. Total time on the floor involved in the patient's care: 70 minutes.  Greater than 50% of my time was spent counseling and/or coordination of care regarding symptoms and goals.     Ronda Lind MD  Palliative Care Physician  Pager [phone]  [facility] Inpatient Team Consult pager [phone] (M-F 8-4:30)  After-hours Answering Service [phone]

## 2018-11-04 NOTE — PROGRESS NOTES
Provider called this writer regarding lipase and LFT results ordered after patient presented with tender Abdomen  pain worsening with deep breathing.   Lipase level 949. Provider would like patient to increase water intake as tolerated.  Notify provider with increase pain, any changes. Provider will let team aware of possible pancreatis, in morning.  Patient has been updated. Patient resting comfortably in bed.

## 2018-11-04 NOTE — PROGRESS NOTES
Problem: Patient Care Overview  Goal: Plan of Care/Patient Progress Review  Outcome: No Change  /75 (BP Location: Right arm)  Pulse 72  Temp 98.4  F (36.9  C) (Oral)  Resp 20  SpO2 94%  BMI 54.3 kg/m2     AOx4, calls appropriately, repositioned at patient's request.  VSS, SR, afebrile, on 1L NC.  Scheduled Tylenol for right leg and RUQ abdominal pain decreased. Lipase level 994 possible pancreatitis team will discuss with patient. Bravo in place with good urine output, small soft BM x 1. Will continue with plan of care.     Problem: Diabetes Comorbidity  Goal: Diabetes  Patient comorbidity will be monitored for signs and symptoms of hyperglycemia or hypoglycemia. Problems will be absent, minimized or managed by discharge/transition of care.   Outcome: No Change  11/3 , glucoses drawn with morning labs, no other monitoring required     Problem: Chronic Respiratory Difficulty Comorbidity  Goal: Chronic Respiratory Difficulty  Patient comorbidity will be monitored for signs and symptoms of Respiratory Difficulty (Chronic) condition.  Problems will be absent, minimized or managed by discharge/transition of care.   Outcome: No Change  Remains on 1L NC, Bipap for sleep, patient declined Bipap tonight.

## 2018-11-04 NOTE — PLAN OF CARE
"Problem: Patient Care Overview  Goal: Plan of Care/Patient Progress Review  Outcome: No Change  /69 (BP Location: Right arm)  Pulse 111  Temp 98.9  F (37.2  C) (Oral)  Resp 20  Ht 1.651 m (5' 5\")  Wt 148 kg (326 lb 4.5 oz)  SpO2 96%  BMI 54.3 kg/m2    AOx4, calls appropriately, declined up to chair this shift, repositioned at patient's request.  VSS, SR-ST, afebrile, on 1L NC.  Receiving Tramadol and Tylenol for right leg and RUQ abdominal pain. Triggered sepsis protocol, lactic acid 1.3.  Right hip x-ray completed.  Bravo in place with good urine output, small BM x 1, ate 100% of dinner.  Will continue with plan of care.    Problem: Diabetes Comorbidity  Goal: Diabetes  Patient comorbidity will be monitored for signs and symptoms of hyperglycemia or hypoglycemia. Problems will be absent, minimized or managed by discharge/transition of care.   Outcome: No Change   with morning labs, no other monitoring required    Problem: Chronic Respiratory Difficulty Comorbidity  Goal: Chronic Respiratory Difficulty  Patient comorbidity will be monitored for signs and symptoms of Respiratory Difficulty (Chronic) condition.  Problems will be absent, minimized or managed by discharge/transition of care.   Outcome: No Change  Remains on 1L NC, Bipap for sleep      "

## 2018-11-04 NOTE — PLAN OF CARE
Problem: Patient Care Overview  Goal: Plan of Care/Patient Progress Review  Discharge Planner PT 6B  Patient plan for discharge: TCU  Current status: Pt max A x2 for rolling side to side, TTWB on R leg, ceiling lift to chair. Pt participated in Moveo exercises today: 2x 5-8 reps at 5-7.5 degrees, up to 10-15 degrees for static standing.  Barriers to return to prior living situation: Decreased LE strength, endurance and functional mobility  Recommendations for discharge: TCU  Rationale for recommendations: Pt will benefit from continued skilled therapy to improve functional mobility toward baseline levels.        Entered by: Jasbir Grant 11/04/2018 2:15 PM

## 2018-11-04 NOTE — PROGRESS NOTES
"Orthopaedic Surgery Progress Note   November 4, 2018    Subjective: No acute events overnight. Pain at her hip is improved today as compared to yesterday - predominately lateral. She was able to be transferred to a chair yesterday. Tolerating diet. +BM. Bravo catheter.     Objective: /85 (BP Location: Right arm, Cuff Size: Adult Regular)  Pulse 88  Temp 98.6  F (37  C) (Oral)  Resp 20  Ht 1.651 m (5' 5\")  Wt 146 kg (321 lb 14 oz)  SpO2 94%  BMI 53.56 kg/m2    General: NAD, alert and oriented, cooperative with exam.   Cardio: RRR, extremities wwp.   Respiratory: Non-labored breathing.  MSK: RLE: Toes wwp, DP 2+, bcr in all toes. +EHL/FHL/GSC/TA. SILT SP/DP/Sa/Palumbo/T. Dressing c/d/i.     Labs:   WBC 13.3  Hgb 8.3  Plts 308  Cr 0.56  Ca 9.8  Surgical pathology: Metastatic squamous cell carcinoma. Cxs NGTD.     Assessment and Plan: Mayra Stokes is a 63 year old female with PMH including FRANSISCO, HTN, DM II, CHF and pulmonary HTN transferred from an OSH after sustaining a R distal femoral shaft pathologic fracture now s/p R distal femur biopsy, tumor curettage and excision, and ORIF on 10/26 with Dr. Wilkinson. Pathology with metastatic squamous cell carcinoma. Primary remains unknown.     Internal Medicine Primary  Hypercalcemia: Paraneoplastic syndrome. Persistent hypercalcemia likely 2/2 persistent primary tumor. Prefer not treating with Denusomab given the potential interference with fracture healing in the setting of a high risk for non-union but will defer to primary who has initiated treatment.   Activity: Up with assist until independent. Knee ROM as tolerated.   Weight bearing status: TTWB RLE.  Pain management: Per primary.    Antibiotics: Per primary. Mildred-operative completed.   Diet: Per primary. Okay for a diet from Orthopedic Surgery perspective.   DVT prophylaxis: Lovenox x 4 weeks. SCDs.   Imaging: No further imaging at this time. If patient with persistent R hip pain that is limiting her ability to " mobilize, may need to consider MRI R Hip.   Labs: PRN.   Bracing/Splinting: None.   Dressings: Keep clean, dry and intact - change every other day or PRN.   Elevation: Elevate RLE on pillows to keep above the level of the heart as much as possible.   Drains: Discontinued.   Physical Therapy/Occupational Therapy: Eval and treat.  Cultures: Pending, follow culture results closely.    Follow-up: Dr. Wilkinson in 4 weeks.  Disposition: Per primary.      Mary Damon MD  Orthopaedic Surgery Resident, PGY-4  Pager: [phone]     For questions about this patient during weekday business hours, please attempt to contact me at my pager prior to contacting the Orthopaedic Surgery resident on call. On the weekends and overnight, please page the Orthopaedic Surgery resident on call. Thank you!

## 2018-11-04 NOTE — PROGRESS NOTES
[facility], [place]    Internal Medicine Progress Note - Damaris 4     Changes today:  - OK to remove Bravo  - Transfer to general medicine  - Palliative care to see patient tomorrow  - Continue manual phosphorus replacement, as I suspect this is secondary to previously very elevated calcium levels  - Likely back to [facility] tomorrow or Tuesday based on return transfer agreement    Assessment & Plan   63F w/ FRANSISCO, HFpEF, HTN, morbid obesity, HLD, and recent dx of hypercalcemia (9/2018) w/ extensive OSH w/u now transferred for evaluation of pathologic right distal femur fracture, s/p right distal femur biopsy, tumor curettage/excision and ORIF on 10/26/18, with ongoing management for hypercalcemia and workup of primary tumor.     # Hypercalcemia  # Metastatic squamous cell carcinoma of unknown primary  -Tumor from right femur shows metastatic squamous cell, likely lung or  primary   -Gynecology/oncology consulted, unable to get bedside pelvic exam, signed off 11/1    - Follow up in clinic in 3-4 weeks for pelvic exam and endometrial biopsy   - Pulm consulted, appreciate recs  - Monitor calcium daily  - Daily renal panel, magnesium  - Strict intake and output  - Follow up on PTHrp (pending) drawn on 10/26  - Appreciate orthopedics, endocrinology and oncology consultation recommendations  - PET scan ordered  - Palliative care consulted, as patient expressed interest in speaking with them    # R-sided pain, musculoskeletal  # Pancreatic inflammation  Description of pain sounds very musculoskeletal in nature, what with resolution at rest and sharp pain with deep inhalation and quick turning. Patient herself suspects that this is secondary to some very large BMs she had a few days ago after a suppository. Lipase was checked last night to work up abdominal pain and was elevated at >900. However, patient has good appetite and just had CT chest/abd/pelvis that showed some pancreatic fluid but  no overt pancreatic inflammation-- hypercalcemia known to be associated with pancreatitis. As patient is relative asymptomatic from this (and her pain sounds very musculoskeletal), will continue to monitor for signs of pancreatitis with hopes that it will resolve with resolving hypercalcemia.  - Pain tolerable on current regimen, continue PRN tylenol and tramadol     Pathologic Right distal femur fracture s/p distal femur bx, tumor curettage and excision, ORIF: Patient suffered mechanical fall 10/24/18. Presented to OSH ED w/ imaging consistent w/ pathologic fracture of right distal femur. Transferred to [facility] for further orthopedic evaluation. Now s/p ORIF and tumor curettage/excision on 10/26. Intraoperative pathology impression of poorly differentiated metastatic carcinoma.   -PT/OT evaluation and management; needs TCU placement  -Orthopedic consultation; appreciate recommendations and assistance with management.   - Touch down weight bearing     # Hypoxic, hypercarbic respiratory failure  # Chronic hypercapnea, likely secondary to obesity hypoventilation and is at high risk for FRANSISCO  Declines using BiPap at night, says she has poor sleep with it on.   - BiPAP as needed and while asleep  - Oxygen therapy to maintain saturations > 90%, continue to try to wean  - Acetaminophen 975mg q8h for pain  - Tramadol 25mg q6h for breakthrough pain     # Hypomagnesemia, Hypophosphatemia:    - Check daily Mg, Phos  - Monitor and replace per protocol      # UTI: UA form OSH 10/24/18 w/ > 100 WBC, LE, and many bacteria. Received Zosyn prior to transfer to [facility] 10/25/18. UC notable for pan-sensitive E. Coli. S/p ceftriaxone x3 days (10/26-10/28).    # HTN: BP stable on admission. PTA enalapril, lopressor.   - Continue lopressor with hold parameters SBP less than 100 or HR less than 60  - Continue PTA enalapril 5mg every day       # HFpEF, hx of Cor Pulmonale:  ECHO 4/2018 w/ EF 60%, normal systolic function, NRWMA,  normal RV function, Grade I diastolic dysfunction, mild left atrial enlargement, PA pressure is 7 mmHg plus RAP (normal). Per OSH report- dramatic change in RV size and contractility, no pulmonary HTN compared to previous study 2/2018. ECHO on admission EF 60-65%, normal LV function, mild RV dilation, IVC normal w/ preserved respiratory variation. No pericardial effusion. RAP 3 mmHg. PTA lasix, lopressor.   - Continue metoprolol  - Daily weights  - Volume management as above     Diet: Regular Diet Adult  Fluids: none, tolerating oral fluids  Lines: PIV, remove Bravo today  Bravo Catheter: in place, indication: Strict 1-2 Hour I&O    DVT Prophylaxis: SCDs, enoxaparin 40mg q24h  Code Status: Full Code    Expected discharge: Likely tomorrow or 11/6 to [facility] based on return transfer agreement.    Patient staffed with Dr. Bell.    MD Damaris Snider [address] [place] [address]  Pager: [phone]  Please see sticky note for cross cover information    Interval History   No acute events over night. Nursing notes reviewed. Declines Bipap over night, as it makes it difficult to sleep. 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 Agreeable to transfer to rehab. No problems with appetite. Has a lot of gas. Continues to have R-sided abdominal pain, well managed on current regimen.     Physical Exam   Vital Signs: Temp: 96.6  F (35.9  C) Temp src: Oral BP: 145/79 Pulse: 88 Heart Rate: 95 Resp: 18 SpO2: 95 % O2 Device: Nasal cannula Oxygen Delivery: 1 LPM  Weight: 321 lbs 13.95 oz  Constitutional: NAD, lying in bed today   Head: Normocephalic and atraumatic.   Eyes: Conjunctivae are normal. Moist mucus membranes  Cardiovascular: RRR without murmurs or gallops  Pulmonary/Chest: Difficult exam d/t body habitus. Distant breath sounds, clear to auscultation bilaterally.  GI: soft with good bowel sounds. Tender to palpation of R side. Nontender in any other quadrant. No rebound  tenderness. Obese abdomen, nondistended.  : Bravo in place, draining clear yellow urine.  Musculoskeletal:  RLE wrapped in ACE bandage.   Skin: Skin is warm and dry. No rash noted to exposed skin areas.

## 2018-11-04 NOTE — PROVIDER NOTIFICATION
Maroon cross-cover notified that patient is C/O RUQ pain that is unrelieved with Tylenol and Tramadol.  Abdomen is tender to palpation and pain worsens with deep breathing.  Will place order for lipase and LFTs.

## 2018-11-04 NOTE — PROGRESS NOTES
"Social Work: Assessment with Discharge Plan    Patient Name:  Mayra Stokes  :  1955  Age:  63 year old  MRN:  [id number]  Risk/Complexity Score: Elevated    Completed assessment with:  Patient    Presenting Information   Reason for Referral:  Discharge plan  Date of Intake:  2018  Referral Source:  Physician  Decision Maker:  Patient  Alternate Decision Maker:  Pt wants her niece Radha Stokes (P: [phone]) to be her decision maker.  Health Care Directive:  Patient considering completing and Provided education  Living Situation:  Pt lives alone in a mobile home in [place], [place]. 4 stairs to enter.   Previous Functional Status: Per PT note- \" Pt reporting she was IND in all mobility until spring of this year when she was hosptilized with sepsis and went to a \"nursing home\" for a few weeks to recover. She was using FWW at the SNF, but then was able to progress her mobility back to IND. However, since July, she has had increased L knee pain and has been using a 4WW for mobility since then. She reports she was preparing for a L total knee replacement, but fell this week exiting the shower. She reports her LEs have become generally weaker and her mobility has been declining. She reports that she has been still IND in ADLs at home.\"   Patient and family understanding of hospitalization:  Pt states she plans to meet with Palliative tomorrow () to \"See what's up next, do I do treatment? Do I pull the plug?\"  Cultural/Language/Spiritual Considerations:  64 yo  female, English speaking, identifies as Hinduism.    Physical Health  Reason for Admission:  Mayra Stokes is a 63 year old female with PMH that includes FRANSISCO, HFpEF, HTN, morbid obesity, HLD, and new diagnosis of hypercalcemia 2018. Admitted to [facility] from OSH for management of pathologic distal R femur fracture now s/p tumor curettage/excision and ORIF on 10/26/18. 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  Services Needed/Recommended:  TCU    Mental Health/Chemical Dependency  Diagnosis:  none  Support/Services in Place:  n/a  Services Needed/Recommended:  n/a    Support System  Significant relationship at present time:  Pt is , no children. Pt has support from her niece Radha who lives in [place], [place].  Family of origin is available for support:  No one available 24/7.  Other support available:  Friend- Magui Alberto (P: [phone]) lives in [place].  Gaps in support system:  Pt lives alone.  Patient is caregiver to:  None     Provider Information   Primary Care Physician:  No primary care provider on file.   None   Clinic:  No primary physician on file.      :  none    Financial   Income Source:  Social Security  Financial Concerns:  none  Insurance:    Payor/Plan Subscriber Name Rel Member # Group #   [employer] [facility] [place]  [id number]      [address]       Discharge Plan   Patient and family discharge goal:  TCU  Provided education on discharge plan:  YES  Patient agreeable to discharge plan:  YES  A list of Medicare Certified Facilities was provided to the patient and/or family to encourage patient choice. Patient's choices for facility are:  Pt reviewed list and would be most interested in [facility] in [place] or [facility].  Will NH provide Skilled rehabilitation or complex medical:  YES  General information regarding anticipated insurance coverage and possible out of pocket cost was discussed. Patient and patient's family are aware patient may incur the cost of transportation to the facility, pending insurance payment: YES  Barriers to discharge:  Medical clearance, Palliative consult, discharge plan.    Discharge Recommendations   Anticipated Disposition:  Per Dr. Toribio with [facility], pt is nearing medical readiness to discharge. There is a Return Transfer Agreement in place with [facility]  [facility]. ENZO discussed with MD and pt and there is agreement to proceed with trying to get pt transferred back to [facility] after pt's Palliative consult on Monday. Pt currently max Ax2, TTWB on R leg, on new supplemental O2, and requiring ceiling lift to chair. Pt will likely need stretcher transport at discharge. ENZO will collaborate with [facility] on Monday to discuss Return Transfer Agreement.  Transportation Needs:  Medical:  Stretcher  Name of Transportation Company and Phone:  N/A    LORENA Willard, [facility]  Weekend Social Work  Sat/Sun [phone]- 6A, 6B, 6C, 6D pager: [phone]  Sat/Sun [phone]- All units page: [phone]

## 2018-11-04 NOTE — PLAN OF CARE
Problem: Patient Care Overview  Goal: Plan of Care/Patient Progress Review  Outcome: Improving  Mayra is doing well. Amylase elevated this morning on labs, infused LR at 150 ml/hr from 1886-7869 am this morning when team rounded and decided to discontinue. Tolerated IV fluids fine, denied shortness of breath and no change in O2 needs. Still requiring 1L via NC to maintain O2 sats. O2 sats 88-89% on room air. Nursing to continue to attempt to wean. NSR on telemetry, HR 80-90's. BP's slightly hypertensive but stable. Complains of right sided abdominal pain, team aware. Tramadol effective for both abdominal and hip discomfort, also on scheduled tylenol. Phosphorous replacement finished infusing - recheck tomorrow AM. x1 IV now saline locked. On regular diet, only had one meal today. No other acute issues, report given to 5B RN and patient transferred in the recliner downstairs to her new room. All belongings including her cell phone and home bipap machine sent with her. 5B nursing staff assumed care, would anticipate that Mayra discharges to rehab in the next few days. Social work consulted today and visited with patient this afternoon in preparation for discharge.

## 2018-11-05 NOTE — PLAN OF CARE
Problem: Patient Care Overview  Goal: Plan of Care/Patient Progress Review  Outcome: No Change  Recent diagnosis of hypercalcemia, right femur fracture, lytic bone lesion. Mets squamous cell carcinoma of unknown primary. Patient is diabetic with diet control. History of diastolic heart failure. Cardiac rhythm is NSR. Bilateral knee pain from OA, and fracture treated with scheduled Tylenol and prn ultram with good reduction in level of pain per patient report. Status Post curettage/excision and ORIF of right femur tumor/fx on 10/26/18. Bowel movement yesterday, passing flatus. Bladder scanned and not retaining urine. Urine incontinence treated with placement of Purewick, functioning with some leaking, skin care, and linen change. Bravo was removed yesterday. Left PIV is saline locked. Diet is regular. Vital signs are every 4 hours and wdl. Dressing c/d/i to right thigh. Continuous pulse oximetry. Oxygenation is 93-94% on 2 L/NC. Has chosen to wear CPAP intermittently during the night. Weight shifting and turning help, specialty bed with pulsate mattress. Turns best to left side 2/2 right femur fx. Per MD note likely transfer back to [facility] Monday or Tuesday. Continue with current plan of nursing care, and update MD with concerns as needed.    Lab just posted and phosphorus is low at 1.6. Requested dose from pharmacy just now. Blood glucose is 109.

## 2018-11-05 NOTE — PLAN OF CARE
Problem: Patient Care Overview  Goal: Plan of Care/Patient Progress Review  Outcome: No Change  Transferred to unit 5B from 6B. Arrived 1500 in chair. C/o pain in right thigh. Tramadol administered with effect. Dressing to right thigh, C/D/I. Up in chair using lift. BM x1. Bravo removed 1730. Pure wick placed. Pt is due to void. Encouraged pt to attempt to void. VSS. Sats >92% 1L/nc. Cpap overnight. Telemetry-NSR. Follow AM labs. Assess pain and effectiveness of interventions. Monitor  status. Assist with repositioning q 2hrs.

## 2018-11-05 NOTE — PLAN OF CARE
Problem: Patient Care Overview  Goal: Plan of Care/Patient Progress Review  Outcome: Improving  D/I/A: Patient A & O X 4, VSS sats RA , c/o right thigh pain and Tramadol po x 1. Right dressing , C/D/I.  Patient had two large Bowel movement. Incont of urine and purewick in place. Patient with good appetite , Phosphorus level 1.6 and replaced per protocol. Patient use call approprietly, hourly round completed. Continue with POC and update MD with concerns.

## 2018-11-05 NOTE — PLAN OF CARE
Problem: Patient Care Overview  Goal: Plan of Care/Patient Progress Review  Discharge Planner PT 5B  Patient plan for discharge: TCU  Current status: Pt needing ceiling lift with repositioning sheet to transfer from bed to moveo for use. Pt completed supine exercises. Pt completed moveo for LLE strengthening for 3 sets between 5 (12% BW) and 10 (25% BW) degrees and static standing at 10 and 12.5 (30% BW) degrees each for 2 and 3 minutes. Pt using ceiling lift to return to bed.   Barriers to return to prior living situation: level of assist, functional mobility, pain, weakness, fatigue  Recommendations for discharge: TCU  Rationale for recommendations: Pt will benefit from continued rehab to improve strength, endurance and activity tolerance to promote functional mobility and independence.        Entered by: Giacomo Jiménez 11/05/2018 2:57 PM

## 2018-11-05 NOTE — PROGRESS NOTES
Social Work Services Progress Note    Hospital Day: 12  Date of Initial Social Work Evaluation:  11/4/18  Collaborated with:  Patient, primary team Damaris Ragland, [facility] (ph [phone])    Data:  Pt is a 63-year-old female transferred from OSH for evaluation of pathologic right distal femur fracture. TCU is recommended at discharge.     Intervention:  Primary team attempted to transfer pt back to [facility], but physician there feels pt does not require acute care and feels pt would be appropriate for a swing bed there. Met with pt and provided this update. Also updated pt that she would be responsible for paying for transportation to the swing bed and would likely need a stretcher at discharge. Updated pt that her insurance may cover part of this but am unsure. Pt expresses understanding and is agreeable to swing bed referral. Left VM for  at [facility] asking for fax number.    Assessment:  Pursuing swing bed placement at [facility]    Plan:    Anticipated Disposition:  Facility:  Swing Bed    Barriers to d/c plan:  Placement    Follow Up:  SW to follow for discharge planning    LORENA Sharpe, AJITSW  5A Unit   Pager [phone]  Phone [phone]    Addendum 2:34pm: Faxed referral to [facility] at [facility] Swing Bed (ph [phone], fax [phone]).

## 2018-11-05 NOTE — PROGRESS NOTES
"Orthopaedic Surgery Progress Note   November 5, 2018    Subjective: No acute events overnight. Reports pain continues intermittently in right lower abdomen. Localizes it to right lower quadrant, not groin or hip this AM. Reports pain can come on at anytime, with movement in bed or at rest. Denies chest pain or shortness of breath.     Objective: /63 (BP Location: Right arm)  Pulse 82  Temp 96  F (35.6  C) (Oral)  Resp 18  Ht 1.651 m (5' 5\")  Wt 146 kg (321 lb 14 oz)  SpO2 96%  BMI 53.56 kg/m2    General: NAD, alert and oriented, cooperative with exam.   Cardio: RRR, extremities wwp.   Respiratory: Non-labored breathing.  MSK: RLE: Toes wwp, DP 2+, bcr in all toes. +EHL/FHL/GSC/TA. SILT SP/DP/Sa/Palumbo/T. Dressing c/d/i.      Labs:   WBC 13.3 11/4  Hgb 8.3 11/4  Plts 308 11/4  Cr 0.44  Ca 9.8  Surgical pathology: Metastatic squamous cell carcinoma. Cxs NGTD.      Assessment and Plan: Mayra Stokes is a 63 year old female with PMH including FRANSISCO, HTN, DM II, CHF and pulmonary HTN transferred from an OSH after sustaining a R distal femoral shaft pathologic fracture now s/p R distal femur biopsy, tumor curettage and excision, and ORIF on 10/26 with Dr. Wilkinson. Pathology with metastatic squamous cell carcinoma. Primary remains unknown. Pain seems to be localized to RLE abdomen today, not right hip joint.      Internal Medicine Primary  Hypercalcemia: Paraneoplastic syndrome. Persistent hypercalcemia likely 2/2 persistent primary tumor. Prefer not treating with Denusomab given the potential interference with fracture healing in the setting of a high risk for non-union but will defer to primary who has initiated treatment.   Activity: Up with assist until independent. Knee ROM as tolerated.   Weight bearing status: TTWB RLE.  Pain management: Per primary.    Antibiotics: Per primary. Mildred-operative completed.   Diet: Per primary. Okay for a diet from Orthopedic Surgery perspective.   DVT prophylaxis: Lovenox x 4 " weeks. SCDs.   Imaging: No further imaging at this time. If patient with persistent R hip pain that is limiting her ability to mobilize, may need to consider MRI R Hip.   Labs: PRN.   Bracing/Splinting: None.   Dressings: Keep clean, dry and intact - change every other day or PRN.   Elevation: Elevate RLE on pillows to keep above the level of the heart as much as possible.   Drains: Discontinued.   Physical Therapy/Occupational Therapy: Eval and treat.  Cultures: Pending, follow culture results closely.    Follow-up: Dr. Wilkinson in 4 weeks.  Disposition: Per primary, ok for discharge from ortho standpoint.     Madhu Whittaker MD  Orthopaedic Surgery Resident, PGY-4  Pager: [phone]     For questions about this patient during the day, please attempt to contact me at my pager ([phone]) prior to contacting the Orthopaedic Surgery resident on call. Thank you!

## 2018-11-06 NOTE — PROGRESS NOTES
[facility], [place]    Internal Medicine Progress Note - Damaris 4     Changes today:  - Arranging follow up prior to transfer out to [facility]    - Ortho- in 2 weeks after discharge with Dr. White    - Gyn onc- in 3-4 weeks in [place] (or with a gynecologist in [facility]) for endometrial biopsy   - Will need to establish with an oncologist in [facility]  - Renal panel, magnesium levels in AM  - Likely back to [facility] tomorrow based on return transfer agreement    Assessment & Plan   63F w/ FRANSISCO, HFpEF, HTN, morbid obesity, HLD, and recent dx of hypercalcemia (9/2018) w/ extensive OSH w/u now transferred for evaluation of pathologic right distal femur fracture, s/p right distal femur biopsy, tumor curettage/excision and ORIF on 10/26/18, with ongoing management for hypercalcemia and workup of primary tumor.     # Hypercalcemia  # Metastatic squamous cell carcinoma of unknown primary  -Tumor from right femur shows metastatic squamous cell, likely lung or  primary   -Gynecology/oncology consulted, unable to get bedside pelvic exam, signed off 11/1    - Follow up in clinic in 3-4 weeks for pelvic exam and endometrial biopsy   - Pulm consulted, appreciate recs  - Monitor calcium daily  - Daily renal panel, magnesium  - Strict intake and output  - Follow up on PTHrp (pending) drawn on 10/26  - Appreciate orthopedics, endocrinology and oncology consultation recommendations   - Will need ortho follow up with Dr. White in 2 weeks after discharge   - PET scan to be done in network     # R-sided pain, musculoskeletal  - Pain tolerable on current regimen, continue PRN tylenol and tramadol     Pathologic Right distal femur fracture s/p distal femur bx, tumor curettage and excision, ORIF: Patient suffered mechanical fall 10/24/18. Presented to OSH ED w/ imaging consistent w/ pathologic fracture of right distal femur. Transferred to [facility] for further orthopedic evaluation.  Now s/p ORIF and tumor curettage/excision on 10/26. Intraoperative pathology impression of poorly differentiated metastatic carcinoma.   -PT/OT evaluation and management; needs TCU placement  -Orthopedic consultation; appreciate recommendations and assistance with management.   - Touch down weight bearing     # Hypoxic, hypercarbic respiratory failure  # Chronic hypercapnea, likely secondary to obesity hypoventilation and is at high risk for FRANSISCO  Declines using BiPap at night, says she has poor sleep with it on.   - BiPAP as needed and while asleep   - Oxygen therapy to maintain saturations > 90%, continue to try to wean  - Acetaminophen 975mg q8h for pain  - Tramadol 25mg q6h for breakthrough pain     # Hypomagnesemia, Hypophosphatemia:    - Check daily Mg, Phos  - Monitor and replace per protocol      # UTI: UA form OSH 10/24/18 w/ > 100 WBC, LE, and many bacteria. Received Zosyn prior to transfer to [facility] 10/25/18. UC notable for pan-sensitive E. Coli. S/p ceftriaxone x3 days (10/26-10/28).    # HTN: BP stable on admission. PTA enalapril, lopressor.   - Continue lopressor with hold parameters SBP less than 100 or HR less than 60  - Continue PTA enalapril 5mg every day       # HFpEF, hx of Cor Pulmonale:  ECHO 4/2018 w/ EF 60%, normal systolic function, NRWMA, normal RV function, Grade I diastolic dysfunction, mild left atrial enlargement, PA pressure is 7 mmHg plus RAP (normal). Per OSH report- dramatic change in RV size and contractility, no pulmonary HTN compared to previous study 2/2018. ECHO on admission EF 60-65%, normal LV function, mild RV dilation, IVC normal w/ preserved respiratory variation. No pericardial effusion. RAP 3 mmHg. PTA lasix, lopressor.   - Continue metoprolol  - Daily weights  - Volume management as above     Diet: Regular Diet Adult  Fluids: none, tolerating oral fluids  Lines: PIV, remove Bravo today  Bravo Catheter: not present    DVT Prophylaxis: SCDs, enoxaparin 40mg  q24h  Code Status: Full Code    Expected discharge: Likely tomorrow or 11/6 to [facility] based on return transfer agreement.    Patient staffed with Dr. Mcgovern.    MD Damaris Snider [address] [place] [address]  Pager: [phone]  Please see sticky note for cross cover information    Interval History   No acute events over night. Nursing notes reviewed. Spoke with palliative care-- said that she needs more information about her cancer before being able to make a decision about what to do next. Afebrile over night. On 1LPM NC, weaned off. Still declining bipap over night. Wants to transfer to [facility] to be closer to her family and support system.     Physical Exam   Vital Signs: Temp: 95.5  F (35.3  C) Temp src: Oral BP: 140/62 Pulse: 82 Heart Rate: 85 Resp: 18 SpO2: 94 % O2 Device: None (Room air) Oxygen Delivery: 1 LPM  Weight: 321 lbs 13.95 oz  Constitutional: NAD, lying in bed today   Head: Normocephalic and atraumatic.   Eyes: Conjunctivae are normal. Moist mucus membranes  Cardiovascular: RRR without murmurs or gallops  Pulmonary/Chest: Difficult exam d/t body habitus. Distant breath sounds, clear to auscultation bilaterally.  : Bravo removed!  Musculoskeletal:  No cyanosis or edema, 2+ DP pulses bilaterally.  Skin: Skin is warm and dry. No rash noted to exposed skin areas.

## 2018-11-06 NOTE — PLAN OF CARE
Problem: Patient Care Overview  Goal: Plan of Care/Patient Progress Review  Patient A/O, VSS. Pt with purewick external catheter to continuous suction, output good. Right side hip area with acute, breakthrough pain for approx 1 hour, moderately relieved with 1x dose of IV dilaudid tramadol, repositioning also assists with comfort. Pt on pulsate mattress. CPAP on for most of the shift. Awaiting discharge orders. Will continue to monitor and alert MDs to any changes.

## 2018-11-06 NOTE — PLAN OF CARE
Problem: Patient Care Overview  Goal: Plan of Care/Patient Progress Review  Outcome: No Change  Status: Pt alert and oriented x4. Remained in bed all day.   VS: VS stable.   GI: Loose BM x1 this shift. Bowel sounds audible/normoactive. Pt complaining of BM urgency immediately after she eats.    : Purewick catheter in place. Changed this shift, connected to continuous suction. Voiding appropriately.   IV: PIV in left arm. Saline locked.   Activity:  Assist of 2  Pain: Complaints of right hip pain. Tramadol and tylenol administered with adequate relief this shift.   Respiratory/Trach: Breathing adequately on RA.  Breath sounds diminished bilaterally.   Skin: Reddened sore on bottom, barrier cream applied. Generalized edema noted, some bruising on extremities noted. Incision site on right outer thigh, dressing CDI.   Plan of care: Pt to transfer to TCU facility as soon as placement is verified. Continue to monitor and follow POC.

## 2018-11-06 NOTE — PROGRESS NOTES
Social Work Services Progress Note     Hospital Day: 13  Date of Initial Social Work Evaluation:  11/4/18  Collaborated with:  Patient, primary team Damaris Ragland, [facility] (ph [phone])     Data:  Pt is a 63-year-old female transferred from OSH for evaluation of pathologic right distal femur fracture. TCU is recommended at discharge.      Intervention:  Received VM from Bon[facility] at [facility] stating that pt is not appropriate for a swing bed there. They also state that the TCU in their nursing home is full. Met with pt and provided list of TCUs in her area. Pt requests referrals to [facility] and [facility]. [facility] and [facility]. Faxed referrals.     Referral status:  1) [facility] swing bed (ph [phone])- declined  2) [facility] TCU- no beds available  3) [facility] (ph [phone], fax [phone])  4) [facility] (ph [phone], fax [phone])     Assessment:  Pursuing TCU placement     Plan:    Anticipated Disposition:  Facility:  TCU    Barriers to d/c plan:  Placement    Follow Up:  SW to follow for discharge planning     LORENA Sharpe, [facility]  5A Unit   Pager [phone]  Phone [phone]

## 2018-11-06 NOTE — PROGRESS NOTES
Brief oncology progress note.      Notified that patient will be transferring back to referring hospital in next few days. Unable to get inpatient PET CT during this admission per discussion with primary team.  If not completed prior to transfer would recommend followin. PET CT to evaluate for primary lesion   2. Biopsy of RUL lesion if found to be FDG avid on PET CT  3. Close follow-up with primary oncologist.  If no source lesion noted, then consider intiation of chemotherapy for SCC of unknown primary.   4. Close follow-up with gyn/onc and ortho oncology at [facility] unless closer provider at [facility] can be found.    5. Consider addition of colorectal consult at [facility] to evaluate for possible anal squamous cell carcinoma primary source.      Patient discussed with Dr. Jeff.     Lucian Bales MD, PhD  Hematology/Oncology Fellow  Pager: [phone]  10:34 PM  2018

## 2018-11-06 NOTE — PLAN OF CARE
Problem: Patient Care Overview  Goal: Plan of Care/Patient Progress Review  Patient alert and oriented x4, pleasant calm and cooperative with cares. Able to verbalize needs and use call light appropriately. Phos replacement completed this shift, PIV saline locked. Good appetite this shift and consumed 100% of supper. Dressing on R thigh intact, clean and dry.  Female external catheter in place. Resting as of this time. No other calls made.

## 2018-11-06 NOTE — PROGRESS NOTES
[facility], [place]    Internal Medicine Progress Note - Damaris 4     Changes today:  - Follow up:    - Ortho- in 2 weeks after discharge with Dr. White    - Gyn onc- in 3-4 weeks in [place] (or with a gynecologist in [facility]) for endometrial biopsy   - Will need to establish with an oncologist in [facility], consider exome sequencing with lung cancer panel as an outpatient   - Renal panel, magnesium levels in AM  - Start Neutra-Phos 3 times daily today  - TCU referrals made by  today     Assessment & Plan   63F w/ FRANSISCO, HFpEF, HTN, morbid obesity, HLD, and recent dx of hypercalcemia (9/2018) w/ extensive OSH w/u now transferred for evaluation of pathologic right distal femur fracture, s/p right distal femur biopsy, tumor curettage/excision and ORIF on 10/26/18, admitted for management of hypercalcemia that is now within normal.  Workup of primary tumors ongoing.  Currently working on finding TCU placement so that patient can work on rehab and be closer to family.     # Hypercalcemia  # Metastatic squamous cell carcinoma of unknown primary  -Tumor from right femur shows metastatic squamous cell, likely lung or  primary   -Gynecology/oncology consulted, unable to get bedside pelvic exam, signed off 11/1    - Follow up in clinic in 3-4 weeks for pelvic exam and endometrial biopsy   - Pulm consulted, appreciate recs  - Monitor calcium daily  - Daily renal panel, magnesium  - Strict intake and output  - Follow up on PTHrp (pending) drawn on 10/26  - Appreciate orthopedics, endocrinology and oncology consultation recommendations   - Will need ortho follow up with Dr. White in 2 weeks after discharge   - PET scan to be done in network      Pathologic Right distal femur fracture s/p distal femur bx, tumor curettage and excision, ORIF: Patient suffered mechanical fall 10/24/18. Presented to OSH ED w/ imaging consistent w/ pathologic fracture of right distal femur.  Transferred to [facility] for further orthopedic evaluation. Now s/p ORIF and tumor curettage/excision on 10/26. Intraoperative pathology impression of poorly differentiated metastatic carcinoma.   -PT/OT evaluation and management; needs TCU placement  -Orthopedic consultation; appreciate recommendations and assistance with management.   - Touch down weight bearing     # Hypoxic, hypercarbic respiratory failure, resolved  # Chronic hypercapnea, likely secondary to obesity hypoventilation and is at high risk for FRANSISCO  Declines using BiPap at night, says she has poor sleep with it on.   - Acetaminophen 975mg q8h for pain  - Tramadol 25mg q6h for breakthrough pain     # Hypomagnesemia, Hypophosphatemia:    - Check daily Mg, Phos  - Monitor and replace per protocol  -Start Neutra-Phos today 3 times daily for persistently low phosphorus      # UTI: UA form OSH 10/24/18 w/ > 100 WBC, LE, and many bacteria. Received Zosyn prior to transfer to [facility] 10/25/18. UC notable for pan-sensitive E. Coli. S/p ceftriaxone x3 days (10/26-10/28).    # HTN: BP stable on admission. PTA enalapril, lopressor.   - Continue lopressor with hold parameters SBP less than 100 or HR less than 60  - Continue PTA enalapril 5mg every day       # HFpEF, hx of Cor Pulmonale:  ECHO 4/2018 w/ EF 60%, normal systolic function, NRWMA, normal RV function, Grade I diastolic dysfunction, mild left atrial enlargement, PA pressure is 7 mmHg plus RAP (normal). Per OSH report- dramatic change in RV size and contractility, no pulmonary HTN compared to previous study 2/2018. ECHO on admission EF 60-65%, normal LV function, mild RV dilation, IVC normal w/ preserved respiratory variation. No pericardial effusion. RAP 3 mmHg. PTA lasix, lopressor.   - Continue metoprolol  - Daily weights  - Volume management as above     Diet: Regular Diet Adult  Fluids: none, tolerating oral fluids  Lines: PIV  Bravo Catheter: not present    DVT Prophylaxis: SCDs, enoxaparin  40mg q24h  Code Status: Full Code    Expected discharge: Referrals made to TCU's near the patient's home.  Likely 2-3 days while we arrange  for receiving facility.    Patient staffed with Dr. Mcgovern.    MD Damaris Snider [address] [place] [address]  Pager: [phone]  Please see sticky note for cross cover information    Interval History   No acute events over night. Nursing notes reviewed.  Had a BM this morning,  as well as some bowel urgency right after she eats.  Has frequent urination, was on the bedpan several times today when I tried to go see her.  Denies pain, continues to work with physical and Occupational Therapy.  Agreeable to discharge to TCU, continues to want to be closer to family and her support system. Is excited to get out and start decorating for the holidays.     Physical Exam   Vital Signs: Temp: 97.7  F (36.5  C) Temp src: Oral BP: 145/79 Pulse: 90 Heart Rate: 96 Resp: 18 SpO2: 98 % O2 Device: None (Room air)    Weight: 321 lbs 13.95 oz  Constitutional: NAD, lying in bed today   Head: Normocephalic and atraumatic.   Eyes: Conjunctivae are normal. Moist mucus membranes  Cardiovascular: RRR without murmurs or gallops  Pulmonary/Chest: Difficult exam d/t body habitus. Distant breath sounds, clear to auscultation bilaterally.  Musculoskeletal:  No cyanosis or edema, 2+ DP pulses bilaterally.  Skin: Skin is warm and dry. No rash noted to exposed skin areas.

## 2018-11-06 NOTE — PROGRESS NOTES
Brief endocrine follow up note:    Mayra Stokes is a 63 year old female with h/o diastolic heart failure, pulmonary HTN, morbid obesity, hypercalcemia who was referred from OSH for R femoral fracture management, now known to be pathologic associated with metastatic carcinoma, undifferentiated. The pt received denosumab 120mg 10/29/18 and calcium level has downtrended. Suspect PTHrP related hypercalcemia though level is still pending.     The pt's corrected calcium level is now within the normal range at 10.0mg/dl.    The pt should still aim for intake of approximately 1000mg of elemental calcium per day (to avoid accelerated bone loss). It would be ideal to obtain this amount of calcium from the diet.     Recommendations:  -aim for   `1000mg elemental calcium intake per day with dietary sources preferred    The endocrine service with sign off at this time. Please contact with any questions or concerns.     The pt was staffed with Dr. Yepez.     Katy Garza MD  Endocrine Fellow   Pager [phone]         Patient with fellow Dr. Garza. Pts calcium normalizing.  Recc for dietary calcium intake as above; can continue current vitamin D supplement given previous low vit D level.  Findings and plan as above.    Davon Yepez MD   [phone]

## 2018-11-06 NOTE — PLAN OF CARE
Problem: Patient Care Overview  Goal: Plan of Care/Patient Progress Review  Discharge Planner PT 5B  Patient plan for discharge: TCU  Current status: Nurse reporting multiple loose stools after eating, therefore, completed supine strengthening exercises. Pt requiring AAROM during heel slides on RLE and manual resistance on LLE to promote strengthening. RLE strengthening limited by R hip pain.   Barriers to return to prior living situation: weakness, pain, functional mobility, activity tolerance  Recommendations for discharge: TCU  Rationale for recommendations: Pt will benefit from continued therapy services to improve strength and endurance to promote return to PLOF and independence during mobility.        Entered by: Giacomo Jiménez 11/06/2018 2:11 PM

## 2018-11-07 NOTE — PLAN OF CARE
Problem: Fracture Orthopaedic (Adult)  Goal: Signs and Symptoms of Listed Potential Problems Will be Absent, Minimized or Managed (Fracture Orthopaedic)  Signs and symptoms of listed potential problems will be absent, minimized or managed by discharge/transition of care (reference Fracture Orthopaedic (Adult) CPG).   Outcome: No Change  Alert and oriented x 4. Scheduled tylenol given and patient staying comfortable. Dressing clean, dry and intact. BiPAP on and patient tolerating it better after RT came and made adjustment. Sleeping between cares. Pure wick catheter in place.

## 2018-11-07 NOTE — PLAN OF CARE
Problem: Patient Care Overview  Goal: Plan of Care/Patient Progress Review  Discharge Planner PT   Patient plan for discharge: TCU  Current status: patient remains limited by pain in R LE. Utilized overhead lift for transfers with Ax2 from bed to chair. Tolerates sitting in chair and performing exercises for ~25 minutes.   Barriers to return to prior living situation: medical stability, pain, level of assist  Recommendations for discharge: TCU  Rationale for recommendations: pt would benefit from TCU to progress activity tolerance, strength for increased indep with functional mobility       Entered by: Judi Mcgovern 11/07/2018 3:19 PM

## 2018-11-07 NOTE — PLAN OF CARE
Problem: Patient Care Overview  Goal: Plan of Care/Patient Progress Review  Outcome: No Change  Status: Pt hospitalized for fractured femur. Alert and oriented x4.   VS: VS stable.   GI: Loose BM x1 this shift. Complaints of having urgent BMs soon after eating. Bowel sounds audible/normoactive. Passing flatus. Regular diet, tolerating well, consumed all of lunch & breakfast.   : Voiding appropriately, external purewick catheter in place.   IV: PIV in left lower forearm saline locked. Dressing CDI.   Activity: Assist of 2. Up to chair x1 this shift with PT.    Pain:  Mild right hip pain. Administered tramadol this shift with some relief.   Respiratory/Trach: Breathing adequately on RA.    Skin: Small wound noted on upper bottom between crack, wound cleaned with cleanser and mepilex dressing applied. Reddened spots noted on lower abdominal skin fold and perineal area around external catheter, barrier applied. Incision site on outer right hip WDL with no drainage, dressing CDI.   Plan of care: Pt awaiting placement at TCU facility. Continue to monitor and follow POC.

## 2018-11-07 NOTE — PLAN OF CARE
Problem: Patient Care Overview  Goal: Plan of Care/Patient Progress Review  Outcome: Improving  Patient condition improving. Alert and oriented x4 pleasant calm and cooperative with care. R thigh incisions appeared clean,dry and intact,well approximated, no drainage noted. Dressing changed. Patient having good appetite and consumed 100% of meal. Had 2 large soft BM, using pure wick external catheter due to incontinence. Received tylenol PRN for flank pain. Repositioned per request.  P: discontinue to TCU in 2-3 days.

## 2018-11-07 NOTE — PROGRESS NOTES
Social Work Services Progress Note      Hospital Day: 14  Date of Initial Social Work Evaluation:  11/4/18  Collaborated with:  Patient, primary team Damaris Ellyn, TCU facilities      Data:  Pt is a 63-year-old female transferred from [facility] for evaluation of pathologic right distal femur fracture. TCU is recommended at discharge.       Intervention:  Spoke with Tanvi in admissions at [facility], and she states that they are attempting to contact pt's insurance policy to see what her TCU benefits are. She will check with business office again. Marlene Doyle did not receive referral; refaxed.     Referral status:  1) [facility] swing bed (ph [phone])- declined  2) [facility] TCU- no beds available  3) [facility] Marlene Doyle (ph [phone], fax [phone])- refaxed referral  4) [facility] (ph [phone], fax [phone])- awaiting clearance from business office re: insurance     Assessment:  Pursuing TCU placement      Plan:    Anticipated Disposition:  Facility:  TCU    Barriers to d/c plan:  Placement    Follow Up:  SW to follow for discharge planning      LORENA Sharpe, LGSW  5A Unit   Pager [phone]  Phone [phone]    Addendum 4:18pm: Pt accepted to [facility]. Pt has met deductible and will have $0 out of pocket costs for TCU. Updated pt. Pt agreeable with plan. Scheduled stretcher transportation for 10am tomorrow. Updated admissions at [facility], bedside RN, and primary team.

## 2018-11-08 NOTE — PROGRESS NOTES
[facility], [place]    Internal Medicine Progress Note - Damaris 4     Changes today:  - Follow up:    - Ortho- in 2 weeks after discharge with Dr. White    - Gyn onc- in 3-4 weeks in [place] (or with a gynecologist in [facility]) for endometrial biopsy   - Will need to establish with an oncologist in [facility]   - Will need dental follow up before next dose of denosumab   - Will need to establish with radiation oncology in [facility]   - Will need to have colorectal evaluation for possible source of SCC  - Renal panel, magnesium levels in AM  - Increase neutraphos to 2 packets TID today  - TCU referrals made by  today     Assessment & Plan   63F w/ FRANSISCO, HFpEF, HTN, morbid obesity, HLD, and recent dx of hypercalcemia (9/2018) w/ extensive OSH w/u now transferred for evaluation of pathologic right distal femur fracture, s/p right distal femur biopsy, tumor curettage/excision and ORIF on 10/26/18, admitted for management of hypercalcemia that is now within normal.  Workup of primary tumors ongoing.  Currently working on finding TCU placement so that patient can work on rehab and be closer to family.     # Hypercalcemia  # Metastatic squamous cell carcinoma of unknown primary  -Tumor from right femur shows metastatic squamous cell, likely lung or  primary  -Gynecology/oncology consulted, unable to get bedside pelvic exam, signed off 11/1   - Follow up in clinic in 3-4 weeks for pelvic exam and endometrial biopsy  - Daily renal panel, magnesium  - Strict intake and output  - Follow up on PTHrp (pending) drawn on 10/26  - Appreciate orthopedics, endocrinology and oncology consultation recommendations   - Will need ortho follow up with Dr. White in 2 weeks after discharge   - Follow up with medical oncology and radiation oncology within 1 week of discharge from [facility]   - Consider exome sequencing with lung cancer panel as an outpatient   - Will need dental  evaluation prior to next dose of denosumab     Pathologic Right distal femur fracture s/p distal femur bx, tumor curettage and excision, ORIF: Patient suffered mechanical fall 10/24/18. Presented to OSH ED w/ imaging consistent w/ pathologic fracture of right distal femur. Transferred to [facility] for further orthopedic evaluation. Now s/p ORIF and tumor curettage/excision on 10/26. Intraoperative pathology impression of poorly differentiated metastatic carcinoma.   -PT/OT evaluation and management; needs TCU placement  -Orthopedic consultation; appreciate recommendations and assistance with management.   - Touch down weight bearing     # Hypoxic, hypercarbic respiratory failure, resolved  # Chronic hypercapnea, likely secondary to obesity hypoventilation and is at high risk for FRANSISCO  Declines using BiPap at night, says she has poor sleep with it on.   - Acetaminophen 975mg q8h for pain  - Tramadol 25mg q6h for breakthrough pain     # Hypomagnesemia, Hypophosphatemia:    - Check daily Mg, Phos  - Monitor and replace per protocol  - Start Neutra-Phos today 3 times daily for persistently low phosphorus      # UTI: UA form OSH 10/24/18 w/ > 100 WBC, LE, and many bacteria. Received Zosyn prior to transfer to [facility] 10/25/18. UC notable for pan-sensitive E. Coli. S/p ceftriaxone x3 days (10/26-10/28).    # HTN: BP stable on admission. PTA enalapril, lopressor.   - Continue lopressor with hold parameters SBP less than 100 or HR less than 60  - Continue PTA enalapril 5mg every day       # HFpEF, hx of Cor Pulmonale:  ECHO 4/2018 w/ EF 60%, normal systolic function, NRWMA, normal RV function, Grade I diastolic dysfunction, mild left atrial enlargement, PA pressure is 7 mmHg plus RAP (normal). Per OSH report- dramatic change in RV size and contractility, no pulmonary HTN compared to previous study 2/2018. ECHO on admission EF 60-65%, normal LV function, mild RV dilation, IVC normal w/ preserved respiratory variation.  No pericardial effusion. RAP 3 mmHg. PTA lasix, lopressor.   - Continue metoprolol  - Daily weights  - Volume management as above     Diet: Regular Diet Adult  Room Service  Fluids: none, tolerating oral fluids  Lines: PIV  Bravo Catheter: not present    DVT Prophylaxis: SCDs, enoxaparin 40mg q24h  Code Status: Full Code    Expected discharge: Tomorrow morning at 10AM to TCU    Patient staffed with Dr. Mcgovern.    MD Damaris Snider [address] [place] [address]  Pager: [phone]  Please see sticky note for cross cover information    Interval History   No acute events over night. Nursing notes reviewed. Wants to get near home as soon as possible. Talked to oncology this morning, with renewed urgency about getting follow up. Still having right-sided abdominal pain, unchanged from before and only with movement. Appetite has been good. Denies shortness of breath. Says that PT has really been working her.    Physical Exam   Vital Signs: Temp: 97  F (36.1  C) Temp src: Oral BP: 126/58 Pulse: 114 Heart Rate: 114 Resp: 17 SpO2: 93 % O2 Device: None (Room air)    Weight: 337 lbs 4.86 oz  Constitutional: NAD, lying in bed today   Head: Normocephalic and atraumatic.   Eyes: Conjunctivae are normal. Moist mucus membranes  Cardiovascular: RRR without murmurs or gallops  Pulmonary/Chest: Difficult exam d/t body habitus. Distant breath sounds, clear to auscultation bilaterally.  Musculoskeletal:  No cyanosis or edema, 2+ radial pulses  Skin: Skin is warm and dry. No rash noted to exposed skin areas.

## 2018-11-08 NOTE — PLAN OF CARE
Problem: Patient Care Overview  Goal: Plan of Care/Patient Progress Review  Outcome: Adequate for Discharge Date Met: 11/08/18  Pt discharged to TCU facility ([facility]), report given to Lesia. Belongings were kept in room and returned to patient upon discharge, no belongings in security. Discharge education complete, all questions answered. VS remained stable, IV successfully removed. Pt left via stretcher with EMS team. No further interventions needed.     Status: Pt alert and oriented x4. VS stable.   GI: No BM this shift. Passing flatus.   : Incontinent. External purewick catheter removed, brief applied.   Activity: Assist of 2 with lift.   Pain: Complaints of mild right hip pain, controlled with tylenol.    Respiratory/Trach: Breathing adequately on RA.    Skin: Incision site on outer right thigh, dressing changed. Redness noted in perineal area and under abdominal skin folds, baby powder applied. Interdry applied between abdominal skin folds. Mepilex dressing changed on wound on upper bottom.

## 2018-11-08 NOTE — PLAN OF CARE
Problem: Patient Care Overview  Goal: Plan of Care/Patient Progress Review  Outcome: No Change  Pt appeared to sleep soundly overnight between cares. Medicated for pain with tylenol and tramadol. R hip dressing CDI. Incontinent of urine- purwick in place. Mepliex to coccyx. Interdry to skin folds. Refusing bipap. Continuous pulse ox on. To transfer to TCU at 10am today. Will continue to monitor pt.

## 2018-11-08 NOTE — PROGRESS NOTES
Social Work Services Discharge Note      Patient Name:  Mayra Stokes     Anticipated Discharge Date:  11/8/18    Discharge Disposition:   TCU:  [address] [address]   [phone]  Fax [phone]    Following MD:  Facility assignment     Pre-Admission Screening (PAS) online form has been completed.  The Level of Care (LOC) is:  Determined  Confirmation Code is:  [id number]  Patient/caregiver informed of referral to [facility] for Pre-Admission Screening for skilled nursing facility (SNF) placement and to expect a phone call post discharge from SNF.     Additional Services/Equipment Arranged:  [facility] (ph [phone]) stretcher arranged for 10am. Explained to pt potential private pay cost of stretcher. Informed pt that SW does not know if pt's insurance will cover any part of the ride, and that pt would have to call insurance company to inquire about this. Pt expressed understanding and is agreeable with proceeding with stretcher ride. PCS form completed and placed in pt's chart d/t R femur fracture, pain, inability to safely transfer to chair, and inability to tolerate sitting in chair for more than 25 minutes.     Patient / Family response to discharge plan:  Pt in agreement with plan.      Persons notified of above discharge plan:  Pt, primary team Tanvi Gill- admissions at [facility] (ph [phone]), SILAS Amos    Staff Discharge Instructions:  RN to call report to [phone].  Please ensure pt's home CPAP is sent with her at discharge.  SW to fax discharge orders and signed hard scripts for any controlled substances.  Please print a packet and send with patient.     CTS Handoff completed:  YES    Medicare Notice of Rights provided to the patient/family:  LORENA Devries, NNEKA  5A Unit   Pager [phone]  Phone [phone]

## 2018-11-08 NOTE — PLAN OF CARE
Problem: Patient Care Overview  Goal: Plan of Care/Patient Progress Review  Outcome: No Change  Pt a&o x 4. VSS on room air. Incision dressings CDI, pt's pain is comfortably managed at this time. TCU placement arranged, transport will  patient at 10:00 tomorrow morning. Pt aware and agreeable to plan. Pt had fair appetite this evening. Pt continues with persistent diarrhea and has external catheter in place for urinary incontinence. Pt had no acute changes and few requests. Continue plan of care.

## 2018-11-08 NOTE — PROGRESS NOTES
"[facility], [place]   Hematology/Oncology Progress Note    Mayra Stokes MRN# [id number]   Age: 63 year old YOB: 1955          Reason for Consult:   \"hypercalcemia, pathologic femur fracture\"         Assessment and Plan:          History of Present Illness:   History obtained from chart review and confirmed with patient.    Mayra Stokes is a 63 year old F with PMH of morbid obesity s/p gastric banding procedure, cor pulmonale/diastolic CHF, osteoarthritis presenting as a transfer from [facility] due to pathologic fracture.  She was initially noted to have hypercalcemia on pre-op evaluation 9/2018 and was referred to oncologist, Dr. Granados, for further workup.  Extensive workup by Dr. Granados was thus far unrevealing.  Workup for hypercalcemia included CT CAP without contrast notable for R 4th rib lytic lesion that was biopsies (metastatic squamous cell carcinoma per path report in [facility]).  Kidneys were noted to be unremarkable, but on [facility] oncology review there may be a L renal lesion that would warrant repeat imaging with CT contrast.  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  Bone marrow biopsy was completed 9/28/18 with normal immunophenotyping results, no monotypic B-cell population or increased blast population, no monotypic plasma cell population.  Plasma cell FISH was unable to be completed due to insufficient number of cells.  She did have a parotid mass that has been present for years per her report, but CT was concerning for possible parotid neoplasms, absent additional lymphadenopathy.  Protein electrophoresis was unremarkable for monoclonal protein, immunoglobulin light chains unremarkable, serologies notable for elevated IgE only, PB smear unremarkable, urine electrophoresis with elevated total protein only.      n the " mean time, she has shown to be refractory to IVF, diuretics, and pamindronate x2 doses for her hypercalcemia, all given prior to transfer.  Calcium total was high as 14.8 prior to transfer and was downtrending since admission at [facility] and administration of denosumab.  PTHrp pending, but low to normal 1,25 and 25-hydroxyvitamin D levels.       She is now s/p repair by orthopedics for pathologic fracture of distal R femur with curettage of bone and biopsy.  Femur XR was notable for radiolucent area of bone destruction around femur, near site of prior CT guided biopsy. Bone tissue from pathologic fracture was noted to be metastatic squamous cell carcinoma. At this time, despite continued attempts at locating primary malignancy, no primary has been located.  She is now planning to discharge back to [facility] and plans to follow-up with her primary oncologist, Dr. Matthew, at [facility].       Summary of Recommendations:    PET CT on return to [facility], particularly to focus on RUL lesion for possible source.      Exome sequencing of tumor with lung cancer panel to be done at [facility].     Please have patient follow-up with medical oncology and radiation oncology within one week of discharge from [facility].    Colorectal evaluation for possible rectal source of SCC.       Dental evaluation prior to next dose of denosumab.      Follow-up with gyn onc and ortho onc at [facility] as scheduled.           Interval History:   Feeling better overall today.  Eating and drinking OK.  Pain in leg is better overall.  No fevers, chills, nausea, vomiting, or CP.  Still feels weak, but thinks strength is getting better overall.           Review of Systems:   A comprehensive ROS was performed with the patient and was found to be negative with the exception of that noted in the HPI above.       Past Medical History:     Past Medical History:   Diagnosis Date     Chronic cor pulmonale (H)      Chronic diastolic heart failure (H)       DM type 2 (diabetes mellitus, type 2) (H)     diet controlled     Hypercalcemia 09/2018     Hypertension      Mass of left side of neck     since ~2003     Morbid obesity (H)      Obstructive sleep apnea      Pathologic fracture of femur (H) 10/24/2018            Past Surgical History:     Past Surgical History:   Procedure Laterality Date     BIOPSY BONE LOWER EXTREMITY Right 10/26/2018    Procedure: BIOPSY RIGHT FEMUR AND EXCISION OF TUMOR;  Surgeon: Maximo Wilkinson MD;  Location: [facility] OR     LAP ADJUSTABLE GASTRIC BAND       OPEN REDUCTION INTERNAL FIXATION FEMUR DISTAL Right 10/26/2018    Procedure: OPEN REDUCTION INTERNAL FIXATION RIGHT DISTAL FEMUR;  Surgeon: Maximo Wilkinson MD;  Location: [facility] OR     SHOULDER SURGERY Left     shoulder replacement            Social History:     Social History     Social History     Marital status: Single     Spouse name: N/A     Number of children: N/A     Years of education: N/A     Occupational History     Not on file.     Social History Main Topics     Smoking status: Not on file     Smokeless tobacco: Not on file     Alcohol use Not on file     Drug use: Not on file     Sexual activity: Not on file     Other Topics Concern     Not on file     Social History Narrative            Family History:   History reviewed. No pertinent family history.         Allergies:   No Known Allergies         Medications:     Prescriptions Prior to Admission   Medication Sig Dispense Refill Last Dose     ASPIRIN PO Take 81 mg by mouth daily   Unknown at Unknown time     calcitonin, salmon, (MIACALCIN) 200 UNIT/ACT nasal spray Spray 1 spray into one nostril alternating nostrils daily Alternate nostril each day.   Unknown at Unknown time     ENALAPRIL MALEATE PO Take 5 mg by mouth daily    Unknown at Unknown time     Furosemide (LASIX PO) Take 40 mg by mouth 2 times daily   Unknown at Unknown time     Metoprolol Tartrate (LOPRESSOR PO) Take 100 mg by mouth 2 times daily   Unknown at Unknown time  "    OXYCODONE HCL PO Pt doesn't remember dose   Unknown at Unknown time     TiZANidine HCl (ZANAFLEX PO) Take 4 mg by mouth daily as needed for muscle spasms   Unknown at Unknown time     TRAMADOL HCL PO Take 50 mg by mouth every 6 hours as needed for moderate to severe pain (1-2 tabs every 4-6 hr)   Unknown at Unknown time            Physical Exam:   /58 (BP Location: Right arm)  Pulse 114  Temp 97  F (36.1  C) (Oral)  Resp 17  Ht 1.651 m (5' 5\")  Wt (!) 153 kg (337 lb 4.9 oz)  SpO2 93%  BMI 56.13 kg/m2  Vitals:    11/04/18 0610 11/04/18 1853 11/06/18 2339   Weight: 146 kg (321 lb 14 oz) 146 kg (321 lb 14 oz) (!) 153 kg (337 lb 4.9 oz)     General: obese, sitting in bed, in no acute distress.  Heme/Lymph: No overt bleeding. No cervical or supraclavicular adenopathy.  Skin: No concerning lesions, rash, jaundice, cyanosis, erythema, or ecchymoses on exposed surfaces.  R leg with surgical dressing, unable to inspect   HEENT: NCAT. EOMI, anicteric sclera. Oral mucosa pink and moist with no lesions or thrush.  Respiratory: Non-labored breathing, reduced air exchange bilaterally, no wheezing or rhonchi bilaterally  Cardiovascular: Regular rate and rhythm.   Gastrointestinal: Normoactive bowel sounds. Abdomen soft, non-distended, and non-tender. No palpable masses or organomegaly.  Extremities: RLE in surgical wrap, LLE with 1+ edema.  B/l toes WWP to touch.   Neurologic: A&O x 3, speech normal, symmetric facies, linear thought process.           Data:   I have personally reviewed the following labs/imaging:  CBC  Recent Labs  Lab 11/06/18  0841 11/06/18  0545 11/04/18  0456 11/03/18  0544   WBC  --   --  13.3* 14.0*   RBC  --   --  2.65*  --    HGB  --   --  8.3*  --    HCT  --   --  29.4*  --    MCV  --   --  111*  --    MCH  --   --  31.3  --    MCHC  --   --  28.2*  --    RDW  --   --  13.5  --     Canceled, Test credited [id number]     CMP  Recent Labs  Lab 11/07/18  0738 11/06/18  0545 " 11/05/18  0505 11/04/18  0456 11/03/18  2102    135 137 138  --    POTASSIUM 4.7 5.0 4.7 4.7  --    CHLORIDE 106 104 104 104  --    CO2 23 25 27 29  --    ANIONGAP 9 6 6 5  --    * 115* 109* 109*  --    BUN 17 14 12 12  --    CR 0.52 0.56 0.44* 0.56  --    GFRESTIMATED >90 >90 >90 >90  --    GFRESTBLACK >90 >90 >90 >90  --    DEANDRE 8.9 9.4 9.8 9.8  --    MAG 2.1 2.1 2.1 2.3  --    PHOS 1.9* 1.5* 1.6* 2.3*  --    PROTTOTAL  --   --   --   --  6.8   ALBUMIN 2.6* 2.6* 2.5*  --  2.4*   BILITOTAL  --   --   --   --  0.2   ALKPHOS  --   --   --   --  207*   AST  --   --   --   --  36   ALT  --   --   --   --  34     INRNo lab results found in last 7 days.

## 2018-11-08 NOTE — PLAN OF CARE
Problem: Patient Care Overview  Goal: Plan of Care/Patient Progress Review  Physical Therapy Discharge Summary    Reason for therapy discharge:    Discharged to transitional care facility.    Progress towards therapy goal(s). See goals on Care Plan in [facility] electronic health record for goal details.  Goals partially met.  Barriers to achieving goals:   discharge from facility.    Therapy recommendation(s):    Continued therapy is recommended.  Rationale/Recommendations:  to improve functional strength, mobility and activity tolerance.

## 2018-11-08 NOTE — DISCHARGE SUMMARY
[facility], [place]    Internal Medicine Discharge Summary- [facility] Service    Date of Admission:  10/25/2018  Date of Discharge:  11/8/2018  Discharging Attending Provider: Dr. Judi Mcgovern  Discharge Team: Damaris 4    Discharge Diagnoses   Hypercalcemia  Hypophosphatemia  Hypomagnesemia  R femur fracture s/p ORIF  Metastatic squamous cell carcinoma of unknown primary  Obesity hypoventilation  Acute hypoxic respiratory failure, resolved  Acute on chronic hypercapnic respiratory failure, resolved  Obstructive sleep apea    Follow-ups Needed After Discharge       Hospital Course   Mayra Stokes was admitted on 10/25/2018 for hypercalcemia and R femur fracture.  The following problems were addressed during her hospitalization:    Metastatic squamous cell carcinoma of unknown primary  Bone biopsy from tumor and right femur, as well as biopsy of fourth rib lesion done at outside hospital, both showed undifferentiated squamous cell carcinoma.  Oncology was consulted this admission; pathology here had indicated that the primary is more likely lung or .  We did do CT chest/abdomen/pelvis, which showed a large liver lesion, opacity in right lung, a somewhat thick endometrial stripe.  Pulmonology was consulted to determine whether bronchoscopy would be appropriate in order to further characterize the platelike opacity seen in the right lung; they agree with oncology that a PET scan should be obtained first.  Both services felt that liver lesion may not be amenable to biopsy as it is likely a metastasis and likely to be due to poorly differentiated to give us more information about where the primary tumor is.  Gynecology was consulted to give recommendations regarding  primary, and they felt that it is unlikely that the squamous cell carcinoma originating in the cervix, the patient has been up-to-date on her cervical cancer screening and had a pelvic exam with HPV testing as soon recently as 2  years ago.  They were unable to perform pelvic exam or endometrial biopsy at this admission because the patient had a great deal of trouble with right leg pain and cannot bend her knee as a result of the femur fracture; they would like to follow-up with Mayra within 4-6 weeks of discharge, but she would prefer to follow-up with a provider closer to her home.  Oncology had also mentioned that perhaps we could consider colorectal workup, as anal cancer could result in metastatic squamous cell carcinoma.  Final recommendations from oncology at discharge include seeing medical oncology and radiation oncology within a week of discharge, obtaining a PET scan, and seeing gynecology/oncology for pelvic exam and endometrial biopsy within 4-6 weeks of discharge. Patient has follow-up scheduled with PCP Tahmina Sanchez on Monday, Nov 12, at 10am at [facility] in [place]. Writer spoke with [facility], who will arrange follow-up for patient with an oncologist in the [place] (patient's first choice) or [facility] [place] for next week. Patient should have PET done ASAP as she was unable to complete while inpatient due to cost.     Hypercalcemia  Hypophosphatemia  Hypomagnesemia  Ms. Stokes had been undergoing workup as an outpatient for hypercalcemia up to about 12.  On admission, her corrected calcium went as high as 14.4.  We tried to manage it with aggressive fluid hydration, calcitonin, and she had already gotten bisphosphonates as an outpatient all to no avail.  Orthopedic surgery had been involved due to the patient's admitting diagnosis of femur fracture and was very concerned about the administration of denosumab, as it impairs bone healing.  However, due to the patient's persistently high calcium recalcitrant to all other treatment and the impending need for possible dialysis to decrease the patient's calcium, we did end up giving her 1 dose on 10/29, with subsequent improvement in calcium such that we were  able to wean her off of IV fluids and diuretics.  Her calcium has been stable in the week prior to discharge.  Patient should have dental exam prior to next administration of denosumab, as it is associated with increased risk of avascular necrosis of the jaw.  While on aggressive diuresis and fluids, required Ms. Pipetel daily electrolyte replacement with phosphorus and magnesium.  Despite being off of IV fluids and diuretics for several days, phosphorus has continued to be very low.  We started scheduled Neutra-Phos for her and postulates that the persistent hypophosphatemia is in part due to her prolonged hypercalcemia.  Electrolytes (K, phos, Mg, Ca) should be monitored very closely at TCU and by her primary care physician,  at least every few days until her electrolytes all stabilize.     R femur fracture s/p ORIF  Patient was admitted and underwent open reduction/internal fixation, tumor curettage and excision, and biopsy of the bone on 10/16.  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  Patient has been working with physical and occupational therapy here, is now touchdown weightbearing.  She has been making good progress and putting in a great effort.  Orthopedic surgery was worried that administration of denosumab would impair bone healing and would like the patient to follow-up with Dr. Anderson, her orthopedist, within 2 weeks of discharge. Dr Wilkinson (orthopedic surgery / ortho oncology at [facility]) would like to speak with the orthopedic surgeon who will be following Mayra, as he has specific recommendations he would like to communicate.       Obesity hypoventilation  Acute hypoxic respiratory failure, resolved  Acute on chronic hypercapnic respiratory failure, resolved  Obstructive sleep apnea  Patient had some acute hypoxic respiratory failure and acute on chronic hypercapnic respiratory failure after her procedure on 10/26 repair of her right femur fracture.  This was attributed  to volume overload and the anesthesia required for the procedure; she required BiPAP for couple of days but was rapidly weaned off and tolerated room air by the time she was discharged.  The patient does carry diagnosis of obstructive sleep apnea and obesity hypoventilation and has her own CPAP, which she does not use because it interferes with her sleep.  She also has been refusing BiPAP here overnight and has not been noted to desat overnight.  We advised that it is recommended that Mayra use her NIPPV overnight going forward.  Patient verbalized understanding but states that she likely will not do so, as it is very cumbersome and interferes with her sleep.      Consultations This Hospital Stay   ORTHOPAEDIC SURGERY ADULT/PEDS IP CONSULT  ENDOCRINE NON-DIABETES ADULT IP CONSULT  HEMATOLOGY & ONCOLOGY IP CONSULT  PHYSICAL THERAPY ADULT IP CONSULT  OCCUPATIONAL THERAPY ADULT IP CONSULT  ENT IP CONSULT  PULMONARY GENERAL ADULT IP CONSULT  PALLIATIVE CARE ADULT IP CONSULT    Code Status   Full Code     The patient was discussed with Dr. Froilan Toribio MD  [facility]  Pager: [phone]  ______________________________________________________________________    Physical Exam   Vital Signs: Temp: 98.3  F (36.8  C) Temp src: Oral BP: 146/76 Pulse: 86 Heart Rate: 91 Resp: 16 SpO2: 94 % O2 Device: None (Room air)    Weight: 337 lbs 4.86 oz    Constitutional: NAD, lying in bed today, very pleasant and in good spirits  Head: Normocephalic and atraumatic.   Eyes: Conjunctivae are normal. Moist mucus membranes  Cardiovascular: RRR without murmurs or gallops  Pulmonary/Chest: Difficult exam d/t body habitus. Distant breath sounds, clear to auscultation bilaterally.  Musculoskeletal:  No cyanosis or edema, 2+ radial pulses; able to move toes, dressings in place  Skin: Skin is warm and dry. No rash noted to exposed skin areas.    Significant Results and Procedures   10/26: MR WU femur  Impression:  1.  Pathologic fracture involving the distal shaft of the right femur  with underlying marrow infiltrative process, measuring 14 cm in length  starting approximately 25 cm proximal to the knee joint. Given the  patient's age, primary differential include myeloma and metastasis.   a. Skip lesion/metastasis more distally and proximally in the right  femur.   b. The most proximal concerning lesion located approximately 32 cm  from the knee joint.    10/26: ORIF of R femur    INTERPRETATION:   RESULT FOR IMMUNOHISTOCHEMICAL VENTANA CLONE  PD-L1 ASSAY   TUMOR PROPORTION SCORE (TPS):  <1%   INTERPRETATION: NEGATIVE PD-L1 EXPRESSION (TPS <1%)     COMMENT:  This Reardan  PD-L1 immunohistochemistry antibody assay is   a laboratory developed test to be   used for patients with non-small cell lung carcinoma (NSCLC) who are being    considered for treatment with   Keytruda (Pembrolizumab), an anti-PD-1 immune checkpoint inhibitor.  The   Reardan  PD-L1 assay has been   validated by the [facility]   Immunohistochemistry Laboratory against the FDA   approved clinical trial-validated PharmDx 22C3 PD-L1 assay.  Evidence   suggests that the level of PD-L1   expression in the tumor cell population by immunohistochemistry is a major    predictor of response to checkpoint   inhibitor therapy.   Previous studies demonstrate a high correlation   between PD-L1 immunohistochemistry   expression data obtained with PD-L1 clones Dako 22C3 and Reardan  in   NSCLC.   (References:  Lancet   387:1540-50, 2016; :1823-33, 2016; J Clin Oncol 34:4102-9. 2016; J    Thorac Oncol 12:1654-63, 2017;   [facility] PD-L1 2018 assessment)      10/28: CT chest/abdomen/pelvis with contrast  IMPRESSION:   1. Numerous, irregular hypodensities scattered throughout the liver,  the largest measuring up to 11.1 cm in hepatic segment 6, favor  metastatic disease until proven otherwise.   2. Lytic lesion of the right  fourth rib with associated cortical  erosion, which suspicious for osseous metastatic disease with  pathologic fracture.   3. Heterogeneous appearance of the uterus with appearance of  endometrial thickening and probably fibroids as well. Cannot exclude  endometrial cancer. Recommend ultrasound for confirmation.  4. Haziness and small volume fluid around the pancreas suggestive of  acute pancreatitis. No evidence of pancreatic mass.  5. Indeterminate 1.5 cm left adrenal nodule.   6. Main pulmonary artery is dilated to 4.7 cm, suggestive of pulmonary  arterial hypertension.  7. Consolidation in the right upper lobe with filling of a segmental  bronchus. Differential includes mucus plugging with associated  atelectasis, aspiration, or infectious consolidation. Less likely  endobronchial or compressive mass could have this appearance.    10/29/18: Pelvic ultrasound  IMPRESSION:   1.  Limited pelvic ultrasound as above. Suboptimal transabdominal  evaluation of the uterus.  2.  Ovaries are not visualized.      Pending Results   These results will be followed up by PCP.  Unresulted Labs Ordered in the Past 30 Days of this Admission     Date and Time Order Name Status Description    10/26/2018 0223 PTH Related Peptide Test In process            Primary Care Physician   Tahmina Sanchez    Discharge Disposition   Discharged to rehabilitation facility  Condition at discharge: Stable    Discharge Orders     General info for SNF   Length of Stay Estimate: Short Term Care: Estimated # of Days <30  Condition at Discharge: Improving  Level of care:skilled   Rehabilitation Potential: Good  Admission H&P remains valid and up-to-date: Yes  Recent Chemotherapy: N/A  Use Nursing Home Standing Orders: Yes     Mantoux instructions   Give two-step Mantoux (PPD) Per Facility Policy Yes     Reason for your hospital stay   You were in the hospital for treatment of high calcium and a femur fracture.     Additional Discharge Instructions   Patient  will need close follow up of her electrolytes, specifically potassium, calcium, phosphorus, and magnesium.     Activity - Up ad delfin     Follow Up ([facility])   Follow up with primary care provider, Tahmina Sanchez, on Monday, 11/12/2018, at 10:00AM.    Follow up with Dr. White (orthopedics) within 2 weeks of discharge for evaluation after femur fracture.    Follow up with gynecology/oncology in [place] within 4-6 weeks of discharge for endometrial biopsy and pelvic exam. Alternatively, this can be done with any gynecologist in your area.    Follow up with oncology and radiation oncology within one week of discharge for PET scan, follow up of hypocalcemia, and metastatic squamous cell carcinoma.     Please make a dental appointment within 3-4 weeks of discharge for exam due to risk of avascular necrosis of the jaw with denosumab use.    Appointments on [place] and/or [facility] (with [facility] or [facility] provider or service). Call [phone] if you haven't heard regarding these appointments within 7 days of discharge.     Physical Therapy Adult Consult   Evaluate and treat as clinically indicated.    Reason:  S/p femur fracture     Occupational Therapy Adult Consult   Evaluate and treat as clinically indicated.    Reason:  S/p femur fracture     Fall precautions     Diet   Follow this diet upon discharge: Regular       Discharge Medications   Current Discharge Medication List      START taking these medications    Details   potassium & sodium phosphates (NEUTRA-PHOS) 280-160-250 MG Packet Take 2 packets by mouth 4 times daily  Qty: 120 each, Refills: 0    Associated Diagnoses: Hypophosphatemia      !! traMADol (ULTRAM) 50 MG tablet Take 0.5 tablets (25 mg) by mouth every 6 hours as needed for moderate pain  Qty: 10 tablet, Refills: 0    Associated Diagnoses: Closed fracture of distal end of right femur with routine healing, unspecified fracture morphology, subsequent encounter       !! - Potential duplicate  medications found. Please discuss with provider.      CONTINUE these medications which have NOT CHANGED    Details   ASPIRIN PO Take 81 mg by mouth daily      calcitonin, salmon, (MIACALCIN) 200 UNIT/ACT nasal spray Spray 1 spray into one nostril alternating nostrils daily Alternate nostril each day.      ENALAPRIL MALEATE PO Take 5 mg by mouth daily       Furosemide (LASIX PO) Take 40 mg by mouth 2 times daily      Metoprolol Tartrate (LOPRESSOR PO) Take 100 mg by mouth 2 times daily      TiZANidine HCl (ZANAFLEX PO) Take 4 mg by mouth daily as needed for muscle spasms      !! TRAMADOL HCL PO Take 50 mg by mouth every 6 hours as needed for moderate to severe pain (1-2 tabs every 4-6 hr)       !! - Potential duplicate medications found. Please discuss with provider.      STOP taking these medications       OXYCODONE HCL PO Comments:   Reason for Stopping:             Allergies   No Known Allergies    Physician Attestation   I, Nelly Mcgovern, saw and evaluated this patient prior to discharge.  I discussed the patient with the resident/fellow and agree with plan of care as documented in the note.      I personally reviewed vital signs, medications, labs and imaging.    I personally spent 35 minutes on discharge activities.    Nelly Mcgovern MD  Date of Service (when I saw the patient): 11/08/18  Pager: [phone]

## 2018-11-26 NOTE — TELEPHONE ENCOUNTER
RECORDS STATUS - ALL OTHER DIAGNOSIS      RECORDS RECEIVED FROM: Epic and CE   DATE RECEIVED: 11/26/18   NOTES STATUS DETAILS   OFFICE NOTE from referring provider Complete Epic and CE   OFFICE NOTE from medical oncologist     DISCHARGE SUMMARY from hospital     DISCHARGE REPORT from the ER     OPERATIVE REPORT     MEDICATION LIST     CLINICAL TRIAL TREATMENTS TO DATE     LABS     PATHOLOGY REPORTS     ANYTHING RELATED TO DIAGNOSIS     GENONOMIC TESTING     TYPE:     IMAGING (NEED IMAGES & REPORT)     CT SCANS Complete PACS   MRI     MAMMO     ULTRASOUND Complete PACS   PET

## 2018-11-30 NOTE — LETTER
11/30/2018         RE: Mayra Stokes  [id number] [address] [address]        Dear Colleague,    Thank you for referring your patient, Mayra Stokes, to the [facility]. Please see a copy of my visit note below.    Pt no show    Again, thank you for allowing me to participate in the care of your patient.        Sincerely,        Jerald Reeves MD

## 2018-11-30 NOTE — MR AVS SNAPSHOT
After Visit Summary   2018    Mayra Stokes    MRN: [id number]           Patient Information     Date Of Birth          1955        Visit Information        Provider Department      2018 10:00 AM Narcisa Dasilva MD [facility]        Today's Diagnoses     Squamous cell carcinoma    -  1       Follow-ups after your visit        Who to contact     If you have questions or need follow up information about today's clinic visit or your schedule please contact [facility] directly at [phone].  Normal or non-critical lab and imaging results will be communicated to you by MyChart, letter or phone within 4 business days after the clinic has received the results. If you do not hear from us within 7 days, please contact the clinic through IQcardhart or phone. If you have a critical or abnormal lab result, we will notify you by phone as soon as possible.  Submit refill requests through Kiala or call your pharmacy and they will forward the refill request to us. Please allow 3 business days for your refill to be completed.          Additional Information About Your Visit        MyChart Information     Kiala is an electronic gateway that provides easy, online access to your medical records. With Kiala, you can request a clinic appointment, read your test results, renew a prescription or communicate with your care team.     To sign up for Kiala visit the website at [email or web address]   You will be asked to enter the access code listed below, as well as some personal information. Please follow the directions to create your username and password.     Your access code is: [email or web address]  Expires: 2019  9:12 AM     Your access code will  in 90 days. If you need help or a new code, please contact your [facility] or call [phone] for assistance.        Care EveryWhere ID     This is your Care EveryWhere  ID. This could be used by other organizations to access your [place] medical records  [id number]         Blood Pressure from Last 3 Encounters:   11/08/18 146/76    Weight from Last 3 Encounters:   11/07/18 (!) 153 kg (337 lb 4.9 oz)              Today, you had the following     No orders found for display       Primary Care Provider Office Phone # Fax #    Tahmina VenturaGREY chaudhry [phone]       [facility]  [address] [facility] [address]        Equal Access to Services     MELISA GRIER : Hadii aad ku hadasho Soomaali, waaxda luqadaha, qaybta kaalmada adeegyada, waxay idiin hayaan adeeg khararyan laalva . So [facility] [phone].    ATENCIÓN: Si habla español, tiene a shaffer disposición servicios gratuitos de asistencia lingüística. [facility] [phone].    We comply with applicable federal civil rights laws and [place] laws. We do not discriminate on the basis of race, color, national origin, age, disability, sex, sexual orientation, or gender identity.            Thank you!     Thank you for choosing [facility]  for your care. Our goal is always to provide you with excellent care. Hearing back from our patients is one way we can continue to improve our services. Please take a few minutes to complete the written survey that you may receive in the mail after your visit with us. Thank you!             Your Updated Medication List - Protect others around you: Learn how to safely use, store and throw away your medicines at www.disposemymeds.org.          This list is accurate as of 11/30/18 10:41 AM.  Always use your most recent med list.                   Brand Name Dispense Instructions for use Diagnosis    ASPIRIN PO      Take 81 mg by mouth daily        calcitonin (salmon) 200 UNIT/ACT nasal spray    MIACALCIN     Spray 1 spray into one nostril alternating nostrils daily Alternate nostril each day.        ENALAPRIL MALEATE PO      Take 5 mg by mouth daily        LASIX PO      Take 40  mg by mouth 2 times daily        LOPRESSOR PO      Take 100 mg by mouth 2 times daily        potassium & sodium phosphates 280-160-250 MG Packet    NEUTRA-PHOS    120 each    Take 2 packets by mouth 4 times daily    Hypophosphatemia       * TRAMADOL HCL PO      Take 50 mg by mouth every 6 hours as needed for moderate to severe pain (1-2 tabs every 4-6 hr)        * traMADol 50 MG tablet    ULTRAM    10 tablet    Take 0.5 tablets (25 mg) by mouth every 6 hours as needed for moderate pain    Closed fracture of distal end of right femur with routine healing, unspecified fracture morphology, subsequent encounter       ZANAFLEX PO      Take 4 mg by mouth daily as needed for muscle spasms        * Notice:  This list has 2 medication(s) that are the same as other medications prescribed for you. Read the directions carefully, and ask your doctor or other care provider to review them with you.

## 2018-12-11 PROBLEM — T17.908S POST-OBSTRUCTIVE PNEUMONIA DUE TO FOREIGN BODY ASPIRATION: Status: ACTIVE | Noted: 2018-01-01

## 2018-12-11 PROBLEM — J18.9 POST-OBSTRUCTIVE PNEUMONIA DUE TO FOREIGN BODY ASPIRATION: Status: ACTIVE | Noted: 2018-01-01

## 2018-12-11 NOTE — LETTER
Health Information Management Services               Recipient: TCU referral for Mayra Stokes           Sender: NNEKA Hurtado [phone]          Date: December 18, 2018  Patient Name:  Mayra Stokes -   Routing Message:  Please assess for TCU placement, likely medically ready for discharge 1-2 days. Thanks! Please call [facility] for additional nursing questions [phone]          The documents accompanying this notice contain confidential information belonging to the sender.  This information is intended only for the use of the individual or entity named above.  The authorized recipient of this information is prohibited from disclosing this information to any other party and is required to destroy the information after its stated need has been fulfilled, unless otherwise required by state law.      If you are not the intended recipient, you are hereby notified that any disclosure, copying, distribution or action taken in reliance on the contents of these documents is strictly prohibited. If you have received this document in error, please notify [place] immediately at [phone].  You may return the document via fax ([phone]) or return mail  (Health Information Management, , [address] [place] [address]).

## 2018-12-11 NOTE — PROGRESS NOTES
[facility]  Transfer Triage Note    Date of call: 12/11/18  Time of call: 10:11 AM    Reason for Transfer:Further diagnostic work up, management, and consultation for specialized care  Diagnosis: Post obstructive pneumonia, hypercalcemia    Outside Records: Available  Additional records requested to be faxed to [phone].    Stability of Patient: Patient is vitally stable, with no critical labs, and will likely remain stable throughout the transfer process    Expected Time of Arrival for Transfer: 0-8 hours    Recommendations for Management and Stabilization: Not needed    Additional Comments   62 yo F with hx of metastatic adenocarcinoma of unknown primary who was admitted here in Oct and discharged to TCU near [place]; now presented to local ED with fevers and AMS. Labs showed leukocytosis, lactate was normal. Hypercalcemia to 13.5.  Imaging showed post-obstructive pneumonia; bony and liver mets (known from prior). Current vitals B 131/60s, mild tachycardia 105, O2 sats 92-93% on 2L O2, non distressed. Transfer her for higher level of care.   Imaging CDs requested  6B bed requested due to sepsis, hypercalcemia, AMS    Reynaldo FLOWERS MD  Hospitalist Triage

## 2018-12-12 NOTE — PHARMACY-VANCOMYCIN DOSING SERVICE
Pharmacy Vancomycin Initial Note  Date of Service 2018  Patient's  1955  63 year old, female    Indication: Healthcare-Associated Pneumonia    Current estimated CrCl = Estimated Creatinine Clearance: 178.5 mL/min (A) (based on SCr of 0.49 mg/dL (L)).    Creatinine for last 3 days  No results found for requested labs within last 72 hours.    Recent Vancomycin Level(s) for last 3 days  No results found for requested labs within last 72 hours.      Vancomycin IV Administrations (past 72 hours)      No vancomycin orders with administrations in past 72 hours.                Nephrotoxins and other renal medications (From now, onward)    Start     Dose/Rate Route Frequency Ordered Stop    18 2330  vancomycin (VANCOCIN) 2,000 mg in sodium chloride 0.9 % 500 mL intermittent infusion      2,000 mg  over 2 Hours Intravenous EVERY 12 HOURS 18 2313            Contrast Orders - past 72 hours (72h ago, onward)    None                Plan:  1.  Pt received 2000 mg IV X1 PTA (~0800) Start vancomycin  2000 mg IV q12h.   2.  Goal Trough Level: 15-20 mg/L   3.  Pharmacy will check trough levels as appropriate in 1-3 Days.    4. Serum creatinine levels will be ordered daily for the first week of therapy and at least twice weekly for subsequent weeks.    5. [place] method utilized to dose vancomycin therapy: Method 1    Clifford Lobo

## 2018-12-12 NOTE — PLAN OF CARE
Neuro: Pt disoriented to time, place and situation.  She is remembering a certain situation that happened but thinking it happened this morning and describing it with gaps and illogical speech.  Pt is also unable to state a friend or family member to contact.  Cardiac: -130 VSS.   Respiratory: Sating 95%  on 4L NC.  GI/: Adequate urine output. BM -smear  Diet/appetite: Unable to eat safely this morning.  Activity:  Assist of 2 with bed mobility.  Pain: Pt a lot of pain this morning.  She slept after 10 mg oxycodone.  Skin: Buttock fissure present. Cracked open and red, paste applied.  LDA's: PIV with bolus running.  Elevated Ca - look for Endocrines recs.  Plan: Continue with POC. Notify primary team with changes.

## 2018-12-12 NOTE — PROGRESS NOTES
Admit:   Admitted from [facility] pneumonia/fevers. Pt is tachycardic upon arrival, afebrile. Oriented to room. No family members present. Awaiting for MD to see patient.

## 2018-12-12 NOTE — PHARMACY-ADMISSION MEDICATION HISTORY
Admission medication history interview status for the 12/11/2018 admission is complete. See Epic admission navigator for allergy information, pharmacy, prior to admission medications and immunization status.     Medication history interview sources:  [facility])    Changes made to PTA medication list (reason)  Added:   -tylenol 325mg tablet, 2 tablets every 4 hours as needed  -Mylanta suspension, 15mLs every 2 hours as needed  -bisacodyl 10mg supp, 1 suppository once daily as needed  -Milk of magnesia, 30mLs once daily as needed  -senna 8.6mg tablet, 1 tablet once daily    Deleted:   -tizanidine 4mg tablet, 1 tablet every 8 hours as needed (not on [facility] MAR)  -tramadol 50mg tablet, 1/2 tablet (25mg) every 4 hours as needed (duplication, older rx)    Changed: None    Additional medication history information (including reliability of information, actions taken by pharmacist):   -Order entries were changed from PO tablet-> strength included. No direction changed.       Prior to Admission medications    Medication Sig Last Dose Taking? Auth Provider   acetaminophen (TYLENOL) 325 MG tablet Take 650 mg by mouth every 4 hours as needed for mild pain 2 tabsx [id number], max 4000mg in 24 hours 12/9/2018 at PRN Yes Unknown, Entered By History   alum & mag hydroxide-simethicone (MYLANTA/MAALOX) 200-200-20 MG/5ML SUSP suspension Take 15 mLs by mouth every 2 hours as needed for indigestion 12/2/2018 at PRN Yes Unknown, Entered By History   aspirin 81 MG EC tablet Take 81 mg by mouth daily 12/10/2018 at 0800 Yes Unknown, Entered By History   bisacodyl (DULCOLAX) 10 MG suppository Place 10 mg rectally daily as needed for constipation 12/1/2018 at PRN Yes Unknown, Entered By History   calcitonin, salmon, (MIACALCIN) 200 UNIT/ACT nasal spray Spray 1 spray into one nostril alternating nostrils daily Alternate nostril each day. 12/10/2018 at 0800 right nostril Yes Reported, Patient   enalapril (VASOTEC) 5 MG tablet  Take 5 mg by mouth daily 12/10/2018 at 0800 Yes Unknown, Entered By History   furosemide (LASIX) 40 MG tablet Take 40 mg by mouth 2 times daily At 0800 and 1400 12/10/2018 at 1400 Yes Unknown, Entered By History   K Phos Garza-Sod Phos Di & Mono (VIRT-PHOS 250 NEUTRAL) 155-852-130 MG TABS Take 2 tablets by mouth 4 times daily @@ 0800, 1200, 1600, 2000 12/10/2018 at 2000 Yes Unknown, Entered By History   magnesium hydroxide (MILK OF MAGNESIA) 400 MG/5ML suspension Take 30 mLs by mouth daily as needed for constipation or heartburn 12/9/2018 at PRN Yes Unknown, Entered By History   metoprolol tartrate (LOPRESSOR) 100 MG tablet Take 100 mg by mouth 2 times daily At 0800 and 2000 12/10/2018 at 2000 Yes Unknown, Entered By History   senna (SENOKOT) 8.6 MG tablet Take 1 tablet by mouth daily 12/10/2018 at 0800 Yes Unknown, Entered By History   traMADol (ULTRAM) 50 MG tablet Take  mg by mouth every 4 hours as needed for severe pain give 1 tablet for pain rated 1-5; give 2 tablets for pain rated 6-10 12/10/2018 at PRN Yes Unknown, Entered By History         Medication history completed by: Deloris Benedict, Pharm.D IV Student

## 2018-12-12 NOTE — PLAN OF CARE
Pt is lethargic but is easily aroused to voice. Pt is oriented x3; intermittently disoriented to time. Pt has been tachycardic with heart rate in the 130s, at rest. Pt was given dilaudid and a 1 L bolus of NS but no change noted. Pt was then given oxycodone in addition, which alleviated pain per pt, but did not change heart rate. Pt's lactic acid was normal, acid given for elevated calcium. Blood cx was sent, now pending. Pt's lungs are dim/crackles; pt assisted with coughing and deep breathing. Urine output WDL.

## 2018-12-12 NOTE — PROGRESS NOTES
[facility] [place]    Progress Note - [facility] Service        Date of Admission:  12/11/2018    Assessment & Plan   Mayra Stokes is a 63 year old female admitted on 12/11/2018 with a PMH significant for HTN, HLD, DMII and cor pulmonales with recent diagnosis of metastatic squamous cell carcinoma of unknown primary. Presenting with sepsis with fever/leukocytosis likely secondary to obstructive pneumonia suggestive on CT.     #Acute hypoxic respiratory failure secondary to HCAP   Oxygen desaturation documented to 82% on RA at OSH on 12/11. Requiring 4L NC. CT 12/11 as below concerning for post-obstructive PNA. CXR concerning for pulmonary edema (likely non-cardiogenic based on bedside US with IVC with good respiratory variation). O2 needs not suggestive of ARDS, but will remain on differential.  -Continuous pulse ox; keep sats > 90%    #Sepsis 2/2 obstructive pneumonia vs UTI  Qualifies for Sepsis based on T 103F, 's, WBC 16.4, RR 22.  CTA chest imaging from OSH c/w post-obstructive pneumonia. Lactic acid 1.4. In the setting of recent hospitalization, will need to cover for HCAP.  OSH UA, bacteria, positive leuk esterase.  - s/p 2L NS  - BC pending, UC pending   - Continue vancomycin, Cefepime, Levaquin     #Tachycardia  -likely secondary to sepsis, intravascular depletion (as evidenced by bedside US on 12/12).   -EKG in sinus tachycardia rates 130-140  -ECHO to rule out cardiac anatomic abnormality playing a role in tachycardia  -continue hydration as below     #Toxic metabolic encephalopathy secondary to hypercalcemia  #Hypercalcemia secondary to malignancy  Ionized calcium elevated. iCal 7.6. PTH appropriately suppresed (9/2018) in the setting of elevated PTH-rp (10/2018). Phos appropriately low. Low vitamin D. Last dose of denosumab on 10/29/18. S/p 2L NS at OSH. Additional 2L NS on 12/12. S/p Zolendronate 4mg IV x1 on 12/12.  -Endocrine consult, apprec recs  >Agree with  hydration: Continue NS @ 100ml/hr   >calcitonin 600 U IM q12h x 2 doses   >Trend iCal q12h   >May consider Denosumab     #Metastatic squamous carcinoma of unknown primary (pulmonary likely)  -s/p biopsy of rib revealed metastatic squamous cell carcinoma of unknown primary but likely pulmonary source. Last seen in Oncology clinic on 11/14/18. PET scan 12/4/18 revealed L parotid mass, RUL mass with post-obstructive consolidation (high suspicion of pulmonary primary), endometrial thickening, mets involving R 4th rib, right T9 pedicle, bilateral iliac bones, bilateral femurs in addition to multiple hepatic lesions.  -Gynecology was consulted last admission and they felt that it is unlikely that the squamous cell carcinoma originating in the cervix, the patient has been up-to-date on her cervical cancer screening and had a pelvic exam with HPV testing as recently as 2 years ago.  They were unable to perform pelvic exam or endometrial biopsy at that time due to right leg pain and cannot bend her knee as a result of the femur fracture; they wanted to follow-up with Mayra within 4-6 weeks of discharge, but she would prefer to follow-up with a provider closer to her home.    -Heme/Onc consult, apprec recs   >will defer therapy options to outpatient Oncologist in [facility] system   -Consider Gyn/Onc re-consult to evaluate endometrial thickening    #Pathologic fracture s/p ORIF  -underwent open reduction/internal fixation, tumor curettage and excision, and biopsy of the bone on 10/16.  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    #Diastolic heart failure  #Cor pulmonale   EF 60% but noted diastolic heart failure (4/27/18).   - Hold PTA metoprolol 100mg PO BID in the setting of infection  - Hold PTA enalapril 5mg qday in the setting of infection   - Hold PTA furosemide 40mg PO BID in the setting of infection   - Order repeat ECHO      #DMII   Last A1c 6.1. No outpatient medication management noted  on chart review and pharm med rec.        #Hypokalemia  #Hypophosphatemia  - resume PTA K phos BID from QID prior to admission     #Obesity hypoventilation syndrome  #Obstructive sleep apnea  -consider CPAP while hospitalized; non-adherent at home       Diet: Moderate Consistent CHO Diet    Fluids: NS @ 100ml/hr  Lines: PIV  DVT Prophylaxis: Enoxaparin (Lovenox) subcutaneous - favored in setting of malignancy  Hickey Catheter: in place, indication: Other (Comment)(chronic hickey)  Code Status: Full Code      Disposition Plan   Expected discharge: 4 - 7 days, recommended to transitional care unit once adequate pain management/ tolerating PO medications, antibiotic plan established, mental status at baseline, O2 use less than 0 liters/minute and SIRS/Sepsis treated.  Entered: Yanet Lopez MD 12/12/2018, 5:39 PM       The patient's care was discussed with the Attending Physician, Dr. Uribe.    Yanet Lopez MD  [place] [address] Service  [facility]  Pager: [phone]  Please see sticky note for cross cover information    Physician Attestation   I, Shaun Uribe, saw this patient with the resident and agree with the resident/fellow's findings and plan of care as documented in the note.      I personally reviewed vital signs, medications, labs and imaging.    Shaun Uribe MD  Date of Service (when I saw the patient): 12/12/18    ______________________________________________________________________    Interval History   Overnight, she continued moaning with non-specific pain complaints. Denies trouble breathing but reports intermittent cough. A&Ox2. Full ROS limited by patient's mental status.    Data reviewed today: I reviewed all medications, new labs and imaging results over the last 24 hours. I personally reviewed the EKG tracing showing sinus tachycardia and the CXR image(s) showing pulmonary edema and likely R sided infiltrate.    Physical Exam   Vital Signs: Temp: 97.7  F  (36.5  C) Temp src: Oral BP: 139/69   Heart Rate: 135 Resp: 20 SpO2: 93 % O2 Device: Nasal cannula Oxygen Delivery: 4 LPM  Weight: 335 lbs 4.8 oz  Constitutional: awake, alert, cooperative, no apparent distress, and appears stated age  Eyes: Lids and lashes normal, extra ocular muscles intact, sclera clear, conjunctiva normal  ENT: Normocephalic, without obvious abnormality, atraumatic, sinuses nontender on palpation, external ears without lesions, oral pharynx with moist mucous membranes, tonsils without erythema or exudates, gums normal and good dentition.  Respiratory: Coarse breath sounds bilaterally on anterior auscultation, no wheezing, increased WOB, on 4L NC  Cardiovascular: +Tachycardia, Regular rhythm, normal S1 and S2   GI: Normal bowel sounds, soft, non-distended, non-tender, no masses palpated   Genitounirinary: hickey catheter in place  Skin: mild bruising noted on abdomen and no rashes  Musculoskeletal: tone is normal  Neurologic: A&O x2  Neuropsychiatric: General: moaning intermittently     Data   Recent Labs   Lab 12/12/18  0343 12/12/18  0033   WBC 16.0*  --    HGB 9.4*  --    MCV 97  --      --      --    POTASSIUM 3.6  --    CHLORIDE 101  --    CO2 24  --    BUN 11  --    CR 0.42*  --    ANIONGAP 9  --    DEANDRE 13.0* 13.2*   GLC 99  --    ALBUMIN  --  2.2*     Recent Results (from the past 24 hour(s))   XR Chest Port 1 View    Narrative    EXAM:  XR CHEST PORT 1 VW    INDICATION: hypoxic respiratory failure - assess for worsening  infiltrate; hypoxic respiratory failure - assess for worsening  infiltrate. Patient with cor pulmonale and recent diagnosis of  metastatic squamous cell carcinoma of unknown primary.    COMPARISON:  CT 10/28/2018, x-ray 10/27/2018    FINDINGS:  AP view of the chest. Increased diffuse interstitial and airspace  opacities throughout both lung fields with more consolidative  appearance in the right upper lung. Cardiomediastinal silhouette is  stably enlarged with  large main pulmonary artery. No visualized  pleural effusion. Upper abdomen is unremarkable. No acute bony  lesions.      Impression    IMPRESSION:  1. Diffuse interstitial and airspace opacities throughout both lung  fields with more consolidative appearance of the right upper lung.  Concern for pulmonary edema versus diffuse infection.  2. Again seen is large main pulmonary artery    I have personally reviewed the examination and initial interpretation  and I agree with the findings.    FRANCINE MÉNDEZ MD

## 2018-12-12 NOTE — PLAN OF CARE
"/67 (BP Location: Left arm)   Temp 98.7  F (37.1  C) (Oral)   Ht 1.676 m (5' 6\")   Wt (!) 151.5 kg (334 lb)   BMI 53.91 kg/m    Neuro: A&Ox4.   Cardiac: Afebrile, VSS.   Respiratory: 2L NC.  GI/: Has pre existing hickey. With good UOP.Small BM this shift.   Diet/appetite: Tolerating diet. Denies nausea   Activity:Turn and repo.  Pain: Generalized pain. Awaiting for orders.  Skin: No new deficits noted.  Lines: PIV x2  Drains: Hickey      Pt has been resting comfortably throughout night, will continue to monitor and follow plan of care.       "

## 2018-12-12 NOTE — PROGRESS NOTES
DAILY ROUNDS CHECKLIST:     RN and Provider saw patient together: YES  Checklist was reviewed with Shaun Uribe  And Dr. Lopez Resident MD      Status/Level of care:    - IMC:  continue with     Tethers:   - Telemetry: continue    - Urinary Catheter: continue    - No line: Not present     Anticoagulation for VTE prophylaxis:    - Reviewed with provider: YES     Rehab:   - PT/OT not indicated   Time until discharge:    - 4 - 7 days once adequate pain management/ tolerating PO medications, antibiotic plan established, mental status at baseline, O2 use less than 0 liters/minute, safe disposition plan/ TCU bed available and SIRS/Sepsis treated

## 2018-12-12 NOTE — CONSULTS
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    We are consulted for aid in management of her hypercalcemia.    Problems:  1) Hypercalcemia   2) Elevated PTHrP at 81.2 (10/26/18)  3) Suppressed PTH intact at <7 (10/26/18)   4) Low 25OH Vitamin D3 at <11 (10/26/18)     Corrected calcium on admission 14.8 secondary to malignacy and PTHrP medicated process. She has been hydrated with a total of 2L saline at [facility], then additional 1L here. Also received 1 time dose of Zoledronic acid 2mg at 0400 on 12/12/18 and ongoing 100cc/h of NS given history of diastolic HF and concern for precipiating hypervolemia.   As she was given Zoledronic Acid on 12/12/18 at 0400, it is too early to recommend additional therapies for hypercalcemia as we would expect a downtrend in the upcoming ~2 days with maximum effect within 2-4d. However notably she did require denosumab (last given 10/29/18) for hypercalcemia.  She reportedly was taking calcitonin nasal spray at home for management of her hypercalcemia and had recieved IV Calcitonin 400u x2 during prior admission.  She has also tried pamidronate x2, Lasix IV as an inpatient during prior admissions ( However caution was recommended using Lasix as it can cause volume contraction and worsening hypercalcemia).     Recommendations:    - Check Calcium BID  - Continue Saline for hydration at rate per primary team   - Calcitonin 600units Q12h IM (IM route per pharmacy recommendation due to volume >2ml and has been ordered for you).  -  "If hypercalcemia not significantly improving, may consider denosumab 120mg x1 in upcoming days     Patient seen and discussed with  and Fellow Dr.Johnson-Rabbett Jody Fuller   PGY 2 IM          Chief Complaint:   Hypercalcemia with elevated PTHrP   History is obtained from the patient and chart review          History of Present Illness:   Mayra Stokes is a 63-year-old female with history of hypertension, hyperlipidemia, type 2 diabetes, cor pulmonale, recently diagnosed squamous cell carcinoma with questionable primary etiology, who now presents with hypercalcemia secondary to PThRP mediated process.    We are consulted for aid in management of her hypercalcemia.    On chart, patient is on 10/25/18 for hypercalcemia and right femur fracture.  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  Oncology was consulted during admission, and ultimately primary etiology of her malignancy was most likely is 1 4 .  To follow-up with an oncologist in the [facility] or in the [facility].  In addition, she presented with hypercalcemia to as high as 14.4 which was managed with aggressive fluid hydration, calcitonin, bisphosphonates as well as diuretics.  Ultimately, she required 1 dose of denosumab 120 mg given on 10/28/18.  At the time there was concern regarding administration of such as it impairs bone healing and the patient presented with right femur fracture.  She did however respond to the dose amount and ultimately she was weaned off of IV fluids as well as diuretics.  Ultimately, she was discharged on a medication regimen including 1 intranasal spray of calcitonin daily 200 units, as well as furosemide 40mg BID as well as electrolyte repletion.     On interview today the patient is a bit confused. She is able to state that she has pain in her abdomen and at times states \"all " "over\" She was unable to clearly respond where she was for me. She was however able to state that she feels generally unwell.   States she told her family members not to come to the hospital to visit her.          Past Medical History:     Past Medical History:   Diagnosis Date     Chronic cor pulmonale (H)      Chronic diastolic heart failure (H)      DM type 2 (diabetes mellitus, type 2) (H)     diet controlled     Hypercalcemia 09/2018     Hypertension      Mass of left side of neck     since ~2003     Morbid obesity (H)      Obstructive sleep apnea      Pathologic fracture of femur (H) 10/24/2018             Past Surgical History:     Past Surgical History:   Procedure Laterality Date     BIOPSY BONE LOWER EXTREMITY Right 10/26/2018    Procedure: BIOPSY RIGHT FEMUR AND EXCISION OF TUMOR;  Surgeon: Maximo Wilkinson MD;  Location: [facility] OR     LAP ADJUSTABLE GASTRIC BAND       OPEN REDUCTION INTERNAL FIXATION FEMUR DISTAL Right 10/26/2018    Procedure: OPEN REDUCTION INTERNAL FIXATION RIGHT DISTAL FEMUR;  Surgeon: Maximo Wilkinson MD;  Location: [facility] OR     SHOULDER SURGERY Left     shoulder replacement          Social History:     No alcohol use,   non active smoker       Family History:   Non contributive.  No family history of hypercalcemia          Immunizations:     Immunization History   Administered Date(s) Administered     Influenza Quad, Recombinant, p-free (RIV4) 11/04/2018           Allergies:   No Known Allergies          Medications:   Outpatient Medications:   -Acetaminophen 650 mg every 4 hours as needed for pain  -Maalox 200 mg suspension for indigestion, as needed  -Aspirin 81 mg p.o. Daily  -Enalapril 5 mg p.o. Daily  -Furosemide 40 mg p.o. twice daily  -Metoprolol tartrate 100 mg p.o. twice daily  -Tramadol  mg p.o. every 4 hours for pain  -Calcitonin 200 units, 1 spray into one nostril, alternating nostrils daily.    Inpatient Medications:  -Sodium chloride bolus 1 L at 250 cc/h over 4 hours " given 12/12/18 at 10:30 AM  -Enoxaparin 40 mg every 12 hours  - Cefepime 2 g every 12 hours  - Levofloxacin 750 mg every 24 hours  - Vancomycin every 12 hours; dose by level   -Zoledronic acid 4 mg x1 given on 12/12/18 at 0208  -Hydromorphone 0.5 mg every 6 hours as needed for pain       Review of Systems:   The 10 point Review of Systems is negative other than noted in the HPI           Physical Exam:   Vitals were reviewed  Temp: 97.8  F (36.6  C) Temp src: Oral BP: 132/56   Heart Rate: 137 Resp: 20 SpO2: 97 % O2 Device: Nasal cannula Oxygen Delivery: 4 LPM  Constitutional:   Awake, able to respond appropriately to questions however quite lethargic and fatigued.    Eyes:   Lashed and lids normal, pupils are equal and reactive to light.    ENT:   Oropharynx with no plaques or lesions, dry.    Lungs:   crackles in BL lower lobes.    Cardiovascular:   Tachycardic however regular rate    Neurologic:   Not oriented to place however is oriented to self and time and somewhat to situation. Moving all extremities appropriately, hand  strength intact and 5/5, able to wiggle toes in bed however due to pain not willing to raise legs.           Data:       --- Addendum----  I saw the patient with endocirne team and resident Dr. Fuller and directly examined patient and discussed. Agree above note and plan.     Hypercalcmia,. Likely paraneoplastic. Given bisphiosp[honate, and bridge with calcitonin, hydration tachycaldia.      Christiana Baptiste MD  Staff Physician  Endocrinology and Metabolism  [facility]  License: [place] [id number]  Pager: [phone]

## 2018-12-12 NOTE — H&P
INTERNAL MEDICINE HISTORY & PHYSICAL   Mayra Stokes ([id number]) admitted on 12/11/2018  Primary care provider: Tahmina Sanchez      Chief Complaint:  Post obstructive pneumonia    History of Present Illness:   Mayra Stokes is a 63 year old female with history of hypertension, hyperlipidemia, diabetes type 2, cor pulmonale who was transferred from [place] for higher level care.    History is limited as patient is a poor historian, history obtained in combination of patient report, and medical record.  Patient was transferred to outside emergency department from living facility after being found to have desaturations around 5:30 AM.  At this time patient received 2 L per nasal cannula with saturations improved to 97%.  She was noted to have diminished breath sounds on physical exam and pain with deep inspiration especially on the right.  Patient was noted to be disoriented which is a change from baseline.  For the past 24 hours prior to admission nursing facility staff noted left cardiac, decreased appetite in general pallor.  She has a chronic indwelling catheter, and urine at this time was noted to be cloudy.     On admission to outside ED she was found to be febrile with temperature of 103 degrees, and tachycardic.  Blood work revealed leukocytosis, and elevated pro-calcitonin, lactate within normal limits, calcium 13.5 and hemoglobin 9.7.  Chest x-ray consistent with obstructive pneumonia with multiple bony metastases.  CT abdomen pelvis with findings suggestive of metastatic disease and thickened endometrium.  UA consistent with infection.  Blood cultures and urine cultures collected prior to administration of antibiotics.  Received vancomycin and cefepime and Levaquin for HCAP received 2 L normal saline which is less than weight-based dosing due to history of heart failure.  Patient was then transferred to [facility] for higher level care.    On arrival to [facility] she she endorses sharp abdominal  pain worsened by deep breathing and epigastric pain.  Describes chest pain as sharp, 8 out of 10 and located in the center of her chest. She denies nausea or vomiting.  Endorses diarrhea of one week duration.  She denies cough, headache, vision changes.  Endorses lower extremity weakness and states that she does not walk or bear weight on lower extremities.    ROS:   ROS: 10 point ROS neg other than the symptoms noted above in the HPI.    Past Medical History:   Patient Active Problem List   Diagnosis     Femur fracture (H)   Known squamous cell carcinoma with unknown primary.    Past Surgical History:   Past Surgical History:   Procedure Laterality Date     BIOPSY BONE LOWER EXTREMITY Right 10/26/2018    Procedure: BIOPSY RIGHT FEMUR AND EXCISION OF TUMOR;  Surgeon: Maximo Wilkinson MD;  Location: [facility]     LAP ADJUSTABLE GASTRIC BAND       OPEN REDUCTION INTERNAL FIXATION FEMUR DISTAL Right 10/26/2018    Procedure: OPEN REDUCTION INTERNAL FIXATION RIGHT DISTAL FEMUR;  Surgeon: Maximo Wilkinson MD;  Location: [facility] OR     SHOULDER SURGERY Left     shoulder replacement       Medications (outpatient):    No current facility-administered medications on file prior to encounter.   Current Outpatient Medications on File Prior to Encounter:  ASPIRIN PO Take 81 mg by mouth daily   calcitonin, salmon, (MIACALCIN) 200 UNIT/ACT nasal spray Spray 1 spray into one nostril alternating nostrils daily Alternate nostril each day.   ENALAPRIL MALEATE PO Take 5 mg by mouth daily    Furosemide (LASIX PO) Take 40 mg by mouth 2 times daily   Metoprolol Tartrate (LOPRESSOR PO) Take 100 mg by mouth 2 times daily   potassium & sodium phosphates (NEUTRA-PHOS) 280-160-250 MG Packet Take 2 packets by mouth 4 times daily   TiZANidine HCl (ZANAFLEX PO) Take 4 mg by mouth daily as needed for muscle spasms   traMADol (ULTRAM) 50 MG tablet Take 0.5 tablets (25 mg) by mouth every 6 hours as needed for moderate pain   TRAMADOL HCL PO Take 50 mg by  "mouth every 6 hours as needed for moderate to severe pain (1-2 tabs every 4-6 hr)       Allergy:  No Known Allergies    Social History:   Former smoker, 1/4 pack x 30 years. Quit 1 year ago.  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      Family History:  No family history of cancer, leukemia or lymphoma    Objective:  Physical exam:  /67 (BP Location: Left arm)   Temp 98.7  F (37.1  C) (Oral)   Ht 1.676 m (5' 6\")   Wt (!) 151.5 kg (334 lb)   BMI 53.91 kg/m    Wt Readings from Last 2 Encounters:   12/11/18 (!) 151.5 kg (334 lb)   11/07/18 (!) 153 kg (337 lb 4.9 oz)       Intake/Output Summary (Last 24 hours) at 12/11/2018 2215  Last data filed at 12/11/2018 2054  Gross per 24 hour   Intake --   Output 200 ml   Net -200 ml       General:  Laying in bed  Comfortably, no apparent distress  HEENT:  Atraumatic normocephalic, EOM  CV: tachycardiac regular rhythm. S1, S2 with no murmurs, rubs, gallops.   Resp: On NC. No increased work of breathing or use of accessory muscles,  Exam limited to anterior lung fields, diminished breath sounds b/l bases  Abdomen: obese,  LLQ ecchymosis Normal active bowel sounds.  Abdomen is tender to palpation RUQ  MSK:  Upper extremity muscle strength intact 4/5 bilaterally. LLE 2/5 muscle strength, No large joint swelling or erythema.    Extremities:  2+radial pulses bilaterally.  2+ dorsalis pedal pulses bilaterally. No pre-tibial edema. No cyanosis or clubbing.  Skin:  Warm and dry. No erythema, rashes, ulceration or diaphoresis.  Neuro: CN II-XII grossly intact. Alert and oriented to person, disoriented to place but redirected. Oriented to year (said 2013 then corrected to 2018) not month (said march then corrected to October). When asked president answered \"trump\" when asked prior president answered \"freemond\".     Labs (Past three days):  Results for orders placed or performed during the hospital encounter of 12/11/18 (from the past 24 hour(s))   Glucose by " meter   Result Value Ref Range    Glucose 103 (H) 70 - 99 mg/dL       Urinalysis  Recent Labs   Lab Test 10/27/18  0255   COLOR Yellow   APPEARANCE Clear   URINEGLC Negative   URINEBILI Negative   URINEKETONE Negative   SG 1.010   UBLD Trace*   URINEPH 5.0   PROTEIN 10*   NITRITE Negative   LEUKEST Negative   RBCU 2   WBCU 3         Imaging/procedure results:    # CT   CT abdomen pelvis 12/11/18  IMPRESSION:    1.  Hepatomegaly with intrahepatic masses, the largest in the right hepatic lobe compatible   with hepatic metastatic disease.  There is stranding within the right perihepatic region,   likely reactive, adjacent to the largest metastatic lesion.   2.  Thickened heterogeneous endometrium is indeterminate.  Endometrial carcinoma cannot be   excluded. Endometrial sampling is recommended.    3.  Nodular thickening of the left adrenal gland, cannot exclude metastatic lesion.   4.  Mildly prominent lower periesophageal lymph nodes may be reactive, technically nonspecific.    No additional abdominal or pelvic lymphadenopathy.   5.  No ascites.   6.  Large amount of formed stool within the rectosigmoid colon.   7.  Lytic lesions within the osseous structures of the abdomen and pelvis compatible with   osseous metastatic disease.  No acute or impending pathologic fracture.     CTA Chest 12/11/18  1. Limited examination secondary to suboptimal opacification of the pulmonary arteries.  Within   this limitation, no large central pulmonary embolus.  The segmental and subsegmental pulmonary   arterial branches are nondiagnostic.   2. Masslike hypodense lesion within the posterior right upper lobe with adjacent consolidative   airspace opacity compatible with probable malignancy and adjacent postobstructive pneumonia.   3.  Cardiomegaly without pericardial effusion.   4.  Enlarged central pulmonary artery which can be seen with pulmonary hypertension.   5.  Lytic destructive lesions involving the sternum, right fourth rib,  right pedicle of T9, and   small lucent lesion within the T4 vertebral body compatible with osseous metastatic disease.    -----------------------------------------------------------------  -----------------------------------------------------------------        ASSESSMENT & PLAN :    Mayra Stokes is a 63 year old female with history of hypertension, hyperlipidemia, diabetes type 2, cor pulmonale who was transferred from [place] for higher level care.    #sepsis 2/2 obstructive pneumonia vs UTI  Patient presents as it has been elevated temperature 103F.  On admission patient tachycardic 120s 130s, leukocytosis 16.4, ,and imaging consistent with postobstructive pneumonia. Lactic acid 1.4 . In the setting of recent hospitalization consider pathogens of HCAP.  Started on vancomycin, cefepime, Levaquin at outside hospital.  Blood cultures urine cultures collected at outside hospital prior to initiation of antibiotics.  UA, bacteria, positive leuk esterase.  Current antibiotic choices cover bacteria for urinary tract infection as well.   - BC, UC pending   - Continue vancomycin,   - NS 100ml/hr for 1 L  - Continuous pulse ox    #hypercalcemia  On admission corrected calcium 14.8 no head injury is confused because daughter held her (13.5 corrected with 2.4 albumin) likely secondary to malignancy as well. Patient uses calcitonin at home.  Received 2 L isotonic saline at outside hospital.  We will continue well fluid hydration with normal saline..  Suspect patient may have underlying component of cardiac disease so will hydrate gently.  Appears to have calcium was checked since I am, will recheck as patient received fluid hydration in hospital.  -Continue prior to admission calcitonin  - Zolendronic acid x1  -Both recheck overnight  - NS 100ml/hr for 1 L    #diastolic heart failure  #cor pulmonale   EF 60% (4/27/18).   - Hold PTA metoprolol  - Hold PTA enalapril  - Hold PTA furosemide  - Hold PTA  potassium    #diabetes type 2  Last A1c 6.1    #metastatic squamous carcinoma of unknown primary  Will likely need goals of care conversation this admission to address options for palliative therapy.   -heme onc consult in am    #obesity hypoventilation   Patient has CPAP at home but does not use it when she sleeps.     FEN: carb consistent  Prophylaxis:    DVT: mechanical    Home medications held:  - metoprolol  - enalapril  - furosemide  - potassium  - tizanidine  - tramadol    Disposition: Disposition Plan   Expected discharge in 2-3 days to transitional care unit once resolution.     Entered: Az Cam 12/11/2018, 10:15 PM       Code Status: Full code (Confirmed with Patient)    -------------------------------------    Patient was seen and discussed with attending physician Dr. Chino.     Az Ocampo Night Intern/cross-cover  PGY-1 Internal Medicine  Pager: [phone]      Physician Attestation   I, Willian Saldana, saw this patient with the resident and agree with the resident/fellow's findings and plan of care as documented in the note.      I personally reviewed vital signs, medications, labs and imaging.      Willian Saldana MD  Date of Service (when I saw the patient): 12/11/2018

## 2018-12-13 NOTE — PROVIDER NOTIFICATION
DATE:  12/12/2018   TIME OF RECEIPT FROM LAB:  1601  LAB TEST:  Ionized calcium   LAB VALUE:  6.9  RESULTS GIVEN WITH READ-BACK TO (PROVIDER):  Damaris 4  TIME LAB VALUE REPORTED TO PROVIDER:   6691

## 2018-12-13 NOTE — PLAN OF CARE
D: Patient on unit [facility] [place], transferred from  yesterday.     I/A:  Neuro- Lethargic, oriented to self only with intermittent orientation to time. PERRLA, follows commands all 4. BUE strength 3/5 , BLE 2/5  CV- Sinus tachycardia 130-140s, no ectopy noted. MDs aware. BPs stable.   Pulm- expiratory wheezes noted B/L, MD notified and xopenex neb ordered with some improvement (patient was unable to use inhaler). Sats maintained with 3L NC.   GI- Passing gas, no BM this shift (last 12/12). Mod CHO diet, declined dinner.   - Voiding large amounts, purewick in use. Bladder scanned to assess for retention post-void, 75mL.   Endo- BG AC+HS, WNL  ID- Afebrile. IV ABX continue   Lines- PIV L infusing   Drips- /hr   Skin- groin/under pannus noted to be red and patient shouts out in pain when touched or lifted. Cleansed and barrier cream applied.   Pain- Generalized pain, frequently moans but when asked if having pain will deny. Oxycodone given X1 with some apparent improvement.   Labs- ionized calcium 7.1, improved from 7.6. MD paged with critical result. K 3.0, Mag 1.7, and Phos 1.6, no replacement protocol ordered so MD paged requesting replacements.     See flow sheets for further interventions and assessments.    P: Continue to monitor pt closely. Notify MD of significant changes.

## 2018-12-13 NOTE — CONSULTS
[facility] Orthopaedic Surgery Consultation    Mayra Stokes MRN# [id number]   Age: 63 year old YOB: 1955   Date of Admission: 12/11/2018    Reason for consult: Right femur ORIF follow-up   Requesting physician: Shaun Uribe MD   Level of consult: Consult, follow and place orders            Impression and Recommendation (Resident / Clinician):   Impression/Recommendations:  Mayra Stokes is a 63 year old female with a history of poorly compensated CHF, cor pulmonale, HTN, DM 2, FRANSISCO, morbid obesity, now 6 weeks s/p right distal femur biopsy, tumor curettage and excision, and ORIF on 10/26 with Dr. Wilkinson for right distal femoral shaft pathologic fracture.  Patient appears to be progressing with her right femur and should continue with physical therapy for range of motion, mobility, and transfers when able.  No concern for infection to her right femur.  She may progress to 50% weightbearing to her right lower extremity.  Given that her postoperative appointments have been with Dr. Anderson at [facility], we will plan to have her follow-up with him for her next postoperative appointment.     Activity: 50% weightbearing RLE. Ok to use андрей lift with lower strap in the popliteal fossa; do not use the strap at the mid or upper thigh.  Wound Cares/Dressing/Splint: None.  DVT PPx: DVT ppx per primary team  Antibiotic: Antibiotic selection per primary team.  Currently on Unasyn.  No antibiotics needed for orthopedic issues.  Labs: None needed at this time.  Imaging: No further imaging needed at this time.  PT/OT: Work on ROM, strengthening, gait training, mobility, and ADLs.  Dispo: Per Primary team.  Follow-up: Plan for continued follow-up with Dr. Wilkinson a few weeks after discharge; or Dr. Anderson at [facility] if not able to make the trip.     Patient discussed and seen with Dr. Glover, PGY 4.  Patient discussed with Dr. Lorenzo, orthopedic staff.    Orthopaedic surgery will follow peripherally. Thank you  for allowing me to participate in this patient's care. Please do not hesitate to contact me with any questions or concerns.    Sherly Powell MD   12/13/2018 11:39 AM  Orthopaedic Surgery Resident, PGY-1    Please page me at [phone] with any questions/concerns. If there is no response, if it is a weekend, or if it is during evening hours, please page the orthopaedic surgery resident on call.    =================================================    Patient seen and examined independently, agree with the above. No signs/symptoms for infection at previous surgical site. If challenges with treatment of sepsis or inconsistent bacteria, could consider advanced imaging of right thigh to eval for abscess. Ok for advance to 50% WB. If significant pain return to [facility]. Follow up here or close to home a few weeks after discharge     Mao Glover MD  Orthopedic Surgery, PGY-4  Pager: [phone]            Chief Complaint:   Right distal femur ORIF postop follow-up          History of Present Illness (Resident / Clinician):   Mayra Stokes is a 63-year-old female with history of poorly compensated CHF, cor pulmonale, HTN, DM 2, FRANSISCO, morbid obesity, now 6 weeks s/p right distal femur biopsy, tumor curettage and excision, and ORIF on 10/26 with Dr. Wilkinson for right distal femoral shaft pathologic fracture.  She has been at a nursing facility and was transferred to the emergency room after desaturations at her nursing facility.  She was admitted on 12/11 for sepsis secondary to obstructive pneumonia versus UTI.  On admission she was tachycardic to 120s-130s, WBC 16.4 with chest x-rays consistent with postobstructive pneumonia.  On admission she was on vancomycin, cefepime, and levofloxacin and was transitioned to Unasyn today.      Orthopedic surgery is consulted regarding weightbearing status for her RLE and for postop follow-up.  The patient has been following up postoperatively for ORIF with Dr. Giacomo Anderson at [facility]  and was last seen on 11/13/18.  She appeared to be doing well at that time per chart review without any symptoms of infection.  Currently she continues to have some right leg pain.  No numbness or tingling to the right lower extremity.  History unreliable as patient is oriented only to self and her orientation continues to wax and wane.    History obtained from patient interview and chart review. All pertinent ROS information is included above.           Review of Systems (not addressed in HPI):     Full ROS unable to be completed given patient's altered mental status.           Past Medical History:     Past Medical History:   Diagnosis Date     Chronic cor pulmonale (H)      Chronic diastolic heart failure (H)      DM type 2 (diabetes mellitus, type 2) (H)     diet controlled     Hypercalcemia 09/2018     Hypertension      Mass of left side of neck     since ~2003     Morbid obesity (H)      Obstructive sleep apnea      Pathologic fracture of femur (H) 10/24/2018            Past Surgical History:     Past Surgical History:   Procedure Laterality Date     BIOPSY BONE LOWER EXTREMITY Right 10/26/2018    Procedure: BIOPSY RIGHT FEMUR AND EXCISION OF TUMOR;  Surgeon: Maximo Wilkinson MD;  [facility]     LAP ADJUSTABLE GASTRIC BAND       OPEN REDUCTION INTERNAL FIXATION FEMUR DISTAL Right 10/26/2018    Procedure: OPEN REDUCTION INTERNAL FIXATION RIGHT DISTAL FEMUR;  Surgeon: Maximo Wilkinson MD;  [facility]: [facility] OR     SHOULDER SURGERY Left     shoulder replacement            Social History:     Social History     Socioeconomic History     Marital status: Single     Spouse name: Not on file     Number of children: Not on file     Years of education: Not on file     Highest education level: Not on file   Social Needs     Financial resource strain: Not on file     Food insecurity - worry: Not on file     Food insecurity - inability: Not on file     Transportation needs - medical: Not on file     Transportation needs -  non-medical: Not on file   Occupational History     Not on file   Tobacco Use     Smoking status: Not on file   Substance and Sexual Activity     Alcohol use: Not on file     Drug use: Not on file     Sexual activity: Not on file   Other Topics Concern     Not on file   Social History Narrative     Not on file   Lives alone.  Retired, previously worked in a factory.  Community ambulator previously.          Family History:   Reviewed.           Allergies:   No Known Allergies          Medications:     Prior to Admission medications    Medication Sig Last Dose Taking? Auth Provider   acetaminophen (TYLENOL) 325 MG tablet Take 650 mg by mouth every 4 hours as needed for mild pain 2 tabsx 488cm=402fn, max 4000mg in 24 hours 12/9/2018 at PRN Yes Unknown, Entered By History   alum & mag hydroxide-simethicone (MYLANTA/MAALOX) 200-200-20 MG/5ML SUSP suspension Take 15 mLs by mouth every 2 hours as needed for indigestion 12/2/2018 at PRN Yes Unknown, Entered By History   aspirin 81 MG EC tablet Take 81 mg by mouth daily 12/10/2018 at 0800 Yes Unknown, Entered By History   bisacodyl (DULCOLAX) 10 MG suppository Place 10 mg rectally daily as needed for constipation 12/1/2018 at PRN Yes Unknown, Entered By History   calcitonin, salmon, (MIACALCIN) 200 UNIT/ACT nasal spray Spray 1 spray into one nostril alternating nostrils daily Alternate nostril each day. 12/10/2018 at 0800 right nostril Yes Reported, Patient   enalapril (VASOTEC) 5 MG tablet Take 5 mg by mouth daily 12/10/2018 at 0800 Yes Unknown, Entered By History   furosemide (LASIX) 40 MG tablet Take 40 mg by mouth 2 times daily At 0800 and 1400 12/10/2018 at 1400 Yes Unknown, Entered By History   K Phos Story-Sod Phos Di & Mono (VIRT-PHOS 250 NEUTRAL) 155-852-130 MG TABS Take 2 tablets by mouth 4 times daily @@ 0800, 1200, 1600, 2000 12/10/2018 at 2000 Yes Unknown, Entered By History   magnesium hydroxide (MILK OF MAGNESIA) 400 MG/5ML suspension Take 30 mLs by mouth  daily as needed for constipation or heartburn 12/9/2018 at PRN Yes Unknown, Entered By History   metoprolol tartrate (LOPRESSOR) 100 MG tablet Take 100 mg by mouth 2 times daily At 0800 and 2000 12/10/2018 at 2000 Yes Unknown, Entered By History   senna (SENOKOT) 8.6 MG tablet Take 1 tablet by mouth daily 12/10/2018 at 0800 Yes Unknown, Entered By History   traMADol (ULTRAM) 50 MG tablet Take  mg by mouth every 4 hours as needed for severe pain give 1 tablet for pain rated 1-5; give 2 tablets for pain rated 6-10 12/10/2018 at PRN Yes Unknown, Entered By History     Medication reviewed with patient and in chart.           Physical Exam:     Vitals:    12/13/18 0345 12/13/18 0415 12/13/18 0810 12/13/18 1125   BP: 148/79 133/83 146/75 119/65   BP Location:  Right arm Left arm Right arm   Resp: 20 22 22 24   Temp: 98.9  F (37.2  C) 99  F (37.2  C) 99.7  F (37.6  C) 98.8  F (37.1  C)   TempSrc: Axillary Axillary Axillary Axillary   SpO2: 94% 96% 93% 94%   Weight:       Height:         General: Patient is awake, alert, and appropriate; following commands and is in NAD  Neuro: CN II-XII grossly intact  Skin: No rashes, lumps, or bumps; skin color grossly normal  HEENT: Normocephalic, atraumatic  Lungs: Breaths nonlabored, without wheezes or stridor  Heart/Cardiovascular: Regular pulse, no peripheral cyanosis  Right lower Extremity: Multiple incisions along the lateral thigh are well-healed.  No erythema or wound drainage.  No significant tenderness to palpation over thigh, knee, leg, ankle/foot. Fires TA/GSC/EHL/FHL. SILT sp/dp/tibial/saph/sural nerves. Toes warm and well perfused, brisk cap refill.          Imaging:   Imaging independently reviewed:    Right femur XR 12/13/18: Lateral plate is intact and in stable position.  No evidence of hardware loosening.  No new fractures are noted.            Labs:   CBC:  Lab Results   Component Value Date    WBC 15.8 (H) 12/13/2018    HGB 9.1 (L) 12/13/2018      12/13/2018       BMP:  Lab Results   Component Value Date     12/13/2018    POTASSIUM 3.0 (L) 12/13/2018    CHLORIDE 102 12/13/2018    CO2 26 12/13/2018    BUN 7 12/13/2018    CR 0.36 (L) 12/13/2018    ANIONGAP 8 12/13/2018    DEANDRE 12.6 (H) 12/13/2018     (H) 12/13/2018       Inflammatory Markers:  Lab Results   Component Value Date    WBC 15.8 (H) 12/13/2018    CRP 82.0 (H) 11/03/2018

## 2018-12-13 NOTE — CONSULTS
[facility], [place]   Hematology/Oncology Consult Note    Mayra Stokes MRN# [id number]   Age: 63 year old YOB: 1955          Reason for Consult:   Metastatic squamous cell carcinoma of unknown primary, possibly lung.          Assessment and Plan:   Mayra Stokes is a 63 year old woman with recent diagnosis of metastatic squamous cell carcinoma, poorly differentiated, of unknown primary, admitted to AMS, hypercalcemia, fever and post obstructive pneumonia.     Problem list:   # Metastatic squamous cell carcinoma, poorly differentiated, on unknown primary, possibly lung.   She has extensive metastases to skeleton and liver. Complicated by hypercalcemia with resultant AMS. She presented 6 weeks ago with hypercalcemia and hospitalized here with pathological fracture of R femur. She underwent ORIF. Treated with pamidronate*2 and denosumab.   Hypercalcemia (PTHrp related) being corrected with IVF, zoledronic acid, calcitonin. Endocrinology consulted.   Last dose of denosumab 10/29/18. May repeat another dose 120 mg subcutaneous today due to slow correction.   Patient is on  ml/hr. May increase to 200 ml/hr and achieve urine output goal of 100 ml/hr. Once she is adequately replenished or develops edema, use lasix as needed to match output with intakes.   Patient is currently acutely ill for initiation of any cancer directed therapy. This should be considered as soon as able once she recovers from her acute illness. She has established oncologist Dr Moreno at [facility]. Could touch base with him to keep him in loop as well as for any additional thoughts and follow up scheduling.     # Endometrial thickening with hypermetabolic uptake. Was seen by GynOnc. [place] metastatic process unlikely to be of cervical origin. Was unable to perform evaluation due to her RLE pathological fracture in October. Advised outpatient follow up. Could consider consult to see  if any evaluation would be possible now.     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     Summary of Recommendations:  Last dose of denosumab 10/29/18. May repeat another dose 120 mg subcutaneous today due to slow correction.   Patient is on  ml/hr. May increase to 200 ml/hr and aim for urine output of at least 100ml/hr. Once she is adequately replenished or develops edema, use lasix as needed to match output with intakes.   Patient is currently acutely ill for initiation of any cancer directed therapy. This should be considered as soon as able once she recovers from her acute illness. She has established oncologist Dr Moreno at [facility]. Could touch base with him to keep him in loop as well as for any additional thoughts and follow up scheduling.   No neurological deficits on exam, but consider brain imaging if her mental status does not clear as expected promptly with correction of hypercalcemia.       Thank you for involving us in the care of this patient. We will continue to follow during the hospitalization.    Patient was seen and plan of care developed with Dr. Morrissey.    Flaco Hilliard/Onc Fellow  12/13/2018  [phone]         History of Present Illness:   History obtained from chart review and confirmed with patient.    History per admission H&P, reviewed and agree-History is limited as patient is a poor historian, history obtained in combination of patient report, and medical record.  Patient was transferred to outside emergency department from living facility after being found to have desaturations around 5:30 AM.  At this time patient received 2 L per nasal cannula with saturations improved to 97%.  She was noted to have diminished breath sounds on physical exam and pain with deep inspiration especially on the right.  Patient was noted to be disoriented which is a change from baseline.  For the past 24 hours prior to admission nursing facility  staff noted left cardiac, decreased appetite in general pallor.  She has a chronic indwelling catheter, and urine at this time was noted to be cloudy.    On admission to outside ED she was found to be febrile with temperature of 103 degrees, and tachycardic.  Blood work revealed leukocytosis, and elevated pro-calcitonin, lactate within normal limits, calcium 13.5 and hemoglobin 9.7.  Chest x-ray consistent with obstructive pneumonia with multiple bony metastases.  CT abdomen pelvis with findings suggestive of metastatic disease and thickened endometrium.  UA consistent with infection.  Blood cultures and urine cultures collected prior to administration of antibiotics.  Received vancomycin and cefepime and Levaquin for HCAP received 2 L normal saline which is less than weight-based dosing due to history of heart failure.  Patient was then transferred to [facility] for higher level care.  Upon my evaluation- patient thinks she is in casino and her friends have left her behind. She endorses pain all over her body. Endorses shortness of breath, abdominal pain, dry mouth.        Review of Systems:   Unable to obtain due to mental status.        Past Medical History:     Past Medical History:   Diagnosis Date     Chronic cor pulmonale (H)      Chronic diastolic heart failure (H)      DM type 2 (diabetes mellitus, type 2) (H)     diet controlled     Hypercalcemia 09/2018     Hypertension      Mass of left side of neck     since ~2003     Morbid obesity (H)      Obstructive sleep apnea      Pathologic fracture of femur (H) 10/24/2018            Past Surgical History:     Past Surgical History:   Procedure Laterality Date     BIOPSY BONE LOWER EXTREMITY Right 10/26/2018    Procedure: BIOPSY RIGHT FEMUR AND EXCISION OF TUMOR;  Surgeon: Maximo Wilkinson MD;  Location: [facility] OR     LAP ADJUSTABLE GASTRIC BAND       OPEN REDUCTION INTERNAL FIXATION FEMUR DISTAL Right 10/26/2018    Procedure: OPEN REDUCTION INTERNAL FIXATION RIGHT DISTAL  FEMUR;  Surgeon: Maximo Wilkinson MD;  Location: [facility] OR     SHOULDER SURGERY Left     shoulder replacement            Social History:     Social History     Socioeconomic History     Marital status: Single     Spouse name: Not on file     Number of children: Not on file     Years of education: Not on file     Highest education level: Not on file   Social Needs     Financial resource strain: Not on file     Food insecurity - worry: Not on file     Food insecurity - inability: Not on file     Transportation needs - medical: Not on file     Transportation needs - non-medical: Not on file   Occupational History     Not on file   Tobacco Use     Smoking status: Not on file   Substance and Sexual Activity     Alcohol use: Not on file     Drug use: Not on file     Sexual activity: Not on file   Other Topics Concern     Not on file   Social History Narrative     Not on file            Family History:   No family history on file.         Allergies:   No Known Allergies         Medications:     Medications Prior to Admission   Medication Sig Dispense Refill Last Dose     acetaminophen (TYLENOL) 325 MG tablet Take 650 mg by mouth every 4 hours as needed for mild pain 2 tabsx 089nf=760on, max 4000mg in 24 hours   12/9/2018 at PRN     alum & mag hydroxide-simethicone (MYLANTA/MAALOX) 200-200-20 MG/5ML SUSP suspension Take 15 mLs by mouth every 2 hours as needed for indigestion   12/2/2018 at PRN     aspirin 81 MG EC tablet Take 81 mg by mouth daily   12/10/2018 at 0800     bisacodyl (DULCOLAX) 10 MG suppository Place 10 mg rectally daily as needed for constipation   12/1/2018 at PRN     calcitonin, salmon, (MIACALCIN) 200 UNIT/ACT nasal spray Spray 1 spray into one nostril alternating nostrils daily Alternate nostril each day.   12/10/2018 at 0800 right nostril     enalapril (VASOTEC) 5 MG tablet Take 5 mg by mouth daily   12/10/2018 at 0800     furosemide (LASIX) 40 MG tablet Take 40 mg by mouth 2 times daily At 0800 and 1400    "12/10/2018 at 1400     K Phos [place]-Sod Phos Di & Mono (VIRT-PHOS 250 NEUTRAL) 155-852-130 MG TABS Take 2 tablets by mouth 4 times daily @@ 0800, 1200, 1600, 2000   12/10/2018 at 2000     magnesium hydroxide (MILK OF MAGNESIA) 400 MG/5ML suspension Take 30 mLs by mouth daily as needed for constipation or heartburn   12/9/2018 at PRN     metoprolol tartrate (LOPRESSOR) 100 MG tablet Take 100 mg by mouth 2 times daily At 0800 and 2000   12/10/2018 at 2000     senna (SENOKOT) 8.6 MG tablet Take 1 tablet by mouth daily   12/10/2018 at 0800     traMADol (ULTRAM) 50 MG tablet Take  mg by mouth every 4 hours as needed for severe pain give 1 tablet for pain rated 1-5; give 2 tablets for pain rated 6-10   12/10/2018 at PRN            Physical Exam:   /83 (BP Location: Right arm)   Temp 99  F (37.2  C) (Axillary)   Resp 22   Ht 1.676 m (5' 6\")   Wt (!) 152 kg (335 lb 3.2 oz)   SpO2 96%   BMI 54.10 kg/m    Vitals:    12/11/18 2100 12/12/18 0345 12/13/18 0100   Weight: (!) 151.5 kg (334 lb) (!) 152.1 kg (335 lb 4.8 oz) (!) 152 kg (335 lb 3.2 oz)     General: Appears ill, laying in bed, no acute distress.  Heme/Lymph: No overt bleeding. No cervical, axillary, or supraclavicular adenopathy.  Skin: No concerning lesions, rash, jaundice, cyanosis, erythema, or ecchymoses on abdomen from subcutaneous injections.  HEENT: NCAT. EOMI, anicteric sclera. Dry mucosae.  Respiratory: Non-labored breathing, good air exchange, lungs clear to auscultation bilaterally.   Cardiovascular: Regular rate and rhythm. No murmur or rub.   Gastrointestinal: diffusely tender, good bowel sounds, no organomegaly.  Extremities: warm and pink all 4 extremities.  Neurologic: disoriented, pupils equal bilaterally, reactive to light, moves all 4 extremities on command, follows commands appropriately.          Data:   I have personally reviewed the following labs/imaging:  CBC  Recent Labs   Lab 12/13/18  0425 12/12/18  0343   WBC 15.8* 16.0* "   RBC 3.39* 3.44*   HGB 9.1* 9.4*   HCT 32.8* 33.5*   MCV 97 97   MCH 26.8 27.3   MCHC 27.7* 28.1*   RDW 17.8* 17.9*    298     CMP  Recent Labs   Lab 12/13/18  0425 12/12/18  0343 12/12/18  0033    134  --    POTASSIUM 3.0* 3.6  --    CHLORIDE 102 101  --    CO2 26 24  --    ANIONGAP 8 9  --    * 99  --    BUN 7 11  --    CR 0.36* 0.42*  --    GFRESTIMATED >90 >90  --    GFRESTBLACK >90 >90  --    DEANRDE 12.6* 13.0* 13.2*   MAG 1.7  --   --    PHOS 1.6*  --   --    PROTTOTAL 6.7*  --   --    ALBUMIN 2.1*  --  2.2*   BILITOTAL 0.5  --   --    ALKPHOS 191*  --   --    AST 37  --   --    ALT 15  --   --      INRNo lab results found in last 7 days.    Path from bone biopsy during ORIF- Immunohistochemistry studies,   performed with appropriate controls on   block D3, demonstrate that the lesional cells are diffusely and strongly   positive for p63 and CK5/6, and are   negative for CK7, RADHA-3, PAX-8, TTF-1 and Androgen Receptor. The   immunophenotype supports the diagnosis of   squamous cell carcinoma.    Parotid tumor FNA 10/29/18-Neck, left parotid, ultrasound guided fine needle aspiration:   - Benign neoplasm.   - Benign mixed tumor (pleomorphic adenoma).     PET CT 12/6/18-FINDINGS:  NECK: There is a solid well-circumscribed soft tissue mass within the right  parotid gland measuring 2.7 x 2.5 cm maximal transverse dimension with  increased metabolic activity an SUV max of 4.62.  No anterior posterior  cervical lymphadenopathy is noted.  The parapharyngeal space, pharyngeal  mucosal space, carotid, and  spaces are normal.  There is normal  physiologic uptake in the remainder of the neck and skullbase.        CHEST: There is a hypermetabolic mass within the right upper lobe adjacent to  the right hilum with associated postobstructive consolidation.  This mass on  image 124 series 3 measures 5.2 x 3.8 cm in size and shows an SUV max of 10.38.  There is no axillary, mediastinal, or hilar  lymphadenopathy or hypermetabolic  lymph nodes in these regions.  The remainder lungs are clear.        ABDOMEN/PELVIS: There are numerous hypermetabolic masses within the liver.  The  largest is noted in the inferior right lobe liver in segments 5 and 6 measuring  at least 16.5 x 13.4 cm in greatest transverse dimension and showing an SUV max  of 19.49.  There are least 6 other masses within the liver with SUV max ranging  from 11.71 up to 18.36.  No splenic lesions are identified.  There is a left  adrenal nodule with an SUV max of 4.21.  The mean attenuation is 18 Hounsfield  units.  No other hypermetabolic lesions are identified within the abdomen.  However within the pelvis there is endometrial thickening with a increased  metabolic activity of the endometrium within SUV max of 16.04.  No adnexal  lesions are identified.  There is no free air free fluid.    Gyn/Onc Consult note - 10/31/2018-Assessment: 63 year old female with a pathologic distal femur fracture secondary to biopsy proven squamous cell carcinoma of unknown primary. This is unlikely to be a cervical primary, as the patient has never had an abnormal pap smear and has no history of HPV. No visible lesions on the vulva. Patient does have a thickened endometrial stripe on imaging, would recommend an endometrial biopsy. Pelvic exam was severely limited due to the patient's leg immobility and body habitus, therefore recommend Gyn follow-up after her mobility has improved.      Problem List  - Squamous cell carcinoma of unknown origin   - Post-menopausal bleeding with thickened endometrial stripe on CT     Plan:  Referral to Gyn Onc clinic for pelvic exam and endometrial biopsy as an outpatient.         MUSCULOSKELETAL: There are numerous skeletal lesions including lytic  destructive lesions of the sternum, right anterior 4th rib, right T9 pedicle,  right iliac bone on image 315 of the CT images, left iliac bone on image 327,  right femoral head neck  junction, diffuse uptake within the right femur as well  as within the distal diaphysis and mid diaphysis of the left femur.            ATTENDING PHYSICIAN ADDENDUM AND NOTE:  I evaluated this patient s case separately and also with the oncology consultation service fellow, Dr. Flaco Rand. The note above reflects my assessment and plan. I have personally reviewed lab results, and vital and radiology results. >50% of time was spent in assessment and coordination of care; >=35 minutes.  Mrs. Mayra Stokes is a 63 old woman with a new diagnosis of metastatic squamous cell carcinoma. She has been admitted with altered mental status and hypercalcemia of malignancy, and postobstructive pneumonia. Palliative measures are warranted in setting of the hypercalcemia, which is related to the pathologic fracture seen of her right femur. Specific recommendations are per Dr. Rand s note, including discussion of the endometrial thickening visualized radiographically. Long-term, following discharge, the patient will follow up with her oncologist at [facility].    Prakash Morrissey MD, PhD   of Medicine   Division of Hematology, Oncology, and Transplantation

## 2018-12-13 NOTE — PROGRESS NOTES
General acute hospital, [place]    Progress Note - [facility] 4 Service        Date of Admission:  12/11/2018    Assessment & Plan   Mayra Stokes is a 63 year old female admitted on 12/11/2018 with a PMH significant for HTN, HLD, DMII and cor pulmonales with recent diagnosis of metastatic squamous cell carcinoma of unknown primary (suspect pulmonary). Presenting with sepsis with secondary to obstructive pneumonia suggestive on CT admitted for management of sepsis and acute hypoxic respiratory failure.     #Acute hypoxic respiratory failure secondary to HCAP - improving   Oxygen desaturation documented to 82% on RA at OSH on 12/11. Required 4L NC initially. CT 12/11 as below concerning for post-obstructive PNA. CXR concerning for pulmonary edema (likely non-cardiogenic based on bedside US with IVC with good respiratory variation). O2 needs not suggestive of ARDS, but will remain on differential.  -Continuous pulse ox; keep sats > 90%    #Sepsis 2/2 obstructive pneumonia vs UTI  Qualifies for Sepsis based on T 103F, 's, WBC 16.4, RR 22.  CTA chest imaging from OSH c/w post-obstructive pneumonia. Lactic acid 1.4. In the setting of recent hospitalization, will need to cover for HCAP.  OSH UA, bacteria, positive leuk esterase. s/p 2L NS. Urine Cx with 10-50K Enterococcus faecalis. BC NGTD.   - Continue Vancomycin, Cefepime, Levaquin     #Sinus Tachycardia  -likely secondary to sepsis, intravascular depletion (as evidenced by bedside US on 12/12). CTA chest at OSH negative for PE but poor contrast study.  -EKG in sinus tachycardia rates 130-140  -ECHO to rule out cardiac anatomic abnormality playing a role in tachycardia; pending read  -continue hydration as below  -will restart home Metoprolol now that BP stabilized   -will consider bedside DVT evaluation with US     #Toxic metabolic encephalopathy secondary to hypercalcemia  #Hypercalcemia secondary to malignancy - improving   Ionized calcium elevated.  iCal 7.6. PTH appropriately suppresed (9/2018) in the setting of elevated PTH-rp (10/2018). Phos appropriately low. Low vitamin D. Last dose of denosumab on 10/29/18. S/p 2L NS at OSH. Additional 2L NS on 12/12. S/p Zolendronate 4mg IV x1 on 12/12.  -Endocrine consult, apprec recs  >Agree with hydration: Continue NS @ 100ml/hr   >calcitonin 600 U IM q12h x 2 doses   >Trend iCal q12h   >May consider Denosumab     #Metastatic squamous carcinoma of unknown primary (pulmonary likely)  #Endometrial thickening, suspicious for malignancy  -s/p biopsy of rib revealed metastatic squamous cell carcinoma of unknown primary but likely pulmonary source. Last seen in Oncology clinic on 11/14/18. PET scan 12/4/18 revealed L parotid mass, RUL mass with post-obstructive consolidation (high suspicion of pulmonary primary), endometrial thickening, mets involving R 4th rib, right T9 pedicle, bilateral iliac bones, bilateral femurs in addition to multiple hepatic lesions.  -Gynecology was consulted last admission and they felt that it is unlikely that the squamous cell carcinoma originating in the cervix, the patient has been up-to-date on her cervical cancer screening and had a pelvic exam with HPV testing as recently as 2 years ago.  They were unable to perform pelvic exam or endometrial biopsy at that time due to right leg pain and cannot bend her knee as a result of the femur fracture; they wanted to follow-up with Mayra within 4-6 weeks of discharge, but she would prefer to follow-up with a provider closer to her home.    -Heme/Onc consult, apprec recs   >will defer therapy options to outpatient Oncologist in [facility] system   >will defer Gyn/Onc re-consult to evaluate endometrial thickening due to acute pneumonia at this time    #Pathologic fracture s/p ORIF  -underwent open reduction/internal fixation, tumor curettage and excision, and biopsy of the bone on 10/16.  Pathology of the tumor showed undifferentiated squamous cell  carcinoma. Last visit outpatient 11/13 : recs NWB with plan for 4 week f/u with repeat XR  -Ortho consult, apprec recs    #Diastolic heart failure  #Cor pulmonale   EF 60% but noted diastolic heart failure (4/27/18).   - Resume PTA metoprolol 100mg PO BID    - Hold PTA enalapril 5mg qday in the setting of infection   - Hold PTA furosemide 40mg PO BID in the setting of infection   - Order repeat ECHO      #DMII   Last A1c 6.1. No outpatient medication management noted on chart review and pharm med rec.     #Hypokalemia  #Hypophosphatemia  - resume PTA K phos BID from QID prior to admission   - Replace per electrolyte protocol    #Obesity hypoventilation syndrome  #Obstructive sleep apnea  -consider CPAP while hospitalized; non-adherent at home       Diet: Moderate Consistent CHO Diet    Fluids: NS @ 100ml/hr  Lines: PIV  DVT Prophylaxis: Enoxaparin (Lovenox) subcutaneous - favored in setting of malignancy  Bravo Catheter: not present  Code Status: Full Code      Disposition Plan   Expected discharge: 4 - 7 days, recommended to transitional care unit once adequate pain management/ tolerating PO medications, antibiotic plan established, mental status at baseline, O2 use less than 0 liters/minute and SIRS/Sepsis treated.  Entered: Yanet Lopez MD 12/13/2018, 11:07 AM       The patient's care was discussed with the Attending Physician, Dr. Uribe.    Yanet Lopez MD  [facility] 4 Service  [facility], [place]  Pager: [phone]  Please see sticky note for cross cover information    Physician Attestation   I, Shaun Uribe, saw this patient with the resident and agree with the resident/fellow's findings and plan of care as documented in the note.      I personally reviewed vital signs, medications, labs and imaging.    Shaun Uribe MD  Date of Service (when I saw the patient): 12/13/18    ______________________________________________________________________    Interval History    Overnight, continues to require 3-4L NC. Bravo catheter removed for leaking and purewick in place. BM x1. Continues to be disoriented this morning. Xopenex nebs ordered overnight for wheezing with some improvement per RN report. Frequent moans and complaints of generalized pain.     Data reviewed today: I reviewed all medications, new labs and imaging results over the last 24 hours. I personally reviewed the EKG  On 12/12 PM: Sinus tachycardia with , QRS 92, normal axis, QTc 523.    Physical Exam   Vital Signs: Temp: 99.7  F (37.6  C) Temp src: Axillary BP: 146/75   Heart Rate: 133 Resp: 22 SpO2: 93 % O2 Device: Nasal cannula Oxygen Delivery: 3 LPM  Weight: 335 lbs 3.2 oz  Constitutional: awake, alert, cooperative, no apparent distress, and appears stated age  Eyes: Lids and lashes normal, extra ocular muscles intact, sclera clear, conjunctiva normal  ENT: Normocephalic, without obvious abnormality, atraumatic   Respiratory: Coarse breath sounds bilaterally on anterior auscultation, no wheezing, increased WOB, on 3L NC  Cardiovascular: +Tachycardia, Regular rhythm, normal S1 and S2   GI: Normal bowel sounds, soft, non-distended, non-tender, no masses palpated   Genitounirinary: Purewick in place   Skin: mild bruising noted on abdomen and no rashes  Musculoskeletal: tone is normal  Neurologic: A&O x2  Neuropsychiatric: General: moaning intermittently     Data   Recent Labs   Lab 12/13/18  0425 12/12/18  0343 12/12/18  0033   WBC 15.8* 16.0*  --    HGB 9.1* 9.4*  --    MCV 97 97  --     298  --     134  --    POTASSIUM 3.0* 3.6  --    CHLORIDE 102 101  --    CO2 26 24  --    BUN 7 11  --    CR 0.36* 0.42*  --    ANIONGAP 8 9  --    DEANDRE 12.6* 13.0* 13.2*   * 99  --    ALBUMIN 2.1*  --  2.2*   PROTTOTAL 6.7*  --   --    BILITOTAL 0.5  --   --    ALKPHOS 191*  --   --    ALT 15  --   --    AST 37  --   --

## 2018-12-13 NOTE — PROGRESS NOTES
"Endocrinology Consultation [facility]   -- Medical student's note followed by attending note ---    Subjective:   She looks less confused than yesterday, is having enough water    Objective:  /75 (BP Location: Left arm)   Temp 99.7  F (37.6  C) (Axillary)   Resp 22   Ht 1.676 m (5' 6\")   Wt (!) 152 kg (335 lb 3.2 oz)   SpO2 93%   BMI 54.10 kg/m    Examination unchanged from yesterday, HR has dropped to 120s from 140s yesterday    ENDO CALCIUM LABS-UMP Latest Ref Rng & Units 12/13/2018 12/12/2018   CALCIUM 8.5 - 10.1 mg/dL 12.6 (H)    CALCIUM IONIZED 4.4 - 5.2 mg/dL 7.1 (HH) 7.6 (HH)     ENDO CALCIUM LABS-UMP Latest Ref Rng & Units 12/12/2018 12/12/2018   CALCIUM 8.5 - 10.1 mg/dL 13.0 (H) 13.2 (H)   CALCIUM IONIZED 4.4 - 5.2 mg/dL       Assessment/Plan:  Mayra Stokes is a 63-year-old female with history of hypertension, hyperlipidemia, type 2 diabetes, cor pulmonale, 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 We were consulted for aid in management of her hypercalcemia.     Problems:  1) Hypercalcemia   2) Elevated PTHrP at 81.2 (10/26/18)  3) Suppressed PTH intact at <7 (10/26/18)   4) Low 25OH Vitamin D3 at <11 (10/26/18)      Corrected calcium on admission 14.8 secondary to malignacy and PTHrP medicated process. She has been hydrated with a total of 2L saline at [facility], then additional 1L here. Also received 1 time dose of Zoledronic acid 2mg at 0400 on 12/12/18 and ongoing 100cc/h of NS given history of diastolic HF and concern for precipiating hypervolemia.   As she was given Zoledronic Acid on 12/12/18 at 0400, it is too early to recommend additional therapies for hypercalcemia as we would expect a downtrend in the upcoming ~2 days with maximum effect within 2-4d. However notably she did require denosumab (last given 10/29/18) for hypercalcemia.  She reportedly was taking " "calcitonin nasal spray at home for management of her hypercalcemia and had recieved IV Calcitonin 400u x2 during prior admission.  She has also tried pamidronate x2, Lasix IV as an inpatient during prior admissions ( However caution was recommended using Lasix as it can cause volume contraction and worsening hypercalcemia).     She received Calcitonin 600units Q12h IM x 2 doses, last dose 5AM today . Her calcium dropped from 13.2 yesterday to 12.6 today. We recommend another dose of Calcitonin 600units IM this AM. We will review calcium levels after the 4PM sample today and decide further management.     Recommendations:    - Calcitonin 600units IM (third dose)  - Check Calcium BID  - Continue Saline for hydration at rate per primary team   - If hypercalcemia not significantly improving, may consider denosumab 120mg x1 in upcoming days       Patient seen and discussed with Dr.Araki Brenden Elias  Medical Student  Served as scribe for     --- Endocrine attending note--  Subjective: She looks less confused than yesterday, able to communicate slowly, she is drinking water.    Objective Physical exam: I directly examined patient.   /75 (BP Location: Left arm)   Temp 99.7  F (37.6  C) (Axillary)   Resp 22   Ht 1.676 m (5' 6\")   Wt (!) 152 kg (335 lb 3.2 oz)   SpO2 93%   BMI 54.10 kg/m    General: awake, eyes closed but able to communicate simple conversation.   Mental status: awake, eyes closed but able to communicate simple conversation  Hands: trace edema  Right lower abdomen: mild tenderness  Leg: edema1+    Lab: reviewed, Calcium trending down slightly.      Ref. 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 We were consulted for aid in management of her hypercalcemia.     Problems:  1) Hypercalcemia   2) Elevated PTHrP at 81.2 (10/26/18)  3) Suppressed PTH intact at <7 (10/26/18)   4) Low 25OH Vitamin D3 at <11 (10/26/18)      Corrected calcium on admission 14.8 secondary to malignacy and PTHrP medicated process. She has been hydrated with a total of 2L saline at [facility], then additional 1L here. Also received 1 time dose of Zoledronic acid 2mg at 0400 on 12/12/18 and ongoing 100cc/h of NS given history of diastolic HF and concern for precipiating hypervolemia.   As she was given Zoledronic Acid on 12/12/18 at 0400, it is too early to recommend additional therapies for hypercalcemia as we would expect a downtrend in the upcoming ~2 days with maximum effect within 2-4d. However notably she did require denosumab (last given 10/29/18) for hypercalcemia.  She reportedly was taking calcitonin nasal spray at home for management of her hypercalcemia and had recieved IV Calcitonin 400u x2 during prior admission.  She has also tried pamidronate x2, Lasix IV as an inpatient during prior admissions ( However caution was recommended using Lasix as it can cause volume contraction and worsening hypercalcemia).     She received Calcitonin 600units Q12h IM x 2 doses, last dose 5AM today . Her calcium dropped from 13.2 yesterday to 12.6 today. We recommend another dose of Calcitonin 600units IM this AM. We will review calcium levels after the 4PM sample today and decide further management.     Recommendations:    - Calcitonin 600units IM (third dose) today  - Check Calcium Q12  - Continue Saline for hydration at rate per primary team   - If hypercalcemia not significantly improving, may consider denosumab 120mg x1 in upcoming days       Christiana Baptiste MD  Staff Physician  Endocrinology and Metabolism  [facility]  License: [place]  [id number]  Pager: [phone]

## 2018-12-13 NOTE — PLAN OF CARE
Neuro: Disoriented to time, place, and situation. CAM positive for delirium. Lethargic and sleeps between cares. Pupils equal and reactive.   Cardiac: EKG ordered to determine if patient is going in and out of Afib/Aflutter vs Sinus tachycardia, -160's. VSS.   Respiratory: Requiring 3-4 LPM NC.   GI/: Bravo catheter removed for leaking. Purewick in place. BM X1  Diet/appetite: Tolerating sips of water.   Activity:  Turned and repositioned q2h.   Pain: At acceptable level on current regimen.   Skin: No new deficits noted.    Plan: Continue with POC. Notify primary team with changes.

## 2018-12-13 NOTE — PROGRESS NOTES
Antimicrobial Stewardship Team Note    Antimicrobial Stewardship Program - A joint venture between [facility] and  Physicians to optimize antibiotic management.  NOT a formal consult - Restricted Antimicrobial Review     Patient: Mayra Stokes  MRN: [id number]  Allergies: Patient has no known allergies.    Brief Summary:   Mayra Stokes is a 63 year old female with history of hypertension, hyperlipidemia, diabetes type 2, and cor pulmonale who was transferred from an outside ED on 12/11 for a higher level care after being found to have desaturations at her nursing facility.  She was noted to have diminished breath sounds on physical exam and pain with deep inspiration especially on the right. She has a chronic indwelling catheter, and urine at the time of admission was noted to be cloudy.    On admission to outside ED she was found to be febrile with temperature of 103 degrees, and tachycardic.  Blood work revealed leukocytosis, and elevated pro-calcitonin, lactate within normal limits. Chest x-ray was consistent with obstructive pneumonia with multiple bony metastases.  CT abdomen pelvis with findings suggestive of metastatic disease and thickened endometrium.  UA consistent with infection (bacteria, positive leukocyte esterase).  Blood cultures and urine cultures collected prior to administration of antibiotics. She received vancomycin and cefepime and Levaquin for HCAP.  Patient was then transferred to [facility] for higher level care.    On admission to [facility] patient was tachycardic 120s 130s, had leukocytosis 16.4, and imaging from OSH consistent with postobstructive pneumonia. Patient was admitted with sepsis 2/2 postobstructive PNA vs UTI and the vancomycin, cefepime, and levofloxacin were continued.    Since admission, the patient has had decreased supplemental oxygen needs (6 LPM->3 LPM) and leukocytosis has improved slightly (15.8 today). She has been mildly febrile (up to 99.7). Urine culture from  urethral catheter on 12/12 has grown 10-50K of Enterococcus faecalis, and blood cultures from 12/12 show NGTD. Chest xray on 12/12 showed concern for pulmonary edema versus diffuse infection. No sputum cultures have been collected. Urethral catheter was replaced with an external catheter on 12/12         Active Anti-infective Medications   (From admission, onward)                Start     Stop    12/12/18 0900  levofloxacin  750 mg,   Intravenous,   100 mL/hr,   EVERY 24 HOURS     Community Acquired Pneumonia, Healthcare-Associated Pneumonia        --    12/12/18 0600  ceFEPIme  2 g,   Intravenous,   EVERY 12 HOURS     Community Acquired Pneumonia, Healthcare-Associated Pneumonia        --    12/11/18 2330  vancomycin (VANCOCIN) injection  2,000 mg,   Intravenous,   EVERY 12 HOURS     Healthcare-Associated Pneumonia        --          Assessment:   Sepsis 2/2 Postobstructive PNA  Patient was clearly septic on presentation to OSH but has clinically improved since. Given the obstruction in her lungs, obtaining a sputum culture may not be possible. She has now received roughly 3 days of broad spectrum therapy and has clinically improved, so narrowing therapy empirically with close monitoring is warranted at this point. The patient [facility], so MRSA coverage is likely not necessary given this test's high negative predictive value. [facility] also shows no history of pseudomonas infection, so antipseudomonal agents are likely not necessary. Unasyn provides adequate coverage of suspected pathogens for postobstructive pneumonia and can easily be transitioned to oral Augmentin should the patient discharge before completing therapy.     Asymptomatic Bacteruria w/ Chronic Urethral Catheter  Given the low CFU count in the urine culture and the presence of a chronic catheter at the time of collection, this is likely a colonizer of the catheter,  and would have been removed when the catheter was replaced. Patient also has no signs or symptoms of a UTI, so antibiotic therapy should not be directed towards the Enterococcus isolated from this sample.    Recommendations:  1. Discontinue vancomycin, cefepime, and levofloxacin  2. Initiate Unasyn and monitor for signs of clinical decompensation    Discussed with ID Staff: Marlin Villavicencio MD, Millie Bryan, PharmD, BCIDP, and Haider Banegas, PharmD  Giacomo Hope, PharmD IV Student  [phone]    Vital Signs/Clinical Features:  Vitals       12/11 0700  -  12/12 0659 12/12 0700  -  12/13 0659 12/13 0700  -  12/13 1452   Most Recent    Temp ( F) 97.8 -  98.7    97.7 -  99.8    98.8 -  99.9     99.2 (37.3)    Heart Rate 131 -  135    127 -  142    110 -  133     110    Resp 17 -  22    20 -  26    22 -  24     24    /74 -  144/73    132/56 -  (!) 161/99    119/65 -  146/75     119/65    SpO2 (%) 95 -  96    92 -  97    92 -  96     92          Labs  Estimated Creatinine Clearance: 243.4 mL/min (A) (based on SCr of 0.36 mg/dL (L)).  Recent Labs   Lab Test 11/05/18  0505 11/06/18  0545 11/07/18  0738 11/08/18  0547 12/12/18  0343 12/13/18  0425   CR 0.44* 0.56 0.52 0.49* 0.42* 0.36*       Recent Labs   Lab Test 10/25/18  2113 10/26/18  0600  10/28/18  0405 10/29/18  0440 10/31/18  0436 11/03/18  0544 11/04/18  0456 11/06/18  0545 11/06/18  0841 12/12/18  0343 12/13/18  0425   WBC 17.0* 15.1*   < > 14.1* 15.1* 12.5* 14.0* 13.3*  --   --  16.0* 15.8*   ANEU 15.5* 13.1*  --   --   --   --   --   --   --   --   --   --    ALYM 0.9 0.7*  --   --   --   --   --   --   --   --   --   --    JUDY 0.6 1.1  --   --   --   --   --   --   --   --   --   --    AEOS 0.0 0.0  --   --   --   --   --   --   --   --   --   --    HGB 10.6* 10.4*   < > 8.9* 8.8* 8.2*  --  8.3*  --   --  9.4* 9.1*   HCT 33.6* 34.1*   < > 31.3* 30.2* 27.8*  --  29.4*  --   --  33.5* 32.8*   * 105*   < > 112* 111* 109*  --  111*  --   --  97 97     267   < > 227 231 197 303 308 Canceled, Test credited [phone]    < > = values in this interval not displayed.       Recent Labs   Lab Test 10/26/18  2249 10/28/18  0405 10/29/18  0440 10/30/18  0440  11/03/18  2102 11/05/18  0505 11/06/18  0545 11/07/18  0738 11/08/18  0547 12/12/18  0033 12/13/18  0425   BILITOTAL 0.4 0.3 0.3 0.3  --  0.2  --   --   --   --   --  0.5   ALKPHOS 154* 149 156* 158*  --  207*  --   --   --   --   --  191*   ALBUMIN 2.3* 2.3* 2.2* 2.1*   < > 2.4* 2.5* 2.6* 2.6* 2.6* 2.2* 2.1*   AST 37 31 30 29  --  36  --   --   --   --   --  37   ALT 26 26 27 25  --  34  --   --   --   --   --  15    < > = values in this interval not displayed.       Recent Labs   Lab Test 10/27/18  0228 11/01/18  2205 11/03/18  0544 11/03/18  2115 11/05/18  0505   PCAL 0.68  --   --   --  0.13   LACT 0.7 0.6*  --  1.3  --    .0*  --  82.0*  --   --        Recent Labs   Lab Test 12/13/18  1052   VANCOMYCIN 21.1       Culture Results:  7-Day Micro Results     Procedure Component Value Units Date/Time    Methicillin Resistant Staph Aureus PCR     Order Status:  No result Lab Status:  No result     Specimen:  Nasal Swab     Urine Culture Aerobic Bacterial [[id number]]  (Abnormal) Collected:  12/12/18 1000    Order Status:  Completed Lab Status:  Preliminary result Updated:  12/13/18 0835    Specimen:  Catheterized Urine from Urine catheter      Specimen Description Catheterized Urine     Special Requests Specimen received in preservative     Culture Micro 10,000 to 50,000 colonies/mL  Enterococcus faecalis        Culture in progress    Urine Culture Aerobic Bacterial     Order Status:  Canceled Lab Status:  No result     Specimen:  Urine     Blood culture [[id number]] Collected:  12/12/18 0538    Order Status:  Completed Lab Status:  Preliminary result Updated:  12/13/18 0700    Specimen:  Blood      Specimen Description Blood Right Hand     Special Requests Received in aerobic bottle only     Culture Micro  No growth after 1 day    Blood culture [[id number]] Collected:  12/12/18 0527    Order Status:  Completed Lab Status:  Preliminary result Updated:  12/13/18 0700    Specimen:  Blood      Specimen Description Blood Left Hand     Special Requests Received in aerobic bottle only     Culture Micro No growth after 1 day          Recent Labs   Lab Test 10/27/18  0255   URINEPH 5.0   NITRITE Negative   LEUKEST Negative   WBCU 3                         Imaging: Xr Chest Port 1 View    Result Date: 12/12/2018  EXAM:  XR CHEST PORT 1 VW INDICATION: hypoxic respiratory failure - assess for worsening infiltrate; hypoxic respiratory failure - assess for worsening infiltrate. Patient with cor pulmonale and recent diagnosis of metastatic squamous cell carcinoma of unknown primary. COMPARISON:  CT 10/28/2018, x-ray 10/27/2018 FINDINGS: AP view of the chest. Increased diffuse interstitial and airspace opacities throughout both lung fields with more consolidative appearance in the right upper lung. Cardiomediastinal silhouette is stably enlarged with large main pulmonary artery. No visualized pleural effusion. Upper abdomen is unremarkable. No acute bony lesions.     IMPRESSION: 1. Diffuse interstitial and airspace opacities throughout both lung fields with more consolidative appearance of the right upper lung. Concern for pulmonary edema versus diffuse infection. 2. Again seen is large main pulmonary artery I have personally reviewed the examination and initial interpretation and I agree with the findings. FRANCINE MÉNDEZ MD

## 2018-12-13 NOTE — PLAN OF CARE
Neuro: Oriented to self. Intermittently lethargic, opens eyes to voice. Rambling nonsense speech. Orientation waxes/wanes.  Cardiac: ST-HR improved 110-120s. VSS. Tmax 99.9.    Respiratory: Sating 92-94% on 2L-3L. Upper lobe wheezes noted.    GI/: Adequate urine output via purewick. BM X2-incontinent.  Diet/appetite: Moderate CHO diet, Pt refused to eat. Takes water without difficulty.  Activity:  Bedrest this shift. Repositioned Q2H.   Pain: Moans intermittently, resolved with repositioning. No Pain meds this shift.   Skin: Open areas to abdominal folds, interdry placed. Open fissure on buttocks, cleansed and barrier cream placed.   LDA's: L PIV X2. Purewick catheter.     Plan: Additional dose of calcitonin X1. Electrolytes replaced, rechecks at 1600. ECHO done. Femur XR done, orthopedics consulted. Metoprolol 100mg PO started this am with HR improved. Continue with POC. Notify primary team with changes.

## 2018-12-13 NOTE — PHARMACY-VANCOMYCIN DOSING SERVICE
Pharmacy Vancomycin Note  Date of Service 2018  Patient's  1955   63 year old, female    Indication: Healthcare-Associated Pneumonia  Goal Trough Level: 15-20 mg/L  Day of Therapy: 3  Current Vancomycin regimen:  2000 mg IV q12h    Current estimated CrCl = Estimated Creatinine Clearance: 243.4 mL/min (A) (based on SCr of 0.36 mg/dL (L)).    Creatinine for last 3 days  2018:  3:43 AM Creatinine 0.42 mg/dL  2018:  4:25 AM Creatinine 0.36 mg/dL    Recent Vancomycin Levels (past 3 days)  2018: 10:52 AM Vancomycin Level 21.1 mg/L    Vancomycin IV Administrations (past 72 hours)                   vancomycin (VANCOCIN) 2,000 mg in sodium chloride 0.9 % 500 mL intermittent infusion (mg) 2,000 mg New Bag 18 1202     2,000 mg New Bag 18 2353     2,000 mg New Bag  1102     2,000 mg New Bag  0057                Nephrotoxins and other renal medications (From now, onward)    Start     Dose/Rate Route Frequency Ordered Stop    18 2330  vancomycin (VANCOCIN) 2,000 mg in sodium chloride 0.9 % 500 mL intermittent infusion      2,000 mg  over 2 Hours Intravenous EVERY 12 HOURS 18 2313               Contrast Orders - past 72 hours (72h ago, onward)    None          Interpretation of levels and current regimen:  Trough level is  Supratherapeutic, 10 hour level    Has serum creatinine changed > 50% in last 72 hours: No    Urine output:  good urine output    Renal Function: Stable    Plan:  1.  Decrease Dose to 1750mg Q12H  2.  Pharmacy will check trough levels as appropriate in 1-3 Days.    3. Serum creatinine levels will be ordered daily for the first week of therapy and at least twice weekly for subsequent weeks.      Deloris Benedict, Pharm.D IV Student          .

## 2018-12-14 NOTE — PLAN OF CARE
Temp: 98.6  F (37  C) Temp src: Axillary BP: 108/63   Heart Rate: 103 Resp: 22 SpO2: 94 % O2 Device: Oxi Plus Oxygen Delivery: 4 LPM     Neuro: Oriented to self, occasionally time. Improved lethargy after initiation of BiPAP. Nonsensical speech.  Cardiac: Sinus tach, rates 100s-110s. BP stable.   Respiratory: Shallow resps, placed on BiPAP around 0530 for pH 7.28 and CO2 66. Repeat VBG at 0645.   GI/: Adequate urine output; purewick in place. BM X2; incontinent.  Diet/appetite: NPO while on BiPAP  Activity:  Assist of *2-3 for repositioning.   Pain: At acceptable level on current regimen.   Skin: Coccyx fissure and reddened abdominal folds.   LDA's: PIV x2; NS @ 100ml/hr.     Plan: Continue with POC. Notify primary team with changes.

## 2018-12-14 NOTE — PLAN OF CARE
[id number]:    Neuro: Oriented to self.  Orientation waxes/wanes.  Cardiac: ST--120s. VSS. Tmax 99.2.         Respiratory: Sating 92-94% on 2L-3L. LS upper wheezes  GI/: Adequate urine output via purewick. BM X1 -incontinent.  Diet/appetite: Moderate CHO diet, Pt refused to eat. Takes water without difficulty.  Activity:  . Repositioned Q2H.   Pain: Denies  Skin: Open areas to abdominal folds, interdry placed. Open fissure on buttocks, cleansed and barrier cream placed.   LDA's: L PIV X2. Purewick catheter.      Plan: Magnesium replacement ordered from pharmacy Continue with POC. Notify primary team with changes.

## 2018-12-14 NOTE — PROGRESS NOTES
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 Presenting with sepsis with secondary to obstructive pneumonia suggestive on CT admitted for management of sepsis and acute hypoxic respiratory failure.     #Acute hypoxic respiratory failure secondary to HCAP - improving   #Acute on chronic hypercarbic respiratory failure - requiring intermittent BiPAP  Oxygen desaturation documented to 82% on RA at OSH on 12/11. Required 4L NC initially. CT 12/11 as below concerning for post-obstructive PNA. CXR concerning for pulmonary edema (likely non-cardiogenic based on bedside US with IVC with good respiratory variation). O2 needs not suggestive of ARDS, but will remain on differential. VBG on 12/13 overnight suggestive of pH 7.28/66 associated with mental status changes. Thus BIPAP was initiated.  -Continuous pulse ox; keep sats > 90%  -Continue BiPAP overnight and nasal cannula during the day    #Sepsis 2/2 obstructive pneumonia vs UTI  Qualifies for Sepsis based on T 103F, 's, WBC 16.4, RR 22.  CTA chest imaging from OSH c/w post-obstructive pneumonia. Lactic acid 1.4. In the setting of recent hospitalization, will need to cover for HCAP.  OSH UA, bacteria, positive leuk esterase. s/p 2L NS. Urine Cx with 10-50K Enterococcus faecalis. MRSA nares negative. BC NGTD.   - IV Vancomycin, Cefepime, Levaquin (12/12-12/14); IV Unasyn (12/14-present)     #Sinus Tachycardia  -likely secondary to sepsis, intravascular depletion (as evidenced by bedside US on 12/12). CTA chest at OSH negative for PE but poor contrast study. EKG in sinus tachycardia rates 130-140. 12/13 Bedside US without signs of DVT.   -12/13 ECHO  with no new anatomic abnormalities  -continue hydration as below  -resumed home Metoprolol now that BP stabilized      #Toxic metabolic encephalopathy secondary to hypercalcemia  #Hypercalcemia secondary to malignancy - improving   Ionized calcium elevated. iCal 7.6. PTH appropriately suppresed (9/2018) in the setting of elevated PTH-rp (10/2018). Phos appropriately low. Low vitamin D. Last dose of denosumab on 10/29/18. S/p 2L NS at OSH. Additional 2L NS on 12/12. S/p Zolendronate 4mg IV x1 on 12/12.  -Endocrine consult, apprec recs  >Agree with hydration: Continue NS @ 100ml/hr   >calcitonin 600 U IM q12h x 3 doses   >Trend iCal q12h   >May consider Denosumab     #Metastatic squamous carcinoma of unknown primary (pulmonary likely)  #Endometrial thickening, suspicious for malignancy  -s/p biopsy of rib revealed metastatic squamous cell carcinoma of unknown primary but likely pulmonary source. Last seen in Oncology clinic on 11/14/18. PET scan 12/4/18 revealed L parotid mass, RUL mass with post-obstructive consolidation (high suspicion of pulmonary primary), endometrial thickening, mets involving R 4th rib, right T9 pedicle, bilateral iliac bones, bilateral femurs in addition to multiple hepatic lesions.  -Gynecology was consulted last admission and they felt that it is unlikely that the squamous cell carcinoma originating in the cervix, the patient has been up-to-date on her cervical cancer screening and had a pelvic exam with HPV testing as recently as 2 years ago.  They were unable to perform pelvic exam or endometrial biopsy at that time due to right leg pain and cannot bend her knee as a result of the femur fracture; they wanted to follow-up with Mayra within 4-6 weeks of discharge, but she would prefer to follow-up with a provider closer to her home.    -Heme/Onc consult, apprec recs   >will defer therapy options to outpatient Oncologist in [facility] system   >will defer Gyn/Onc re-consult to evaluate  endometrial thickening due to acute pneumonia at this time    #Pathologic fracture s/p ORIF  -underwent open reduction/internal fixation, tumor curettage and excision, and biopsy of the bone on 10/16.  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 Last visit outpatient 11/13 : recs NWB with plan for 4 week f/u with repeat XR  -Ortho consult, apprec recs  >Ok to use андрей lift but lower strap needs to be in the popliteal fossa behind the knee. Do not use with strap at the mid or upper thigh  >50% weightbearing     #Diastolic heart failure  #Cor pulmonale   EF 60% but noted diastolic heart failure (4/27/18).   - Resume PTA metoprolol 100mg PO BID    - Hold PTA enalapril 5mg qday in the setting of infection   - Hold PTA furosemide 40mg PO BID in the setting of infection   - Repeat ECHO with stable systolic function     #DMII   Last A1c 6.1. No outpatient medication management noted on chart review and pharm med rec.     #Hypokalemia  #Hypophosphatemia  - resume PTA K phos BID from QID prior to admission   - Replace per electrolyte protocol    #Obesity hypoventilation syndrome  #Obstructive sleep apnea  -BiPAP overnight      Diet: Regular Diet Adult    Fluids: NS @ 100ml/hr  Lines: PIV  DVT Prophylaxis: Enoxaparin (Lovenox) subcutaneous - favored in setting of malignancy  Bravo Catheter: not present  Code Status: Full Code      Disposition Plan   Expected discharge: 4 - 7 days, recommended to transitional care unit once adequate pain management/ tolerating PO medications, antibiotic plan established, mental status at baseline, O2 use less than 0 liters/minute and SIRS/Sepsis treated. Considering transfer to [facility] when medically stable from Endocrine standpoint.  Entered: Yanet Lopez MD 12/14/2018, 2:18 PM       The patient's care was discussed with the Attending Physician, Dr. Uribe.    MD Damaris Arrington [address] [place] [address],  [place]  Pager: [phone]  Please see sticky note for cross cover information  ______________________________________________________________________    Interval History   Overnight, nursing concerns for lethargy and VBG checked concerning for hypercapnea. BiPAP initiated. She was noted to be more alert thereafter. Still intermittently oriented at this time. Spoke with family over the phone yesterday.    Data reviewed today: I reviewed all medications, new labs and imaging results over the last 24 hours.    Physical Exam   Vital Signs: Temp: 99.9  F (37.7  C) Temp src: Axillary BP: 135/83 Pulse: 89 Heart Rate: 88 Resp: 26 SpO2: 94 % O2 Device: Nasal cannula with humidification Oxygen Delivery: 4 LPM  Weight: 337 lbs 1.33 oz  Constitutional: awake, alert, cooperative, no apparent distress, and appears stated age  Eyes: Lids and lashes normal, extra ocular muscles intact, sclera clear, conjunctiva normal  ENT: Normocephalic, without obvious abnormality, atraumatic   Respiratory: Coarse breath sounds bilaterally on anterior auscultation, no wheezing, on BiPAP  Cardiovascular: +Tachycardia, Regular rhythm, normal S1 and S2   GI: Normal bowel sounds, soft, non-distended, non-tender, no masses palpated   Genitounirinary: Purewick in place   Skin: mild bruising noted on abdomen and no rashes  Musculoskeletal: tone is normal  Neurologic: A&O x2    Data   Recent Labs   Lab 12/14/18  0428 12/13/18  1615 12/13/18  0425 12/12/18  0343   WBC 16.3*  --  15.8* 16.0*   HGB 9.0*  --  9.1* 9.4*   MCV 96  --  97 97     --  319 298     --  136 134   POTASSIUM 3.2* 3.3* 3.0* 3.6   CHLORIDE 101  --  102 101   CO2 28  --  26 24   BUN 7  --  7 11   CR 0.36*  --  0.36* 0.42*   ANIONGAP 8  --  8 9   DEANDRE 11.7*  --  12.6* 13.0*   *  --  113* 99   ALBUMIN 2.2*  --  2.1*  --    PROTTOTAL 6.8  --  6.7*  --    BILITOTAL 0.4  --  0.5  --    ALKPHOS 178*  --  191*  --    ALT 16  --  15  --    AST 36  --  37  --

## 2018-12-14 NOTE — PROGRESS NOTES
Social Work Services Progress Note    Hospital Day: 4  Date of Initial Social Work Evaluation:  Not completed, pt not alert or oriented.  Collaborated with:  Pt's friend Magui.    Data:  Mayra Stokes is a 62 yo female admitted to [facility] 12/11/18.    Intervention:  Pt's friend requested to speak to SW re: insurance questions.    Assessment: Pt currently lethargic and disoriented. Pt's friend is concerned that pt's [employer] coverage will lapse end of this month. ENZO spoke to STEPHIE Miramontes Financial Counselor, who stated that pt's insurance is active through 12/31 but then after that pt will need to continue to pay her premium in order to continue current coverage. Pt's friend states there is a person at the bank that helps pt with her insurance and this person said pt would need to sign a form with two witnesses giving permission for pt's friend to handle her finances. SW also discussed financial POA paperwork, but that pt needs to be alert, oriented, and aware of what she is signing in order to complete any of this paperwork. STEPHIE Financial Counselor thought pt would have likely been sent a notice a month ago from [employer] asking if pt wanted to renew. Pt's friend does not think pt responded to this notice. Pt's friend is hoping pt will become more alert and oriented in the next week or two and can either call [employer] to verify she wants to continue current coverage and/or pay next month's premium.    Per previous ENZO assessment 11/4/18, pt lives alone in a mobile home in [place], [place]. Pt is , no children, primary support is from her niece Radha who lives in [place] [facility] [place] and friend Magui who lives in [place]. Pt had recently been hospitalized at [facility] 10/25/18-11/8/18, and discharged to [facility] in [place].    Per pt's friend, pt had expressed previously that she did not want to return to [facility], and instead preferred [facility]. Last admission this TCU did not have  beds available. Per pt's friend, [facility] pt had follow up appts at [facility] and had to pay privately for medicab each visit.     Plan:    Anticipated Disposition:  Per MD note, possible discharge 4-7 days. Pt came from [facility] but per pt's friend, pt prefers [facility].     Barriers to d/c plan:  Medical clearance    Follow Up:  SW will continue to follow and assist as needed.    LORENA Willard, [facility] Center[facility]  6B Intermediate Care Unit  Phone: [phone]  Pager: [phone]

## 2018-12-14 NOTE — PLAN OF CARE
Neuro: Lethargic today waking only a few minutes at a time. Disoriented to place and situation.     Cardiac: ST this morning 110s this afternoon SR 70-90. VSS.   Respiratory: Pt requiring BiPAP last night.  Okayed to come off this morning for meds. Sating 96% on 4L NC.  She gradually started drifting down and BiPAP replaced.  Historically pt hates the BiPAP and she has not said anything about not wanting it on today.  Please have her wear it at HS.  GI/: Adequate urine output, Pure wick in place. BM X1 med soft.  Diet/appetite: Pt declining to eat.  Drinking with help. Tolerating well.  Activity:  Assist of 2 with bed mobility, using ceiling lift.  Pain: Pt declines pain.  Skin: No new deficits noted.  LDA's: PIV x 2  Potassium and Phosphate replaced today, recheck at 17:00.    Plan: Continue with POC. Notify primary team with changes.

## 2018-12-14 NOTE — PROGRESS NOTES
Endocrine follow up note:    A/P:  Summary:  Mayra Stokes is a 63-year-old female with history of hypertension, hyperlipidemia, type 2 diabetes, cor pulmonale, recently diagnosed squamous cell carcinoma with unclear primary, transferred to [facility] 12/11/18 after presentation to OSH with AMS and identification of post obstructive PNA. Recurrence of prior (~Oct 2018) severe hypercalcemia attributed to PTHrp was also identified this admission thus endocrine was re-consulted for further assistance with management. See initial consult note from prior admission dated 10/26/18.    Hypercalcemia, PTHrP mediated   PTHrP elevated to 81.2 10/26/18 in the setting of metastatic squamous cell cancer of unknown primary. Serum calcium (uncorrected) as high as 13.3mg/dl was identified during pt's prior hospitalization Oct 2018 for R femoral fracture. She had received multiple doses of pamindronate prior to that hospitalization, with calcium downtrending significantly only after denosumab 120mg was administered during her inpatient stay.    Upon transfer to [facility] 12/12/18, serum calcium uncorrected was 13.0mg/dl with iCal of 7.6mg/dL. She has been receiving fluids as able and received 4mg zoledronic acid at 2am 12/12/18. Given expected delay in onset of action of bisphosphonate and degree of hypercalcemia, she received 600mg (~4mg/kg) calcitonin 12/12/18 1830, 12/13/18 0458 and 12/13/18 1208. Calcium is now downtrending (iCal of 6.3 12/14/18). Pt continues to have evidence of some altered mental status though suspect that this may be multifactorial in the setting of acute infection with PNA, etc.     Recommendations:  -recommend continued inpatient stay at [facility] given continued significant hypercalcemia and lack of normalization of mental status, pt does not appear to be stable for transfer  -IV fluids as per primary medicine team  -continue q12 serum calcium and q24 albumin checks  -may defer further calcitonin at this time given  "downtrend in calcium  -if lack of further improvement noted on 12/17/18, may consider repeat administration of denosumab     ---    Subjective/IEs    Per RN, pt lethargic during the day today, currently using NIPPV for nap.     Gen: obese female, lying in bed, appears asleep, using NIPPV    Results for BEAU DE (MRN [id number]) as of 12/14/2018 17:10   Ref. Range 12/14/2018 16:14   Calcium Ionized Latest Ref Range: 4.4 - 5.2 mg/dL 6.3 (H)           The pt was staffed with Dr. Oliva Garza MD  Endocrine Fellow   Pager [phone]    --- Addendum----  I saw the patient with endocrine team and fellow Dr. Garza and directly examined patient and discussed. Agree above note and plan.     Physical Exam:/62   Pulse 89   Temp 99.5  F (37.5  C) (Axillary)   Resp 26   Ht 1.676 m (5' 6\")   Wt (!) 152.9 kg (337 lb 1.3 oz)   SpO2 97%   BMI 54.41 kg/m    General: no Bipap  Mental status: eyes closed, on Bipap, unable to communicate  Hands: mild edema+  Leg: mild edema+    Ca trending down, will follow. S/p bisphosphonate and calcitoninX3.     Christiana Baptiste MD  Staff Physician  Endocrinology and Metabolism  [facility]  License: [place] [id number]  Pager: [phone]      "

## 2018-12-15 NOTE — PROGRESS NOTES
"--- Medical Student note followed by attending note ----------    Endocrine follow up note:     Subjective:  Per RN, pt still lethargic during the day today, currently using NIPPV for nap  She complains of pain over left arm, probably due to IV site  Heart rate has dropped to 90s from initial 140s     Objective:  /53 (BP Location: Right arm)   Pulse 89   Temp 98.4  F (36.9  C) (Axillary)   Resp 18   Ht 1.676 m (5' 6\")   Wt (!) 154.6 kg (340 lb 13.3 oz)   SpO2 94%   BMI 55.01 kg/m    Gen: obese female, lying in bed, using NIPPV  Mental Status: eyes closed, able to respond to call  Hands: mild edema+  Leg: mild edema+         A/P:  Mayra Stokes is a 63-year-old female with history of hypertension, hyperlipidemia, type 2 diabetes, cor pulmonale, recently diagnosed squamous cell carcinoma with unclear primary, transferred to [facility] 12/11/18 after presentation to OSH with AMS and identification of post obstructive PNA. Recurrence of prior (~Oct 2018) severe hypercalcemia attributed to PTHrp was also identified this admission thus endocrine was re-consulted for further assistance with management. See initial consult note from prior admission dated 10/26/18.     Hypercalcemia, PTHrP mediated                PTHrP elevated to 81.2 10/26/18 in the setting of metastatic squamous cell cancer of unknown primary. Serum calcium (uncorrected) as high as 13.3mg/dl was identified during pt's prior hospitalization Oct 2018 for R femoral fracture. She had received multiple doses of pamindronate prior to that hospitalization, with calcium downtrending significantly only after denosumab 120mg was administered during her inpatient stay.                 Upon transfer to [facility] 12/12/18, serum calcium uncorrected was 13.0mg/dl with iCal of 7.6mg/dL. She has been receiving fluids as able and received 4mg zoledronic acid at 2am 12/12/18. Given expected delay in onset of action of bisphosphonate and degree of hypercalcemia, she " "received 600mg (~4mg/kg) calcitonin 12/12/18 1830, 12/13/18 0458 and 12/13/18 1208. Calcium continues to downtrend (Isma of 10.8 12/15/18).     Pt continues to have evidence of some altered mental status though suspect that this may be multifactorial in the setting of acute infection with PNA, etc.      Recommendations:  -IV fluids as per primary medicine team  -continue q12 serum calcium and q24 albumin checks  -defer further calcitonin at this time given downtrend in calcium  -if lack of further improvement noted on 12/17/18, may consider repeat administration of denosumab        The pt was staffed with Dr. Oliva Elias  Medical Student  Served as scribe for     --- Endocrine attending note ----  Subjective:   Eyes closed, able to open, follow commands, able to respond simple conversations.   Calcium trending down to 10.8.       Physical Exam:   /71 (BP Location: Right arm)   Pulse 89   Temp 98.4  F (36.9  C) (Axillary)   Resp 24   Ht 1.676 m (5' 6\")   Wt (!) 154.6 kg (340 lb 13.3 oz)   SpO2 97%   BMI 55.01 kg/m    Gen: obese female, lying in bed, using NIPPV  Mental Status: eyes closed, able to respond to call  Hands: mild edema+  Leg: mild edema+     Lab:      Ref. Range 12/12/2018 03:43 12/13/2018 04:25 12/14/2018 04:28 12/15/2018 04:28   Calcium Latest Ref Range: 8.5 - 10.1 mg/dL 13.0 (H) 12.6 (H) 11.7 (H) 10.8 (H)     Assessment and Plan:  Pathologic fracture, SCC unknown origins, paraneoplastic syndrome with elevated PTHrP and hypercalcemia    Post Bisphosphonate, and calcitonin 3 times, Ca trending down    -IV fluids as per primary medicine team  -continue q12 serum calcium checks    Will follow up.     Christiana Baptiste MD  Staff Physician  Endocrinology and Metabolism  [facility]  License: [place] [id number]  Pager: [phone]           "

## 2018-12-15 NOTE — PLAN OF CARE
"/64 (BP Location: Right arm)   Pulse 89   Temp 98.4  F (36.9  C) (Oral)   Resp 20   Ht 1.676 m (5' 6\")   Wt (!) 154.6 kg (340 lb 13.3 oz)   SpO2 96%   BMI 55.01 kg/m    SR-ST with rates up to 110's. Afebrile. Confused. Alert and oriented only to self and that she is in [place]. Able to swallow water without difficulty. Denies pain, n/v. Bipap on a majority of night at 35%-45%. 6L NC as of 0600 this AM. Incontinent of urine and stool. C-diff negative. Repositioned q 2 hours. Mepilex over skin tear of gluteal cleft c/d/I. Potassium 3.2 this AM. Request sent to pharmacy for 40 total Meq to replace. Continue to monitor.  "

## 2018-12-15 NOTE — PLAN OF CARE
Neuro: A&O to self. lethargic  Cardiac: SR. VSS.   Respiratory: Sating 98% on BiPAP NOC. 4 lpm via NC when awake  GI/: Adequate urine output. BM X3- liquid, brown. Incontinent of bowel and bladder  Diet/appetite: Tolerating regular diet. Refused dinner. Poor appetite  Activity:  Assist of 2-3 and mechanical lift, turn and reposition in bed.   Pain: Patient denies pain.   Skin: Blanchable redness on buttocks  LDA's: L PIV x2- both infusing    Plan: Continue with POC. Notify primary team with changes.

## 2018-12-15 NOTE — PROGRESS NOTES
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 Presenting with sepsis with secondary to obstructive pneumonia suggestive on CT admitted for management of sepsis secondary to pulmonary source and acute hypoxic respiratory failure.     #Acute hypoxic respiratory failure secondary to CAP - improving   #Acute on chronic hypercapneic respiratory failure - requiring intermittent BiPAP  Oxygen desaturation documented to 82% on RA at OSH on 12/11. Required 4L NC initially. CT 12/11 as below concerning for post-obstructive PNA. CXR concerning for pulmonary edema (likely non-cardiogenic based on bedside US with IVC with good respiratory variation). O2 needs not suggestive of ARDS, but will remain on differential. VBG on 12/13 overnight suggestive of pH 7.28/66 associated with mental status changes. Thus BIPAP was initiated while sleeping.  -Continuous pulse ox; keep sats > 90%  -Continue BiPAP overnight and nasal cannula during the day    #Sepsis 2/2 obstructive pneumonia vs. Aspiration pneumonia  Qualifies for Sepsis based on T 103F, 's, WBC 16.4, RR 22.  CTA chest imaging from OSH c/w post-obstructive pneumonia. Lactic acid 1.4. Volume resuscitated. Urine Cx with 10-50K Enterococcus faecalis. MRSA nares negative. BC NGTD. She is high risk for aspiration with altered mentation on presentation; thus aspiration PNA is on the differential for source of infection.   - IV Vancomycin, Cefepime, Levaquin (12/12-12/14); IV Unasyn (12/14-present, plan to complete 7 day course)     #Sinus Tachycardia  -likely secondary to sepsis, intravascular depletion (as evidenced by bedside US on 12/12). CTA chest at OSH  negative for PE but poor contrast study. EKG in sinus tachycardia rates 130-140. 12/13 Bedside US without signs of DVT.   -12/13 ECHO with no new anatomic abnormalities  -continue hydration as below  -resumed home Metoprolol now that BP stabilized      #Toxic metabolic encephalopathy secondary to hypercalcemia  #Hypercalcemia secondary to malignancy - improving   Ionized calcium elevated. iCal 7.6. PTH appropriately suppresed (9/2018) in the setting of elevated PTH-rp (10/2018). Phos appropriately low. Low vitamin D. Last dose of denosumab on 10/29/18. S/p 2L NS at OSH. Additional 2L NS on 12/12. S/p Zolendronate 4mg IV x1 on 12/12.  -Endocrine consult, apprec recs  >Agree with hydration: Continue NS @ 100ml/hr   >calcitonin 600 U IM q12h x 3 doses   >Trend iCal q12h; albumin q24h   >May consider Denosumab if not improved by 12/17    #Metastatic squamous carcinoma of unknown primary (pulmonary likely)  #Endometrial thickening, suspicious for malignancy  -s/p biopsy of rib revealed metastatic squamous cell carcinoma of unknown primary but likely pulmonary source. Last seen in Oncology clinic on 11/14/18. PET scan 12/4/18 revealed L parotid mass, RUL mass with post-obstructive consolidation (high suspicion of pulmonary primary), endometrial thickening, mets involving R 4th rib, right T9 pedicle, bilateral iliac bones, bilateral femurs in addition to multiple hepatic lesions.  -Gynecology was consulted last admission and they felt that it is unlikely that the squamous cell carcinoma originating in the cervix, the patient has been up-to-date on her cervical cancer screening and had a pelvic exam with HPV testing as recently as 2 years ago.  They were unable to perform pelvic exam or endometrial biopsy at that time due to right leg pain and cannot bend her knee as a result of the femur fracture; they wanted to follow-up with Mayra within 4-6 weeks of discharge, but she would prefer to follow-up with a provider closer to  her home.    -Heme/Onc consult, apprec recs   >will defer therapy options to outpatient Oncologist in [facility] system   >will defer Gyn/Onc re-consult to evaluate endometrial thickening due to acute pneumonia at this time    #Pathologic fracture s/p ORIF  -underwent open reduction/internal fixation, tumor curettage and excision, and biopsy of the bone on 10/16.  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 Last visit outpatient 11/13 : recs NWB with plan for 4 week f/u with repeat XR  -Ortho consult, apprec recs  >Ok to use андрей lift but lower strap needs to be in the popliteal fossa behind the knee. Do not use with strap at the mid or upper thigh  >50% weightbearing     #Diastolic heart failure  #Cor pulmonale   EF 60% but noted diastolic heart failure (4/27/18).   - Resume PTA metoprolol 100mg PO BID    - Hold PTA enalapril 5mg qday in the setting of infection   - Hold PTA furosemide 40mg PO BID in the setting of infection   - Repeat ECHO with stable systolic function     #DMII   Last A1c 6.1. No outpatient medication management noted on chart review and pharm med rec.     #Hypokalemia  #Hypophosphatemia  Anticipate hypophosphatemia and hypercalcemia will persist with PTH-rp driving electrolyte imbalance.   - resume PTA K phos BID from QID prior to admission   - Replace per electrolyte protocol    #Obesity hypoventilation syndrome  #Obstructive sleep apnea  -BiPAP overnight      Diet: Regular Diet Adult    Fluids: NS @ 100ml/hr  Lines: PIV  DVT Prophylaxis: Enoxaparin (Lovenox) subcutaneous - favored in setting of malignancy  Bravo Catheter: not present  Code Status: Full Code      Disposition Plan   Expected discharge: 4 - 7 days, recommended to transitional care unit once adequate pain management/ tolerating PO medications, antibiotic plan established, mental status at baseline, O2 use less than 0 liters/minute and SIRS/Sepsis treated. Considering transfer to [facility] when  medically stable from Endocrine standpoint but at this time she does require higher level of care with Endocrine management closely.    Entered: Yanet Lopez MD 12/15/2018, 2:07 PM       The patient's care was discussed with the Attending Physician, Dr. Uribe.    Yanet Lopez MD  [address], [place]  Pager: [phone]  Please see sticky note for cross cover information  ______________________________________________________________________    Interval History   BM x3 last evening. Adequate UOP. Used mechanical lift for turning and repositioning in accordance with Ortho recs. She tolerated BiPAP overnight and transitioned to NC 6L this AM. C diff negative. Triggered sepsis protocol based on HR and WBC early this morning but lactate not elevated.    This morning, A&Ox3. States she feels like a she was run over by a truck. Breathing is comfortable.      Data reviewed today: I reviewed all medications, new labs and imaging results over the last 24 hours.    Physical Exam   Vital Signs: Temp: 97.6  F (36.4  C) Temp src: Oral BP: 103/50   Heart Rate: 86 Resp: 24 SpO2: 96 % O2 Device: BiPAP/CPAP Oxygen Delivery: 3 LPM  Weight: 340 lbs 13.3 oz  Constitutional: awake, alert, cooperative, no apparent distress, and appears stated age  Eyes: Lids and lashes normal, extra ocular muscles intact, sclera clear, conjunctiva normal  ENT: Normocephalic, without obvious abnormality, atraumatic   Respiratory: difficult to appreciate focal breathe sounds due to body habitus, no respiratory distress, NC in place   Cardiovascular: +Tachycardia improved, Regular rhythm, normal S1 and S2   GI: Normal bowel sounds, soft, non-distended, non-tender    Genitounirinary: Purewick in place   Skin: mild bruising noted on abdomen and no rashes  Neurologic: A&O x3    Data   Recent Labs   Lab 12/15/18  0428 12/14/18  1614 12/14/18  0428  12/13/18  0425   WBC 14.3*  --  16.3*  --  15.8*   HGB 8.8*  --   9.0*  --  9.1*   MCV 98  --  96  --  97     --  312  --  319     --  137  --  136   POTASSIUM 3.2* 3.7 3.2*   < > 3.0*   CHLORIDE 103  --  101  --  102   CO2 29  --  28  --  26   BUN 9  --  7  --  7   CR 0.36*  --  0.36*  --  0.36*   ANIONGAP 7  --  8  --  8   DEANDRE 10.8*  --  11.7*  --  12.6*   *  --  123*  --  113*   ALBUMIN  --   --  2.2*  --  2.1*   PROTTOTAL  --   --  6.8  --  6.7*   BILITOTAL  --   --  0.4  --  0.5   ALKPHOS  --   --  178*  --  191*   ALT  --   --  16  --  15   AST  --   --  36  --  37    < > = values in this interval not displayed.

## 2018-12-15 NOTE — PLAN OF CARE
Neuro: Disoriented to place in a.m; aware of time, situation, self.  Following commands. BUE strength 3/5, BLE 2/5. After a.m nap made a couple inappropriate comments.  BiPap placed while sleeping after.  Around 1430 she became more lethargic while sleeping and only aroused slightly to voice.  MD notified and instructed to continue to monitor.  At 1800 she was somnolent and responded to pain only, not following commands.  MD paged and VBG + CXR ordered.  When MD was at bedside Mayra was more alert and started speaking appropriately.  OX4.    Cardiac: ST in 100's prior to metoprolol, since SR 80-90's. VSS.   Respiratory: Sating >92% on 2L NC in a.m.  BiPap 30%Fi02 when sleeping.  Has been wearing BiPap from 3237-1418.  Currently on 3L NC.    GI/: Incontinent of urine X3. Incontinent of stool X4, stool is loose/watery brown.  Imodium given twice.   Diet/appetite: Tolerated regular diet in a.m, no signs of aspiration. No appetite for lunch and too lethargic for dinner. .  Activity:  Assist of 2-3 with bed mobility.  Lift utilized.  Pain: Tylenol given once for R leg ache, improvement noted.   Skin: Skin tear in gluteal fold, mepilex re-applied.  Perineal area with blanchable redness.  Careful incontinence care performed with barrier cream and miconazole powder.    LDA's:  X2 L PIV    Plan: Will transition to BiPAP at [facility] if mentation is clear.  Will need to use HFNC if needed and she is unable to remove BiPap.  Awaiting VBG and CXR results.  Continue with POC. Notify primary team with changes.

## 2018-12-16 NOTE — PROGRESS NOTES
Paged by Serafin RN to bedside with concerns that patient somnolent and only responsive to pain.  On entering room patient on BiPAP 30% FiO2 , O2 sat 96%, opens eyes to voice.  Removed BiPAP in attempt to test whether patient was protecting airway. On removal of BiPAP patient began to open eyes and converse.     Intervention: VBG, CXR, Continue to monitor      MD Damaris Duran

## 2018-12-16 NOTE — PROGRESS NOTES
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 Presenting with sepsis with secondary to obstructive pneumonia suggestive on CT admitted for management of sepsis secondary to pulmonary source and acute hypoxic respiratory failure. In addition she has significant delirium, that is multifactorial but with likely the most significant contributor being hypercalcemia that is improving with IV fluids, calcitonin and a dose of zoledronic acid.     #Toxic metabolic encephalopathy secondary to hypercalcemia  #Hypercalcemia secondary to malignancy - improving   Ionized calcium elevated. iCal 7.6. PTH appropriately suppresed (9/2018) in the setting of elevated PTH-rp (10/2018). Phos appropriately low. Low vitamin D. Last dose of denosumab on 10/29/18. S/p 2L NS at OSH. Additional 2L NS on 12/12. S/p Zolendronate 4mg IV x1 on 12/12.  >Endocrine consult, apprec recs  >Continue NS @ 100ml/hr   >Start lasix, will trial low dose initially and can increase as appropriate  >calcitonin 600 U IM q12h x 3 doses   >Trend iCal q12h; albumin q24h   >May consider repeat Denosumab if not improved by 12/17 (was initially given 10/28/18)    #Acute hypoxic respiratory failure secondary to CAP - improving   #Acute on chronic hypercapneic respiratory failure - requiring intermittent BiPAP  Oxygen desaturation documented to 82% on RA at OSH on 12/11. Required 4L NC initially. CT 12/11 as below concerning for post-obstructive PNA. CXR concerning for pulmonary edema (likely non-cardiogenic based on bedside US with IVC with good respiratory variation). O2 needs not suggestive of ARDS, but will remain on differential. VBG on 12/13  overnight suggestive of pH 7.28/66 associated with mental status changes. Thus BIPAP was initiated while sleeping.  -Continuous pulse ox; keep sats > 90%  -Continue BiPAP while sleeping and nasal cannula when awake  -CXR from 12/15/18: do not appreciate significant changes from admission and has not had new symptoms consistent with pneumonia-will hold off on change in antibiotic plan. Starting lasix as above     #Sepsis 2/2 obstructive pneumonia vs. Aspiration pneumonia  Qualifies for Sepsis based on T 103F, 's, WBC 16.4, RR 22.  CTA chest imaging from OSH c/w post-obstructive pneumonia. Lactic acid 1.4. Volume resuscitated. Urine Cx with 10-50K Enterococcus faecalis. MRSA nares negative. BC NGTD. She is high risk for aspiration with altered mentation on presentation; thus aspiration PNA is on the differential for source of infection.   - IV Vancomycin, Cefepime, Levaquin (12/12-12/14); IV Unasyn (12/14-present, plan to complete 7 day course)      #Sinus Tachycardia  -likely secondary to sepsis, intravascular depletion (as evidenced by bedside US on 12/12). CTA chest at OSH negative for PE but poor contrast study. EKG in sinus tachycardia rates 130-140. 12/13 Bedside US without signs of DVT.   -12/13 ECHO with no new anatomic abnormalities  -continue hydration as below  -resumed home Metoprolol now that BP stabilized      #Metastatic squamous carcinoma of unknown primary (pulmonary likely)  #Endometrial thickening, suspicious for malignancy  -s/p biopsy of rib revealed metastatic squamous cell carcinoma of unknown primary but likely pulmonary source. Last seen in Oncology clinic on 11/14/18. PET scan 12/4/18 revealed L parotid mass, RUL mass with post-obstructive consolidation (high suspicion of pulmonary primary), endometrial thickening, mets involving R 4th rib, right T9 pedicle, bilateral iliac bones, bilateral femurs in addition to multiple hepatic lesions.  -Gynecology was consulted last admission and  they felt that it is unlikely that the squamous cell carcinoma originating in the cervix, the patient has been up-to-date on her cervical cancer screening and had a pelvic exam with HPV testing as recently as 2 years ago.  They were unable to perform pelvic exam or endometrial biopsy at that time due to right leg pain and cannot bend her knee as a result of the femur fracture; they wanted to follow-up with Mayra within 4-6 weeks of discharge, but she would prefer to follow-up with a provider closer to her home.    -Heme/Onc consult, apprec recs              >will defer therapy options to outpatient Oncologist in [facility] system   >will defer Gyn/Onc re-consult to evaluate endometrial thickening due to acute pneumonia at this time     #Pathologic fracture s/p ORIF  -underwent open reduction/internal fixation, tumor curettage and excision, and biopsy of the bone on 10/16.  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 Last visit outpatient 11/13 : recs NWB with plan for 4 week f/u with repeat XR  -Ortho consult, apprec recs  >Ok to use андрей lift but lower strap needs to be in the popliteal fossa behind the knee. Do not use with strap at the mid or upper thigh  >50% weightbearing      #Diastolic heart failure  #Cor pulmonale   EF 60% but noted diastolic heart failure (4/27/18).   - Resume PTA metoprolol 100mg PO BID    - Hold PTA enalapril 5mg qday in the setting of infection   - Hold PTA furosemide 40mg PO BID in the setting of infection   - Repeat ECHO with stable systolic function     #DMII   Last A1c 6.1. Has not had elevated blood glucose levels this admission. Will stop blood sugar checks.     #Hypokalemia  #Hypophosphatemia  Anticipate hypophosphatemia and hypercalcemia will persist with PTH-rp driving electrolyte imbalance.   - resume PTA K phos BID from QID prior to admission   - Replace per electrolyte protocol     #Obesity hypoventilation syndrome  #Obstructive sleep apnea  -BiPAP  as above     Diet: Regular Diet Adult    Fluids: NS as above  Lines: PIV  DVT Prophylaxis: Enoxaparin (Lovenox) SQ  Bravo Catheter: not present  Code Status: Full Code      Disposition Plan   Expected discharge: 2 - 3 days, recommended to transitional care unit once adequate pain management/ tolerating PO medications   .  Entered: Shaun Uribe MD 12/16/2018, 7:27 AM     The patient's care was discussed with the Bedside Nurse and Patient.    Shaun Uribe MD  [address], [place]  Pager: [phone]  Please see sticky note for cross cover information  ______________________________________________________________________    Interval History   Overnight had increased sleepiness/confusion that was intermittent and resolved when seen by cross cover. CXR done without significant change. This morning she is alert and responding well. She reports improved abdominal pain. No significant pain in right leg. No fevers, chills, cough. Had frequent BMs yesterday that have resolved.    Data reviewed today: I reviewed all medications, new labs and imaging results over the last 24 hours. I personally reviewed the chest x-ray image(s) showing bilateral interstitial opacities.    Physical Exam   Vital Signs: Temp: 97.8  F (36.6  C) Temp src: Axillary BP: 116/74   Heart Rate: 86 Resp: 18 SpO2: 100 % O2 Device: BiPAP/CPAP Oxygen Delivery: 3 LPM  Weight: 340 lbs 13.3 oz    General: Patient lying in bed, NAD  HEENt: PERRL, clear sclera  Resp: Scattered crackles, with diminished breath sounds overall. No focal findings. Breathing comfortably  CV: RRR, no m/r/g. No m/r/g  Abd: Soft, mild tenderness to palpation, no rebound or guarding  Ext: ~1+ pitting edema of LEs  Neuro: Alert and orientedx2,    Data   Recent Labs   Lab 12/16/18  0437 12/15/18  1618 12/15/18  0428  12/14/18  0428  12/13/18  0425   WBC 11.5*  --  14.3*  --  16.3*  --  15.8*   HGB 8.8*  --  8.8*  --  9.0*  --  9.1*   MCV 98   --  98  --  96  --  97     --  266  --  312  --  319     --  139  --  137  --  136   POTASSIUM 3.5 3.5 3.2*   < > 3.2*   < > 3.0*   CHLORIDE 105  --  103  --  101  --  102   CO2 27  --  29  --  28  --  26   BUN 8  --  9  --  7  --  7   CR 0.38*  --  0.36*  --  0.36*  --  0.36*   ANIONGAP 9  --  7  --  8  --  8   DEANDRE 10.2*  --  10.8*  --  11.7*  --  12.6*   GLC 90  --  102*  --  123*  --  113*   ALBUMIN 1.9*  --   --   --  2.2*  --  2.1*   PROTTOTAL  --   --   --   --  6.8  --  6.7*   BILITOTAL  --   --   --   --  0.4  --  0.5   ALKPHOS  --   --   --   --  178*  --  191*   ALT  --   --   --   --  16  --  15   AST  --   --   --   --  36  --  37    < > = values in this interval not displayed.     Recent Results (from the past 24 hour(s))   XR Chest Port 1 View    Narrative    EXAM: XR CHEST PORT 1 VW  12/15/2018 8:42 PM     HISTORY:  dyspnea    COMPARISON: Chest radiograph dated 12/12/2018    FINDINGS: A single AP view of the chest is obtained. Trachea is  midline. Cardiac silhouette is stable. Multifocal diffuse interstitial  and patchy airspace opacities bilaterally, not significantly changed.  Stable pulmonary vascular congestion. The right costophrenic angle is  collimated off the field-of-view, however there is no large right  pleural effusion. No appreciable left pleural effusion. No  pneumothorax. No acute bony abnormality. Postoperative changes of left  shoulder arthroplasty. The upper abdomen is unremarkable.      Impression    IMPRESSION:   1. Stable cardiomegaly with pulmonary vascular congestion.  2. Multifocal interstitial patchy airspace opacities, likely pulmonary  edema however multifocal infection would have a similar appearance.    I have personally reviewed the examination and initial interpretation  and I agree with the findings.    VIRIDIANA CARDENAS MD

## 2018-12-16 NOTE — PROGRESS NOTES
"Endocrine follow up note:    Medical student's note followed by attending note.     Subjective:  Patient feeling much better than before, hydrating well  Heart rate continues to be in 90s     Objective:  /55 (BP Location: Right arm)   Pulse 89   Temp 98.4  F (36.9  C) (Axillary)   Resp 22   Ht 1.676 m (5' 6\")   Wt (!) 154.6 kg (340 lb 13.3 oz)   SpO2 98%   BMI 55.01 kg/m    Gen: obese female, lying in bed  Mental Status: eyes open, responding to simple conversations  Hands: mild edema+  Leg: mild edema+       A/P:  Mayra Stokes is a 63-year-old female with history of hypertension, hyperlipidemia, type 2 diabetes, cor pulmonale, recently diagnosed squamous cell carcinoma with unclear primary, transferred to [facility] 12/11/18 after presentation to OSH with AMS and identification of post obstructive PNA. Recurrence of prior (~Oct 2018) severe hypercalcemia attributed to PTHrp was also identified this admission thus endocrine was re-consulted for further assistance with management. See initial consult note from prior admission dated 10/26/18.     Hypercalcemia, PTHrP mediated                PTHrP elevated to 81.2 10/26/18 in the setting of metastatic squamous cell cancer of unknown primary. Serum calcium (uncorrected) as high as 13.3mg/dl was identified during pt's prior hospitalization Oct 2018 for R femoral fracture. She had received multiple doses of pamindronate prior to that hospitalization, with calcium downtrending significantly only after denosumab 120mg was administered during her inpatient stay.                 Upon transfer to [facility] 12/12/18, serum calcium uncorrected was 13.0mg/dl with iCal of 7.6mg/dL. She has been receiving fluids as able and received 4mg zoledronic acid at 2am 12/12/18. Given expected delay in onset of action of bisphosphonate and degree of hypercalcemia, she received 600mg (~4mg/kg) calcitonin 12/12/18 1830, 12/13/18 0458 and 12/13/18 1208.     Calcium continues to downtrend (Sima " "of 10.2 12/16/18).        Recommendations:  -IV fluids as per primary medicine team  -continue q12 serum calcium and q24 albumin checks       The pt was staffed with Dr. Oliva Elias  Medical Student  Served as scribe for     ---Endocrine attending note----  Subjective:  Patient feeling much better than before. Awake.   Heart rate continues to be in 90s, tachycardia resolving.      Physical Exam:  /75   Pulse 89   Temp 97.6  F (36.4  C) (Oral)   Resp 24   Ht 1.676 m (5' 6\")   Wt (!) 154.6 kg (340 lb 13.3 oz)   SpO2 95%   BMI 55.01 kg/m    Gen: obese female, lying in bed  Mental Status: eyes open, responding to simple conversations  Hands: mild edema+  Leg: mild edema+     Lab:      Ref. Range 12/12/2018 03:43 12/13/2018 04:25 12/14/2018 04:28 12/15/2018 04:28 12/16/2018 04:37   Calcium Latest Ref Range: 8.5 - 10.1 mg/dL 13.0 (H) 12.6 (H) 11.7 (H) 10.8 (H) 10.2 (H)     Assessment and Plan:  Pathologic fracture, SCC unknown origins, paraneoplastic syndrome with elevated PTHrP and hypercalcemia     Post Bisphosphonate, and calcitonin 3 times, Ca trending down   -IV fluids as per primary medicine team  -continue q12 serum calcium checks     Will follow up.      Christiana Baptiste MD  Staff Physician  Endocrinology and Metabolism  [facility]  License: [id number]  Pager: [phone]           "

## 2018-12-16 NOTE — PROGRESS NOTES
D AVSS with sat's 97% on BiPAP set to 30-40% which she kept on for most of the night. Patient is currently on nasal cannula set to 3L/min and sating 95%. Heart regular and lungs decreased throughout. Having adequate amount of urine output via pure wick and had no BM over night. Turning/repositioning with good zaire care every two hours and PRN. Voiced c/o back pain which was relieved with tylenol with oxycodone and has denied nausea. Slept well over night with no other complaints.   I Vital's, assessment and med's per order.   A Resting in bed with call light in reach.  P Continue to monitor and update MD with changes.

## 2018-12-17 NOTE — PROGRESS NOTES
12/17/18 1215   Quick Adds   Type of Visit Initial PT Evaluation   Living Environment   Lives With alone   Living Arrangements mobile home   Home Accessibility stairs to enter home   Living Environment Comment 4 stairs to enter mobile home, live alone but has friends nearby available    Self-Care   Usual Activity Tolerance fair   Current Activity Tolerance poor   Equipment Currently Used at Home wheelchair, manual;shower chair;walker, rolling   Activity/Exercise/Self-Care Comment indep with self cares and ADLs at baseline   Functional Level Prior   Ambulation 0-->independent   Transferring 0-->independent   Toileting 0-->independent   Bathing 0-->independent   Communication 0-->understands/communicates without difficulty   Swallowing 0-->swallows foods/liquids without difficulty   Cognition 0 - no cognition issues reported   Fall history within last six months yes   Number of times patient has fallen within last six months 1   Which of the above functional risks had a recent onset or change? ambulation;transferring;toileting;bathing;dressing   Prior Functional Level Comment pt admitted from TCU; at baseline indep with functional mobility, used 4WW for mobility d/t knee pain in L knee, patient also has access to [facility].   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 Presenting with sepsis with secondary to obstructive pneumonia suggestive on CT admitted for management of sepsis secondary to pulmonary source and acute hypoxic respiratory  failure. PMH includes fall with resulting R femur fracture and arthritis L knee   Weight-Bearing Status - RLE partial weight-bearing (% in comments)  (50%)   General Info Comments activity: up with assist   Cognitive Status Examination   Orientation orientation to person, place and time   Level of Consciousness alert   Follows Commands and Answers Questions 100% of the time;able to follow multistep instructions   Personal Safety and Judgment intact   Memory intact   Pain Assessment   Patient Currently in Pain Yes, see Vital Sign flowsheet   Range of Motion (ROM)   ROM Comment supine knee flexion ROM ~30 degrees grossly assessed limited by pain   Strength   Strength Comments poor functional strength; needing max A for rolling in bed   Bed Mobility   Bed Mobility Comments max A x2   Transfer Skills   Transfer Comments overhead lift   Gait   Gait Comments not safe to attempt ambulation secondary to WB restriction R LE and poor strength   Sensory Examination   Sensory Perception Comments no concerns   Coordination   Coordination Comments no concerns   Muscle Tone   Muscle Tone no deficits were identified   General Therapy Interventions   Planned Therapy Interventions balance training;bed mobility training;gait training;neuromuscular re-education;strengthening;ROM;transfer training;risk factor education;home program guidelines;progressive activity/exercise   Clinical Impression   Criteria for Skilled Therapeutic Intervention yes, treatment indicated   PT Diagnosis impaired functional mobility   Influenced by the following impairments weakness, decreased activity tolerance, impaired balance   Functional limitations due to impairments decreased indep with functional mobility   Clinical Presentation Unstable/Unpredictable   Clinical Presentation Rationale complex medical history, 3-4 personal factors, limited mobility   Clinical Decision Making (Complexity) High complexity   Therapy Frequency` 5 times/week   Predicted  "Duration of Therapy Intervention (days/wks) 1 week   Anticipated Discharge Disposition Transitional Care Facility   Risk & Benefits of therapy have been explained Yes   Patient, Family & other staff in agreement with plan of care Yes   [facility] TM \"6 Clicks\"   2016, Trustees of [employer], under license to [employer].  All rights reserved.   6 Clicks Short Forms Basic Mobility Inpatient Short Form   [facility]  \"6 Clicks\" V.2 Basic Mobility Inpatient Short Form   1. Turning from your back to your side while in a flat bed without using bedrails? 2 - A Lot   2. Moving from lying on your back to sitting on the side of a flat bed without using bedrails? 1 - Total   3. Moving to and from a bed to a chair (including a wheelchair)? 1 - Total   4. Standing up from a chair using your arms (e.g., wheelchair, or bedside chair)? 1 - Total   5. To walk in hospital room? 1 - Total   6. Climbing 3-5 steps with a railing? 1 - Total   Basic Mobility Raw Score (Score out of 24.Lower scores equate to lower levels of function) 7   Total Evaluation Time   Total Evaluation Time (Minutes) 8     "

## 2018-12-17 NOTE — PROGRESS NOTES
"Endocrine follow up note:    Subjective:  Patient sleeping, looks comfortable,on oxygen via nasal cannula  Heart rate continues to be in 90s     Objective:  /67 (BP Location: Right arm)   Pulse 89   Temp 98.2  F (36.8  C) (Oral)   Resp 22   Ht 1.676 m (5' 6\")   Wt (!) 157 kg (346 lb 2 oz)   SpO2 95%   BMI 55.87 kg/m    Gen: obese female, lying in bed  Mental Status: eyes closed  Hands: mild edema+  Leg: mild edema+          A/P:  Mayra Stokes is a 63-year-old female with history of hypertension, hyperlipidemia, type 2 diabetes, cor pulmonale, recently diagnosed squamous cell carcinoma with unclear primary, transferred to [facility] 12/11/18 after presentation to OSH with AMS and identification of post obstructive PNA. Recurrence of prior (~Oct 2018) severe hypercalcemia attributed to PTHrp was also identified this admission thus endocrine was re-consulted for further assistance with management. See initial consult note from prior admission dated 10/26/18.     Hypercalcemia, PTHrP mediated                PTHrP elevated to 81.2 10/26/18 in the setting of metastatic squamous cell cancer of unknown primary. Serum calcium (uncorrected) as high as 13.3mg/dl was identified during pt's prior hospitalization Oct 2018 for R femoral fracture. She had received multiple doses of pamindronate prior to that hospitalization, with calcium downtrending significantly only after denosumab 120mg was administered during her inpatient stay.                 Upon transfer to [facility] 12/12/18, serum calcium uncorrected was 13.0mg/dl with iCal of 7.6mg/dL. She has been receiving fluids as able and received 4mg zoledronic acid at 2am 12/12/18. Given expected delay in onset of action of bisphosphonate and degree of hypercalcemia, she received 600mg (~4mg/kg) calcitonin 12/12/18 1830, 12/13/18 0458 and 12/13/18 1208.      Calcium continues to downtrend (Isma of 9.3 12/17/18). Corrected calcium, considering albumin, comes to " 10.9.     Recommendations:  -continue q12 serum calcium and q24 albumin checks  -if calcium rises even a little, we will consider another dose of Densoumab  -if calcium continues to downtrend/remain stable, defer the Denosumab  -she might need regular treatment for hypercalcemia of malignancy, Denosumab 120mg can be given every 4 weeks  -continue monitoring calcium even after discharge        The pt was staffed with Dr. Riddle and      [facility]  Medical Student  Served as scribe for      Physician Attestation   I, Funmi Riddle, was present with the medical student who participated in the service and in the documentation of the note.  I have verified the history and personally performed the physical exam and medical decision making.  I agree with the assessment and plan of care as documented in the note.      I personally reviewed vital signs, medications and labs.    - Hypercalemia of malignancy. Plan as detailed in note above.     Funmi Riddle MD  Date of Service (when I saw the patient): 12/17/18

## 2018-12-17 NOTE — PLAN OF CARE
Discharge Planner PT   Patient plan for discharge: TCU  Current status: patient needs assist x1-2 for rolling in bed, not able to perform OOB mobility this day. Patient engaged in supine ROM exercises. Reports at TCU, has been working on UE and LE exercises, but utilizing lift for OOB mobility d/t being NWB R LE while there. Per ortho notes, upgraded WB to 50% WB. Pt cites long-term goal of returning to indep ambulation.   Barriers to return to prior living situation: level of assist, medical stability  Recommendations for discharge: TCU  Rationale for recommendations: pt would benefit from continued skilled rehab to progress tolerance to activity, strength to improve indep with functional mobility       Entered by: Judi Higgins 12/17/2018 12:07 PM

## 2018-12-17 NOTE — PLAN OF CARE
Neuro: A&Ox4. Lethargic and slow to respond at times.   Cardiac: SR. VSS.   Respiratory: Attempted to place patient on RA but did not tolerate. Pt placed on 1 L NC with oxygen saturation 94%. MD notified. RT called at 1200 for upper wheezing. No neb given at this time. Pt fluid up. Lasix 20 mg IV given.   GI/: Adequate urine output- purewick in place. Pt incontinent of bowel, no BM on this shift.   Diet/appetite: Pt does not want to eat, no appetite.   Activity:  Pt reports being unable to stand and has not walk in a long time. Lift used for mobility in bed.   Pain: At acceptable level on current regimen. Tylenol and Ultram given with adequate relief.   Skin: barrier cream applied to zaire area.   LDA's: PIV saline locked.     Plan: Potassium replaced, recheck at 1400. Waiting to hear back from primary team if phosphorus should be replaced IV since she is on a oral supplament.  Continue with POC. Notify primary team with changes.    Infection  Infection Symptom Resolution  12/17/2018 1224 - Improving by Chanelle Barillas RN  12/17/2018 0658 - No Change by Daniella Brunner RN     Adult Inpatient Plan of Care  Absence of Hospital-Acquired Illness or Injury  12/17/2018 0658 - Improving by Daniella Brunner RN  12/17/2018 0657 - Improving by Daniella Brunner RN

## 2018-12-17 NOTE — PROGRESS NOTES
Social Work Services Progress Note    Hospital Day: 7  Date of Initial Social Work Evaluation:  Not completed  Collaborated with:  Patient    Data: Mayra Stokes is a 64 yo female admitted to [facility] 12/11/18.    Intervention:  SW following for discharge plans.    Assessment:  Pt now alert and oriented. PT evaluated today and recommends TCU. OT consulted today so will see pt tomorrow. Pt had been recently hospitalized at [facility] 10/25/18-11/8/18 and [facility] [place]. Pt ideally wants to go to [facility] TCU because she already has all her follow up appts at this hospital/clinic, but this facility is concerned that they cannot meet pt's level of assistance needs. Pt currently Ax2 and lift.    Pt had her own CPAP machine that she was using during the previous hospitalization, but pt states that when it was getting packed up, all the correct parts and tubing did not get sent with pt, thus it is not working and is now at pt's home. Pt had been borrowing [facility]'s CPAP.    Pt requested TCU referrals to the following facilities, listed in order of preference:    1. [facility] and Swing Bed   Ph: [phone], Fax: [phone]  -Referral sent to [facility] who will review for both care center and swing bed. This facility already has concerns r/t pt's weight (thus additional equipment needed) and how much assist pt is needing. All rooms are shared so they are concerned about potentially needing assist of 2 and lift.    2. [facility]  Ph: [phone], Fax: [phone]  -Faxed referral to Radha in admissions.    3. [facility] in [place]  Ph: [phone], Fax: [phone]  -Faxed referral to admissions.    Plan:    Anticipated Disposition:  Per MD, possible discharge in a couple days. Referrals are out to TCUs, no facility has accepted yet.    Barriers to d/c plan:  Medical clearance and TCU placement.    Follow Up:  SW will continue to  follow and assist as needed.    LORENA Willard, [facility]  [facility] Unit  Phone: [phone]  Pager: [phone]

## 2018-12-17 NOTE — PROVIDER NOTIFICATION
Sepsis Protocol Activation    Sepsis Protocol was activated at the beginning of the shift due to the increase in HR from 70-80s to 100-110s. Lactic acid was drawn and resulted 1.0. Vital signs stable. On-call MD on pager #[phone] was web-based texted; MD called back and no order given.

## 2018-12-17 NOTE — PLAN OF CARE
Neuro: Oriented X4.  Alert throughout day.  Took one nap otherwise maintained good mentation throughout day.  3/5 strength BUE, 1-2/5 BLE.  Cardiac: ST in 100's prior to metoprolol, since SR 80-90's. MAP >65      Respiratory: BiPap at night.  Weaned 02 to 1L, dropping to 87% on RA, denies SOB.  Lung sounds diminished.  GI/: Incontinent of stool and urine.  Had one small BM.  Good urine output via PureWick  Diet/appetite: Tolerated regular diet.  Ate good breakfast, bowl of soup for lunch, burger for dinner.  Pain: Switched to Tramadol with relief.  Pain mainly in R leg.     Skin: Skin tear in gluteal fold, mepilex re-applied.  Perineal area with blanchable redness.  Careful incontinence care performed with barrier cream and miconazole powder.  Pannus cleansed and Interdry applied.    LDA's:  X2 L PIV     Plan: Continue with POC. Notify primary team with changes.

## 2018-12-17 NOTE — PLAN OF CARE
"Shift Summary    Been mostly asleep since midnight and AAOx4, pleasant, appreciative, cooperative and agreeable. C/O pain relieved by analgesic, \"Tylenol is better than the other one (referring to Ultram)\" per pt, along with non-pharmacological interventions. Vital signs been stable; HR ranging in the high-80s to 90's since after the Sepsis Protocol activation early in the shift. Been breathing regular, unlabored, and with equal chest expansion; on BIPAP during sleep and NC while awake. No problem swallowing. Continues to have brown-yellow loose stool with occasional episodes of incontinence; promptly cleaned after being notified by pt. Pure Wick readjusted to capture pt's urine via incontinence. Latest K+ 3.3; will replace.  "

## 2018-12-18 NOTE — PROGRESS NOTES
Pt arrived on unit @ 0045 via bed accompanied by float RN. All belongings present w/ patient upon arrival.     Writer assumed care of the patient at this time. VSS on 1L NC.     Maggy Caruso RN on 12/18/2018 at 12:45 AM

## 2018-12-18 NOTE — PLAN OF CARE
Pt feeling limited by chest pain today. She declines rolling or trying to sit EOB.  Plan for lift to chair tomorrow. Pt did perform good LE exercise in supine.    Discharge Planner PT   Patient plan for discharge: TCU  Current status: dependent  Barriers to return to prior living situation: mobility, weakness, just increased to 50% WB on the R  Recommendations for discharge: TCU  Rationale for recommendations: Pt will need significant therapy to progress all areas of mobility       Entered by: Maryan Gonzalez 12/18/2018 3:57 PM

## 2018-12-18 NOTE — PLAN OF CARE
Vital signs:  Temp: 98.4  F (36.9  C) Temp src: Oral BP: 114/65   Heart Rate: 83 Resp: 18 SpO2: 97 % O2 Device: Nasal cannula Oxygen Delivery: 1 LPM     Pt A&Ox3-4. Afebrile. VSS. On 1L NC. Lung sounds clear/diminished in bases. Purewick in place w/ adequate UOP. Incontinent of stool. T/R q2h in bed.     Report given to 7C RN, pt transferred on bed w/ float RN. Belongings (cell phone, ipad/ana luisa, glasses and clothing) with pt.

## 2018-12-18 NOTE — PROGRESS NOTES
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 Presenting with sepsis with secondary to obstructive pneumonia suggestive on CT admitted for management of sepsis secondary to pulmonary source and acute hypoxic respiratory failure. In addition she has had significant delirium, that is multifactorial but with likely the most significant contributor being hypercalcemia; now improving with IV fluids, calcitonin and a dose of zoledronic acid.     #Toxic metabolic encephalopathy secondary to hypercalcemia  #Hypercalcemia secondary to malignancy - improving   Ionized calcium elevated. iCal 7.6. PTH appropriately suppresed (9/2018) in the setting of elevated PTH-rp (10/2018). Phos appropriately low. Low vitamin D. Last dose of denosumab on 10/29/18. S/p 2L NS at OSH. Additional 2L NS on 12/12. S/p Zolendronate 4mg IV x1 on 12/12.   >Endocrine consult, apprec recs  >Discontinue NS @ 100ml/hr   >Continue lasix with increase to 20mg IV BID today  >Trend iCal q12h; albumin q24h   >If calcium starts rising again can consider Denosumab 120mg per Endo rec    #Acute hypoxic respiratory failure secondary to CAP - improving   #Acute on chronic hypercapneic respiratory failure - requiring intermittent BiPAP  Oxygen desaturation documented to 82% on RA at OSH on 12/11. Required 4L NC initially. CT 12/11 as below concerning for post-obstructive PNA. CXR concerning for pulmonary edema (likely non-cardiogenic based on bedside US with IVC with good respiratory variation). VBG on 12/13 overnight suggestive of pH 7.28/66 associated with mental status changes. Thus BIPAP was initiated while sleeping.  -Continuous pulse ox;  keep sats > 90%  -Continue BiPAP while sleeping and nasal cannula when awake. Weaning NC during the day as tolerated.   -CXR from 12/15/18: do not appreciate significant changes from admission and has not had new symptoms consistent with pneumonia-will hold off on change in antibiotic plan.   -Lasix as above     #Sepsis 2/2 Obstructive pneumonia vs. Aspiration pneumonia  Qualified for Sepsis based on T 103F, 's, WBC 16.4, RR 22.  CTA chest imaging from OSH c/w post-obstructive pneumonia. Lactic acid 1.4. Volume resuscitated. Urine Cx with 10-50K Enterococcus faecalis. MRSA nares negative. BC NGTD. She is high risk for aspiration with altered mentation on presentation; thus aspiration PNA is on the differential for source of infection.   - IV Vancomycin, Cefepime, Levaquin (12/12-12/14); IV Unasyn (12/14-present, plan to complete 7 day course)      #Sinus Tachycardia-- resolved  -likely secondary to sepsis, intravascular depletion (as evidenced by bedside US on 12/12). CTA chest at OSH negative for PE but poor contrast study. EKG in sinus tachycardia rates 130-140. 12/13 Bedside US without signs of DVT.   -12/13 ECHO with no new anatomic abnormalities  -resumed home Metoprolol now that BP stabilized      #Metastatic squamous carcinoma of unknown primary (pulmonary likely)  #Endometrial thickening, suspicious for malignancy  -s/p biopsy of rib revealed metastatic squamous cell carcinoma of unknown primary but likely pulmonary source. Last seen in Oncology clinic on 11/14/18. PET scan 12/4/18 revealed L parotid mass, RUL mass with post-obstructive consolidation (high suspicion of pulmonary primary), endometrial thickening, mets involving R 4th rib, right T9 pedicle, bilateral iliac bones, bilateral femurs in addition to multiple hepatic lesions.  -Gynecology was consulted last admission and they felt that it is unlikely that the squamous cell carcinoma originating in the cervix, the patient has been up-to-date on  her cervical cancer screening and had a pelvic exam with HPV testing as recently as 2 years ago.  They were unable to perform pelvic exam or endometrial biopsy at that time due to right leg pain and cannot bend her knee as a result of the femur fracture; they wanted to follow-up with Mayra within 4-6 weeks of discharge, but she would prefer to follow-up with a provider closer to her home.    -Heme/Onc consult, apprec recs              >will defer therapy options to outpatient Oncologist in [facility]   >will defer Gyn/Onc re-consult to evaluate endometrial thickening due to acute pneumonia at this time     #Pathologic fracture s/p ORIF  -underwent open reduction/internal fixation, tumor curettage and excision, and biopsy of the bone on 10/16.  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 Last visit outpatient 11/13 : recs NWB with plan for 4 week f/u with repeat XR  -Ortho consult, apprec recs  >Ok to use андрей lift but lower strap needs to be in the popliteal fossa behind the knee. Do not use with strap at the mid or upper thigh  >50% weightbearing      #Diastolic heart failure  #Cor pulmonale   EF 60% but noted diastolic heart failure (4/27/18).   - Resume PTA metoprolol 100mg PO BID    - Hold PTA enalapril 5mg qday in the setting of infection   - Hold PTA furosemide 40mg PO BID in the setting of infection   - Repeat ECHO with stable systolic function     #DMII   Last A1c 6.1. Has not had elevated blood glucose levels this admission. Will stop blood sugar checks.     #Hypokalemia  #Hypophosphatemia  Anticipate hypophosphatemia and hypercalcemia will persist with PTH-rp driving electrolyte imbalance.   - resume PTA K phos BID from QID prior to admission   - Replace per electrolyte protocol     #Obesity hypoventilation syndrome  #Obstructive sleep apnea  -BiPAP as above     Diet: Regular Diet Adult    Fluids: NS as above  Lines: PIV  DVT Prophylaxis: Enoxaparin (Lovenox) SQ  Bravo  Catheter: not present  Code Status: Full Code      Disposition Plan   Expected discharge: 2 - 3 days, recommended to TCU verses hospital transfer due to insurance coverage once adequate pain management/ tolerating PO medications and improved respiratory status.   .  Entered: Ariana Justin MD 12/17/2018, 6:38 PM     The patient's care was discussed with the Bedside Nurse and Patient.    Patient was staffed with Dr. Uribe.    Ariana Justin  Medicine/pediatrics PGY-4  Pager [phone]    ______________________________________________________________________    Interval History   No shortness of breath. This morning she is alert and responds appropriately to most questions. No significant pain in right leg. No fevers, chills, cough.     Data reviewed today: I reviewed all medications, new labs and imaging results over the last 24 hours. I personally reviewed no images or EKG's today.    Physical Exam   Vital Signs: Temp: 98.2  F (36.8  C) Temp src: Oral BP: 128/67   Heart Rate: 94 Resp: 22 SpO2: 95 % O2 Device: Nasal cannula Oxygen Delivery: 1 LPM  Weight: 346 lbs 1.96 oz    General: Patient lying in bed, NAD  HEENt: PERRL, clear sclera. Wearing nasal canula.   Resp: Scattered crackles, with diminished breath sounds overall. No focal findings. Breathing comfortably.   CV: RRR, no m/r/g. No m/r/g  Abd: Soft, mild tenderness to palpation, no rebound or guarding  Ext: ~1+ pitting edema of LEs  Neuro: Alert and oriented x2,    Data   Recent Labs   Lab 12/17/18  1350 12/17/18  0414 12/16/18  0437  12/15/18  0428  12/14/18  0428  12/13/18  0425   WBC  --  12.4* 11.5*  --  14.3*  --  16.3*  --  15.8*   HGB  --  8.5* 8.8*  --  8.8*  --  9.0*  --  9.1*   MCV  --  97 98  --  98  --  96  --  97   PLT  --  248 240  --  266  --  312  --  319   NA  --  138 141  --  139  --  137  --  136   POTASSIUM 3.4 3.3* 3.5   < > 3.2*   < > 3.2*   < > 3.0*   CHLORIDE  --  103 105  --  103  --  101  --  102   CO2  --  29 27  --  29  --  28   --  26   BUN  --  6* 8  --  9  --  7  --  7   CR  --  0.38* 0.38*  --  0.36*  --  0.36*  --  0.36*   ANIONGAP  --  6 9  --  7  --  8  --  8   DEANDRE  --  9.3 10.2*  --  10.8*  --  11.7*  --  12.6*   GLC  --  91 90  --  102*  --  123*  --  113*   ALBUMIN  --  1.9* 1.9*  --   --   --  2.2*  --  2.1*   PROTTOTAL  --   --   --   --   --   --  6.8  --  6.7*   BILITOTAL  --   --   --   --   --   --  0.4  --  0.5   ALKPHOS  --   --   --   --   --   --  178*  --  191*   ALT  --   --   --   --   --   --  16  --  15   AST  --   --   --   --   --   --  36  --  37    < > = values in this interval not displayed.     No results found for this or any previous visit (from the past 24 hour(s)).

## 2018-12-18 NOTE — PLAN OF CARE
OT 7C: cancel and HOLD, per discussion with physical therapy, pt is not able to tolerate OOB activity at this time. Will HOLD on OT evaluation until pt can tolerate 2 therapies/day. Will check in to determine when pt is appropriate for both therapy disciplines to be following, will reschedule.

## 2018-12-18 NOTE — PLAN OF CARE
VS: Vital signs:  Temp: 97.2  F (36.2  C) Temp src: Oral BP: 122/67 Pulse: 90 Heart Rate: 83 Resp: 20 SpO2: 97 % O2 Device: BiPAP/CPAP Oxygen Delivery: 1 LPM   Neuro: Disoriented to place. Oriented to person, situation, and time. Generalized weakness with moderately impaired movement.   Resp: Lung sounds clear and diminished in upper lobes w/ expiratory wheezes in lower lobes. Dyspnea w/ exertion and repositioning. BiPAP on while asleep- managed by RT  Cardiac: WNL  GI: + passing flatus. 1 loose/brown incontinent stool. + bowel sounds. Denies N/V  : Purewick catheter in place to continuous suction for urinary incontinence- adequate wilbert/clear UOP.   Skin: Generalized pale skin.    Diet: Tolerating regular diet  VAD: Left PIV saline locked  Wounds: Pullman/moist open area on coccyx/sacrum- barrier cream applied w/ zaire cares.   Activity: Repositioning in bed q2h w/ assist x2 and mechanical lift/lift sheet  Pain: Managed w/ Tramadol 100mg    Plan of Care: Continue w/ current POC. Tentative discharge in 2-3 days to TCU    Maggy Caruso RN on 12/18/2018 at 5:13 AM

## 2018-12-18 NOTE — PLAN OF CARE
Neuro: A&Ox4, lethargic at times but opens eyes to voice.  Cardiac: SR-ST, 80-100s. (triggered sepsis protocol w ) Other VSS.   Respiratory: Sating > 92% on 1L via NC, weaning as tolerated.  GI/: Good UOP via purewick catheter, BM frequency decreasing with Imodium. Smear during my shift.   Diet/appetite: Tolerating regular diet. Eating well though did not want any dinner.  Activity:  Assist of 2 with bed mobility, verify PT/OT is ordered.  Pain: At acceptable level on current regimen, prn tylenol and tramadol Q6 hours.   Skin: Coccyx with pink open area, barrier applied and repositioned q2 hours. No new deficits noted.  LDA's: L-PIV    Plan: Plan for possible transfer to [facility] soon. Continue with POC. Notify primary team with changes.

## 2018-12-18 NOTE — PLAN OF CARE
Shift:   VS: Temp: 97.1  F (36.2  C) Temp src: Oral BP: 150/84 Pulse: 90 Heart Rate: 99 Resp: 20 SpO2: 95 % O2 Device: Nasal cannula Oxygen Delivery: 1 LPM  Pain: Pt stated leg and abdominal pain, given tramadol x2, stated getting better   Neuro: A&Ox4 but very lethargic and sleeping most throughout the shift  Cardiac:   WNL, no chest pain  Respiratory: Spo2>95% at 1L NC  GI/Diet/Appetite: Poor oral intake, stated no appetite. Frequent loose BM x4-5. Given imodiumx 1 and notified primary team. C-diff sample sent. Result pending.  :  Purewick in place with good UO  LDA's: PIV with magnesium replacement infusing  Skin: Redness/abrasion near coccyx area from bowel incontinence. Barrier cream applied  Activity: Turn and clean with heavy assist x 2 and lift  Tests/Procedures:   Pertinent Labs/Lab Collection: Phos replaced, Mag replacing     Plan: Bed switched to Stewart bed with pulsating mattress. Continue with cares and update MD with any changes.

## 2018-12-18 NOTE — PROGRESS NOTES
"Endocrine follow up note:     Subjective:  Patient comfortable, on oxygen via nasal cannula, communicating well   Heart rate continues to be in 90s     Objective:  /59 (BP Location: Right arm)   Pulse 95   Temp 96.9  F (36.1  C) (Oral)   Resp 20   Ht 1.676 m (5' 6\")   Wt (!) 157 kg (346 lb 2 oz)   SpO2 98%   BMI 55.87 kg/m    Gen: obese female, lying in bed  Mental Status: eyes closed, responds to call, oriented  Hands: mild edema+  Leg: severe edema+          A/P:  Mayra Stokes is a 63-year-old female with history of hypertension, hyperlipidemia, type 2 diabetes, cor pulmonale, recently diagnosed squamous cell carcinoma with unclear primary, transferred to [facility] 12/11/18 after presentation to OSH with AMS and identification of post obstructive PNA. Recurrence of prior (~Oct 2018) severe hypercalcemia attributed to PTHrp was also identified this admission thus endocrine was re-consulted for further assistance with management. See initial consult note from prior admission dated 10/26/18.     Hypercalcemia, PTHrP mediated                PTHrP elevated to 81.2 10/26/18 in the setting of metastatic squamous cell cancer of unknown primary. Serum calcium (uncorrected) as high as 13.3mg/dl was identified during pt's prior hospitalization Oct 2018 for R femoral fracture. She had received multiple doses of pamindronate prior to that hospitalization, with calcium downtrending significantly only after denosumab 120mg was administered during her inpatient stay.                 Upon transfer to [facility] 12/12/18, serum calcium uncorrected was 13.0mg/dl with iCal of 7.6mg/dL. She has been receiving fluids as able and received 4mg zoledronic acid at 2am 12/12/18. Given expected delay in onset of action of bisphosphonate and degree of hypercalcemia, she received 600mg (~4mg/kg) calcitonin 12/12/18 1830, 12/13/18 0458 and 12/13/18 1208.      Calcium continued to downtrend and plateaued today (Isma of 9.5 12/18/18). Corrected " calcium, considering albumin, comes to ~11.     Recommendations:  -continue q12 serum calcium and q24 albumin checks  -if calcium rises even a little, we will consider another dose of Densoumab  -if calcium continues to downtrend/remain stable, defer the Denosumab  -she might need regular treatment for hypercalcemia of malignancy, Denosumab 120mg can be given every 4 weeks  -continue monitoring calcium even after discharge         The pt was staffed with Dr. Riddle and [facility]  Medical Student  Served as scribe for

## 2018-12-18 NOTE — PROGRESS NOTES
Social Work Services Progress Note    Hospital Day: 7  Date of Initial Social Work Evaluation:  Not completed  Collaborated with:  Pt at bedside    Data:  Mayra Stokes is 62 yo female admitted to [facility] 12/11/18. SW following for discharge planning.     Intervention:  SW met with Pt at bedside to review discharge planning and TCU recommendation. Pt is agreeable to rehab placement at this time. Pt had been recently hospitalized at [facility] 10/25/18-11/8/18 and discharged to [facility] in [place]. Pt is unable to return to [facility] TCU - no appropriate beds available. Pt ideally wants to go to [facility] TCU because her follow up appts are at this facility. Per admissions, they are unable to admit d/t concerns r/t pt's weight (thus additional equipment needed) and how much assist pt is needing. All rooms are shared so they are concerned about potentially needing assist of 2 and lift. SW discussed TCU placement within the [facility] area to be close for follow-up appointments. Pt states she wishes to be as close to her home in [place] if possible. See below for the following TCU preferences and statuses:    1. [facility] - denied (no appropriate bed d/t Pt requiring a lift and they have small double rooms only. Also states no current bed availability).  Ph: [phone], Fax: [phone]  -Referral sent to [facility] who will review for both care center and swing bed. This facility already has concerns r/t pt's weight (thus additional equipment needed) and how much assist pt is needing. All rooms are shared so they are concerned about potentially needing assist of 2 and lift.       2. [facility]  Ph: [phone], Fax: [phone]  -Faxed referral to Rad[facility] in admissions.  -left VM [phone]     3. [facility] in [place] - denied (no appropriate beds).   Ph: [phone], Fax: [phone]    4. 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 SW informed Pt there was potential concern her HP could lapse. Pt states she will be in contact with her bank and ensure payment is made to continue her insurance. SW offered to contact any Pt friends or family members and Pt declined at this time stating \"I don't think I want to involve them just yet.\"     Assessment:  Pt was pleasant and open to SW visit to discuss discharge planning. Pt presented fatigued, but was open to discharge planning discussion. Pt desires to discharge as close to home if possible. Pt has limited support that she does not wish to involve at this time. [place].\" Pt exhibited no concern with her financial/insurance coverage at this time and states she will follow-up.     Plan:    Anticipated Disposition:  Facility:  D    Barriers to d/c plan:  Medical stability and rehab placement (limited rural options).    Follow Up:  SW to f/u & assist as needed.    LORENA Hurtado, NNEKA  7C Surgical/Oncology Unit   Phone: [phone]  Pager: [phone]              "

## 2018-12-19 NOTE — CONSULTS
[facility], [place]    Palliative Care Consultation Note    Patient: Mayra Stokes  Date of Admission:  12/11/2018    Requesting provider/team:   Reason for consult: Goals of care  Decisional support  Patient and family support    Recommendations:  Please see assessment below for rationale.    -Pain: Suggest the following-   Check lipase to assess any potentially reversible exacerbating pancreatitis.  Add dexamethasone 4mg daily.  Schedule tramadol 100mg [id number].  Add oxycodone 2.5-5mg q6hrs prn for breakthrough pain.     -GOC: DNR/DNI status. Further goals to be clarified depending on treatment options per Hem/Onc.    These recommendations have been discussed with  and Medicine resident .    Thank you for the opportunity to participate in the care of this patient and family. Our team will follow Mayra. Please feel free to contact the on-call Palliative provider with any urgent needs.     Radha Day  Palliative Medicine Fellow  Pager: [phone]  [facility] Inpatient Team Consult pager [phone] (M-F 8-4:30)  After-hours Answering Service [phone]   Palliative Clinic: [phone]     Patient seen and evaluated with Dr. Day.   Agree with assessment and recommendations.    Total time spent was 60 minutes,  >50% of time was spent counseling and/or coordination of care regarding symptoms and goals.    Radha Helms  Palliative Care   Pager [phone]        Assessment & Plan   Mayra Stokes is a 63 year old female with recent diagnosis of squamous cell carcinoma, likely pulmonary source, admitted with AMS 2/2 hypercalcemia, UTI, PNA, improving.    Symptoms:   Pain: upper abdomen, appears likely related to hepatic metastatic disease. However, agree with further evaluating with lipase level given prev h/o pancreatitis and mild inflammation seen on CT  AMS: appears improved per medical management for hypercalcemia, UTI, PNA    Social:       Living situation:  Lives alone       Support system: Family (parents, siblings, niece)       Actual/Potential Caregiver: Unclear       Functional status: Previously in TCU s/p femur fracture, overall PPS 50%       Financial concerns: Denies       Occupation: Retired from a        Hobbies: Travel    Mental Health: Denies any sx of depression/anxiety    Coping: Well.     Spiritual/Buddhist:    Spiritual background: Confucianist  Spiritual needs: Denies    Advance Care Planning:        Decision making capacity: Intact       Disease understanding: Good       Goals of Care: Currently restorative, but has limits to the kinds of treatments she would accept for her cancer, valuing quality over prolongation.       Preferred way of decision making: Direct       Health care directive: Has not completed       Health care agent: Designates her niece, Radha.       Code Status:  Full Code       POLST There is no POLST (Physician orders for life-sustaining treatment) form on file for this patient    History of Present Illness   Sources of History:patient and electronic health record    62yo female with h/o HTN, hyperlipidemia, DMII, cor pulmonale, recently diagnosed widely metastatic SCC unclear but suspected pulmonary primary.    She was in previous health until 9/12/18 when a preop evaluation revealed incidental hypercalcemia. BMB showed normal bone marrow, but rib bne biopsy showed SCC. Has had a pathologic fracture R femur s/p ORIF 10/26/18. CT C/A/P showed metastatic disease of liver, rib, uterus, acute pancreatitis.    12/11/18 she presented to an outside hospital in [place] with weakness, confusion. She had notable AMS, fever; found to have hypercalcemia, obstructive PNA, UTI and sepsis, was transferred to [facility] for further management.     ROS:  Palliative Symptom Review (0=no symptom/no concern, 1=mild, 2=moderate, 3=severe):  Pain: 2  Fatigue: 0  Nausea: 0  Constipation: 0  Diarrhea: 1  Depressive Symptoms: 0  Anxiety:  0  Drowsiness: 0  Poor Appetite: 0  Shortness of Breath: 0  Insomnia: 0  Delirium: 1  Other: 0  Overall (0 good/no concerns, 3 very poor): 1-2    Past Medical History    Reviewed per EMR    Past Surgical History   Reviewed per EMR    Social History   Lives alone.    Family History   Significant other  of cancer 3yrs ago; father and other family members had serious illness.    Medications   I have reviewed this patient's medication profile and medications given in the past 24 hours.  Acetaminophen 650mg po q6prn, x2  Tramadol 50-100mg po q6hrs prn, 100mg x3    Melatonin 1mg at bedtime    PTA: tramadol 100mg q6hrs    Physical Exam   Vital Signs: Temp: 98.4  F (36.9  C) Temp src: Oral BP: 115/53 Pulse: 94 Heart Rate: 84 Resp: 16 SpO2: 92 % O2 Device: None (Room air) Oxygen Delivery: 1.5 LPM  Weight: 346 lbs 1.96 oz    Physical Exam:  Constitutional:  Lying in bed, comfortable appearing no distress   HENT:  Normocephalic. MMM  Respiratory:  Normal breath sounds, No respiratory distress, No wheezing,  Cardiovascular:  Normal heart rate, Normal rhythm,     GI:  Soft, Nondistended, bowel sounds present, mild tenderness in RUQ  Musculoskeletal: No BLE edema.  Integument:  Warm, Dry, no rash on visible skin  Neurologic:  Tired appearing but attentive; oriented x3. Symmetric face, moving all 4 extremities  Psychiatric:  Mood and affect normal.     Data   Data reviewed:  ROUTINE IP LABS (Last four results)  BMP  Recent Labs   Lab 18  0346 18  0412 18  1350 18  0414 18  0437    135  --  138 141   POTASSIUM 3.2* 4.1 3.4 3.3* 3.5   CHLORIDE 98 102  --  103 105   DEANDRE 9.4 9.5  --  9.3 10.2*   CO2 30 28  --  29 27   BUN 4* 5*  --  6* 8   CR 0.39* 0.40*  --  0.38* 0.38*   * 91  --  91 90     CBC  Recent Labs   Lab 18  0346 18  0944 18  0412 18  041   WBC 14.7* 16.3* 14.3* 12.4*   RBC 3.44* 3.45* 3.37* 3.21*   HGB 9.1* 9.1* 8.9* 8.5*   HCT 33.0* 32.8* 32.7* 31.0*    MCV 96 95 97 97   MCH 26.5 26.4* 26.4* 26.5   MCHC 27.6* 27.7* 27.2* 27.4*   RDW 18.7* 18.2* 18.4* 18.3*    226 246 248     INRNo lab results found in last 7 days.  Recent Results (from the past 24 hour(s))   CT Abdomen Pelvis w Contrast    Impression    Impression:   1. Substantially increased burden of hepatic metastatic disease.  2. Numerous new osseous lytic metastatic lesions are identified as  described above.  3. Mild stranding around the pancreatic tail. Consider correlation  with serum lipase. Peripancreatic inflammation has overall slightly  improved from prior study.  4. Unchanged heterogenous appearance of the uterus.    I have personally reviewed the examination and initial interpretation  and I agree with the findings.    CLARKE BURNETT MD

## 2018-12-19 NOTE — PLAN OF CARE
AOx4, lethargic at times. AVSS on 1 LPM nc. Assist x2 with lift for cares. Pt is on Stewart bed with pulsatile mattress. Passing flatus. Pt is incontinent of stool and urine. Days reported pt had multiple loose BM's, stool sample sent for C. DIFF, stool tested negative for C. DIFF. PRN Imodium x1 given for loose BM, only one loose BM on my shift. Purewick in place and changed x1 on shift, AUO in canister. Redness/abrasion near coccyx, barrier cream applied. Redness/abrasion under abdominal skin fold, cleaned and Interdry placed in between skin folds. PRN Tylenol given x1 for pain. Lt PIV SL. Mg and Phos replaced on days, recheck in the morning. Cont POC.

## 2018-12-19 NOTE — PROGRESS NOTES
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 Presenting with sepsis secondary to obstructive pneumonia suggested on CT and admitted for management of sepsis and acute hypoxic respiratory failure. In addition, she had significant delirium, that was multifactorial but with likely the most significant contributor being hypercalcemia; mentation and calcium improving with IV fluids, calcitonin and a dose of zoledronic acid. On 12/18 had new abdominal pain and worsening diarrhea with CT showing worsening burden of metastatic SCC.      # Diffuse abdominal pain  New and diffuse abdominal pain on 12/18. Concerning for possible C. Diff infection given diarrhea but this was negative. Diarrhea possibly related to antibiotic use. Abdominal CT 12/18-->notable for increased burden of hepatic metastatic disease. Mild stranding also noted around the pancreatic tail.   --C.diff tested 12/18 and negative  --Restarted Imodium following negative c. Diff 12/18  --Stopped Unasyn  --addressing Metastatic disease as below.     #Metastatic squamous carcinoma of unknown primary (pulmonary likely)  #Endometrial thickening, suspicious for malignancy  S/p biopsy of rib revealed metastatic squamous cell carcinoma of unknown primary but likely pulmonary source. Last seen in Oncology clinic on 11/14/18. PET scan 12/4/18 revealed L parotid mass, RUL mass with post-obstructive consolidation (high suspicion of pulmonary primary), endometrial thickening, mets involving R 4th rib, right T9 pedicle, bilateral iliac bones, bilateral femurs in addition to multiple hepatic lesions. Gynecology was consulted last admission and they felt  that it is unlikely that the squamous cell carcinoma originated in the cervix, the patient has been up-to-date on her cervical cancer screening and had a pelvic exam with HPV testing as recently as 2 years ago.  They were unable to perform pelvic exam or endometrial biopsy at that time due to right leg pain and cannot bend her knee as a result of the femur fracture; they wanted to follow-up with Mayra within 4-6 weeks of discharge.   --Heme/Onc previously consulted, apprec recs--> reached out again to their team 12/19 given new findings on abdominal CT from 12/18. Patient previously had been deferring cancer therapy options to outpatient Oncologist in [facility], but will question whether it would be appropriate to shift her care to [facility]. Patient is interested primarily in comfort options, but is also interested in learning about her cancer treatment options as well as prognosis to help inform her decision regarding next steps in her care--> asked heme/onc to weigh in regarding these topics.  -- Palliative care consulted 12/19 for symptomatic management as well as on-going goals of care discussions in the setting of her metastatic SCC.     #Toxic metabolic encephalopathy secondary to hypercalcemia  #Hypercalcemia secondary to malignancy - stable   Ionized calcium initially elevated. iCal 7.6. PTH appropriately suppresed (9/2018) in the setting of elevated PTH-rp (10/2018). Phos appropriately low. Low vitamin D. Last dose of denosumab on 10/29/18. S/p 2L NS at OSH. Additional 2L NS on 12/12. S/p Zolendronate 4mg IV x1 on 12/12. Ionized calcium stable 12/18  >Endocrine consult, apprec recs  >Continued Lasix  20mg IV BID today  >Trend iCal q12h; albumin q24h   >If calcium starts rising again, can consider Denosumab 120mg per Endo recommendations    #Acute hypoxic respiratory failure secondary to CAP   #Acute on chronic hypercapneic respiratory failure - requiring intermittent BiPAP  Oxygen desaturation documented  to 82% on RA at OSH on 12/11. Required 4L NC initially. CT 12/11 concerning for post-obstructive PNA. CXR concerning for pulmonary edema (likely non-cardiogenic based on bedside US with IVC with good respiratory variation). VBG on 12/13,  pH 7.28/66 associated with mental status changes, thus BIPAP was initiated while sleeping. Suspect hypoxemia may now persist in part due to recent fluids administered for hypercalcemia. Other considerations include PNA not covered by recent antibiotic regimen vs. May in part be due to a small right-sided pleural effusion with ? loculation noted on CT imaging.    -Continuous pulse ox; keep sats > 90%  -Continue BiPAP while sleeping and nasal cannula when awake. Weaning NC during the day as tolerated.   -s/p PNA treatment with Unasyn as below  -Lasix as above     #Sepsis 2/2 Obstructive pneumonia vs. Aspiration pneumonia  Qualified for Sepsis based on T 103F, 's, WBC 16.4, RR 22.  CTA chest imaging from OSH c/w post-obstructive pneumonia. Lactic acid 1.4. Volume resuscitated. Urine Cx with 10-50K Enterococcus faecalis. MRSA nares negative. BC NGTD. She is high risk for aspiration with altered mentation on presentation; thus aspiration PNA is on the differential for source of infection.   - IV Vancomycin, Cefepime, Levaquin (12/12-12/14); IV Unasyn (12/14-12/18)     #Sinus Tachycardia-- resolved  Likely secondary to sepsis, intravascular depletion (as evidenced by bedside US on 12/12). CTA chest at OSH negative for PE but poor contrast study. EKG in sinus tachycardia rates 130-140. 12/13 Bedside US without signs of DVT.   -12/13 ECHO with no new anatomic abnormalities  -resumed home Metoprolol now that BP stabilized      #Pathologic fracture s/p ORIF  Underwent open reduction/internal fixation, tumor curettage and excision, and biopsy of the bone on 10/16.  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 Last visit outpatient 11/13 : ovi RILEY with plan  for 4 week f/u with repeat XR  -- Ortho consult, apprec recs  --Ok to use андрей lift but lower strap needs to be in the popliteal fossa behind the knee. Do not use with strap at the mid or upper thigh  --50% weightbearing      #Diastolic heart failure  #Cor pulmonale   EF 60% but noted diastolic heart failure (4/27/18).   - PTA metoprolol 100mg PO BID    - Hold PTA enalapril 5mg qday in the setting of possible infection, will consider restarting 12/20   - Hold PTA furosemide 40mg PO BID (diuresing as above for hypercalcemia)  - Repeat ECHO with stable systolic function     #DMII   Last A1c 6.1. Has not had elevated blood glucose levels this admission. Will stop blood sugar checks.     #Hypophosphatemia  Anticipate hypophosphatemia and hypercalcemia may occur with PTH-rp driving electrolyte imbalance.   - resume PTA K phos BID from QID prior to admission   - Replace per electrolyte protocol     #Obesity hypoventilation syndrome  #Obstructive sleep apnea  -BiPAP as above     Diet: Regular Diet Adult    Fluids: NS as above  Lines: PIV  DVT Prophylaxis: Enoxaparin (Lovenox) SQ  Bravo Catheter: not present  Code Status: Full Code      Disposition Plan   Expected discharge: 4 - 7 days, recommended to TCU verses hospital transfer due to insurance coverage once adequate pain management/ tolerating PO medications, safe disposition plan/ TCU bed available and completed discussion with palliative care as well as heme/onc teams to establish a treatment plan for metastatic cancer.    .  Entered: Ariana Justin MD 12/19/2018, 7:27 AM     The patient's care was discussed with the Bedside Nurse and Patient.    Patient was staffed with Dr. Durand.     Ariana Justin  Medicine/pediatrics PGY-4  Pager [phone]    ______________________________________________________________________    Interval History   Fewer loose stools in the evening, and no BM overnight. Today reports that she still has some abdominal pain  predominantly in the epigastric area. No shortness of breath.     We spent some time discussing her understanding of what oncologists have told her about her cancer diagnosis in the past. She reported that they really did not tell her very much. I shared with her the findings from her recent CT scan and discussed that it would be helpful to have the heme/onc team further discuss their significance and meaning for her prognosis. She noted that she in general was not interested in really aggressive therapy, but did want to know her options as she still would like to know what was available. She was predominantly interested in comfort approaches; thus, I also introduced the idea of a palliative care consult. She was amenable to meeting with palliative care to further discuss symptom management as well as review goals of care and her overall prognosis.     Data reviewed today: I reviewed all medications, new labs and imaging results over the last 24 hours. I personally reviewed no images or EKG's today    Physical Exam   Vital Signs: Temp: 99.1  F (37.3  C) Temp src: Axillary BP: 120/60 Pulse: 94 Heart Rate: 87 Resp: 18 SpO2: 93 % O2 Device: None (Room air) Oxygen Delivery: 1.5 LPM  Weight: 346 lbs 1.96 oz    General: Patient lying in bed, appears calm.  HEENt: PERRL, clear sclera. Wearing nasal canula.   Resp: Diminished breath sounds overall. No focal findings. Breathing comfortably.   CV: distant sounds. RRR, no m/r/g.   Abd: Soft, non-distended, mildly tender to palpation in the epigastric area without guarding.  Ext: ~1+ pitting edema of LEs    Data   Recent Labs   Lab 12/19/18  1618 12/19/18  0346 12/18/18  0944 12/18/18  0412  12/17/18  0414  12/14/18  0428  12/13/18  0425   WBC  --  14.7* 16.3* 14.3*  --  12.4*   < > 16.3*  --  15.8*   HGB  --  9.1* 9.1* 8.9*  --  8.5*   < > 9.0*  --  9.1*   MCV  --  96 95 97  --  97   < > 96  --  97   PLT  --  219 226 246  --  248   < > 312  --  319   NA  --  136  --  135  --   138   < > 137  --  136   POTASSIUM 3.5 3.2*  --  4.1   < > 3.3*   < > 3.2*   < > 3.0*   CHLORIDE  --  98  --  102  --  103   < > 101  --  102   CO2  --  30  --  28  --  29   < > 28  --  26   BUN  --  4*  --  5*  --  6*   < > 7  --  7   CR  --  0.39*  --  0.40*  --  0.38*   < > 0.36*  --  0.36*   ANIONGAP  --  8  --  6  --  6   < > 8  --  8   DEANDRE  --  9.4  --  9.5  --  9.3   < > 11.7*  --  12.6*   GLC  --  105*  --  91  --  91   < > 123*  --  113*   ALBUMIN  --  2.0*  --  2.1*  --  1.9*   < > 2.2*  --  2.1*   PROTTOTAL  --   --   --   --   --   --   --  6.8  --  6.7*   BILITOTAL  --   --   --   --   --   --   --  0.4  --  0.5   ALKPHOS  --   --   --   --   --   --   --  178*  --  191*   ALT  --   --   --   --   --   --   --  16  --  15   AST  --   --   --   --   --   --   --  36  --  37    < > = values in this interval not displayed.

## 2018-12-19 NOTE — PROGRESS NOTES
"Endocrine follow up note:     Subjective:  Patient comfortable, alert  Heart rate continues to be in 90s     Objective:  /53 (BP Location: Right arm)   Pulse 94   Temp 98.4  F (36.9  C) (Oral)   Resp 16   Ht 1.676 m (5' 6\")   Wt (!) 157 kg (346 lb 2 oz)   SpO2 92%   BMI 55.87 kg/m    Gen: obese female, lying in bed  Mental Status: eyes open, alert, oriented   Hands: mild edema+  Leg: severe edema+         A/P:  Mayra Stokes is a 63-year-old female with history of hypertension, hyperlipidemia, type 2 diabetes, cor pulmonale, recently diagnosed squamous cell carcinoma with unclear primary, transferred to [facility] 12/11/18 after presentation to OSH with AMS and identification of post obstructive PNA. Recurrence of prior (~Oct 2018) severe hypercalcemia attributed to PTHrp was also identified this admission thus endocrine was re-consulted for further assistance with management. See initial consult note from prior admission dated 10/26/18.     Hypercalcemia, PTHrP mediated                PTHrP elevated to 81.2 10/26/18 in the setting of metastatic squamous cell cancer of unknown primary. Serum calcium (uncorrected) as high as 13.3mg/dl was identified during pt's prior hospitalization Oct 2018 for R femoral fracture. She had received multiple doses of pamindronate prior to that hospitalization, with calcium downtrending significantly only after denosumab 120mg was administered during her inpatient stay.                 Upon transfer to [facility] 12/12/18, serum calcium uncorrected was 13.0mg/dl with iCal of 7.6mg/dL. She has been receiving fluids as able and received 4mg zoledronic acid at 2am 12/12/18. Given expected delay in onset of action of bisphosphonate and degree of hypercalcemia, she received 600mg (~4mg/kg) calcitonin 12/12/18 1830, 12/13/18 0458 and 12/13/18 1208.      Calcium continued to downtrend and plateaued (Isma of 9.4 12/19/18). Corrected calcium, considering albumin, comes to " ~10.8.     Recommendations:  -continue q12 serum calcium and q24 albumin checks  -if calcium rises even a little, we will consider another dose of Densoumab  -if calcium continues to downtrend/remain stable, defer the Denosumab  -she might need regular treatment for hypercalcemia of malignancy, Denosumab 120mg can be given every 4 weeks  -continue monitoring calcium even after discharge        Brenden Elias  Medical Student  Served as scribe for

## 2018-12-19 NOTE — PLAN OF CARE
VS: Temp: 99.1  F (37.3  C) Temp src: Axillary BP: 120/60 Pulse: 94 Heart Rate: 87 Resp: 18 SpO2: 93 % O2 Device: None (Room air)   Neuro: A&O x4. Lethargic. Movement significantly impaired  Resp: On BiPAP while asleep. O2 sats in upper 90s when on BiPAP. On 1L NC while awake--maintaining O2 sats in low-mid 90s. Lung sounds clear and diminished bilaterally.   Cardiac: WNL  GI: + passing flatus. No BM overnight. + bowel sounds. Denies N/V  : Purewick external catheter in place to continuous suction d/t urinary incontinence. Adequate UOP. Purewick catheter and canister changed @ 0600  Skin: Perineal area frequently moist d/t urinary incontinence. Frequent zaire cares completed. Interdry fabric in place in right abd fold. Repositioned q2h. Pink/moist area on sacrum/coccyx blanchable- barrier cream applied w/ zaire cares  Diet: Tolerating regular diet- no food intake overnight  VAD: Left PIV saline locked   Activity: Repositioning in bed w/ assist x2 and mechanical lift.   Pain: Managed w/ PRN PO Tramadol 100mg and PRN PO Tylenol 650mg    Plan of Care: Continue w/ current POC. Tentative discharge to TCU in 4-7 days pending placement      Maggy Caruso RN on 12/19/2018 at 6:22 AM

## 2018-12-19 NOTE — PROGRESS NOTES
Social Work Services Progress Note    Hospital Day: 8  Date of Initial Social Work Evaluation:  Not completed.  Collaborated with:  Chart review.    Data:  SW involved for discharge planning. Per medical team, GOC conversations occurring with Pt and palliative is consulted as well. TCU continues to be recommended at this time. Please see below for status of referrals at this time:     1.. [facility]  Ph: [phone]; F: [phone]  -Faxed referral 12/18/18     2.. [facility]   Ph:[phone]; F: [phone]  -Faxed referral 12/18/18.   -Spoke with Rissa/Admission (P:[phone]) re: referral questions.     3.[facility] - denied (no appropriate bed d/t Pt requiring a lift and they have small double rooms only. Also states no current bed availability).  Ph: [phone], Fax: [phone]  -Referral sent to Bon[facility] who will review for both care center and swing bed. This facility already has concerns r/t pt's weight (thus additional equipment needed) and how much assist pt is needing. All rooms are shared so they are  concerned about potentially needing assist of 2 and lift.   4.. [facility] - full; no bed availability  Ph: [phone], Fax: [phone]  -Faxed referral to Radha in admissions.  -left VM [phone]   5. [facility] in [place] - denied (no appropriate beds).   Ph: [phone], Fax: [phone]    Intervention:  Discharge planning.    Assessment:  See bedside notes, medical team notes, PT/OT notes    Plan:    Anticipated Disposition:  Facility:  [facility]    Barriers to d/c plan:  Medical stability and long-term GOC    Follow Up:  SW to f/u & assist as needed.    LORENA Hurtado, NNEKA  7C Surgical/Oncology Unit   Phone: [phone]  Pager: [phone]

## 2018-12-19 NOTE — PLAN OF CARE
Pt here for sepsis 2/2 obstructive pneumonia, vs ex intermittently tachy, triggering sepsis protocol, LA=0.9, neuros include: d/o place, needed cueing, BUE 4/5, BLE 2/5 w/L>R, forgetful, intermittently lethargic. PIV SL, regular diet w/assistance ordering and meal tray setup. Pt up AO2 w/lift and Q2 repo, pure wick catheter in place. K and Mg replaced, per routine lab redraws ordered, pt receiving PRN tramadol for lower back pain w/moderate relief. Pt was weaned off O2 this morning, continuous pulse oximetry on for monitoring, O2 sats remain above 94%. Continue to monitor per MD orders.

## 2018-12-19 NOTE — PROGRESS NOTES
Brief Oncology Note  12/19/18  5:08 PM    Stopped by Ms. Stokes's room per request by primary team to discuss her metastatic SCC.  Mayra mentioned that she feels very tired and is in a lot of pain, especially in the epigastric area.  She wonders realistically about her prognosis and her chance of getting chemotherapy.  She has not yet started treatment with her oncologist in [place].    We discussed that her liver lesion has nearly doubled in size from her CT scan in late October of this year.  In order to be well enough to receive chemotherapy, she would need to be up and out of bed more than 50% of the time.  She said she did not meet this criteria prior to admission and at this point, neither she nor I think that she meets this criteria.  She worries that her rehabilitation will take months.  I asked whether she had considered hospice care, as this is not unreasonable in a patient with metastatic SCC with pathological fracture, who does not have high enough performance status to receive treatment.  She said that she is interested in looking into hospice.  I think this would be a good course of action and may be the most life-prolonging intervention at this point.    Ms. Stokes said that she does not feel sad or afraid.  She is not very Sikhism and did not wish to see the .    I will ask Palliative and Social Work to look into hospice options.  Regarding her functional status, if this improves on hospice, she can always choose to leave her hospice program and seek cancer treatment, though this would be palliative and not curative.    Kristal Dukes MD  Hematology-Oncology-Transplant Fellow

## 2018-12-19 NOTE — PROGRESS NOTES
"CLINICAL NUTRITION SERVICES - ASSESSMENT NOTE     Nutrition Prescription    RECOMMENDATIONS FOR MDs/PROVIDERS TO ORDER:  None at this time     Malnutrition Status:    Patient does not meet two of the above criteria necessary for diagnosing malnutrition but is at risk for malnutrition    Recommendations already ordered by Registered Dietitian (RD):  PRN supplements.  Sent Boost Glucose Control and Gelatein 20 (sugar free) for patient to try    Future/Additional Recommendations:  Consider scheduling supplements or snacks if pt agreeable.  If pt continues to eat <75% of meals TID (or the equivalent), consider ordering calorie counts (pending GOC).     REASON FOR ASSESSMENT  Mayra Stokes is a/an 63 year old female assessed by the dietitian for Admission Nutrition Risk Screen for reduced oral intake over the last month and LOS    NUTRITION HISTORY  Patient reports poor PO intake for 2 weeks PTA, in part 2/2 abdominal pain.  Eats a regular diet, denies any food allergies/intolerances.  Pt does try to eat lower CHO foods (PMH includes T2DM).  Baseline intake when appetite was normal was 2 meals per day, eats foods like sandwiches (pt provided only vague diet hx, was tired during visit).    Per chart, PMH includes HTN, HLD, T2DM, and cor pulmonales with recent diagnosis of metastatic squamous cell carcinoma.   Palliative has been consulted and GOC are being discussed    CURRENT NUTRITION ORDERS  Diet: Regular  Intake/Tolerance: Eating % of meals documented in flowsheets.  Poor appetite per pt.  Says she's generally been eating 1 meal per day.    LABS  Labs reviewed    MEDICATIONS  Medications reviewed    ANTHROPOMETRICS  Height: 167.6 cm (5' 6\")  Most Recent Weight: (!) 157 kg (346 lb 2 oz)    IBW: 59.1 kg   BMI: Obesity Grade III BMI >40  Weight History: Fluctuating this admit, but mostly ~152 kg.  Pt denies any recent unintentional weight changes.  Wt Readings from Last 10 Encounters:   12/17/18 (!) 157 kg (346 lb 2 " oz)   11/07/18 (!) 153 kg (337 lb 4.9 oz)      Dosing Weight: 82 kg (adjusted based on admit/lowest recent wt 151.5 kg and IBW)    ASSESSED NUTRITION NEEDS  Estimated Energy Needs: 1900-4397 kcals/day (20 - 25 kcals/kg)  Justification: Obese  Estimated Protein Needs:  grams protein/day (1.2 - 1.5 grams of pro/kg)  Justification: Hypercatabolism with acute illness  Estimated Fluid Needs: 0671-5344 mL/day (25 - 30 mL/kg)   Justification: Maintenance, or other per provider pending fluid status    PHYSICAL FINDINGS  See malnutrition section below.    MALNUTRITION  % Intake: </= 50% for >/= 5 days (severe)  % Weight Loss: None noted  Subcutaneous Fat Loss: None observed  Muscle Loss: None observed  Fluid Accumulation/Edema: Trace per provider note yesterday  Malnutrition Diagnosis: Patient does not meet two of the above criteria necessary for diagnosing malnutrition but is at risk for malnutrition    NUTRITION DIAGNOSIS  Inadequate oral intake related to decreased appetite and abdominal pain hindering PO intake as evidenced by poor PO intake x 3 weeks      INTERVENTIONS  Implementation  Nutrition Education: Provided education on role of RD.  Suggested scheduled snacks, pt not interested.  Went over supplement options, pt willing to try.  Pt would prefer lower CHO/sugar supplements, will order but recommended pt try to be too restrictive about CHO since she is not eating much in general.  Medical food supplement therapy: see above    Goals  Patient to consume % of nutritionally adequate meal trays TID, or the equivalent with supplements/snacks.     Monitoring/Evaluation  Progress toward goals will be monitored and evaluated per protocol.     Katherine Crowder, DUARTE, LD  7C RD pager: [phone]

## 2018-12-20 NOTE — PLAN OF CARE
OT 7C: cancel and HOLD, per discussion with physical therapy, pt is not able to tolerate OOB activity at this time and has limited participation in therapy. Will HOLD on OT evaluation until pt can tolerate 2 therapies/day. Will check in to determine when pt is appropriate for both therapy disciplines to be following, will reschedule.

## 2018-12-20 NOTE — PLAN OF CARE
VS: Temp: 98.6  F (37  C) Temp src: Axillary BP: 118/49 Pulse: 72 Heart Rate: 97 Resp: 14 SpO2: 95 % O2 Device: BiPAP/CPAP   Neuro: WNL except lethargic and disoriented to place. Pt arousable to voice    Resp: Lungs clear and diminished bilaterally. BiPAP in place while asleep.   Cardiac: WNL  GI: + passing flatus. + bowel sounds. No BM overnight. Denies N/V  : Purewick external catheter in place to continuous suction for urinary incontinence. Adequate UOP  Skin: Pink/moist sacral/coccyx area- barrier cream applied. Interdry applied to abd folds   Diet: Regular diet- no PO food intake overnight   VAD: Right PIV saline locked   Activity: Repositioned q2h w/ assist x2 and mechanical lift.   Pain: Managed w/ scheduled Tramadol and PRN PO Oxycodone 2.5mg    Plan of Care: Continue w/ current POC    Maggy Caruso RN on 12/20/2018 at 5:16 AM

## 2018-12-20 NOTE — PROGRESS NOTES
"[facility], [place]    Palliative Care Progress Note    Recommendations: See rationale below  -Pain: Continue current regimen.    -GOC: DNR/DNI status. Please schedule care conference with patient's niece to discuss results and to determine further GOC, tomorrow if possible. We would like to be a part of the care conference and continue to support Mayra.    These recommendations have been discussed with Dr. Lind.    Radha Day  Palliative Medicine Fellow  Pager: [phone]  [facility] Inpatient Team Consult pager [phone] (M-F 8-4:30)  After-hours Answering Service [phone]   Palliative Clinic: [phone]     Attestation:   Patient seen and evaluated with Dr. Day and I agree with/confirm her findings/recs in this note. Total time on the floor involved in the patient's care: 25 minutes. Greater than 50% of my time was spent counseling and/or coordination of care regarding symptoms and goals.   Thank you for involving us in the patient's care.   Ronda Lind MD / Palliative Medicine / Pager [phone];  [facility] Inpatient Team Consult Pager [phone] (answered 8am-430pm M-F) - ok to text page via [employer] / After-Hours Answering Service [phone] / Palliative Clinic in the [facility] [place] - [phone] (scheduling); [phone] (triage).    Assessment & Plan   Mayra Stokes is a 63 year old female with recent diagnosis of squamous cell carcinoma, likely pulmonary source, admitted with AMS 2/2 hypercalcemia, UTI, PNA, improving.    Symptoms:   Pain: Started scheduled tramadol yesterday and added oxycodone. Doing well on this regimen, no new concerns    GOC: Despite multiple discussions with hem/onc patient doesn't appear to be processing. States she \"still doesn't know the options\" or about side effects she is concerned about. She doesn't appear to be confused, just not able to hear diagnosis/prognosis. Strongly recommend a care conference with her " HCA, niece Radha, to go over this information and further determine GOC.       History of Present Illness   Hem/onc met with patient yesterday and per their note: discussed CT findings. Per their note, Mayra was reportedly realistic about her prognosis and realized she does not and was unlikely to meet criteria for chemotherapy, and talked about pursuing hospice.    Per our meeting with her, she actually does not appear to have understanding of the prognosis. Stated several times she still didn't get the information about options or prognosis.     ROS:  Palliative Symptom Review (0=no symptom/no concern, 1=mild, 2=moderate, 3=severe):  Pain: 1  Fatigue: 0  Nausea: 0  Constipation: 0  Diarrhea: 0  Depressive Symptoms: 0  Anxiety: 0  Drowsiness: 0  Poor Appetite: 0  Shortness of Breath: 0  Insomnia: 0  Delirium: 0  Other: 0  Overall (0 good/no concerns, 3 very poor): 1-2    Medications   I have reviewed this patient's medication profile and medications given in the past 24 hours.  Decadron 4mg daily (started today)  Acetaminophen 650mg po q6prn, x2  Tramadol 100mg po q6hrs, x3  Oxycodone 2.5-5mg po q6hrs prn, x3    Melatonin 1mg at bedtime      Physical Exam   Vital Signs: Temp: 98.4  F (36.9  C) Temp src: Axillary BP: 112/60 Pulse: 72 Heart Rate: 87 Resp: 17 SpO2: 97 % O2 Device: Nasal cannula Oxygen Delivery: 2 LPM  Weight: 346 lbs 1.96 oz    Physical Exam:  Constitutional:  Lying in bed, comfortable appearing no distress   HENT:  Normocephalic. MMM  Respiratory:  Normal breath sounds, No respiratory distress, No wheezing,  Cardiovascular:  Normal heart rate, Normal rhythm,     GI:  Soft, Nondistended, bowel sounds present, mild tenderness in RUQ  Musculoskeletal: No BLE edema.  Integument:  Warm, Dry, no rash on visible skin  Neurologic:  Tired appearing but attentive; oriented x3. Symmetric face, moving all 4 extremities  Psychiatric:  Mood and affect normal.     Data   Data reviewed:  ROUTINE IP LABS (Last four  results)  BMP  Recent Labs   Lab 12/20/18  0909 12/20/18  0449 12/19/18  1618 12/19/18  0346 12/18/18  0412  12/17/18  0414   NA  --  134  --  136 135  --  138   POTASSIUM 3.3* 3.3* 3.5 3.2* 4.1   < > 3.3*   CHLORIDE  --  97  --  98 102  --  103   DEANDRE  --  9.4  --  9.4 9.5  --  9.3   CO2  --  28  --  30 28  --  29   BUN  --  5*  --  4* 5*  --  6*   CR  --  0.42*  --  0.39* 0.40*  --  0.38*   GLC  --  100*  --  105* 91  --  91    < > = values in this interval not displayed.     CBC  Recent Labs   Lab 12/20/18  0449 12/19/18  0346 12/18/18  0944 12/18/18  0412   WBC 13.4* 14.7* 16.3* 14.3*   RBC 3.51* 3.44* 3.45* 3.37*   HGB 9.3* 9.1* 9.1* 8.9*   HCT 33.5* 33.0* 32.8* 32.7*   MCV 95 96 95 97   MCH 26.5 26.5 26.4* 26.4*   MCHC 27.8* 27.6* 27.7* 27.2*   RDW 19.0* 18.7* 18.2* 18.4*    219 226 246     INRNo lab results found in last 7 days.  Recent Results (from the past 24 hour(s))   CT Abdomen Pelvis w Contrast    Impression    Impression:   1. Substantially increased burden of hepatic metastatic disease.  2. Numerous new osseous lytic metastatic lesions are identified as  described above.  3. Mild stranding around the pancreatic tail. Consider correlation  with serum lipase. Peripancreatic inflammation has overall slightly  improved from prior study.  4. Unchanged heterogenous appearance of the uterus.    I have personally reviewed the examination and initial interpretation  and I agree with the findings.    CLARKE BURNETT MD

## 2018-12-20 NOTE — PLAN OF CARE
AVSS.  Pt lethargic, flat affect. Minimally participative in cares, requiring full assist for all movement in bed.  Emotional support provided. Self-feeding regular diet, tolerating well.  Incontinent of bowel and bladder, purewick catheter in place.  Mg and K replaced.  K redraw at 1500.  Phos replacement infusing, will be completed at 1700.  Mildred area reddened, barrier cream applied.  Abdominal pain control fair with oxycodone.  O2 2L per NC, sats >92%. Continue supportive cares.

## 2018-12-20 NOTE — PLAN OF CARE
PT / 7C - Cancel.  Patient declining therapy this PM despite education on importance of increased activity for healing, endorsing stomach pain. Patient rescheduled per POC.

## 2018-12-20 NOTE — PROGRESS NOTES
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 Presenting with sepsis secondary to obstructive pneumonia suggested on CT and admitted for management of sepsis and acute hypoxic respiratory failure. In addition, she had significant delirium, that was multifactorial but with likely the most significant contributor being hypercalcemia; mentation and calcium improving with IV fluids, calcitonin and a dose of zoledronic acid. On 12/18 had new abdominal pain and worsening diarrhea with CT showing worsening burden of metastatic SCC.      # Diffuse abdominal pain  New and diffuse abdominal pain on 12/18. Abdominal CT 12/18-->notable for increased burden of hepatic metastatic disease. Mild stranding also noted around the pancreatic tail. Suspect pain is occurring in the setting of her cancer.   --C.diff tested 12/18 and negative. Restarted Imodium following negative c. Diff  --addressing Metastatic disease as below.   --Palliave care consulted; recommendations appreciated--->Pain: Scheduled tramadol, with PRN oxycodone for breakthrough pain. Dexamethasone 4mg PO daily.    #Metastatic squamous carcinoma of unknown primary (pulmonary likely)  #Endometrial thickening, suspicious for malignancy  S/p biopsy of rib revealed metastatic squamous cell carcinoma of unknown primary but likely pulmonary source. PET scan 12/4/18 revealed L parotid mass, RUL mass with post-obstructive consolidation (high suspicion of pulmonary primary), endometrial thickening, mets involving R 4th rib, right T9 pedicle, bilateral iliac bones, bilateral femurs in addition to multiple hepatic lesions. Gynecology was consulted last admission  and they felt that it is unlikely that the squamous cell carcinoma originated in the cervix, the patient has been up-to-date on her cervical cancer screening and had a pelvic exam with HPV testing as recently as 2 years ago.  They were unable to perform pelvic exam or endometrial biopsy at that time due to right leg pain and cannot bend her knee as a result of the femur fracture.   --Heme/Onc previously consulted; given new findings on abdominal CT from 12/18 asked asked the team to see the patient. See note by Dr. Pacheco dated 12/19 for full details; in brief given her poor functional status she is not a good candidate for chemotherapy. Hospice care was discussed--> patient is still thinking about this option.   -- Palliative care consulted 12/19 for symptomatic management as well as on-going goals of care discussions in the setting of her metastatic SCC.     #Toxic metabolic encephalopathy secondary to hypercalcemia  #Hypercalcemia secondary to malignancy - stable   Ionized calcium initially elevated. iCal 7.6. PTH appropriately suppresed (9/2018) in the setting of elevated PTH-rp (10/2018). Phos appropriately low. Low vitamin D. Last dose of denosumab on 10/29/18. S/p 2L NS at OSH. Additional 2L NS on 12/12. S/p Zolendronate 4mg IV x1 on 12/12. Ionized calcium stable 12/18  >Endocrine consult, apprec recs  > Lasix  20mg IV BID switched to home dose of 40mg PO BID daily.   >Trend iCal q12h; albumin q24h    #Acute hypoxic respiratory failure secondary to CAP- resolving  #Acute on chronic hypercapneic respiratory failure - requiring intermittent BiPAP  Oxygen desaturation documented to 82% on RA at OSH on 12/11. Required 4L NC initially. CT 12/11 concerning for post-obstructive PNA. CXR concerning for pulmonary edema (likely non-cardiogenic based on bedside US with IVC with good respiratory variation). VBG on 12/13,  pH 7.28/66 associated with mental status changes, thus BIPAP was initiated while sleeping. Suspect  hypoxemia persisted in part due to recent fluids administered for hypercalcemia; now off oxygen after diuresis.    -Continuous pulse ox; keep sats > 90%  -Continue BiPAP while sleeping and nasal cannula when awake. Weaning NC during the day as tolerated.   -s/p PNA treatment with Unasyn as below  -Lasix as above     #Sepsis 2/2 Obstructive pneumonia vs. Aspiration pneumonia- resolved  Qualified for Sepsis based on T 103F, 's, WBC 16.4, RR 22.  CTA chest imaging from OSH c/w post-obstructive pneumonia. Lactic acid 1.4. Volume resuscitated. Urine Cx with 10-50K Enterococcus faecalis. MRSA nares negative. BC NGTD. She is high risk for aspiration with altered mentation on presentation; thus aspiration PNA is on the differential for source of infection.   - IV Vancomycin, Cefepime, Levaquin (12/12-12/14); IV Unasyn (12/14-12/18)     #Sinus Tachycardia-- resolved  Likely secondary to sepsis, intravascular depletion (as evidenced by bedside US on 12/12). CTA chest at OSH negative for PE but poor contrast study. EKG in sinus tachycardia rates 130-140. 12/13 Bedside US without signs of DVT.   -12/13 ECHO with no new anatomic abnormalities  -resumed home Metoprolol now that BP stabilized      #Pathologic fracture s/p ORIF  Underwent open reduction/internal fixation, tumor curettage and excision, and biopsy of the bone on 10/16.  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 Last visit outpatient 11/13: recs NWB with plan for 4 week f/u with repeat XR  -- Ortho consult, apprec recs  --Ok to use андрей lift but lower strap needs to be in the popliteal fossa behind the knee. Do not use with strap at the mid or upper thigh  --50% weightbearing      #Diastolic heart failure  #Cor pulmonale   EF 60% but noted diastolic heart failure (4/27/18).   - PTA metoprolol 100mg PO BID    - Held PTA enalapril 5mg qday in the setting of possible infection, BP currently stable but will consider restarting while  inpatient  - PTA furosemide 40mg PO BID   - Repeat ECHO with stable systolic function     #DMII   Last A1c 6.1. Has not had elevated blood glucose levels this admission. Will stop blood sugar checks.     #Hypophosphatemia  Anticipate hypophosphatemia and hypercalcemia may occur with PTH-rp driving electrolyte imbalance.   - resume PTA K phos BID from QID prior to admission   - Replace per electrolyte protocol     #Obesity hypoventilation syndrome  #Obstructive sleep apnea  -BiPAP as above     Diet: Regular Diet Adult  Snacks/Supplements Adult: Other; PRN; Between Meals    Fluids: NS as above  Lines: PIV  DVT Prophylaxis: Enoxaparin (Lovenox) SQ  Bravo Catheter: not present  Code Status: DNR/DNI      Disposition Plan   Expected discharge: 4 - 7 days, recommended to TCU verses hospital transfer due to insurance coverage once adequate pain management/ tolerating PO medications and completed discussion with oncology and palliative care team to establish safe discharge option in the setting of metastatic cancer.    .  Entered: Ariana Justin MD 12/20/2018, 7:58 AM     The patient's care was discussed with the Bedside Nurse and Patient.    Patient was staffed with Dr. Durand.     Ariana Justin  Medicine/pediatrics PGY-4  Pager [phone]    ______________________________________________________________________    Interval History   Reports on-going abdominal pain in the epigastric area. Notes that her pain medications (tramadol and oxycodone) help somewhat with the pain. No diarrhea today. Reported by nursing and noted during our discussion today to have a flater affect. Can recall discussing Hospice with providers yesterday, but today feels like she needs some time to process what has been going on.  Amenable to the team calling her niece to discuss her care.     Data reviewed today: I reviewed all medications, new labs and imaging results over the last 24 hours. I personally reviewed no images or EKG's  today    Physical Exam   Vital Signs: Temp: 98.4  F (36.9  C) Temp src: Axillary BP: 112/60 Pulse: 72 Heart Rate: 87 Resp: 17 SpO2: 98 % O2 Device: BiPAP/CPAP Oxygen Delivery: 2 LPM  Weight: 346 lbs 1.96 oz    General: Patient lying in bed, appears calm.  HEENt: PERRL, clear sclera.  Resp: Diminished breath sounds overall. No focal findings. Breathing comfortably.   CV: distant sounds. RRR, no m/r/g.   Abd: Soft, non-distended, mildly tender to palpation in the epigastric area without guarding.  Ext: ~1+ pitting edema of LEs  Neuro: alert. Conversant. Answers questions appropriately.   Psych: flat affect.     Data   Recent Labs   Lab 12/20/18  0909 12/20/18  0449 12/19/18  1618 12/19/18  0346 12/18/18  0944 12/18/18  0412  12/14/18  0428   WBC  --  13.4*  --  14.7* 16.3* 14.3*   < > 16.3*   HGB  --  9.3*  --  9.1* 9.1* 8.9*   < > 9.0*   MCV  --  95  --  96 95 97   < > 96   PLT  --  227  --  219 226 246   < > 312   NA  --  134  --  136  --  135   < > 137   POTASSIUM 3.3* 3.3* 3.5 3.2*  --  4.1   < > 3.2*   CHLORIDE  --  97  --  98  --  102   < > 101   CO2  --  28  --  30  --  28   < > 28   BUN  --  5*  --  4*  --  5*   < > 7   CR  --  0.42*  --  0.39*  --  0.40*   < > 0.36*   ANIONGAP  --  10  --  8  --  6   < > 8   DEANDRE  --  9.4  --  9.4  --  9.5   < > 11.7*   GLC  --  100*  --  105*  --  91   < > 123*   ALBUMIN  --  2.0*  --  2.0*  --  2.1*   < > 2.2*   PROTTOTAL  --   --   --   --   --   --   --  6.8   BILITOTAL  --   --   --   --   --   --   --  0.4   ALKPHOS  --   --   --   --   --   --   --  178*   ALT  --   --   --   --   --   --   --  16   AST  --   --   --   --   --   --   --  36    < > = values in this interval not displayed.

## 2018-12-20 NOTE — PROGRESS NOTES
"Pt here for sepsis 2/2 obstructive pneumonia.A&O to self, month, year, and \"hospital\". VSS on 2L NC. New PIV infusing phos. Regular diet, no appetite this evening. Pt T&R q2h A2 w/lift, pure wick catheter in place w/ GUOP. Pt reports tramadol not helping with pain and was changed to oxycodone which provided good relief and pt was able to get a good nap. She seemed much more comfortable after her nap. Continue with POC.  "

## 2018-12-21 NOTE — PROGRESS NOTES
"[facility], [place]    Palliative Care Progress Note    Recommendations: See rationale below  -Pain: Continue current regimen.    -GOC: DNR/DNI status. Patient is now aware of prognosis and accepting of hospice. Family care conference with primary team today with niece via phone to review this and for further planning.    These recommendations have been discussed with Dr. Dukes and resident Dr.McGrath Radha Day  Palliative Medicine Fellow  Pager: [phone]  [facility] Inpatient Team Consult pager [phone] (M-F 8-4:30)  After-hours Answering Service [phone]   Palliative Clinic: [phone]     Patient discussed with fellow, was not seen personally by me today.  Agree with plan to proceed with hospice planning, appreciate SW assistance with this. .      Lorie Dukes MD  Palliative Medicine  Pager [phone]       Assessment & Plan   Mayra Stokes is a 63 year old female with recent diagnosis of squamous cell carcinoma, likely pulmonary source, admitted with AMS 2/2 hypercalcemia, UTI, PNA, improving.    Symptoms:   Pain: Continues on tramadol, oxycodone. Doing well on this regimen, no new concerns    GOC: comfort measures, will discharge on hospice.      History of Present Illness   This am at my visit is now understanding of diagnosis. \"there is nothing that they can do to treat my cancer.\" States she feels fine with that, and is not surprised. Discussed what the time ahead would look like; she is accepting that she will need to be in a facility.     Team plans to review this today with patient and her niece via phone.    ROS:  Palliative Symptom Review (0=no symptom/no concern, 1=mild, 2=moderate, 3=severe):  Pain: 1  Fatigue: 0  Nausea: 0  Constipation: 0  Diarrhea: 0  Depressive Symptoms: 0  Anxiety: 0  Drowsiness: 0  Poor Appetite: 0  Shortness of Breath: 0  Insomnia: 0  Delirium: 0  Other: 0  Overall (0 good/no concerns, 3 very poor): 1-2    Medications   I have reviewed " this patient's medication profile and medications given in the past 24 hours.  Decadron 4mg daily   Acetaminophen 650mg po q6prn, x0  Tramadol 100mg po q6hrs, x4  Oxycodone 2.5-5mg po q6hrs prn, 2.5mg po x4    Melatonin 1mg at bedtime    Physical Exam   Vital Signs: Temp: 97.9  F (36.6  C) Temp src: Oral BP: 124/59   Heart Rate: 89 Resp: 18 SpO2: 92 % O2 Device: BiPAP/CPAP Oxygen Delivery: 2 LPM  Weight: 346 lbs 1.96 oz    Physical Exam:  Constitutional:  Lying in bed, no distress  HENT: Normocephalic,Mucous membranes moist  Respiratory:  No tachypnea or respiratory distress  Musculoskeletal:  No gross deformities, grossly unremarkable range of motion   Integument:  Warm, Dry   Neurologic:  Alert, attentive and appropriately oriented  Psychiatric:  Flat, but engaged.       Data   Data reviewed:  ROUTINE IP LABS (Last four results)  BMP  Recent Labs   Lab 12/21/18  0357 12/20/18  1605 12/20/18  0909 12/20/18  0449  12/19/18  0346 12/18/18 0412     --   --  134  --  136 135   POTASSIUM 3.6 4.4 3.3* 3.3*   < > 3.2* 4.1   CHLORIDE 96  --   --  97  --  98 102   DEANDRE 9.6  --   --  9.4  --  9.4 9.5   CO2 30  --   --  28  --  30 28   BUN 5*  --   --  5*  --  4* 5*   CR 0.38*  --   --  0.42*  --  0.39* 0.40*   *  --   --  100*  --  105* 91    < > = values in this interval not displayed.     CBC  Recent Labs   Lab 12/20/18  0449 12/19/18  0346 12/18/18  0944 12/18/18  0412   WBC 13.4* 14.7* 16.3* 14.3*   RBC 3.51* 3.44* 3.45* 3.37*   HGB 9.3* 9.1* 9.1* 8.9*   HCT 33.5* 33.0* 32.8* 32.7*   MCV 95 96 95 97   MCH 26.5 26.5 26.4* 26.4*   MCHC 27.8* 27.6* 27.7* 27.2*   RDW 19.0* 18.7* 18.2* 18.4*    219 226 246     INRNo lab results found in last 7 days.  Recent Results (from the past 24 hour(s))   CT Abdomen Pelvis w Contrast    Impression    Impression:   1. Substantially increased burden of hepatic metastatic disease.  2. Numerous new osseous lytic metastatic lesions are identified as  described above.  3.  Mild stranding around the pancreatic tail. Consider correlation  with serum lipase. Peripancreatic inflammation has overall slightly  improved from prior study.  4. Unchanged heterogenous appearance of the uterus.    I have personally reviewed the examination and initial interpretation  and I agree with the findings.    CLARKE BURNETT MD

## 2018-12-21 NOTE — PLAN OF CARE
OT 7C: cancel and HOLD, per discussion with physical therapy and chart review, pt is not able to tolerate OOB activity at this time and has limited participation in therapy. Will HOLD on OT evaluation until pt can tolerate 2 therapies/day. Will check in to determine when pt is appropriate for both therapy disciplines to be following, will reschedule.

## 2018-12-21 NOTE — PROGRESS NOTES
"Social Work Services Progress Note    Hospital Day: 10  Date of Initial Social Work Evaluation:  Not completed. Assessment completed during last admission on 11/4/18.  Collaborated with:  Pt at bedside, medical team, friend via phone, niece via phone, and chart review.     Data: Per previous SW assessment 11/4/18, Pt lives alone in a mobile home in [place], [place] w/ 4 stairs to enter. Pt is , no children, and her primary support is from her niece Radha who lives in [place], [place] and a friend Magui who lives in [place]. Pt was hospitalized at [facility] 10/25/18-11/8/18 and discharged to [facility] and she was readmitted 12/11/18 for sepsis.     Initially, Pt and SW were planning for TCU rehab placement however, following additional information re: metastatic squamous carcinoma of unknown primary and per notes Pt is not a good candidate for chemotherapy. Team discussed hospice care with Pt and she is still considering this option.     SW met with Pt at bedside this AM to follow up on GOC conversation. Pt appears highly lethargic and it is unclear if Pt's insight to prognosis and discharge planning. SW discussed rehab/TCU placement vs LTC/long-term vs Hospice/LTC with Pt as it is unsafe to discharge home at this time. ENZO educated Pt on all of these options in a slow manner withPer PT/OT notes on 12/20, therapy was cancelled and held due to poor OOB tolerance as well as patient declining therapy. ENZO explained Pt on multiple discharge planning options in a slow, comprehendible language; however it is still unclear Pt's insight. SW discussed Pt's finances and likely private pay in regards to long-term care and Hospice/LTC at discharge or following rehab (if unable to rehabilitate to ind level). Pt stated she understood. Pt did respond appropriately in reporting she has over $3000 in assets/savings (in regards to discussion of Medical Assistance) and stated \"I don't want to spend all my savings though.\"  Pt " did not anticipate qualifying for MA at this time.     Pt lives in [place], [place] and has minimal nursing facility options. Primary facilities are [facility]ing beds which are not appropriate LTC placements. Earlier when TCU referral to [facility] T[facility] in [facility] was sent, they did not have an appropriate bed available. SW left VM for Admissions; still waiting for return call if they'd have appropriate LTC placement for Pt. SW to also follow up with [facility] in [place], [place] for potential placement.     ENZO spoke with medical team this AM who plans to involve Pt's niece in decision making as well (phone conference scheduled later this afternoon).     SW received VM from friend Magui (on face sheet) requesting an update. ENZO spoke with Pt, Pt gave verbal consent for SW to update Pt's friend Magui. However, Pt states she does not want Magui to be her medical decision maker, but her niece Radha should be her alternate decision maker in the event she is unable to make decisions. Pt has expressed how important it is to her to make her medical decisions and expressed fear of Magui trying to make decisions without Pt's input or awareness. ENZO updated Pt's friend Magui via phone who was emotional re: Pt's current clinical condition and future planning. Pt's friend Magui has been assisting Pt in managing her finances and maintaining her community needs leading up to the hospitalization.  ENZO updated Pt's niece via phone (per Pt consent as well). Pt's niece is 22 years old (lives in [place], [place]) and she is aware that Pt would like her assistance in decision making and the alternate decision maker in event she is unable to make decisions. Pt's niece expressed concern with Pt's sense of privacy and desire to not inform other family members and friends of her condition in order to support her and help her. Pt's niece is going out of state for a week for the holidays and will attempt to support via phone  "as able.     Pt's TCU preferences obtained earlier in her stay (see in previous note) however, Pt was not accepted at the facilities and due to poor rehab tolerance and participation it is unclear if that is an appropriate plan. Pt has not made decision re: rehab/TCU placement vs LTC/group home vs Hospice/LTC placement. Pt is her own decision maker and there is not current concern of capacity. After multiple consultations with several SW colleagues, SW unable to move forward with discharge planning and send referrals without consent of plan moving forward. Family and friend contacted by team today in hopes of supporting Pt to process diagnosis and make plan moving forward. Also, Pt's primary insurance is [employer] and their office (for obtaining TCU stay auth) will be closed 22-25, which is also a likely barrier for discharge planning at this time. If Pt continues to be unwilling to establish plan, a care conference with family (and possibly friend) via phone with the patient and medical team may be beneficial in reviewing options moving forward. SW aware and documenting reasoning for Pt's medical stability for discharge and no current discharge plan established. Assisting Pt with completing a Health Care Directive as she maintains capacity is also likely to be addressed.      Intervention:  Discharge planning    Assessment:  Pt was open to SW visit, but was minimally participatory in conversation. Pt presented lethargic and responded yes/no to various questions and stating she is \"thinking about everything.\" Pt reported understanding that rehab/TCU may not be an appropriate plan if unable to tolerate therapies or if prognosis and mobility continues to worsen.     Plan:    Anticipated Disposition:  Facility:  D    Barriers to d/c plan:  Discharge plan established - long term goals of care plan established with Patient.    Follow Up:  SW to f/u & assist as needed.    LORENA Hurtado, NNEKA  7C " Surgical/Oncology Unit   Phone: [phone]  Pager: [phone]

## 2018-12-21 NOTE — PLAN OF CARE
AVSS, remains lethargic but responsive, sometimes with spontaneous humor but quite tired. O2 sats mid 90s with NC/2L. Abdominal and general pain in am and afternoon, sleeping after oxycodone 2.5mg doses. Small soft BM smear. Large urine volume out per Purewick catheter. New Meplix dressing to coccyx seam skin tear. Seen by team also  to begin discharge plan but she was not very alert. Possible hospice placement, niece and cousin have been involved.

## 2018-12-21 NOTE — PROGRESS NOTES
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 Presenting with sepsis secondary to obstructive pneumonia suggested on CT and admitted for management of sepsis and acute hypoxic respiratory failure. In addition, she had significant delirium, that was multifactorial but with likely the most significant contributor being hypercalcemia; mentation and calcium improved with IV fluids, calcitonin and a dose of zoledronic acid. On 12/18 had new abdominal pain and worsening diarrhea with CT showing worsening burden of metastatic SCC.      # Abdominal pain  New and diffuse abdominal pain on 12/18. Abdominal CT 12/18 notable for increased burden of hepatic metastatic disease which is likely source. Mild stranding also noted around the pancreatic tail. Suspect pain is occurring in the setting of her cancer.   --addressing Metastatic disease as below.   --Palliave care consulted; recommendations appreciated  --Pain: Scheduled tramadol, with PRN oxycodone for breakthrough pain, Dexamethasone 4mg PO daily.    #Metastatic squamous carcinoma of unknown primary (pulmonary likely)  #Endometrial thickening, suspicious for malignancy  S/p biopsy of rib revealed metastatic squamous cell carcinoma of unknown primary but likely pulmonary source. PET scan 12/4/18 revealed L parotid mass, RUL mass with post-obstructive consolidation (high suspicion of pulmonary primary), endometrial thickening, mets involving R 4th rib, right T9 pedicle, bilateral iliac bones, bilateral femurs in addition to multiple hepatic lesions. Gynecology was consulted last admission and they felt that it is unlikely that the squamous cell carcinoma  originated in the cervix, the patient has been up-to-date on her cervical cancer screening and had a pelvic exam with HPV testing as recently as 2 years ago.  They were unable to perform pelvic exam or endometrial biopsy at that time due to right leg pain and cannot bend her knee as a result of the femur fracture.   --Heme/Onc consulted; given new findings on abdominal CT from 12/18. See note by Dr. Pacheco dated 12/19 for full details; in brief given her poor functional status she is not a good candidate for chemotherapy. Hospice care was introduced--> patient is still thinking about this option.   -- Palliative care consulted 12/19 for symptomatic management as well as on-going goals of care discussions in the setting of her metastatic SCC.     #Toxic metabolic encephalopathy secondary to hypercalcemia- resolved  #Hypercalcemia secondary to malignancy - stable   Ionized calcium initially elevated. iCal 7.6. PTH appropriately suppresed (9/2018) in the setting of elevated PTH-rp (10/2018). Phos appropriately low. Low vitamin D. Last dose of denosumab on 10/29/18. S/p 2L NS at OSH. Additional 2L NS on 12/12. S/p Zolendronate 4mg IV x1 on 12/12. Ionized calcium stable 12/18  > Endocrine consult, apprec recs  > Lasix 40mg PO BID daily.   >Trend iCal q24h;   > Discontinue checking albumin     #Acute hypoxic respiratory failure secondary to CAP- resolving  #Acute on chronic hypercapneic respiratory failure - requires intermittent BiPAP  Oxygen desaturation documented to 82% on RA at OSH on 12/11. Required 4L NC initially. CT 12/11 concerning for post-obstructive PNA. CXR concerning for pulmonary edema (likely non-cardiogenic based on bedside US with IVC with good respiratory variation). VBG on 12/13,  pH 7.28/66 associated with mental status changes, thus BIPAP was initiated while sleeping.   -Continuous pulse ox; keep sats > 90%  -Continue BiPAP while sleeping and nasal cannula when awake. Wean NC during the day as  tolerated.   -s/p PNA treatment with Unasyn as below  -Lasix as above     #Sepsis 2/2 Obstructive pneumonia vs. Aspiration pneumonia- resolved  Qualified for Sepsis based on T 103F, 's, WBC 16.4, RR 22.  CTA chest imaging from OSH c/w post-obstructive pneumonia. Lactic acid 1.4. Volume resuscitated. Urine Cx with 10-50K Enterococcus faecalis. MRSA nares negative. BC NGTD. She is high risk for aspiration with altered mentation on presentation; thus aspiration PNA was on the differential for source of infection.   - s/p IV Vancomycin, Cefepime, Levaquin (12/12-12/14); IV Unasyn (12/14-12/18)     #Sinus Tachycardia-- resolved  Likely secondary to sepsis, intravascular depletion (as evidenced by bedside US on 12/12). CTA chest at OSH negative for PE but poor contrast study. EKG in sinus tachycardia rates 130-140. 12/13 Bedside US without signs of DVT.   -12/13 ECHO with no new anatomic abnormalities  -resumed home Metoprolol now that BP stabilized      #Pathologic fracture s/p ORIF  Underwent open reduction/internal fixation, tumor curettage and excision, and biopsy of the bone on 10/16.  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 Last visit outpatient 11/13: recs NWB with plan for 4 week f/u with repeat XR  -- Ortho consult, apprec recs  --Ok to use андрей lift but lower strap needs to be in the popliteal fossa behind the knee. Do not use with strap at the mid or upper thigh  --50% weightbearing      #Diastolic heart failure  #Cor pulmonale   EF 60% but noted diastolic heart failure (4/27/18).   - PTA metoprolol 100mg PO BID    - Held PTA enalapril 5mg qday in the setting of possible infection, now BP currently stable but will consider restarting while inpatient if BPs increase  - PTA furosemide 40mg PO BID   - Repeat ECHO with stable systolic function     #DMII   Last A1c 6.1. Has not had elevated blood glucose levels this admission. Will stop blood sugar  checks.     #Hypophosphatemia  Anticipate hypophosphatemia and hypercalcemia may occur with PTH-rp driving electrolyte imbalance.   - resume PTA K phos BID from QID prior to admission   - Replace per electrolyte protocol     #Obesity hypoventilation syndrome  #Obstructive sleep apnea  -BiPAP as above     Diet: Regular Diet Adult  Snacks/Supplements Adult: Other; PRN; Between Meals    Fluids: NS as above  Lines: PIV  DVT Prophylaxis: Enoxaparin (Lovenox) SQ  Bravo Catheter: not present  Code Status: DNR/DNI      Disposition Plan   Expected discharge: 4 - 7 days, recommended to TCU verses other program such as hospice once adequate pain management/ tolerating PO medications and completed discussion with palliative care team to establish safe discharge option in the setting of metastatic cancer.    .  Entered: Ariana Justin MD 12/21/2018, 6:56 AM     The patient's care was discussed with the Bedside Nurse and Patient.    Patient was staffed with Dr. Durand.     Ariana Justin  Medicine/pediatrics PGY-4  Pager [phone]    ______________________________________________________________________    Interval History   The patient continues to have some on-going abdominal pain in the epigastric region, but notes that it is better with scheduled pain medications. She reports that she hears different plans from different providers and is okay going through a summary of her hospitalization. This examiner outlines why she was in the hospital as well as the recent CT findings. Reviewed the heme/onc team evaluation that she is not an optimal candidate for chemotherapy. Palliative care team saw the patient and recommended a care conference with the patient's niece. Plan to call niece this afternoon to further discuss her aunt and potential care plan options.     Data reviewed today: I reviewed all medications, new labs and imaging results over the last 24 hours. I personally reviewed no images or EKG's today    Physical  Exam   Vital Signs: Temp: 97.3  F (36.3  C) Temp src: Oral BP: 124/59   Heart Rate: 77 Resp: 18 SpO2: 94 % O2 Device: Nasal cannula Oxygen Delivery: 2.5 LPM  Weight: 346 lbs 1.96 oz    General: Patient lying in bed, appears calm. Face appears slightly flushed.   HEENt: PERRL, clear sclera.  Resp: Diminished breath sounds overall. No focal findings. Breathing comfortably.   CV: distant sounds. RRR, no m/r/g.   Abd: Soft, non-distended, mildly tender to palpation in the epigastric area without guarding.  Ext: ~1+ pitting edema of LEs to the knee.   Neuro: Alert. Conversant. Answers questions appropriately.   Psych: flat affect.     Data   Recent Labs   Lab 12/21/18  0357 12/20/18  1605 12/20/18  0909 12/20/18  0449  12/19/18  0346 12/18/18  0944   WBC  --   --   --  13.4*  --  14.7* 16.3*   HGB  --   --   --  9.3*  --  9.1* 9.1*   MCV  --   --   --  95  --  96 95   PLT  --   --   --  227  --  219 226     --   --  134  --  136  --    POTASSIUM 3.6 4.4 3.3* 3.3*   < > 3.2*  --    CHLORIDE 96  --   --  97  --  98  --    CO2 30  --   --  28  --  30  --    BUN 5*  --   --  5*  --  4*  --    CR 0.38*  --   --  0.42*  --  0.39*  --    ANIONGAP 7  --   --  10  --  8  --    DEANDRE 9.6  --   --  9.4  --  9.4  --    *  --   --  100*  --  105*  --    ALBUMIN 2.0*  --   --  2.0*  --  2.0*  --     < > = values in this interval not displayed.

## 2018-12-21 NOTE — PLAN OF CARE
AVSS on 2.5 L nasal cannula. A&Ox4. Quiet. Taking sched tramadol for abd pain management. Regular diet, good PO intake, denies nausea. Purewick cath in place for urine incontinence, adequate UOP. Pt is incontinent of stool, had x3 BMs this evening. Putting barrier cream on after incontinence care to protect wound on buttocks. Scant bleeding noted when wiping. Assist of 2 w/ cares, pt able to turn side to side minimally. PIV SL. Continue to monitor pain. Continue w/ POC.

## 2018-12-21 NOTE — PLAN OF CARE
A&Ox4. AVSS on 2.5 L O2 NC. BIPAP on overnight. Abdominal pain is managed with scheduled tramadol and PRN oxycodone (2.5mg). Regular diet; carb counts; denies nausea. Purewick catheter in place for urinary incontinence. Purewick was last changed at 0500. Adequate UOP. Incontinent of stool, had x2 loose BMs this shift. Barrier cream applied after incontinence care to protect wound on buttocks. Scant bleeding noted when wiping. Turning/repositioning patient every two hours. Assist x2 w/ cares; able to turn side to side minimally. PIV - SL. Continue to monitor pain. Care team is trying to find a LTC hospice placement near where she lives in [place], [place]. Continue w/ POC.

## 2018-12-22 NOTE — PROGRESS NOTES
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 Presenting with sepsis secondary to obstructive pneumonia suggested on CT and admitted for management of sepsis and acute hypoxic respiratory failure. In addition, she had significant delirium, that was multifactorial but with likely the most significant contributor being hypercalcemia; mentation and calcium improved with IV fluids, calcitonin and a dose of zoledronic acid. On 12/18 had abdominal pain and diarrhea with CT showing worsening burden of metastatic SCC.      # Abdominal pain  New and diffuse abdominal pain on 12/18. Abdominal CT 12/18 notable for increased burden of hepatic metastatic disease which is likely source. Mild stranding also noted around the pancreatic tail. Suspect pain is occurring in the setting of her cancer.   --addressing Metastatic disease as below.   --Palliave care consulted; recommendations appreciated  --Pain: Scheduled tramadol, with PRN oxycodone for breakthrough pain, Dexamethasone 4mg PO daily. Recommend use of pain medications prior to PT/OT therapies.     #Metastatic squamous carcinoma, unknown primary (pulmonary likely)  #Endometrial thickening, suspicious for malignancy  S/p biopsy of rib revealed metastatic squamous cell carcinoma of unknown primary but likely pulmonary source. PET scan 12/4/18 revealed L parotid mass, RUL mass with post-obstructive consolidation (high suspicion of pulmonary primary), endometrial thickening, mets involving R 4th rib, right T9 pedicle, bilateral iliac bones, bilateral femurs in addition to multiple hepatic lesions. Gynecology was consulted previously and felt it is unlikely that  the squamous cell carcinoma originated in the cervix, the patient has been up-to-date on her cervical cancer screening and had a pelvic exam with HPV testing as recently as 2 years ago.  They were unable to perform pelvic exam or endometrial biopsy at that time due to right leg pain and cannot bend her knee as a result of the femur fracture.   --Heme/Onc consulted; given new findings on abdominal CT from 12/18. See note by Dr. Pacheco dated 12/19 for full details; in brief given her poor functional status she is not a good candidate for chemotherapy. Hospice care was introduced--> patient is still thinking about this option. Appreciate SW assistance in looking into options for TCU.   -- Palliative care consulted 12/19 for symptomatic management as well as on-going goals of care discussions in the setting of her metastatic SCC.   -- Encouraging on-going therapy with PT/OT to maintain strength and function    #Toxic metabolic encephalopathy secondary to hypercalcemia- resolved  #Hypercalcemia secondary to malignancy - stable   Ionized calcium initially elevated. iCal 7.6. PTH appropriately suppresed (9/2018) in the setting of elevated PTH-rp (10/2018). Phos appropriately low. Low vitamin D. Last dose of denosumab on 10/29/18. S/p 2L NS at OSH. Additional 2L NS on 12/12. S/p Zolendronate 4mg IV x1 on 12/12. Ionized calcium stable 12/18  -- Endocrine consulted, apprec recs  -- Lasix 40mg PO BID daily.   --Trend iCal M,W,F or with changes in mentation    #Acute hypoxic respiratory failure secondary to CAP- resolving  #Acute on chronic hypercapneic respiratory failure - requires intermittent BiPAP  Oxygen desaturation documented to 82% on RA at OSH on 12/11. Required 4L NC initially. CT 12/11 concerning for post-obstructive PNA. CXR concerning for pulmonary edema (likely non-cardiogenic based on bedside US with IVC with good respiratory variation). VBG on 12/13,  pH 7.28/66 associated with mental status changes, thus  BIPAP was initiated while sleeping.   --Continuous pulse ox; keep sats > 90%  --Continue BiPAP while sleeping and nasal cannula when awake. Wean NC during the day as tolerated.    --s/p PNA treatment with Unasyn as below  --Lasix 40mg PO BID daily.      #Pathologic fracture s/p ORIF  Underwent open reduction/internal fixation, tumor curettage and excision, and biopsy of the bone on 10/16.  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  -- Ortho consult, apprec recs  --Ok to use андрей lift but lower strap needs to be in the popliteal fossa behind the knee. Do not use with strap at the mid or upper thigh  --50% weightbearing   --Continue physical therapy for range of motion, mobility, and transfers  --Plan for continued follow-up with Dr. Wilkinson a few weeks after discharge; or Dr. Anedrson at [facility]     #Sepsis 2/2 obstructive pneumonia vs. aspiration pneumonia- resolved  Qualified for Sepsis based on T 103F, 's, WBC 16.4, RR 22.  CTA chest imaging from OSH c/w post-obstructive pneumonia. Lactic acid 1.4. Volume resuscitated. Urine Cx with 10-50K Enterococcus faecalis. MRSA nares negative. BC NGTD. She was high risk for aspiration with altered mentation on presentation; thus aspiration PNA was on the differential for source of infection.   - s/p IV Vancomycin, Cefepime, Levaquin (12/12-12/14); IV Unasyn (12/14-12/18)     #Sinus Tachycardia-- resolved  Likely secondary to sepsis, intravascular depletion (as evidenced by bedside US on 12/12). CTA chest at OSH negative for PE but poor contrast study. EKG in sinus tachycardia rates 130-140. 12/13 Bedside US without signs of DVT.   -12/13 ECHO with no new anatomic abnormalities  -resumed home Metoprolol now that BP stabilized      #Diastolic heart failure  #Cor pulmonale   EF 60% but noted diastolic heart failure (4/27/18).   - PTA metoprolol 100mg PO BID    - Held PTA enalapril 5mg qday in the setting of possible infection, now BP  currently stable but will consider restarting while inpatient if BPs increase  - PTA furosemide 40mg PO BID   - Repeat ECHO with stable systolic function     #DMII   Last A1c 6.1. Has not had elevated blood glucose levels this admission. Will stop blood sugar checks.     #Hypophosphatemia  Anticipate hypophosphatemia and hypercalcemia may occur with PTH-rp driving electrolyte imbalance.   - resume PTA K phos BID from QID prior to admission   - Replace per electrolyte protocol     #Obesity hypoventilation syndrome  #Obstructive sleep apnea  -BiPAP as above     Diet: Regular Diet Adult  Snacks/Supplements Adult: Other; PRN; Between Meals    Fluids: NS as above  Lines: PIV  DVT Prophylaxis: Enoxaparin (Lovenox) SQ  Bravo Catheter: not present  Code Status: DNR/DNI      Disposition Plan   Expected discharge: 4 - 7 days, recommended to TCU verses other program such as hospice once adequate pain management/ tolerating PO medications and establish safe discharge option in the setting of metastatic cancer.    .  Entered: Ariana Chung MD 12/22/2018, 6:53 AM     The patient's care was discussed with the Patient.    Patient was staffed with Dr. Durand.     Ariana Chung  Medicine/pediatrics PGY-4  Pager [phone]    ______________________________________________________________________    Interval History   Nursing notes were reviewed.  Patient's abdominal pain was managed with scheduled tramadol overnight.   has been visiting the patient attempting to find appropriate placement options. See note by Geri Campbell dated 12/21 for full details.     Patient noting on-going abdominal pain today-- the same as yesterday. Notes that she has to have her scheduled medications on time or her pain is intolerable. Still able to take PO and had apple sauce today. She has been hesitant to work with therapy to move out of bed due to concern that she is waiting for a doctor to tell her to do so. This examiner as  well as Dr. Durand ensured patient that working with physical and occupational therapy would be helpful in maintaining her strength and that she should participate. She noted that she would be willing to use a lift to the chair and work on therapy. No other acute concerns listed today. Visited by a friend/family member at the bedside today.     Data reviewed today: I reviewed all medications, new labs and imaging results over the last 24 hours. I personally reviewed no images or EKG's today    Physical Exam   Vital Signs: Temp: 97  F (36.1  C) Temp src: Oral BP: 118/52   Heart Rate: 78 Resp: 18 SpO2: 100 % O2 Device: Nasal cannula Oxygen Delivery: 2 LPM  Weight: 346 lbs 1.96 oz    General: Patient lying in bed, appears calm. Face appears slightly flushed. Wearing nasal canula.   HEENt: PERRL, clear sclera.  Resp: Diminished breath sounds overall. No focal findings. Breathing comfortably.   CV: Distant sounds. RRR, no m/r/g.   Abd: Soft, non-distended, mildly tender to palpation in the epigastric area without guarding.  Ext: ~1+ pitting edema of LEs to the knee.   Neuro: Alert. Conversant. Answers questions appropriately.   Psych: flat affect.     Data   Mg 1.8  Phos 2.4  Glucose 101-127

## 2018-12-22 NOTE — PLAN OF CARE
Status: Pt on 7C w/ sepsis w/ fever leukocytosis secondary to post-obstructive pneumonia per MD note.   VS: Stable  Neuros: Intact, A&Ox4, lethargic affect. BUE 3/5, BLE 2/5.  GI: Reg diet, carb counting. Fair intake this shift.   : Purewick in place for urinary incontinence, good UOP. ?  IV: PIV SL.  Activity: Up w/ 2, mechanical lift. Turn/repo q2.  Pain: Controlled with scheduled toradol, declined oxy this shift.   Respiratory/Trach: 2LPM by NC when awake, bipap at night.   Skin: Mepilex on coccyx, seam skin tear. Barrier cream and miconazole powder applied as indicated.  Social: No visitors this shift. See SW note regarding alternative decision maker.    Plan of care: Continue to monitor and follow POC.

## 2018-12-22 NOTE — PLAN OF CARE
OT 7C: cancel and defer, per chart review and discussion with physical therapy, pt is planning on discharging to long term hospice facility with full cares provided. Not appropriate to initiate IP therapy, will complete orders.

## 2018-12-22 NOTE — PLAN OF CARE
A&Ox4. AVSS on 2L O2 NC; BIPAP on overnight. Abdominal pain is managed with scheduled tramadol. Regular diet; carb counts; denies nausea. Purewick catheter in place for urinary incontinence. Adequate UOP. Inner cannister changed around 0600. Incontinent of stool (no loose stools overnight); barrier cream & miconazole powder applied after incontinence care to protect wound on buttocks; mepilex in place on coccyx. Turning/repositioning patient every two hours. Assist x2 w/ cares; able to turn side to side minimally. PIV - SL. Care team is trying to find a LTC hospice placement near where she lives in [place], [place]. Continue w/ POC.

## 2018-12-22 NOTE — PLAN OF CARE
"/61 (BP Location: Left arm)   Pulse 86   Temp 97.5  F (36.4  C) (Oral)   Resp 16   Ht 1.676 m (5' 6\")   Wt (!) 157 kg (346 lb 2 oz)   SpO2 96%   BMI 55.87 kg/m      VSS. A &O x3, 2L NC sating 96%. Bipap at night. Takes meds with apple sauce. Voiding per pure wick. No BM. Ate late breakfast, done eating around 11am. Abdomen pain fair relief with sched Toradol.and prn tylenol. Needs mag replaced, waiting for med to come up from pharmacy  "

## 2018-12-22 NOTE — PLAN OF CARE
PT / 7C - Per chart review and discussion with RN and OT - plan is for pt to discharge to long term hospice facility with full cares provided. PT to complete orders at this time.

## 2018-12-23 NOTE — PLAN OF CARE
Pt. Quiet , withdrawn. Answers when spoken to. Zuly. Reg. Diet in sm. Amounts , states not hungry. Turned q 2 hr. Mepilex intact on coccyx. Pily. Tramadol, Tyl.and Oxy for pain , pt. States pain increases when on her R side. Purewick in place for incontinence with good output. Purewick changed. No stools this shift. Pt states therapy will not change discharge plan and pt. Has refused multiple times. Would need an order to reinstate, Mg. And Phos need replacing, ordered.

## 2018-12-23 NOTE — PROGRESS NOTES
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 Presenting with sepsis secondary to obstructive pneumonia suggested on CT and admitted for management of sepsis and acute hypoxic respiratory failure. In addition, she had significant delirium, that was multifactorial but with likely the most significant contributor being hypercalcemia; mentation and calcium improved with IV fluids, calcitonin and a dose of zoledronic acid. On 12/18 had abdominal pain and diarrhea with CT showing worsening burden of metastatic SCC.      # Abdominal pain  New and diffuse abdominal pain on 12/18. Abdominal CT 12/18 notable for increased burden of hepatic metastatic disease which is likely source. Mild stranding also noted around the pancreatic tail. Suspect pain is occurring in the setting of her cancer.   --addressing Metastatic disease as below.   --Palliave care consulted; recommendations appreciated  --Pain: Scheduled tramadol, with PRN oxycodone for breakthrough pain, Dexamethasone 4mg PO daily. Recommend use of pain medications prior to PT/OT therapies.     #Metastatic squamous carcinoma, unknown primary (pulmonary likely)  #Endometrial thickening, suspicious for malignancy  --Heme/Onc consulted; given new findings on abdominal CT from 12/18. See note by Dr. Pacheco dated 12/19 for full details; in brief given her poor functional status she is not a good candidate for chemotherapy. Hospice care was introduced--> patient is still thinking about this option. Appreciate SW assistance in looking into options dispo   -- Palliative care consulted 12/19 for symptomatic management as well as on-going goals of care  discussions in the setting of her metastatic SCC.     #Toxic metabolic encephalopathy secondary to hypercalcemia- resolved  #Hypercalcemia secondary to malignancy - stable   Ionized calcium initially elevated. iCal 7.6. PTH appropriately suppresed (9/2018) in the setting of elevated PTH-rp (10/2018). Phos appropriately low. Low vitamin D. Last dose of denosumab on 10/29/18. S/p 2L NS at OSH. Additional 2L NS on 12/12. S/p Zolendronate 4mg IV x1 on 12/12. Ionized calcium stable 12/18  -- Endocrine consulted, apprec recs  -- Lasix 40mg PO BID daily.   --Trend iCal M,W,F or with changes in mentation    #Acute hypoxic respiratory failure secondary to CAP- resolving  #Acute on chronic hypercapneic respiratory failure - requires intermittent BiPAP  Oxygen desaturation documented to 82% on RA at OSH on 12/11. Required 4L NC initially. CT 12/11 concerning for post-obstructive PNA. CXR concerning for pulmonary edema (likely non-cardiogenic based on bedside US with IVC with good respiratory variation). VBG on 12/13,  pH 7.28/66 associated with mental status changes, thus BIPAP was initiated while sleeping.   --Continuous pulse ox; keep sats > 90%  --Continue BiPAP while sleeping and nasal cannula when awake. Wean NC during the day as tolerated.    --s/p PNA treatment with Unasyn as below  --Lasix 40mg PO BID daily.      #Pathologic fracture s/p ORIF  Underwent open reduction/internal fixation, tumor curettage and excision, and biopsy of the bone on 10/16.  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  -- Ortho consult, apprec recs  --Ok to use андрей lift but lower strap needs to be in the popliteal fossa behind the knee. Do not use with strap at the mid or upper thigh  --50% weightbearing   --Continue physical therapy for range of motion, mobility, and transfers  --Plan for continued follow-up with Dr. Wilkinson a few weeks after discharge; or [facility] at [facility]     #Sepsis 2/2 obstructive  pneumonia vs. aspiration pneumonia- resolved     #Sinus Tachycardia-- resolved     #Diastolic heart failure  #Cor pulmonale   EF 60% but noted diastolic heart failure (4/27/18).   - PTA metoprolol 100mg PO BID    - Held PTA enalapril 5mg qday in the setting of possible infection, now BP currently stable but will consider restarting while inpatient if BPs increase  - PTA furosemide 40mg PO BID   - Repeat ECHO with stable systolic function     #DMII   Last A1c 6.1. Has not had elevated blood glucose levels this admission. Will stop blood sugar checks.     #Hypophosphatemia  Anticipate hypophosphatemia and hypercalcemia may occur with PTH-rp driving electrolyte imbalance.   - resume PTA K phos BID from QID prior to admission   - Replace per electrolyte protocol     #Obesity hypoventilation syndrome  #Obstructive sleep apnea  -BiPAP as above     Diet: Regular Diet Adult  Snacks/Supplements Adult: Other; PRN; Between Meals    Fluids: None  Lines: PIV  DVT Prophylaxis: Enoxaparin (Lovenox) SQ  Bravo Catheter: not present  Code Status: DNR/DNI      Disposition Plan   Expected discharge: 2 - 3 days, recommended to TCU verses other program such as hospice once adequate pain management/ tolerating PO medications and establish safe discharge option in the setting of metastatic cancer.    .  Entered: Hailey Durand MD 12/23/2018, 11:50 AM     The patient's care was discussed with the Patient.    Hailey Durand MD    ______________________________________________________________________    Interval History   No complaints this am.  She recently had her pain meds so her abd pain is under control.  Hopes to get OOB to chair today.  No N/V, chest pain    Data reviewed today: I reviewed all medications, new labs and imaging results over the last 24 hours. I personally reviewed no images or EKG's today    Physical Exam   Vital Signs: Temp: 97.9  F (36.6  C) Temp src: Axillary BP: 113/53 Pulse: 69 Heart Rate: 76  Resp: 15 SpO2: 96 % O2 Device: BiPAP/CPAP Oxygen Delivery: 2 LPM  Weight: 346 lbs 1.96 oz    General: Patient lying in bed, appears calm. Face appears slightly flushed. Wearing nasal canula.   HEENt: PERRL, clear sclera.  Resp: Diminished breath sounds overall. No focal findings. Breathing comfortably.   CV: Distant sounds. RRR, no m/r/g.   Abd: Soft, non-distended, mildly tender to palpation in the epigastric area without guarding.  Ext: ~1+ pitting edema of LEs to the knee.   Neuro: Alert. Conversant. Answers questions appropriately.   Psych: flat affect.     Data

## 2018-12-23 NOTE — PLAN OF CARE
HD12 post obstructive pneumonia. A&Ox4, VSS on 2 LPM NC. LSC but diminished, BS hypoactive, Purewick in place and running at 100mmhg. Adequate UO. On a regular diet, aspiration precautions. Meds given crushed if large with applesauce. On a pulsate mattress, turned Q2H. Passing gas, LBM 12/22. Continuous pulse ox on, bipap at night. Mg++ replaced, recheck ordered for am. Phos replaced, recheck ordered for am. Continue with plan of care.

## 2018-12-23 NOTE — PLAN OF CARE
A&Ox4. AVSS on 2L O2 NC; BIPAP on overnight. Abdominal pain is managed with scheduled tramadol, PRN oxycodone, and PRN Tylenol. Regular diet; carb counts; denies nausea. Purewick catheter in place for urinary incontinence. Adequate UOP. Inner cannister changed around 0600. Incontinent of stool (no loose stools overnight); barrier cream applied after incontinence care to protect wound on buttocks; mepilex in place on coccyx. Turning/repositioning patient every two hours. Assist x2 w/ cares; able to turn side to side minimally. PIV - SL. Care team is trying to find a TCU/ LTC hospice placement near where she lives in [place], [place]. Continue w/ POC.

## 2018-12-24 NOTE — PLAN OF CARE
Oriented to person and place. VSS on 3 LPM NC forgetful at times. Pain is controlled with PO tylenol and tramadol. On a regular diet, Aspiration precautions in place, HOB at 90 for all PO intake. Purewick used for UO, changed this AM. Adequate UO, however changed linens several times due to dispositioning of catheter. Lift sheet present. On a pulsate mattress, turned Q2H, LS diminished, unable to perform IS. Interdry and miconazole to folds. Mg++ replaced, Phos to be replaced, ordered. BS+ passing gas, LBM 12/23. Plan is to discharge to TCU or hospice post ricky.     MD expressed interest in getting patient up into chair. RN staff does not feel comfortable getting patient into chair without therapy due to concern of safey in relation to deconditioning.

## 2018-12-24 NOTE — PROGRESS NOTES
Endocrine follow up note:    A/P:  Summary:  Mayra Stokes is a 63-year-old female with history of hypertension, hyperlipidemia, type 2 diabetes, cor pulmonale, recently diagnosed squamous cell carcinoma with unclear primary, transferred to [facility] 12/11/18 after presentation to OSH with AMS and identification of post obstructive PNA. Recurrence of prior (~Oct 2018) severe hypercalcemia attributed to PTHrp was also identified this admission thus endocrine was re-consulted for further assistance with management. See initial consult note from prior admission dated 10/26/18.    Hypercalcemia, PTHrP mediated   PTHrP elevated to 81.2 10/26/18 in the setting of metastatic squamous cell cancer of unknown primary. Serum calcium (uncorrected) as high as 13.3mg/dl was identified during pt's prior hospitalization Oct 2018 for R femoral fracture. She had received multiple doses of pamindronate prior to that hospitalization, with calcium downtrending significantly only after denosumab 120mg was administered during her inpatient stay.    Upon transfer to [facility] 12/12/18, serum calcium uncorrected was 13.0mg/dl with iCal of 7.6mg/dL. She had been receiving fluids as able and received 4mg zoledronic acid at 2am 12/12/18. Given expected delay in onset of action of bisphosphonate and degree of hypercalcemia, she received 600mg (~4mg/kg) calcitonin 12/12/18 1830, 12/13/18 0458 and 12/13/18 1208. Calcium had subsequently downtrended to the normal range, but as of 12/24/18 significant increase in ionized calcium to 6.0mg/dl was identified.     Recommendations:  -one dose 120mg denosumab subcutaneous   -at least weekly monitoring labs: BMP, albumin (for correction of serum calcium), Mg, phosphorous. Would not anticipate post denosumab hypercalcemia but would recommend monitoring given high risk for recurrence of hypercalcemia  -in the future, could consider alternating zoledronic acid and denosumab dosing (Each can be dosed every ~4 weeks to  "treat hypercalcemia of malignancy. If needed for recurrent hypercalcemia could consider a dose of zoledronic acid 2 weeks post denosumab dose, repeat dose of denosumab 2 weeks after zoledronic acid and 4 weeks after prior denosumab, and so on)     ---    Subjective/IEs    Pt reports feeling warm. She does not report change in how she feels.     O:  /63 (BP Location: Left arm)   Pulse 100   Temp 98.9  F (37.2  C) (Axillary)   Resp 20   Ht 1.676 m (5' 6\")   Wt (!) 157 kg (346 lb 2 oz)   SpO2 95%   BMI 55.87 kg/m      Gen: obese female, lying in bed, appears diaphoretic    Results for BEAU DE (MRN [id number]) as of 12/24/2018 11:38   Ref. Range 12/20/2018 04:49 12/20/2018 16:05 12/21/2018 03:57 12/24/2018 07:36   Calcium Ionized Latest Ref Range: 4.4 - 5.2 mg/dL 5.1 5.3 (H) 4.9 6.0 (H)       The pt was staffed with Dr. Riddle.    Katy Garza MD  Endocrine Fellow   Pager [phone]      "

## 2018-12-24 NOTE — PROGRESS NOTES
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     Social:              Support system: ne Braryn       Functional status:     Coping: when I asked her how she was doing emotionally with everything going on, she responded \"sick and hurt\". She also agreed that she was frustrated.    Prognostic Information: discussed her disease being spread and that it cant get treatment, and discussed she has limited time.     Advance Care Planning:          Decision making capacity: she was not able to verbalize her options and would say multiple times \"i dont know\", despite being told directly her 2 options she is not able to repeat them back, and does not have the ability thus to describe a choice. She would benefit from family assistance with decisions.       Disease understanding: she seemed to have understanding after being probed, she understands she has cancer, that it has spread, and when asked what they can do for her cancer she said \"not much\"       Goals of Care: currently she is being faced with a decision. She said multiple times that she did not know her options despite multiple efforts to discuss them with her. She said that she wants to make her own decisions but she is not able to express what she is making a decision about. She did have a father on hospice who  at home when she was 17. Discussed hospice and places " people receive care outside of the hospital.         Health care directive: None in chart       Health care agent: per next of kin       Code Status:  DNR / DNI       POLST There is no POLST (Physician orders for life-sustaining treatment) form on file for this patient    These recommendations have been discussed with primary team.    EFFIE Evans CNS  Palliative Care Consult Team  Pager: [phone]    [facility] Inpatient Team Consult pager [phone] (M-F 8-4:30)  After-hours Answering Service [phone]   Palliative Clinic: [phone]       Interval History:      Notes reviewed, ongoing management of hypercalcemia, resting in bed, drowsy.     Medications:   I have reviewed this patient's medication profile and medications during this hospitalization    Dexamethasone 4 mg daily  Tramadol 100 mg q6h  Acetaminophen PRN- x1  Oxycodone 2.5-5 mg q6h PRN - x 7.5 mg          Review of Systems:   A comprehensive ROS has been negative other than stated in the HPI and below:   Palliative Symptom Review (0=no symptom/no concern, 1=mild, 2=moderate, 3=severe):      Pain: 0      Fatigue: 0      Nausea: 0      Constipation: 0      Diarrhea: 0      Depressive Symptoms: 0      Anxiety: 0      Drowsiness: 2      Poor Appetite: 0      Shortness of Breath: 0      Insomnia: 0      Other: 0      Overall (0 good/no concerns, 3 very poor):  2          Physical Exam:     Vital Signs: Temp: 98.9  F (37.2  C) Temp src: Axillary BP: 129/63 Pulse: 100 Heart Rate: 95 Resp: 20 SpO2: 95 % O2 Device: Nasal cannula Oxygen Delivery: 3 LPM  Weight: 346 lbs 1.96 oz    Physical Exam:  Constitutional: drowsy, seen laying in bed, no apparent distress  Lungs: No increased work of breathing on NC  Neurologic: drowsy, oriented to self, able to wake easily to voice, responds to questions   Neuropsychiatric: flat, difficulty with memory   Skin: No rashes, erythema, pallor, petechia or purpura.    Data Reviewed:       CMP  Recent Labs   Lab  12/24/18  0736 12/23/18  0715 12/22/18  1258 12/22/18  0707 12/21/18  0357 12/20/18  1605 12/20/18  0909 12/20/18 0449 12/19/18 0346 12/18/18 0412   NA  --   --   --   --  134  --   --  134  --  136 135   POTASSIUM  --   --   --   --  3.6 4.4 3.3* 3.3*   < > 3.2* 4.1   CHLORIDE  --   --   --   --  96  --   --  97  --  98 102   CO2  --   --   --   --  30  --   --  28  --  30 28   ANIONGAP  --   --   --   --  7  --   --  10  --  8 6   GLC  --   --   --   --  109*  --   --  100*  --  105* 91   BUN  --   --   --   --  5*  --   --  5*  --  4* 5*   CR  --   --   --   --  0.38*  --   --  0.42*  --  0.39* 0.40*   GFRESTIMATED  --   --   --   --  >90  --   --  >90  --  >90 >90   GFRESTBLACK  --   --   --   --  >90  --   --  >90  --  >90 >90   DEANDRE  --   --   --   --  9.6  --   --  9.4  --  9.4 9.5   MAG 2.0 1.9  --  1.8 2.1  --   --  2.0  --  1.9 1.8   PHOS 2.5 2.3* 2.4*  --   --   --  2.3*  --   --  2.2* 2.1*   ALBUMIN  --   --   --   --  2.0*  --   --  2.0*  --  2.0* 2.1*    < > = values in this interval not displayed.     CBC  Recent Labs   Lab 12/20/18 0449 12/19/18 0346 12/18/18  0944 12/18/18 0412   WBC 13.4* 14.7* 16.3* 14.3*   RBC 3.51* 3.44* 3.45* 3.37*   HGB 9.3* 9.1* 9.1* 8.9*   HCT 33.5* 33.0* 32.8* 32.7*   MCV 95 96 95 97   MCH 26.5 26.5 26.4* 26.4*   MCHC 27.8* 27.6* 27.7* 27.2*   RDW 19.0* 18.7* 18.2* 18.4*    219 226 246       EFFIE Evans CNS  Palliative Care Consult Team  Pager: [phone]    [facility] Inpatient Team Consult pager [phone] (M-F 8-4:30)  After-hours Answering Service [phone]  Palliative Clinic: [phone]     Total time spent was 35 minutes,  >50% of time was spent counseling and/or coordination of care regarding goals of care.

## 2018-12-24 NOTE — PLAN OF CARE
HD12 admitted with AMS, post obstructive pneumonia. A&Ox4, VSS on 2 LPM NC forgetful at times. Pain is controlled with PO oxycodone and tramadol. On a regular diet, Aspiration precautions in place, HOB at 90 for all PO intake. Purewick used for UO. Adequate UO, however changed linens x2 due to dispositioning of catheter. No lift sheet present, used sling to move in bed. On a pulsate mattress, turned Q2H, LSC but diminished, unable to perform IS. Interdry and miconazole to folds. Mg++ replaced, recheck ordered. Phos needs to be replaced, ordered. BS+ passing gas, LBM 12/22. Plan is to discharge to TCU in 2-3 days.

## 2018-12-24 NOTE — PLAN OF CARE
"/59 (BP Location: Right arm)   Pulse 77   Temp 98.2  F (36.8  C) (Axillary)   Resp 18   Ht 1.676 m (5' 6\")   Wt (!) 157 kg (346 lb 2 oz)   SpO2 95%   BMI 55.87 kg/m         Neuros: A&O x4, forgetful, slow to respond at times  GI: Tolerating reg diet, denies nausea  : Voiding with purewic in place   IV: PIV infusing TKO  Activity: Ax2 turn/repo q2hr, Ceiling lift at times  Pain: Controlled with scheduled Tramadol  Respiratory/Trach: Bipap in place, 2L during day   Skin: Interdry and miconazole to folds  Labs: Phos replaced, recheck mag an phos scheduled for am    Plan of care: Continue to monitor and follow plan of care     "

## 2018-12-24 NOTE — PROGRESS NOTES
Social Work Services Progress Note    Hospital Day: 13  Date of Initial Social Work Evaluation:  Not completed  Collaborated with:  Pt at bedside, bedside nurse, charge nurse.    Data:  Pt is 64 yo female readmitted to [facility] on 12/11/18 for sepsis following a TCU stay at [facility] in [place]. Initially, Pt and SW were planning for TCU rehab placement however, following additional information re: metastatic squamous carcinoma of unknown primary and per notes Pt is not a good candidate for chemotherapy. Team discussed hospice care with Pt and she is still considering this option. Per discussion with Pt this AM, Pt would still like more information re: hospice cares vs continued rehab therapies. Pt continues to present highly lethargic but responsive and following conversation. It is still somewhat unclear of Pt's insight to prognosis and discharge planning.     SW explained the hospice philosophy, and the focus on pain management, symptom control and end of life decision making. SW explained insurance coverage for hospice services and equipment. SW explained to Pt that if she required SNF placement - hospice does not cover room/board for LTC. SW explained that hospice is a supplemental service that can come to wherever she is; and support her in living out the remainder of her life, however much she has left, the way she wants to. SW did explain to Pt that she would require a 24 hr caregiver for hospice services to be carried out at home. Pt requires 24 hr skilled nursing care at this time and Pt reported she does not have someone available to be her 24 hr caregiver. Pt expressed understanding that skilled nursing placement is recommended at this time; however the question of GOC continues - TCU (rehab placement) vs LTC (long-term or hospice). Per previous planning, pt has limited TCU options in surrounding [place], [place] area d/t amount of needs and expected long LOS.     Pt verbalized desire to try therapy today to  "see if she can tolerate and wishes to continue vs altering plan of care to comfort/hospice. SW discussed looking for SNF placement near [place] vs [facility]. Pt exhibited some confusion in stating \"[place] is so far away.\" SW reminded Pt she is in [place] at [facility] and patient stated \"oh yes that's right.\" When discussing transferring to a facility near [place], [place] Pt expressed concern with the distance being \"too long and uncomfortable.\" Pt stated she has friends in [place] area. SW offered a list of SNF facilities to Pt, Pt declined stating she does not have a preference. SW offered to review facilities near where her friends live in the [facility] and Pt declined. Pt is still open to rehab vs hospice pending clinical course.     SW explained to Pt that per her medical team she is medically ready to transfer to the next level of care - SNF placement (pending rehab vs hospice vs jail care) and expressed the importance of making a decision regarding GOC and discharge planning d/t unable to warrant continued insurance coverage for hospital stay. Due to the holiday and time of day, referrals to be sent on 12/26/18 for review.     Intervention:  Discharge planning    Assessment:  Pt was open to SW visit and conversation re: discharge planning and hospice education. Pt presents highly lethargic with eyes partially closed throughout conversation, but when prompted Pt responded appropriately. Pt responded and would nod yes/no appropriately as well following the conversation. Pt appeared to express understanding that nursing facility placement is recommended at this time and home is not a safe option. SW explained that rehab/TCU may not be an appropriate plan if Pt is unable to tolerate therapies or if prognosis and mobility continues to worsen. Pt appears to be understanding that Pt may be private pay for SNF placement pending rehab tolerance and plan of care. Pt continues to be " her own decision maker at baseline and no current concerns per medical team re: capacity at this time.     Plan:    Anticipated Disposition:  Facility:  TBD    Barriers to d/c plan:  GOC and SNF placement.    Follow Up:  SW to f/u & assist as needed.    LORENA Hurtado, LGSW   Surgical/Oncology Unit   Phone: [phone]  Pager: [phone]

## 2018-12-24 NOTE — PROGRESS NOTES
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 Presenting with sepsis secondary to obstructive pneumonia and admitted for sepsis and acute hypoxic respiratory failure. In addition, she had significant delirium, that was multifactorial but with likely the most significant contributor being hypercalcemia; mentation and calcium improved with IV fluids, calcitonin and a dose of zoledronic acid. On 12/18 had abdominal pain and diarrhea with CT showing worsening burden of metastatic SCC. Today with elevated ionized calcium and appears tired on exam.      # Abdominal pain (diffuse)  Abdominal CT 12/18 notable for increased burden of hepatic metastatic disease which is likely source. Mild stranding also noted around the pancreatic tail. Suspect pain is occurring in the setting of her cancer.   --addressing Metastatic disease as below.   --Palliave care consulted; recommendations appreciated  --Pain: Scheduled tramadol, with PRN oxycodone for breakthrough pain, Dexamethasone 4mg PO daily. Recommend use of pain medications prior to PT/OT therapies.     #Metastatic squamous carcinoma, unknown primary (pulmonary likely)  #Endometrial thickening, suspicious for malignancy  --Heme/Onc consulted; given new findings on abdominal CT from 12/18. See note by Dr. Pacheco dated 12/19 for full details; in brief given her poor functional status she is not a good candidate for chemotherapy. Hospice care was introduced--> patient is still thinking about this option. Primary team discussing this option. At this point has not made decision regarding hospice. Outlined options regarding TCU with rehab verses hospice. Will reach  out to shawn to update. Consider care conference later this week and will continue to address this with the patient; if she is unable to decide will consider asking next of kin. Appreciate SW assistance in looking into dispo options.    -- Palliative care consulted 12/19 for symptomatic management as well as on-going goals of care discussions in the setting of her metastatic SCC.     #Toxic metabolic encephalopathy secondary to hypercalcemia  #Hypercalcemia secondary to malignancy   Ionized calcium initially elevated. iCal 7.6. PTH appropriately suppresed (9/2018) in the setting of elevated PTH-rp (10/2018). Phos appropriately low. Low vitamin D. Last dose of denosumab on 10/29/18. S/p 2L NS at OSH. Additional 2L NS on 12/12. S/p Zolendronate 4mg IV x1 on 12/12. 600mg (~4mg/kg) calcitonin 12/12/18 1830, 12/13/18 0458 and 12/13/18 1208. Ionized calcium increasing today.   -- Endocrine consulted, apprec recs  -- Lasix 40mg PO BID daily. Restart IV fluids with NS at 100cc/hr  -- Plan for one dose 120mg denosumab subcutaneous   -- At least weekly monitoring labs (or more frequent with changes in mental status): BMP, albumin (for correction of serum calcium), Mg, phosphorous.  -- -in the future, consider alternating zoledronic acid and denosumab dosing (Can be dosed every ~4 weeks to treat hypercalcemia of malignancy. If needed for recurrent hypercalcemia could consider a dose of zoledronic acid 2 weeks post denosumab dose, repeat dose of denosumab 2 weeks after zoledronic acid and 4 weeks after prior denosumab, and so on)     #Acute hypoxic respiratory failure secondary to CAP- resolved  #Acute on chronic hypercapneic respiratory failure - requires intermittent BiPAP  Oxygen desaturation documented to 82% on RA at OSH on 12/11. Required 4L NC initially. CT 12/11 concerning for post-obstructive PNA. CXR concerning for pulmonary edema (likely non-cardiogenic based on bedside US with IVC with good respiratory variation).  VBG on 12/13,  pH 7.28/66 associated with mental status changes, thus BIPAP was initiated while sleeping.   --Continuous pulse ox; keep sats > 90%  --Continue BiPAP while sleeping  --s/p PNA treatment with Unasyn as below  --Lasix 40mg PO BID daily.      #Pathologic fracture s/p ORIF  Underwent open reduction/internal fixation, tumor curettage and excision, and biopsy of the bone on 10/16.  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  -- Ortho consult, apprec recs  --Ok to use андрей lift but lower strap needs to be in the popliteal fossa behind the knee. Do not use with strap at the mid or upper thigh  --50% weightbearing   --Continue physical therapy for range of motion, mobility, and transfers  --Plan for continued follow-up with Dr. Wilkinson a few weeks after discharge; or [facility]. Anderson at [facility]     #Sepsis 2/2 obstructive pneumonia vs. aspiration pneumonia- resolved     #Sinus Tachycardia-- resolved     #Diastolic heart failure  #Cor pulmonale   EF 60% but noted diastolic heart failure (4/27/18).   - PTA metoprolol 100mg PO BID    - Held PTA enalapril 5mg qday in the setting of possible infection, now BP currently stable but will consider restarting while inpatient if BPs increase  - PTA furosemide 40mg PO BID   - Repeat ECHO with stable systolic function     #DMII   Last A1c 6.1. Has not had elevated blood glucose levels this admission. Will stop blood sugar checks.     #Hypophosphatemia  Anticipate hypophosphatemia and hypercalcemia may occur with PTH-rp driving electrolyte imbalance.   - resume PTA K phos BID from QID prior to admission   - Replace per electrolyte protocol     #Obesity hypoventilation syndrome  #Obstructive sleep apnea  -BiPAP as above     Diet: Regular Diet Adult  Snacks/Supplements Adult: Other; PRN; Between Meals    Fluids: None  Lines: PIV  DVT Prophylaxis: Enoxaparin (Lovenox) SQ  Bravo Catheter: not present  Code Status: DNR/DNI      Disposition Plan    Expected discharge: 2 - 3 days, recommended to TCU verses other program such as hospice once adequate pain management/ tolerating PO medications and establish safe discharge option in the setting of metastatic cancer.    .  Entered: Ariana Chung MD 12/24/2018, 7:23 AM     The patient's care was discussed with the Patient.    Patient was staffed with MD Ariana Short  Medicine/pediatrics PGY-4  Pager [phone]    ______________________________________________________________________    Interval History   Still with some abdominal pain today. Notes that it is diffuse. Reports that scheduled pain medications do provide some relief. Continues to pass BM. No She wants to know more about hospice options, but she has not decided that she would like to yet pursue comfort/hospice. Care team discussed at the bedside the option of doing PT/OT and transferring to TCU for on-going rehab verses entering a program such as hospice in the setting of her cancer diagnosis. Patient would like more information and thus care team informed her that we would request palliative to come by. Asked if it was okay to update her shawn; she is amenable to this.     Called and updated Radha by phone. Discussed that care team will continue to address disposition options with Mayra, but if Mayra has difficulty with making a decision then will continue to ask Mayra if deferring or asking for family input into the decision would be helpful. Shawn reported that she would be available by phone 12/26 and also will be returning to [facility] on 12/29 at which time if Mayra was hospitalized she would try to visit.     Data reviewed today: I reviewed all medications, new labs and imaging results over the last 24 hours. I personally reviewed no images or EKG's today    Physical Exam   Vital Signs: Temp: 98.2  F (36.8  C) Temp src: Axillary BP: 121/59 Pulse: 77 Heart Rate: 95 Resp: 18 SpO2: 96 % O2 Device: BiPAP/CPAP     Weight: 346 lbs 1.96 oz    General: Patient lying in bed, appears calm. Face appears slightly flushed. Appears tired. Intermittently closes her eyes during conversation.    HEENt: PERRL, clear sclera.  Neck: `1-2cm palpable non-tender fixed node in the left neck.   Resp: Diminished breath sounds overall. No focal findings. Breathing comfortably.   CV: Distant sounds. RRR, no m/r/g.   Abd: Soft, non-distended, mildly tender throughout the abdomen.  Ext: ~1+ pitting edema of LEs to the knee.   Neuro: Alert. Conversant.   Psych: flat affect.     Data   Mg 2.0  Phos 2.5  I sabrina 6.0

## 2018-12-25 NOTE — PROVIDER NOTIFICATION
Notified team that of decreased orientation. Pt is A&O to name only. This is a marked change since I last saw her on 12/23/18 when she was A&Ox4.

## 2018-12-25 NOTE — PROGRESS NOTES
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 Presenting with sepsis secondary to obstructive pneumonia; she admitted for this and acute hypoxic respiratory failure. In addition, she had significant delirium, that was multifactorial but with likely the most significant contributor being hypercalcemia; mentation and calcium previously improved with IV fluids, calcitonin and a dose of zoledronic acid. On 12/18 had abdominal pain and diarrhea with CT showing worsening burden of metastatic SCC. On 12/24 hypercalcemia began to worsen and as well as patient's mental status.     #Toxic metabolic encephalopathy secondary to hypercalcemia  #Hypercalcemia secondary to malignancy   Ionized calcium initially elevated. iCal 7.6. PTH appropriately suppresed (9/2018) in the setting of elevated PTH-rp (10/2018). Phos appropriately low. Low vitamin D. Last dose of denosumab on 10/29/18. S/p Zolendronate 4mg IV x1 on 12/12. 600mg (~4mg/kg) calcitonin 12/12/18 , 12/13/18 and 12/13/18. Hypercalcemia worsening, suspect in setting of underlying malignancy.   -- Endocrine consulted, apprec recs  -- Lasix 40mg PO BID daily.   -- Give bolus of 1L NS, continue IV fluids with NS at 100cc/hr  -- Plan for one dose 120mg denosumab subcutaneous when available  -- Holding off on Calcitonin for now, but will readdress as needed.  -- Monitoring calcium BID  -- in the future, consider alternating zoledronic acid and denosumab dosing     # Abdominal pain (diffuse)  Abdominal CT 12/18 notable for increased burden of hepatic metastatic disease which is likely source. Mild stranding also noted around the pancreatic tail. Suspect pain is  occurring in the setting of her cancer.   --addressing Metastatic disease as below.   --Palliave care consulted; recommendations appreciated  --Pain: Scheduled tramadol, with PRN oxycodone for breakthrough pain, Dexamethasone 4mg PO daily. Recommend use of pain medications prior to PT/OT therapies.     #Metastatic squamous carcinoma, unknown primary (pulmonary likely)  #Endometrial thickening, suspicious for malignancy  --Heme/Onc consulted; given new findings on abdominal CT from 12/18. See note by Dr. Pacheco dated 12/19 for full details; in brief given her poor functional status she is not a good candidate for chemotherapy. Hospice care was introduced--> patient is still thinking about this option. Primary team discussing this option. At this point has not made decision regarding hospice. Outlined options regarding TCU with rehab verses hospice. Will reach out to shawn to update. Patient's mental status now fluctuating with worsening hypercalcemia as above. Consider care conference later this week and will continue to address this with the patient; if she is unable to decide will consider asking next of kin. Appreciate SW assistance in eval of dispo options.    -- Palliative care consulted 12/19 for symptomatic management as well as on-going goals of care discussions in the setting of her metastatic SCC.     #Acute hypoxic respiratory failure secondary to CAP- resolved  #Acute on chronic hypercapneic respiratory failure - requires intermittent BiPAP  Oxygen desaturation documented to 82% on RA at OSH on 12/11. Required 4L NC initially. CT 12/11 concerning for post-obstructive PNA. CXR concerning for pulmonary edema (likely non-cardiogenic based on bedside US with IVC with good respiratory variation). VBG on 12/13,  pH 7.28/66 associated with mental status changes, thus BIPAP was initiated while sleeping.   --Continuous pulse ox; keep sats > 90%  --Continue BiPAP while sleeping  --s/p PNA treatment with Unasyn as  below  --Lasix 40mg PO BID daily.      #Pathologic fracture s/p ORIF  Underwent open reduction/internal fixation, tumor curettage and excision, and biopsy of the bone on 10/16.  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  -- Ortho consult, apprec recs  --Ok to use андрей lift but lower strap needs to be in the popliteal fossa behind the knee. Do not use with strap at the mid or upper thigh  --50% weightbearing   --Continue physical therapy for range of motion, mobility, and transfers  --Plan for continued follow-up with Dr. Wilkinson a few weeks after discharge; or Dr. Anderson at [facility]     #Sepsis 2/2 obstructive pneumonia vs. aspiration pneumonia- resolved     #Sinus Tachycardia-- resolved     #Diastolic heart failure  #Cor pulmonale   EF 60% but noted diastolic heart failure (4/27/18).   - PTA metoprolol 100mg PO BID    - Held PTA enalapril 5mg qday in the setting of possible infection, now BP currently stable but will consider restarting while inpatient if BPs increase  - PTA furosemide 40mg PO BID   - Repeat ECHO with stable systolic function     #DMII   Last A1c 6.1. Has not had elevated blood glucose levels this admission. Will stop blood sugar checks.     #Hypophosphatemia  Anticipate hypophosphatemia and hypercalcemia may occur with PTH-rp driving electrolyte imbalance.   - resume PTA K phos BID from QID prior to admission   - Replace per electrolyte protocol     #Obesity hypoventilation syndrome  #Obstructive sleep apnea  -BiPAP as above     Diet: Regular Diet Adult  Snacks/Supplements Adult: Other; PRN; Between Meals    Fluids: None  Lines: PIV  DVT Prophylaxis: Enoxaparin (Lovenox) SQ  Bravo Catheter: not present  Code Status: DNR/DNI      Disposition Plan   Expected discharge: 2 - 3 days, recommended to TCU verses other program such as hospice once adequate pain management/ tolerating PO medications and establish safe discharge option in the setting of metastatic cancer.     .  Entered: Ariana Justin MD 12/25/2018, 6:56 AM     The patient's care was discussed with the Patient.    Patient was staffed with Dr. Cierra Justin  Medicine/pediatrics PGY-4  Pager [phone]    ______________________________________________________________________    Interval History   Nursing notes reviewed. Disoriented intermittently. No nausea. Had small amount of PO intake. This morning reports some on-going abdominal pain; it is still in the mid-epigastric area. Oriented only to person and place. Affect remains flat and patient minimally engaging in questions.     Data reviewed today: I reviewed all medications, new labs and imaging results over the last 24 hours. I personally reviewed no images or EKG's today    Physical Exam   Vital Signs: Temp: 97.2  F (36.2  C) Temp src: Axillary BP: 126/57 Pulse: 82   Resp: 18 SpO2: 96 % O2 Device: BiPAP/CPAP Oxygen Delivery: 3 LPM  Weight: 346 lbs 1.96 oz    General: Patient lying in bed, appears calm. Face appears slightly flushed. Wearing BiPAP mask. Appears tired. Intermittently closes her eyes during conversation.    HEENt: PERRL, clear sclera.  Resp: Diminished breath sounds overall. No focal findings. Breathing comfortably.   CV: Distant sounds. RRR, no m/r/g.   Abd: Soft, non-distended, mildly tender throughout the abdomen, mostly in epigastric area..  Ext: ~1+ pitting edema of LEs to the knee.   Neuro: Alert. Oriented only to person and place.   Psych: flat affect.     Data   Last Comprehensive Metabolic Panel:  Sodium   Date Value Ref Range Status   12/25/2018 132 (L) 133 - 144 mmol/L Final     Potassium   Date Value Ref Range Status   12/25/2018 5.1 3.4 - 5.3 mmol/L Final     Chloride   Date Value Ref Range Status   12/25/2018 96 94 - 109 mmol/L Final     Carbon Dioxide   Date Value Ref Range Status   12/25/2018 30 20 - 32 mmol/L Final     Anion Gap   Date Value Ref Range Status   12/25/2018 6 3 - 14 mmol/L Final     Glucose   Date Value Ref  Range Status   12/25/2018 100 (H) 70 - 99 mg/dL Final     Urea Nitrogen   Date Value Ref Range Status   12/25/2018 10 7 - 30 mg/dL Final     Creatinine   Date Value Ref Range Status   12/25/2018 0.30 (L) 0.52 - 1.04 mg/dL Final     GFR Estimate   Date Value Ref Range Status   12/25/2018 >90 >60 mL/min/[1.73_m2] Final     Comment:     Non  GFR Calc  Starting 12/18/2018, serum creatinine based estimated GFR (eGFR) will be   calculated using the Chronic Kidney Disease Epidemiology Collaboration   (CKD-EPI) equation.       Calcium   Date Value Ref Range Status   12/25/2018 12.1 (H) 8.5 - 10.1 mg/dL Final       I sabrina 6.6

## 2018-12-25 NOTE — PLAN OF CARE
Discharge Planner PT  7C    Patient plan for discharge: To be determined  Current status: PT evaluation completed. MaxA of 2 for rolling in bed R and L. Dependent lift transfer with use of sling and ceiling lift to recliner chair. Therapist lifting R LE while in sling to avoid additional pressure on R femur d/t fx s/p ORIF 10/16/18 (currently 50% weight bearing R LE). Pt lethargic and confused, unreliable historian. Alertness improves once seated up in chair. Very weak, significant deconditioning.   Barriers to return to prior living situation: medical needs, weakness, deconditioning, Ax2 for mobility   Recommendations for discharge: TCU vs LTC / Hospice pending goals of care and medical prognosis  Rationale for recommendations: See above       Entered by: Jesi John 12/25/2018 10:57 AM

## 2018-12-25 NOTE — PLAN OF CARE
AVSS on 3L O2 via NC when awake, BiPAP when sleeping. Oriented to self and place, disoriented to time and situation, orientation waxes and wanes, pt is lethargic, slow to respond. Lift pt, turn/repo q2h. Pain to RUQ abdomen managed w/ PRN oxycodone x 1, pt reported some relief. Denies nausea, states no appetite; pt ate 1/2 order of mashed potates and 1 applesauce this shift. Aspiration precautions. Incontinent of urine, purewick catheter in place. No BM this shift, reportedly incontinent of stool. Mepilex on coccyx for blanchable redness and small fissure/skin break at cleft. Redness to pannicus, interdry in skin folds. Carb counts continue. PIV infusing MIFV @ 100mL/hr. K recheck at 1930 4.6, additional recheck scheduled for 2300 per pharmacy advice, MD concurred. Continue to monitor and follow POC.

## 2018-12-25 NOTE — PROGRESS NOTES
Brief endocrine follow up note:    Chart reviewed and pt discussed with primary team.     A/P:  Summary:  Mayra Stokes is a 63-year-old female with history of hypertension, hyperlipidemia, type 2 diabetes, cor pulmonale, recently diagnosed squamous cell carcinoma with unclear primary, transferred to [facility] 12/11/18 after presentation to OSH with AMS and identification of post obstructive PNA. Recurrence of prior (~Oct 2018) severe hypercalcemia attributed to PTHrp was also identified this admission thus endocrine was re-consulted for further assistance with management. See initial consult note from prior admission dated 10/26/18.    Hypercalcemia, PTHrP mediated   PTHrP elevated to 81.2 10/26/18 in the setting of metastatic squamous cell cancer of unknown primary. Serum calcium (uncorrected) as high as 13.3mg/dl was identified during pt's prior hospitalization Oct 2018 for R femoral fracture. She had received multiple doses of pamindronate prior to that hospitalization, with calcium downtrending significantly only after denosumab 120mg was administered during her inpatient stay.    Upon transfer to [facility] 12/12/18, serum calcium uncorrected was 13.0mg/dl with iCal of 7.6mg/dL. She had been receiving fluids as able and received 4mg zoledronic acid at 2am 12/12/18. Given expected delay in onset of action of bisphosphonate and degree of hypercalcemia, she received 600mg (~4mg/kg) calcitonin 12/12/18 1830, 12/13/18 0458 and 12/13/18 1208. Calcium had subsequently downtrended to the normal range, but as of 12/24/18 significant increase in ionized calcium to 6.0mg/dl was identified. Denosumab recommended and has been ordered though not yet obtained. Ionized calcium has continued to trend up at is at 6.6mg/dL morning of 12/25/18.    Recommendations:  -monitor calcium levels BID for now  -at this time, would defer administration of calcitonin as pt's mental status remains stable, tachyphylaxis to calcitonin develops quickly,  etc.   -one dose 120mg denosumab subcutaneous when available  -at least weekly monitoring labs: BMP, albumin (for correction of serum calcium), Mg, phosphorous. Would not anticipate post denosumab hypercalcemia but would recommend monitoring given high risk for recurrence of hypercalcemia  -in the future, could consider alternating zoledronic acid and denosumab dosing (Each can be dosed every ~4 weeks to treat hypercalcemia of malignancy. If needed for recurrent hypercalcemia could consider a dose of zoledronic acid 2 weeks post denosumab dose, repeat dose of denosumab 2 weeks after zoledronic acid and 4 weeks after prior denosumab, and so on)     The pt was discussed with Dr. Riddle.    Katy Garza MD  Endocrine Fellow   Pager [phone]

## 2018-12-25 NOTE — PLAN OF CARE
HD15 admitted with AMS, UTI and aspiration pneumonia. Alert to name only. Pt is lethargic and slow to respond. Team is aware, CA level was 12.1. Bolus of NS given. Pain is controlled with PO tramadol and oxycodone. PIV is running IVM at 100/hr, Purewick in place for urine output. Adequate UO. Aspiration precautions in place, Up with PT to the chair, Interdry to folds, Mepilex removed from coccyx when returning to bed. Pt on a pulsate mattress. Turn Q2H. LS diminished, 2+ edema throughout, UW2 and the lift. BS hypoactive, LBM 12/23. PO meds given crushed when possible and with applesauce, on a regular diet, poor appetite. VSS on 2 LPM NC. Continue with POC

## 2018-12-25 NOTE — PLAN OF CARE
Oriented to person and place. VSS on 3L O2 NC; BIPAP on overnight. Abdominal pain is managed with scheduled tramadol, PRN oxycodone, and PRN Tylenol. Regular diet; carb counts; denies nausea. Aspiration precautions. Patient takes pills in applesauce. Purewick catheter in place for urinary incontinence. Adequate UOP. Inner cannister changed around 0600. Incontinent of stool (no loose stools overnight); barrier cream applied after incontinence care to protect wound on buttocks; mepilex in place on coccyx. Interdry and miconazole to folds. Turning/repositioning patient every two hours. Assist x2-3 w/ cares; able to turn side to side minimally. PIV - SL. Care team is trying to find a TCU/ LTC hospice placement near where she lives in [place], [place]. Continue w/ POC.

## 2018-12-25 NOTE — PLAN OF CARE
Discharge Planner OT   Patient plan for discharge: Pt did not state. Per chart review, wants to try to progress with therapy before decision is made to pursue hospice.   Current status: Pt heavy A x 2 and lift dependent for t/f to chair. Challenged with ADLs -- requiring max A to don/doff gown, min A to brush teeth, demo some dysmetria.   Barriers to return to prior living situation: Pain, WB status, deconditioning/weakness  Recommendations for discharge: TCU vs hospice  Rationale for recommendations: Pending POC       Entered by: Radha Chavez 12/25/2018 11:15 AM     OT 7C

## 2018-12-25 NOTE — PROGRESS NOTES
"   12/25/18 1012   Quick Adds   Type of Visit Initial Occupational Therapy Evaluation   Living Environment   Lives With alone   Living Arrangements mobile home   Home Accessibility stairs to enter home   Living Environment Comment Information obtained from prior eval, as pt confused and unable to provide accurate information at this time. Has 4 steps to enter home, then all is on one level.    Self-Care   Usual Activity Tolerance fair   Current Activity Tolerance poor   Equipment Currently Used at Home wheelchair, manual;shower chair;walker, rolling   Functional Level   Ambulation 0-->independent  (4WW when having knee pain)   Transferring 0-->independent   Toileting 0-->independent   Bathing 0-->independent   Dressing 0-->independent   Eating 0-->independent   Communication 0-->understands/communicates without difficulty   Cognition 0 - no cognition issues reported   Fall history within last six months yes   Number of times patient has fallen within last six months 1   Which of the above functional risks had a recent onset or change? ambulation;transferring;toileting;bathing;dressing;cognition   Prior Functional Level Comment Per PT -- \"pt admitted from TCU; at baseline indep with functional mobility, used 4WW for mobility d/t knee pain in L knee, patient also has access to [facility] Above levels are pt's baseline.    General Information   Onset of Illness/Injury or Date of Surgery - Date 12/11/18   Referring Physician Ariana Justin MD   Patient/Family Goals Statement Pt did not state.    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 Presenting with sepsis secondary to obstructive pneumonia suggested on CT and admitted for management of sepsis and acute hypoxic " "respiratory failure. In addition, she had significant delirium, that was multifactorial but with likely the most significant contributor being hypercalcemia; mentation and calcium improved with IV fluids, calcitonin and a dose of zoledronic acid. On 12/18 had abdominal pain and diarrhea with CT showing worsening burden of metastatic SCC.    Precautions/Limitations fall precautions;oxygen therapy device and L/min;spinal precautions  (4L O2, conservative spinal precautions - mets unclear)   Weight-Bearing Status - RLE partial weight-bearing (% in comments)  (50%)   General Info Comments Unclear if pt will d/c to TCU/LTC or hospice. Pending family decision. Per  note 12/24 -- \"Pt verbalized desire to try therapy today to see if she can tolerate and wishes to continue vs altering plan of care to comfort/hospice\"   Cognitive Status Examination   Orientation person;place   Level of Consciousness confused   Cognitive Comment Pt oriented to self, hospital. Believes it is March, unable to recall reorientation after ~5 minutes   Visual Perception   Visual Perception Wears glasses   Sensory Examination   Sensory Comments Pt reports n/t in R knee.    Pain Assessment   Patient Currently in Pain Yes, see Vital Sign flowsheet   Range of Motion (ROM)   ROM Comment Pt demonstrates limited L shoulder flex to ~50 degrees, limited by pain. R UE sh flex to ~80 degrees.    Strength   Strength Comments Not formally assessed as pt with reduced ROM   Hand Strength   Hand Strength Comments Weak  bilaterally   Coordination   Coordination Comments Pt presenting with some dysmetria, difficulty judging distance to objects. Will monitor.    Instrumental Activities of Daily Living (IADL)   IADL Comments Pt unable to state. Was living alone, so likely that pt was doing IADLs herself.    Activities of Daily Living Analysis   Impairments Contributing to Impaired Activities of Daily Living balance impaired;coordination impaired;cognition " "impaired;pain;post surgical precautions;ROM decreased;sensation decreased;strength decreased   General Therapy Interventions   Planned Therapy Interventions ADL retraining;bed mobility training;cognition;fine motor coordination training;ROM;strengthening;transfer training;home program guidelines;progressive activity/exercise;risk factor education   Clinical Impression   Criteria for Skilled Therapeutic Interventions Met yes, treatment indicated   OT Diagnosis Decreased ADL I   Influenced by the following impairments pain, 50% WB on R LE, deconditioning/weakness   Assessment of Occupational Performance 5 or more Performance Deficits   Identified Performance Deficits dressing, bathing, toileting, g/h, functional transfers, functional mobility, home management   Clinical Decision Making (Complexity) Low complexity   Therapy Frequency 3 times/wk   Predicted Duration of Therapy Intervention (days/wks) 1 week   Anticipated Discharge Disposition Transitional Care Facility  (hospice?)   Risks and Benefits of Treatment have been explained. Yes   Patient, Family & other staff in agreement with plan of care Yes   Clinical Impression Comments Pt to benefit from skilled OT to address above deficits. See daily flowsheet for details on tx provided.    [facility] AM-PAC  \"6 Clicks\" Daily Activity Inpatient Short Form   1. Putting on and taking off regular lower body clothing? 1 - Total   2. Bathing (including washing, rinsing, drying)? 2 - A Lot   3. Toileting, which includes using toilet, bedpan or urinal? 1 - Total   4. Putting on and taking off regular upper body clothing? 2 - A Lot   5. Taking care of personal grooming such as brushing teeth? 2 - A Lot   6. Eating meals? 3 - A Little   Daily Activity Raw Score (Score out of 24.Lower scores equate to lower levels of function) 11   Total Evaluation Time   Total Evaluation Time (Minutes) 4     "

## 2018-12-25 NOTE — PROVIDER NOTIFICATION
"12/25/2018  0213    Provider Ariana Justin ([id number]) notified about patient's potassium re-draw at midnight and that it came back at 4.9.     \"Fyi: Pt had K+ redrawn at midnight and it came back at 4.9.\"     No new orders placed at this time. Will continue to monitor.       "

## 2018-12-26 NOTE — PLAN OF CARE
"Discharge Planner PT   Patient plan for discharge: Did not endorse  Current status: Patient minimally engaged. Despite continual cues and encouragement declined any form of mobility. Moaning throughout conversation and appeared confused continually stating \"I just need to go home\". Patient participated in a few exercises but that was all. Spoke with OT and had OT place on hold as does not appear to have needs for 2 disciplines to be involved. If patient able to demonstrate participation with PT could re-initiate OT. Focus at this time should be strengthening and mobility which PT can address.   Barriers to return to prior living situation: safety, independence, pain, cognition, activity tolerance, home set-up  Recommendations for discharge: LTC  Rationale for recommendations: Currently demonstrating limited ability to engage in meaningful rehab tasks. Care conference schedule for today to discuss goals of care.        Entered by: Rissa Linton 12/26/2018 10:18 AM       "

## 2018-12-26 NOTE — PROGRESS NOTES
Brief endocrine note:    iCal up to 7.1 today, uncorrected serum Ca 13.0.     Recommendations:  -hydration and furosemide as tolerated by pt as per primary team  -okay to give calcitonin  -goals of care discussion given nature of underlying etiology of hypercalcemia of widely metastatic squamous cell carcinoma with the understanding that patients in this setting often die of electrolyte abnormalities caused by their underlying malignancy     -full note to follow       The pt was discussed with endocrine staff.     Katy Garza MD  Endocrine Fellow   Pager [phone]

## 2018-12-26 NOTE — PROGRESS NOTES
"[facility], [place]    Palliative Care Progress Note    Patient: Mayra Stokes  Date of Admission:  12/11/2018    Recommendations:  -DNR/DNI  -comfort measures only  -would utilize \"MED Comfort Measures for End of Life\" order set  -consider increase steroids to 8 mg PO dexamethasone daily for pain  -discharge to facility with hospice services    Hospice PRN orders:  - Bisacodyl 10 mg Suppository MA daily to bid prn constipation  - Tylenol 650 mg suppository PO/MA q4hr prn pain, fever  - Lorazepam  0.5-1.0 mg PO/SL/MA q4h prn anxiety/restlessness  - Atropine sulfate 1% opth solution 1-2 drops SL q2h prn secretions  - Senna 8.6 mg 1-2 tabs PO prn constipation  - Haldol 1-2 mg PO/SL/MA q6h prn nausea, agitation or delirium.   - Morphine 5-10 mg q2h PRN for pain or dyspnea    Assessment  Mayra Stokes is a 63 year old female with recent diagnosis of squamous cell carcinoma, likely pulmonary source, admitted with AMS 2/2 hypercalcemia, UTI, PNA, and now with hypercalcemia. More lethargic and confused now.     I met with ne Nunez, primary team, and unit SW. Discussed her overall status and worry that she is in the end stage of her life, possible final weeks. Patient and Mayra were in agreement with comfort measures only and no longer pursuing life prolonging measures.     Symptoms: abdominal pain which is worse possibly due to constipation versus worsening cancer. Also having some nausea. Has oxycodone PRN, also taking     Prognostic Information: discussed with Drake difficult place we are in, and worry that we could be in the final stages of Mayra's life, possible last weeks.     Advance Care Planning:           Decision making capacity: No, she is not able to recall why she is at the hospital, she is not oriented to place or time. Would include Radha in conversation.        Disease understanding: seems like family has understanding now of how short time might be       " Goals of Care: discussed comfort measures only and Mayra and Radha agreed with this focus of care. They also agreed to discharge to a facility with hospice. She is currently DNR/DNI.    These recommendations have been discussed with primary team.    EFFIE Evans CNS  Palliative Care Consult Team  Pager: [phone]    [facility] Inpatient Team Consult pager [phone] (M-F 8-4:30)  After-hours Answering Service [phone]   Palliative Clinic: [phone]       Interval History:       notes reviewed, no acute events, more confused today, oriented to self only, not interested in eating or drinking, she also is having a hard time with some oral medications.       Medications:   I have reviewed this patient's medication profile and medications during this hospitalization    dexamethasone 4 mg daily  Tramadol scheduled 100 mg q6h  Acetaminophen PRN  Ondansetron PRN - x1  Oxycodone PRN- x1          Review of Systems:   A comprehensive ROS has been negative other than stated in the HPI and below:   Palliative Symptom Review (0=no symptom/no concern, 1=mild, 2=moderate, 3=severe):      Pain: 2      Fatigue: 0      Nausea: 2      Constipation: 0      Diarrhea: 0      Depressive Symptoms: 0      Anxiety: 0      Drowsiness: 0      Poor Appetite: 0      Shortness of Breath: 0      Insomnia: 0      Other: 0      Overall (0 good/no concerns, 3 very poor):  2          Physical Exam:     Vital Signs: Temp: 98.3  F (36.8  C) Temp src: Axillary BP: 146/64 Pulse: 95 Heart Rate: 100 Resp: 18 SpO2: 98 % O2 Device: BiPAP/CPAP Oxygen Delivery: 2 LPM  Weight: 312 lbs 0 oz    Physical Exam:  Constitutional: drowsy, will wake to voice, cooperative, no apparent distress  Lungs: No increased work of breathing  Neurologic: Awake, alert, oriented to self, able to answer questions with yes/no intermittently  Neuropsychiatric: guarded    Data Reviewed:     ROUTINE ICU LABS (Last four results)  CMP  Recent Labs   Lab 12/26/18  7933  12/25/18  1737 12/25/18  0657 12/24/18  2358 12/24/18  1927 12/24/18  0736 12/23/18  0715 12/22/18  1258 12/22/18  0707 12/21/18  0357  12/20/18  0449   *  --  132*  --   --   --   --   --   --  134  --  134   POTASSIUM 4.5  --  5.1 4.9 4.6  --   --   --   --  3.6   < > 3.3*   CHLORIDE 95  --  96  --   --   --   --   --   --  96  --  97   CO2 28  --  30  --   --   --   --   --   --  30  --  28   ANIONGAP 6  --  6  --   --   --   --   --   --  7  --  10   *  --  100*  --   --   --   --   --   --  109*  --  100*   BUN 10  --  10  --   --   --   --   --   --  5*  --  5*   CR 0.32*  --  0.30*  --   --   --   --   --   --  0.38*  --  0.42*   GFRESTIMATED >90  --  >90  --   --   --   --   --   --  >90  --  >90   GFRESTBLACK >90  --  >90  --   --   --   --   --   --  >90  --  >90   DEANDRE 13.0* 12.4* 12.1*  --   --   --   --   --   --  9.6  --  9.4   MAG  --   --   --   --   --  2.0 1.9  --  1.8 2.1  --  2.0   PHOS  --   --  2.6  --   --  2.5 2.3* 2.4*  --   --    < >  --    ALBUMIN  --   --  2.0*  --   --   --   --   --   --  2.0*  --  2.0*    < > = values in this interval not displayed.     CBC  Recent Labs   Lab 12/20/18  8469   WBC 13.4*   RBC 3.51*   HGB 9.3*   HCT 33.5*   MCV 95   MCH 26.5   MCHC 27.8*   RDW 19.0*        INRNo lab results found in last 7 days.  Arterial Blood GasNo lab results found in last 7 days.    EFFIE Evans CNS  Palliative Care Consult Team  Pager: [phone]    [facility] Inpatient Team Consult pager [phone] (M-F 8-4:30)  After-hours Answering Service [phone]  Palliative Clinic: [phone]     Total time spent was 80 minutes,  >50% of time was spent counseling and/or coordination of care regarding goals of care and symptom management.  60min of that were spent face-to-face, in 1400, out 1500.

## 2018-12-26 NOTE — PROGRESS NOTES
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 Presented with sepsis secondary to obstructive pneumonia; she admitted for this and acute hypoxic respiratory failure. In addition, she had significant delirium, that was multifactorial but with likely the most significant contributor being hypercalcemia; mentation and calcium previously improved with IV fluids, calcitonin and a dose of zoledronic acid. On 12/18 had abdominal pain and diarrhea with CT showing worsening burden of metastatic SCC. On 12/24 hypercalcemia began to worsen and as well as patient's mental status.     Patient with continued decreased mental status today. Following care conference with patient's niece, SW, palliative care, the patient, and the [facility] care team the decision was made to move to comfort cares.     #Toxic metabolic encephalopathy secondary to hypercalcemia  #Hypercalcemia secondary to malignancy   Ionized calcium initially elevated. iCal 7.6. PTH appropriately suppresed (9/2018) in the setting of elevated PTH-rp (10/2018). Phos appropriately low. Low vitamin D. Last dose of denosumab on 10/29/18. S/p Zolendronate 4mg IV x1 on 12/12. 600mg (~4mg/kg) calcitonin 12/12/18 , 12/13/18 and 12/13/18. Hypercalcemia worsening today, suspect in setting of underlying malignancy. Repeat work-up for other reversible causes of encephalopathy largely unrevealing; mild elevation in CO2, but this is chronic for her. Ammonia WNL. NA mildly low.   -- Endocrine consulted  -- Lasix 40mg PO BID daily. Plan to continue for now.   -- s/p 120mg denosumab subcutaneous 12/26  -- 2/p 600 units Calcitonin 12/26  -- Stop monitoring calcium, as  patient is moving to comfort cares     # Abdominal pain (diffuse)  Abdominal CT 12/18 notable for increased burden of hepatic metastatic disease which is likely source. Mild stranding also noted around the pancreatic tail. Suspect pain is occurring in the setting of her cancer.   --Palliave care consulted; recommendations appreciated  --Comfort care orders initiated.   --Pain: Scheduled tramadol, with PRN sub lingual oxycodone for breakthrough pain, Dexamethasone 4mg PO daily, can increase up to 8mg PO if needed.     #Metastatic squamous carcinoma, unknown primary (pulmonary likely)  #Endometrial thickening, suspicious for malignancy  --Heme/Onc consulted; given new findings on abdominal CT from 12/18. See note by Dr. Pacheco dated 12/19 for full details; in brief given her poor functional status she is not a good candidate for chemotherapy. Hospice care was introduced. Patient's mental status now fluctuating with worsening hypercalcemia as above. Now s/p care conference with shawn 12/26 and will pursue comfort cares.   -- Comfort care order set  -- Appreciate SW assistance in eval of dispo options for hospice   -- Palliative care consulted; their assistance appreciated.     #Acute hypoxic respiratory failure secondary to CAP- resolved  #Acute on chronic hypercapneic respiratory failure - requires intermittent BiPAP  --Moving to comfort cares.      #Pathologic fracture s/p ORIF  Underwent open reduction/internal fixation, tumor curettage and excision, and biopsy of the bone on 10/16.  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  -- Moving to comfort cares. See above.      #Sepsis 2/2 obstructive pneumonia vs. aspiration pneumonia- resolved     #Sinus Tachycardia-- resolved     #Diastolic heart failure  #Cor pulmonale   EF 60% but noted diastolic heart failure (4/27/18). Repeat ECHO with stable systolic function  - PTA metoprolol 100mg PO BID    - PTA furosemide 40mg PO BID     #DMII    Last A1c 6.1. Has not had elevated blood glucose levels this admission. Will stop blood sugar checks.     #Hypophosphatemia  Anticipate hypophosphatemia and hypercalcemia may occur with PTH-rp driving electrolyte imbalance.   - Neutrophos QID  - Stop checking labs, move to comfort cares     #Obesity hypoventilation syndrome  #Obstructive sleep apnea     Diet: Regular Diet Adult  Snacks/Supplements Adult: Other; PRN; Between Meals    Fluids: None  Lines: PIV  DVT Prophylaxis: Enoxaparin (Lovenox) SQ  Bravo Catheter: not present  Code Status: DNR/DNI      Disposition Plan   Expected discharge: Tomorrow, recommended to hospice once when hospice bed is available.    .  Entered: Ariana Chung MD 12/26/2018, 7:17 AM     The patient's care was discussed with the Patient.    Patient was staffed with Dr. Nuñez.    Ariana Chung  Medicine/pediatrics PGY-4  Pager [phone]    ______________________________________________________________________    Interval History   Nursing notes reviewed. Oriented to person. Remains confused. ROS limited in the setting of her confusion.    Called niece and had a team care conference at 2pm. Patient's niece (on phone as she is currently in [place], [place],), SW, palliative care, the patient, and the [facility] team discussed the patient's current health status. Updated niece regarding recent decline in mental status. Overall, the niece voiced her opinion that it would be best to focus on comfort in the setting of no curative options. She was amenable to the idea of moving toward hospice cares. Hospice was described. Mayra noted a preference to be in in or near [facility] when transitioning to hospice if possible. SW looking into options. The niece was hopeful that the patient will remain cognitively intact to further discuss her health when she returns to [facility] 12/29. The care team articulated concerns that the patient may not be cognitively intact at that time, andthe niece voiced  understanding. Plan was finalized to move to comfort cares and SW will look into hospice options.      Data reviewed today: I reviewed all medications, new labs and imaging results over the last 24 hours. I personally reviewed no images or EKG's today    Physical Exam   Vital Signs: Temp: 95.7  F (35.4  C) Temp src: Axillary BP: 137/74 Pulse: 95 Heart Rate: 96 Resp: 18 SpO2: 96 % O2 Device: BiPAP/CPAP Oxygen Delivery: 2 LPM  Weight: 346 lbs 1.96 oz    General: Patient lying in bed. Appears chronically ill. Face appears slightly flushed and diaphoretic. Appears tired. Intermittently closes her eyes during conversation.    HEENt: PERRL, clear sclera.  Resp: Diminished breath sounds overall. No focal findings. Breathing comfortably.   CV: Distant sounds. RRR, no m/r/g.   Abd: Soft, non-distended, mildly tender mostly in epigastric area.  Ext: ~1+ pitting edema of LEs to the knee.   Neuro: Alert. Oriented only to person.   Psych: Flat affect.     Data    Last Comprehensive Metabolic Panel:  Sodium   Date Value Ref Range Status   12/26/2018 130 (L) 133 - 144 mmol/L Final     Potassium   Date Value Ref Range Status   12/26/2018 4.5 3.4 - 5.3 mmol/L Final     Chloride   Date Value Ref Range Status   12/26/2018 95 94 - 109 mmol/L Final     Carbon Dioxide   Date Value Ref Range Status   12/26/2018 28 20 - 32 mmol/L Final     Anion Gap   Date Value Ref Range Status   12/26/2018 6 3 - 14 mmol/L Final     Glucose   Date Value Ref Range Status   12/26/2018 104 (H) 70 - 99 mg/dL Final     Urea Nitrogen   Date Value Ref Range Status   12/26/2018 10 7 - 30 mg/dL Final     Creatinine   Date Value Ref Range Status   12/26/2018 0.32 (L) 0.52 - 1.04 mg/dL Final     GFR Estimate   Date Value Ref Range Status   12/26/2018 >90 >60 mL/min/[1.73_m2] Final     Calcium   Date Value Ref Range Status   12/26/2018 13.0 (H) 8.5 - 10.1 mg/dL Final     Liver:  Lab Results   Component Value Date    AST 57 12/26/2018     Lab Results   Component Value  Date    ALT 26 12/26/2018     No results found for: BILICONJ   Lab Results   Component Value Date    BILITOTAL 0.3 12/26/2018     Lab Results   Component Value Date    ALBUMIN 2.0 12/26/2018     Lab Results   Component Value Date    PROTTOTAL 7.5 12/26/2018      Lab Results   Component Value Date    ALKPHOS 231 12/26/2018     Blood cultures 12/26: In process   Other:  Lipase   Date Value Ref Range Status   12/26/2018 291 73 - 393 U/L Final       Venous Blood Gas  Recent Labs   Lab 12/26/18  1509   PHV 7.30*   PCO2V 63*   PO2V 97*   HCO3V 31*   DIDI 2.8   O2PER 30.0

## 2018-12-26 NOTE — PROGRESS NOTES
CLINICAL NUTRITION SERVICES - REASSESSMENT NOTE     Nutrition Prescription    RECOMMENDATIONS FOR MDs/PROVIDERS TO ORDER:  None at this time     Malnutrition Status:    Unable to determine due to no new recorded weight x 9 days    Recommendations already ordered by Registered Dietitian (RD):  None additional at this time    Future/Additional Recommendations:  Offer supplements/scheduled snacks.  If within goals of care, consider calorie counts to help determine need for additional nutrition intervention.     EVALUATION OF THE PROGRESS TOWARD GOALS   Diet: Regular    Intake: Poor appetite today per RN.  Per chart review, pt has had poor appetite the past week.  Pt has been disoriented, this likely hindering PO intakes at least in part.     NEW FINDINGS   Weight: No new recorded weight since 12/17.  Daily weights are ordered.  Dispo: Care conference this afternoon    MALNUTRITION  % Intake: </= 50% for >/= 5 days (severe)  % Weight Loss: Unable to assess (no new weight recorded the past week)  Subcutaneous Fat Loss: None observed per RD note on 12/19  Muscle Loss: None observed per RD note on 12/19  Fluid Accumulation/Edema: Trace per provider note yesterday  Malnutrition Diagnosis: Unable to determine due to no new recorded weight x 9 days    Previous Goals   Patient to consume % of nutritionally adequate meal trays TID, or the equivalent with supplements/snacks.  Evaluation: Not met    Previous Nutrition Diagnosis  Inadequate oral intake related to decreased appetite and abdominal pain hindering PO intake as evidenced by poor PO intake x 3 weeks    Evaluation: No change    CURRENT NUTRITION DIAGNOSIS  Inadequate oral intake related to decreased appetite and suspect disorientation hindering PO as evidenced by poor appetite noted the past week    INTERVENTIONS  Implementation  Attempted to visit with patient, but pt was with other providers.  Likely not appropriate for education today given  disorientation    Goals  Patient to consume % of nutritionally adequate meal trays TID, or the equivalent with supplements/snacks.    Monitoring/Evaluation  Progress toward goals will be monitored and evaluated per protocol.     Katherine Crowder RD, LD  7C RD pager: [phone]

## 2018-12-26 NOTE — PROGRESS NOTES
Brief Care Coordination Note    Per request of the primary team writer scheduled a care conference to discuss goals of care.     Members requested to attend:   Patient  Primary medicine team  Palliative care    Patient's niece, Radha     The care conference will occur over the telephone with the niece today at 2pm in the patient's room.     Elizabeth Grant, SILASCC, BSN    [facility]  [address] [place] [id number]    [email or web address]    Office: [phone] Pager: [phone]  To contact weekend RNCC, dial [phone] and enter pager number [phone] at prompt. This pager can not be contacted by text page or outside line.

## 2018-12-26 NOTE — PROVIDER NOTIFICATION
Pagesha Ocampo 4 Medicine Team due to critical lab for ionized calcium of 7.1. Immediately received a call from team regarding critical lab. Orders placed for a NS 1L bolus and plans for prolia subcutaneous injection when available. Will continue to monitor patient

## 2018-12-26 NOTE — PLAN OF CARE
AVSS on 2L NC. AOx1 to self, pt has been AOx1 since day shift, team aware. Pt was lethargic at beginning of shift and has improved but continuous to be lethargic. Assist x2 using ceiling lift, Q2hr turns, pt on pulsate mattress. Aspiration precautions. Reg diet, had not appetite and did not eat any of her food ordered. Passing flatus, no BM reported on shift. AUO, external cathter in place. New meplex applied to coccyx. Pain managed with scheduled Tramadol and PRN Oxycodone. PIV infusing with IV fluids. Phosphorous replaced, recheck in the AM. Pt takes PO meds crushed with apple sauce. Pt will need weight next time pt is out of bed. Cont POC and team ([address]) would like to be updated regarding changes in pts mental status.     Addendum: RT was paged to have BiPAP on pt, RT placed BiPAP on pt. Re-paged RT at 2230 as BiPAP was alarming that there was leakage, RT is aware and will come to see pt, RT instructed RN to tighten the pt's mask.

## 2018-12-26 NOTE — PLAN OF CARE
Oriented to person and place. Continues to be very lethargic. Garbled speech at times. VSS on 2L O2 NC; BIPAP on overnight. Abdominal pain is managed with scheduled tramadol, PRN oxycodone, and PRN Tylenol. Regular diet; carb counts; denies nausea. Aspiration precautions. Patient takes pills crushed in applesauce. Purewick catheter in place for urinary incontinence. Purewick changed at 0100. Adequate UOP. Inner cannister marked; new empty inner cannister in room when current one needs to be changed. Incontinent of stool (no loose stools overnight); barrier cream applied after incontinence care to protect wound on buttocks; mepilex in place on coccyx. Interdry and miconazole to folds. Turning/repositioning patient every two hours with ceiling lift / assist x2-3 w/ cares; able to turn side to side minimally. PIV - SL. Care team is trying to find a TCU/ LTC hospice placement near where she lives in [place], [place]. When PT works with patient next, please have them get an up to date weight on patient. Continue w/ POC.

## 2018-12-26 NOTE — PLAN OF CARE
OT/7C:  HOLDING -  Per discussion with PT, OT will hold services at this time.  Pt has poor activity tolerance and is only appropriate for single-therapy discipline.  PT will continue to follow and update OT if/when pt becomes more appropriate for re-initiation of services.

## 2018-12-26 NOTE — PROVIDER NOTIFICATION
"Paged Ariana Justin from [place] [address] Medicine team as patient stated that she is seeing smoke in the room and making statements such as \" I just want to die\". This AM pt was able to provide correct Name, , and location. Speech is intermittently garbled. Will continue to monitor patient cognitive status.   "

## 2018-12-26 NOTE — PLAN OF CARE
"HD 16   Neuro: Oriented to place and self. Speech is slow and garbled at times.   Pain: Pt pain poorly managed with PRN Oxycodone. Scheduled Tramadol not given due to nausea. Will give once nausea has improved   Nausea: Pt reported nausea this afternoon, administered zofran   Lab: Ionized calcium 7.1- administered 1L bolus and prolia injection. Continue to monitor labs   /58 (BP Location: Right arm)   Pulse 95   Temp 97.4  F (36.3  C) (Oral)   Resp 18   Ht 1.676 m (5' 6\")   Wt 141.5 kg (312 lb)   SpO2 96%   BMI 50.36 kg/m   - Intermittently tachy, will continue to monitor. Pt on 2L NC.   Output: External catheter/ cannister changed at 11 AM, intact, adequate output  Diet: Regular/carb counts. No appetite. Refused breakfast and lunch. Only had spoonfuls of applesauce with morning medications  Activity: Total cares. Assist of 2-3 with ceiling lifts. Turn every 2 hours. Mildred-cares completed, bath wipes, interdry in place under left breast and under abdominal folds. Linens and gown changed.   Skin: Mepilex on saccrum. Re-applied 12/26. Bruising on right forearm   Bowel Function: Bowel sounds hypoactive/faint in all quadrants, pt did no report any passing of flatus, small stool this afternoon   Lines: R PIV, saline locked, dressing CDI   Plan: Continue to monitor pain, nausea, labs, VS, output, and bowel function. Need UA to be collected     Addendum: Patient now on comfort cares at 1500   "

## 2018-12-26 NOTE — PROGRESS NOTES
Social Work Services Progress Note    Hospital Day: 15  Date of Initial Social Work Evaluation:  Not completed  Collaborated with:  Pt at bedside, niece via speaker phone, bedside nurse, charge nurse, medical providers, palliative care team.     Data:  Pt is 64 yo female readmitted to [facility] on 12/11 for sepsis following a TCU stay at [facility] TCU in [place]. Pt exhibiting increased confusion and discomfort per bedside notes. Care Conference was held today including Pt at bedside, niece via speaker phone, medical providers, , and palliative care to discuss goals of care. Please see additional interdisciplinary providers' notes for additional care conference information.      Pt verbalized she was experiencing pain and that she wishes to be kept comfortable. Per OT, Pt has poor activity tolerance and it was discussed with Pt if she'd like to continue. Pt and niece via phone expressed wishes to transition to comfort care and hospice services as Pt's condition appears to be declining. SW discussed transitioning to a LTC facility where Pt can receive 24 hr care and additional hospice services. Pt and niece are agreeable to transition out of the hospital when a safe plan is in place. SW discussed SNF coverage and explained it will likely private pay for room/board. Pt's niece reports she has access to Patient's funds as POA and is agreeable to down payment if required by the accepting facility. Pt's niece reported she wishes to transport Pt to a SNF in [place], [place] to be near here in order to visit more frequently. Though lethargic, Pt spoke up expressing she did not wish to transport the several hour distance d/t fear of discomfort. Pt requested [place] area for [place]. Pt and niece expressed no facility preference in the [facility] area and gave SW consent to send referrals to [facility] facilities. SW also discussed [facility] and family gave consent for a  referral to be sent. [facility]'s admissions is closed at this time, SW will make call/referral in the AM. SW also discussed hospice preference and importance of Pt/family choice. Pt and family expressed no preference to hospice agency in the [facility] at this time and agreed to [facility] when accepting facility is found. Pt's niece, as AUGIEK, reports she is agreeable to signing hospice election paperwork and requested information be sent to email: [email or web address].    Pt's friend Magui, who is on Pt's facesheet and Pt has given consent to give updates (updates only - no decision making capacity per chart review and previous conversations) was given an update by writer following the care conference as well.     Intervention:  End of life discussion and discharge planning.    Assessment:  Pt was open to care conference and provider visits. Pt exhibited discomfort with intermittent moaning and increased lethargy. Pt also exhibits some confusion although she was participatory in the care conference at times. Pt was able to verbalize her wishes to be kept comfortable, consent to hospice services, consent to discharge to SNF on hospice services, and desire [place] the metro area for the [place] being due to fear of discomofrt in extended transportation needs to be closer to family or home.     Plan:    Anticipated Disposition:  Facility:  TBD    Barriers to d/c plan:  SNF placement.    Follow Up:  SW to f/u & assist as needed.    LORENA Hurtado, NNEKA  7C Surgical/Oncology Unit   Phone: [phone]  Pager: [phone]

## 2018-12-26 NOTE — PROGRESS NOTES
Endocrine follow up note:    A/P:  Summary:  Mayra Stokes is a 63-year-old female with history of hypertension, hyperlipidemia, type 2 diabetes, cor pulmonale, recently diagnosed squamous cell carcinoma with unclear primary, transferred to [facility] 12/11/18 after presentation to OSH with AMS and identification of post obstructive PNA. Recurrence of prior (~Oct 2018) severe hypercalcemia attributed to PTHrp was also identified this admission thus endocrine was re-consulted for further assistance with management. See initial consult note from prior admission dated 10/26/18. Pt found to have significant progression of metastatic disease with liver lesions nearly doubling in size and multiple new bony lytic lesions identified on CT scan completed 12/11/18 as compared to CT from 10/25/18.    Hypercalcemia, PTHrP mediated   PTHrP elevated to 81.2 10/26/18 in the setting of metastatic squamous cell cancer of unknown primary. Serum calcium (uncorrected) as high as 13.3mg/dl was identified during pt's prior hospitalization Oct 2018 for R femoral fracture. She had received multiple doses of pamindronate prior to that hospitalization, with calcium downtrending significantly only after denosumab 120mg was administered during her inpatient stay (10/29/18).    Upon transfer to [facility] 12/12/18, serum calcium uncorrected was 13.0mg/dl with iCal of 7.6mg/dL. She had been receiving fluids as able and received 4mg zoledronic acid at 2am 12/12/18. Given expected delay in onset of action of bisphosphonate and degree of hypercalcemia, she received 600mg (~4mg/kg) calcitonin 12/12/18 1830, 12/13/18 0458 and 12/13/18 1208. Calcium had subsequently downtrended to the normal range (iCa papo of 4.9 12/21/18), but as of 12/24/18 significant increase in ionized calcium to 6.0mg/dl was identified. Denosumab recommended though there was a delay in obtaining this medication. Ionized calcium has continued to trend up to most recent value of 7.1mg/dL this  "morning 12/26. Denosumab has since been obtained and 120mg was administered at 1049 this morning 12/26. 12/26/18 as per care conference (primary team, palliative, patient and pt's family member), a transition to comfort cares was decided upon.     Please let endocrine team know if we can be of any further assistance.     The pt was discussed with Dr. Willams.     Katy Garza MD  Endocrine Fellow   Pager [phone]      Subjective/IEs:    Pt noted to have waxing and waning mental status per other care team members. C/o abdominal pain.    Calcium with further elevation this morning.     Denosumab was obtained from pharmacy, 120mg administered at 1049 this morning, 12/26/18      O:  /58 (BP Location: Right arm)   Pulse 95   Temp 97.4  F (36.3  C) (Oral)   Resp 18   Ht 1.676 m (5' 6\")   Wt 141.5 kg (312 lb)   SpO2 96%   BMI 50.36 kg/m    Gen: Appears fatigued, somewhat diaphoretic  Pulm: Breathing comfortably on O2 via NC. No overt crackles on pulmonary exam.   Neuro: Pt states she is in the hospital, seems to think in [place]. States that it is the year 2017. Able to state full name.         I have personally examined the patient, reviewed and edited the fellow's note and agree with the plan of care.    Estefania Willams MD.     "

## 2018-12-27 NOTE — PROGRESS NOTES
CLINICAL NUTRITION SERVICES    Informed decision made to change pt s status to comfort care. Nutrition interventions discontinued and no further interventions planned at this time. RD can be consulted if needed.    RD signing off on 12/27/2018.    Katherine Crowder RD, LD  7C RD pager: [phone]

## 2018-12-27 NOTE — PROGRESS NOTES
"Social Work Services Progress Note    Hospital Day: 16  Date of Initial Social Work Evaluation:  Not completed  Collaborated with:  Medical team, Pt's niece, and Pt's mother via phone.    Data: Pt is 62 yo female readmitted to [facility] on 12/11/18 for sepsis following a TCU stay at [facility] TCU in [place]. Pt is on comfort cares following diagnosis of metastatic squamous carcinoma of unknown primary and decline of condition. Please see interdisciplinary notes of care conference held 12/27 for additional information.     Pt's niece Radha, Pt's primary contact, has been involved and visited Pt at her bedside this afternoon. She states she \"thinks\" she \"might be\" Pt's power of , but is unsure. She plans to locate the documents this evening and send to writer.     Per care conference held 12/27, local SNF placement was being pursued. A referral to [facility] hospice home was sent, however, Pt does not meet criteria d/t being over the weight limit for admission. Pt was accepted to [facility] for hospice placement as well. However, after reviewing the $5,000 deposit requirement Pt's niece requested to transfer Pt to [facility] to be closer to all of her family. Also, it was confirmed that [facility] is not within Pt's insurance network.     Pt is currently without capacity to make medical decisions. Per facility [facility] policy, SW spoke with Pt's mother Jennifer Stokes (P: [phone]) who was updated re: Pt's current medical condition. Pt's mother reports Pt has not informed majority of her family members of her medical information and she is \"not surprised\" Pt has been hospitalized, but was not aware of her current condition or cancer diagnosis. [facility] states she would like to defer to the medical team that if they feel Pt is stable to transport to the [place], [place] area then they would like SW to pursue Hospice/SNF placement.     Medical stability for transport and SNF placement will be " evaluated the following morning.     Intervention:  Discharge planning.    Assessment:  See bedside notes and medical team notes.    Plan:    Anticipated Disposition:  Facility:  [facility]    Barriers to d/c plan:  placement    Follow Up:  SW to f/u & assist as needed.

## 2018-12-27 NOTE — PLAN OF CARE
HD 16 admitted with aspiration pneumonia. Alert to name only. Care conference today, transitioned to comfort cares. On 2 LPM NC, diaphoretic, Oxycodone soln. For pain, Pt moans if in pain. Will answer yes/no to questions. Turn Q2H, on a pulsate mattress. Held all PO meds. Pt is unable to follow commands. Interdry to folds, Purewick in place, BM this shift. Mepilex in place. Oral care complete lips cracked, vasaline applied. PIV is at TKO. Will discharge to TCU if available or stay here.

## 2018-12-27 NOTE — PLAN OF CARE
Occupational Therapy Discharge Summary    Reason for therapy discharge:    No further expectations of functional progress.  Change in medical status.    Progress towards therapy goal(s). See goals on Care Plan in [facility] electronic health record for goal details.  Goals not met.  Barriers to achieving goals:   change in medical status .    Therapy recommendation(s):    No further therapy is recommended.  Pt transitioning to comfort cares, will complete therapy orders. Pt does not demo rehab potential and primary goal is comfort at this time.

## 2018-12-27 NOTE — PROGRESS NOTES
[facility], [place]    Palliative Care Progress Note    Patient: Mayra Stokes  Date of Admission:  12/11/2018    Recommendations:  -DNR/DNI  -comfort measures only  -would consider increasing oxycodone to 5-10 mg q2h PRN for pain or dypsnea  -appreciate unit SW assistance with disposition       Assessment  Mayra Stokes is a 63 year old female with recent diagnosis of squamous cell carcinoma, likely pulmonary source, admitted with AMS 2/2 hypercalcemia, UTI, PNA, and now with hypercalcemia. More lethargic and confused now    Symptoms: abdominal pain which is worse possibly due to constipation versus worsening cancer. Does nod head yes to having pain, Has oxycodone PRN.    Prognostic Information: discussed with felicia and her fiance, Josiah, today that it could be that she is in her last days and if she is not stable enough she might not be able to leave the hospital, discussed re-evaluating this tomorrow for any changes. Discussed it may be possible to get her out of the hospital locally but that if she were to get closer to home there would be a risk for death in transportation if it was further (such as 3 hours away).     Advance Care Planning:           Decision making capacity: No       Disease understanding: seems like family has understanding now of how short time might be    These recommendations have been discussed with primary team.    EFFIE Evans CNS  Palliative Care Consult Team  Pager: [phone]    [facility] Inpatient Team Consult pager [phone] (M-F 8-4:30)  After-hours Answering Service [phone]   Palliative Clinic: [phone]       Interval History:       notes reviewed, no acute events, seen laying in bed, having pain. Niece and her fiance at bedside.       Medications:   I have reviewed this patient's medication profile and medications during this hospitalization    dexamethasone 4 mg daily  Tramadol scheduled 100 mg q6h  Acetaminophen PRN  Ondansetron PRN -  x1  Oxycodone PRN- x1          Review of Systems:   A comprehensive ROS has been negative other than stated in the HPI and below:   Palliative Symptom Review (0=no symptom/no concern, 1=mild, 2=moderate, 3=severe):      Pain: 2      Fatigue: 0      Nausea: 2      Constipation: 0      Diarrhea: 0      Depressive Symptoms: 0      Anxiety: 0      Drowsiness: 0      Poor Appetite: 0      Shortness of Breath: 0      Insomnia: 0      Other: 0      Overall (0 good/no concerns, 3 very poor):  2          Physical Exam:     Vital Signs: Temp: 97.6  F (36.4  C) Temp src: Axillary BP: 139/86   Heart Rate: 131 Resp: 18 SpO2: 90 % O2 Device: Nasal cannula Oxygen Delivery: 2.5 LPM  Weight: 312 lbs 0 oz    Physical Exam:  Constitutional: drowsy, will wake to voice, cooperative, no apparent distress  Lungs: No increased work of breathing  Neurologic: Awake, alert, oriented to self, able to answer questions with yes/no intermittently  Neuropsychiatric: guarded    Data Reviewed:     ROUTINE ICU LABS (Last four results)  CMP  Recent Labs   Lab 12/26/18  1252 12/26/18  0657 12/25/18  1737 12/25/18  0657 12/24/18  2358 12/24/18  1927 12/24/18  0736 12/23/18  0715 12/22/18  1258 12/22/18  0707 12/21/18  0357   NA  --  130*  --  132*  --   --   --   --   --   --  134   POTASSIUM  --  4.5  --  5.1 4.9 4.6  --   --   --   --  3.6   CHLORIDE  --  95  --  96  --   --   --   --   --   --  96   CO2  --  28  --  30  --   --   --   --   --   --  30   ANIONGAP  --  6  --  6  --   --   --   --   --   --  7   GLC  --  104*  --  100*  --   --   --   --   --   --  109*   BUN  --  10  --  10  --   --   --   --   --   --  5*   CR  --  0.32*  --  0.30*  --   --   --   --   --   --  0.38*   GFRESTIMATED  --  >90  --  >90  --   --   --   --   --   --  >90   GFRESTBLACK  --  >90  --  >90  --   --   --   --   --   --  >90   DEANDRE  --  13.0* 12.4* 12.1*  --   --   --   --   --   --  9.6   MAG  --   --   --   --   --   --  2.0 1.9  --  1.8 2.1   PHOS  --   --    --  2.6  --   --  2.5 2.3* 2.4*  --   --    PROTTOTAL 7.5  --   --   --   --   --   --   --   --   --   --    ALBUMIN 2.0*  --   --  2.0*  --   --   --   --   --   --  2.0*   BILITOTAL 0.3  --   --   --   --   --   --   --   --   --   --    ALKPHOS 231*  --   --   --   --   --   --   --   --   --   --    AST 57*  --   --   --   --   --   --   --   --   --   --    ALT 26  --   --   --   --   --   --   --   --   --   --      CBCNo lab results found in last 7 days.  INRNo lab results found in last 7 days.  Arterial Blood Gas  Recent Labs   Lab 12/26/18  1509   O2PER 30.0       EFFIE Evans [facility]  Palliative Care Consult Team  Pager: [phone]    [facility] Inpatient Team Consult pager [phone] (M-F 8-4:30)  After-hours Answering Service [phone]  Palliative Clinic: [phone]     Total time spent was 25 minutes,  >50% of time was spent counseling and/or coordination of care regarding goals of care and symptom management.

## 2018-12-27 NOTE — PLAN OF CARE
Pt transitioned to comfort cares yesterday. A&O to self only. Pt able to answer yes/no questions. Pt is not following commands. Did not give pill medications because of pt's high risk for aspiration. On 2.5L NC. Prevalon boots on. Pt repo q3h per comfort. Purewick catheter replaced at 0400. Draining clear yellow output. Pt had 1 BM overnight, medium soft. Mepilex to coccyx, CDI. Interdry in groin area. Mouth care (mouth swabs/lips) complete q3h. Pt only wanted oxycodone x1 overnight.  Plan is for pt's niece (legal guardian) to visit (lives out of town) this Saturday. SW following for hospice referrals.

## 2018-12-27 NOTE — PROGRESS NOTES
"Patient stating \"no\" and \"don't\" with repositioning. Otherwise very lethargic. Did not give morning medications due to lethargy. Patient not oriented x 4. Will continue to monitor and manage pain   "

## 2018-12-27 NOTE — PLAN OF CARE
"Patient on comfort cares. Very lethargic. Repositioning when patient will allow. Patient stated \" no\" and \"don't\" when repositioning. See associated note. Pain assessed by observing non-verbal facial descriptors- oxycodone high conc oral solution 2.6 mg- 5 mg q4 hours. Providing oral care- vaseline on lips and oral swabs when repositioning patient. Patient diaphoretic- cool cloth on patient and fan in use. R PIV, saline locked, dressing CDI. Changed and cleaned up. Small smear of stool. New mepilex placed on coccyx. Plan- continue to observe for non-verbal signs of pain/signs of pain   "

## 2018-12-27 NOTE — PLAN OF CARE
Physical Therapy Discharge Summary    Reason for therapy discharge:    No further expectations of functional progress.    Progress towards therapy goal(s). See goals on Care Plan in [facility] electronic health record for goal details.  Goals not met.  Barriers to achieving goals:   limited tolerance for therapy.    Therapy recommendation(s):    Pt has transitioned to comfort cares; will complete orders and sign off.

## 2018-12-27 NOTE — PROGRESS NOTES
Patient sleeping, eyes partially open. No observable signs of pain. Will continue to monitor pain

## 2018-12-28 NOTE — PROGRESS NOTES
[facility], [place]    Palliative Care Progress Note    Patient: Mayra Stokes  Date of Admission:  12/11/2018    Recommendations:  -DNR/DNI  -continue comfort measures only  -if pain is not well controlled would continue to increase dose of opioids, for moderate pain would increase by 50% (for example go from 10 mg to 15 mg of oxycodone) and for severe uncontrolled pain would increase by 100% (for example go from 10 mg to 20 mg of oxycodone).    Assessment  Mayra Stokes is a 63 year old female with recent diagnosis of squamous cell carcinoma, likely pulmonary source, admitted with AMS 2/2 hypercalcemia, UTI, PNA, and now with hypercalcemia. More lethargic and confused now.    Goals of care: discussed with primary team and niece and mother are on board with the plan of care at this time. At time of this visit would think that she would be stable enough to discharge to a facility tomorrow and would also have providers re-evaluate her status the day of to ensure she is not in the more active process of dying. At the time of this visit she was vitally stable.    Pain: appears comfortable during this visit, she had 10 mg of oxycodone about an hour before this visit and seemed to be effective for her pain.    These recommendations have been discussed with primary team.    EFFIE Evans CNS  Palliative Care Consult Team  Pager: [phone]    [facility] Inpatient Team Consult pager [phone] (M-F 8-4:30)  After-hours Answering Service [phone]   Palliative Clinic: [phone]       Interval History:      Notes reviewed, lethargic and appears comfortable.       Medications:   I have reviewed this patient's medication profile and medications during this hospitalization    14.8 mg over last 24 hours  Dexamethasone 8 mg daily  Oxycodone 5-10 mg q2h         Review of Systems:   A comprehensive ROS has been negative other than stated in the HPI and below:   Palliative Symptom Review (0=no  symptom/no concern, 1=mild, 2=moderate, 3=severe):   MARIAJOSE given AMS          Physical Exam:     Vital Signs:           Resp: 20        Weight: 312 lbs 0 oz    Physical Exam:  Constitutional: lethargic, appears comfortable, laying in bed, no apparent distress  Lungs: No increased work of breathing, good air exchange, clear to auscultation bilaterally  Cardiovascular: increased rate and rhythm  Neurologic: lethargic  Neuropsychiatric: MARIAJOSE given AMS    Data Reviewed:     CMP  Recent Labs   Lab 12/26/18  1252 12/26/18  0657 12/25/18  1737 12/25/18  0657 12/24/18  2358 12/24/18  1927 12/24/18  0736 12/23/18  0715 12/22/18  1258 12/22/18  0707   NA  --  130*  --  132*  --   --   --   --   --   --    POTASSIUM  --  4.5  --  5.1 4.9 4.6  --   --   --   --    CHLORIDE  --  95  --  96  --   --   --   --   --   --    CO2  --  28  --  30  --   --   --   --   --   --    ANIONGAP  --  6  --  6  --   --   --   --   --   --    GLC  --  104*  --  100*  --   --   --   --   --   --    BUN  --  10  --  10  --   --   --   --   --   --    CR  --  0.32*  --  0.30*  --   --   --   --   --   --    GFRESTIMATED  --  >90  --  >90  --   --   --   --   --   --    GFRESTBLACK  --  >90  --  >90  --   --   --   --   --   --    DEANDRE  --  13.0* 12.4* 12.1*  --   --   --   --   --   --    MAG  --   --   --   --   --   --  2.0 1.9  --  1.8   PHOS  --   --   --  2.6  --   --  2.5 2.3* 2.4*  --    PROTTOTAL 7.5  --   --   --   --   --   --   --   --   --    ALBUMIN 2.0*  --   --  2.0*  --   --   --   --   --   --    BILITOTAL 0.3  --   --   --   --   --   --   --   --   --    ALKPHOS 231*  --   --   --   --   --   --   --   --   --    AST 57*  --   --   --   --   --   --   --   --   --    ALT 26  --   --   --   --   --   --   --   --   --      CBCNo lab results found in last 7 days.  INRNo lab results found in last 7 days.  Arterial Blood Gas  Recent Labs   Lab 12/26/18  1509   O2PER 30.0       [facility] Inpatient Team Consult pager [phone] (M-F  8-4:30)  After-hours Answering Service [phone]  Palliative Clinic: [phone]     Total time spent was 25 minutes,  >50% of time was spent counseling and/or coordination of care regarding symptom management and goals of care.

## 2018-12-28 NOTE — PROVIDER NOTIFICATION
"Paged ehsan celeste at [phone]. Pt moaning and repeatingly saying \"ow\" with repositioning. Pre-medicated pt prior, did not help with current pain management regiment.     MD changed oxycodone dose to 5-10 mg q4h.   "

## 2018-12-28 NOTE — PROGRESS NOTES
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 Presented with sepsis secondary to obstructive pneumonia and was admitted with acute hypoxic respiratory failure. In addition, she had significant delirium, that was multifactorial but with likely the most significant contributor being hypercalcemia; mentation and calcium previously improved with IV fluids, calcitonin and a dose of zoledronic acid. On 12/18 had abdominal pain and diarrhea with CT showing worsening burden of metastatic SCC. On 12/24 hypercalcemia began to worsen and as well as patient's mental status. Following care conference on 12/26 the patient was moved to comfort cares.     #Toxic metabolic encephalopathy secondary to hypercalcemia  #Hypercalcemia secondary to malignancy   Ionized calcium initially elevated. iCal 7.6. PTH appropriately suppresed (9/2018) in the setting of elevated PTH-rp (10/2018). Phos appropriately low. Low vitamin D. Last dose of denosumab on 10/29/18. S/p Zolendronate 4mg IV x1 on 12/12. 600mg (~4mg/kg) calcitonin 12/12/18 , 12/13/18 and 12/13/18.    -- s/p 120mg denosumab subcutaneous 12/26  -- 2/p 600 units Calcitonin 12/26  -- Stopped monitoring calcium, as patient is moving to comfort cares   --Transition oral medications to IV medications if needed given worsening mental status.    # Abdominal pain (diffuse)  Abdominal CT 12/18 notable for increased burden of hepatic metastatic disease which is likely source. Mild stranding also noted around the pancreatic tail. Suspect pain is occurring in the setting of her cancer.   --Palliave care consulted; recommendations appreciated  --Comfort care orders initiated.   --Pain: Stopped  scheduled tramadol due to patient's inability to take p.o. medications instead will do high concentration oxycodone solution 2.6-5.2 mg every 4 hours as needed.  May consider increasing this to 5-10 mg every 4 hours as needed if pain is not adequately controlled.  --Dexamethasone 4mg PO daily, increase and to switch to 8 mg IV daily.     #Metastatic squamous carcinoma, unknown primary   #Endometrial thickening, suspicious for malignancy  --Heme/Onc consulted; given new findings on abdominal CT from 12/18. See note by Dr. Pacheco dated 12/19 for full details; in brief given her poor functional status she is not a good candidate for chemotherapy. Hospice care was introduced. Patient's mental status worsening with hypercalcemia as above. Now s/p care conference with shawn 12/26 and pursued comfort cares.   -- Comfort care order set  -- Appreciate SW assistance in eval of dispo options for hospice   -- Palliative care consulted; their assistance appreciated.     #Acute hypoxic respiratory failure secondary to CAP  #Acute on chronic hypercapneic respiratory failure   --Moving to comfort cares.      #Pathologic fracture s/p ORIF  Underwent open reduction/internal fixation, tumor curettage and excision, and biopsy of the bone on 10/16.  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  -- Moving to comfort cares. See above.      #Sepsis 2/2 obstructive pneumonia vs. aspiration pneumonia- resolved     #Sinus Tachycardia-- resolved     #Diastolic heart failure  #Cor pulmonale   EF 60% but noted diastolic heart failure (4/27/18). Repeat ECHO with stable systolic function  - PTA metoprolol 100mg PO BID    - PTA furosemide 40mg PO BID     #DMII   Last A1c 6.1. Has not had elevated blood glucose levels this admission. Will stop blood sugar checks.     #Hypophosphatemia  Anticipate hypophosphatemia and hypercalcemia may occur with PTH-rp driving electrolyte imbalance.   - Neutrophos QID  - Stop checking  labs, move to comfort cares     #Obesity hypoventilation syndrome  #Obstructive sleep apnea     Diet: Regular Diet Adult  Snacks/Supplements Adult: Other; PRN; Between Meals    Fluids: None  Lines: PIV  DVT Prophylaxis: Enoxaparin (Lovenox) SQ  Bravo Catheter: not present  Code Status: DNR/DNI      Disposition Plan   Expected discharge: Tomorrow, recommended to hospice once when hospice bed is available.  Will need to continue to reassess daily.   .  Entered: Ariana Justin MD 12/27/2018, 6:11 PM     The patient's care was discussed with the Patient.    Patient was staffed with Dr. Rincon.    Ariana Justin  Medicine/pediatrics PGY-4  Pager [phone]    ______________________________________________________________________    Interval History   Nursing notes reviewed.  Patient was oriented to self only this morning for nursing.  She was not following commands.  She was having difficulty taking p.o. medications.  Unable to complete review of systems with the patient during our visit due to patient's decreased mental status.    Data reviewed today: I reviewed all medications, new labs and imaging results over the last 24 hours. I personally reviewed no images or EKG's today    Physical Exam   Vital Signs: Temp: 97.6  F (36.4  C) Temp src: Axillary BP: 139/86   Heart Rate: 131 Resp: 18 SpO2: 90 % O2 Device: Nasal cannula Oxygen Delivery: 2.5 LPM  Weight: 312 lbs 0 oz    General: Patient lying in bed. Appears chronically ill. Face appears slightly flushed and diaphoretic. Appears tired. Intermittently closes her eyes.  Fan blowing on her face.  HEENt: PERRL, clear sclera.  Resp: Diminished breath sounds overall. No focal findings. Breathing comfortably.   CV: Distant sounds. RRR, no m/r/g.   Abd: Soft, non-distended, mildly tender will wince slightly when palpating the epigastric area.  Ext: ~1+ pitting edema of LEs to the knee.   Neuro: Alert.  Opens eyes to examiner's voice. Does not answer questions.     Data     None.

## 2018-12-28 NOTE — PLAN OF CARE
HD17 admitted with Aspiration Pneumonia, now on comfort cares. Is able to communicate yes no regarding pain needs however is in the most pain when moving pt. Oral suction at bedside for secretions, Ordered atropine from pharmacy. High concentrated Oxycodone available PRN given once. External catheter in place, mepilex to coccyx. On a pulsate mattress.

## 2018-12-28 NOTE — PROGRESS NOTES
Social Work Services Progress Note    Hospital Day: 17  Date of Initial Social Work Evaluation:  Not completed  Collaborated with:  Medical team, bedside nurse, palliative team, Pt's niece, and Pt's mother via phone.    Data:  Pt is 62 yo female readmitted to [facility] on 12/11/18 for sepsis following a TCU stay at [facility] TCU in [place]. Pt is on comfort cares following diagnosis of metastatic squamous carcinoma of unknown primary and decline of condition.     Per medical team and palliative team, Pt is not medically stable to transport today, but will re-evaluate tomorrow. See below for SNF referrals/updates. Pt's funds would need to be accessed for $5,000 deposit and potentially for transportation up front as well. SW explained this to both Pt's niece (via VM) and NOK mother Jennifer via phone. It is unclear if pt will be medically stable to discharge from the hospital over the weekend.    [facility] [place]  P: [phone]  F:[phone]  Referral sent today, they have availability and could likely accept over the weekend.   They commonly use [facility]  $5,000 deposit would be required.     [facility] [place]   P: [phone]  No availability over the weekend, but could reassess Monday 12/31.     Intervention:  Discharge planning    Assessment:  See bedside notes, medical team notes, palliative notes.    Plan:    Anticipated Disposition:  Facility:  TBD    Barriers to d/c plan:  Medical stability    Follow Up:  SW to f/u & assist as needed.    LORENA Hurtado, NNEKA   Surgical/Oncology Unit   Phone: [phone]  Pager: [phone]

## 2018-12-28 NOTE — PLAN OF CARE
Pt on comfort cares. Unable to adequately communicate needs. Pt grimaces and mumbles while in pain. Pt unable to answer yes or no questions. Pain being managed with PRN oxycodone every two hours. Pt has external catheter in place. Mildred care performed today. Low urine output. Pt to be turned every two hours. Oral cares given every two hours as well. Pt assist of two with lift.

## 2018-12-28 NOTE — PROVIDER NOTIFICATION
"Paged Ariana Justin at [phone]. Pt does not have adequate pain control with current pain regiment. Pt repeatedly saying \"ow\" and grimacing with cares and repositioning. Writer requesting change in pain management plan.   "

## 2018-12-28 NOTE — PLAN OF CARE
"On comfort cares. Pt moaning and grimacing frequently at beginning of shift, will become mildly restless with pain. Oxycodone dose increased, pt appeared more comfortable and slept after dosage increase. Pain medication administration timed with repositioning. Repositioning and oral cares completed q 2-3 hours. Assist x2 and mechanical lift. Purewick catheter in place. Pt intermittently responded \"yes\" or \"no\" to questions. Lethargic majority of shift.   "

## 2018-12-28 NOTE — PROGRESS NOTES
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 Presented with sepsis secondary to obstructive pneumonia and was admitted with acute hypoxic respiratory failure. In addition, she had significant delirium, that was multifactorial but with likely the most significant contributor being hypercalcemia; her mentation and calcium previously improved with IV fluids, calcitonin and a dose of zoledronic acid. On 12/18, she had abdominal pain and diarrhea with CT showing worsening burden of metastatic SCC. On 12/24 hypercalcemia began to worsen and as well as patient's mental status. Following care conference on 12/26 the patient was moved to comfort cares.     #Toxic metabolic encephalopathy secondary to hypercalcemia  #Hypercalcemia secondary to malignancy   Ionized calcium initially elevated. iCal 7.6. PTH appropriately suppresed (9/2018) in the setting of elevated PTH-rp (10/2018). Phos appropriately low. Low vitamin D. Last dose of denosumab on 10/29/18. S/p Zolendronate 4mg IV x1 on 12/12. 600mg (~4mg/kg) calcitonin 12/12/18 , 12/13/18 and 12/26/18. s/p 120mg denosumab subcutaneous 12/26  --Patient is on comfort cares, stop monitoring calcium   --Transition IV medications to sublingual or solutions as possible given worsening mental status, but in preparation for possible move to outpatient hospice.     # Abdominal pain (diffuse)  Abdominal CT 12/18 notable for increased burden of hepatic metastatic disease which is likely source for pain. Mild stranding also noted around the pancreatic tail.   --Palliave care consulted; recommendations appreciated  --Comfort care orders initiated.   --Pain: High concentration oxycodone  solution 5-10 mg every 4 hours as needed switch to igh concentration oxycodone solution 5-10 mg every 2 hours as needed.   --Dexamethasone solution 8mg PO daily.     #Metastatic squamous carcinoma, unknown primary   #Endometrial thickening, suspicious for malignancy  --Heme/Onc consulted; given new findings on abdominal CT from 12/18. See note by Dr. Pacheco dated 12/19 for full details; in brief given her poor functional status she is not a good candidate for chemotherapy. Hospice care was introduced. Patient's mental status worsening with hypercalcemia as above. Now s/p care conference with niece 12/26 and pursued comfort cares. Her mother Jennifer (discovered as next of kin, prior was unware of this relative and had not had contact information) was also updated over the phone on 12/28 and agreed with care plan.   -- Comfort care order set  -- Appreciate SW assistance in eval of dispo options for hospice   -- Palliative care consulted; their assistance appreciated.     #Acute hypoxic respiratory failure secondary to CAP  #Acute on chronic hypercapneic respiratory failure   --Moving to comfort cares.      #Pathologic fracture s/p ORIF  Underwent open reduction/internal fixation, tumor curettage and excision, and biopsy of the bone on 10/16.  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  -- Moving to comfort cares. See above.      #Sepsis 2/2 obstructive pneumonia vs. aspiration pneumonia- resolved     #Sinus Tachycardia-- resolved     #Diastolic heart failure  #Cor pulmonale   EF 60% but noted diastolic heart failure (4/27/18). Repeat ECHO with stable systolic function  -- Moving to comfort cares. See above.      #DMII   Last A1c 6.1. Has not had elevated blood glucose levels this admission. Will stop blood sugar checks.     #Hypophosphatemia  Anticipate hypophosphatemia and hypercalcemia may occur with PTH-rp driving electrolyte imbalance.   - Stop checking labs, move to comfort  cares     #Obesity hypoventilation syndrome  #Obstructive sleep apnea     Diet: Regular Diet Adult  Snacks/Supplements Adult: Other; PRN; Between Meals    Fluids: None  Lines: PIV  DVT Prophylaxis: None  Bravo Catheter: not present  Code Status: DNR/DNI      Disposition Plan   Expected discharge: Tomorrow, recommended to hospice once when hospice bed is available. Pain will need to be under control, PO medications tolerated, and will continue to reassess daily.   .  Entered: Ariana Justin MD 12/28/2018, 2:14 PM     The patient's care was discussed with the Patient and Patient's Family.    Patient was staffed with Dr. Rincon.    Ariana Justin  Medicine/pediatrics PGY-4  Pager [phone]    ______________________________________________________________________    Interval History   Nursing notes reviewed.  Overnight pain medications increased. Patient only opening eyes to voice this morning. Groaned slightly and held abdomen when asked about pain. Otherwise rested quietly. Unable to do remainder of ROS due to patient's mental status.    Called patient's mother (Jennifer phone: [phone]) and updated her regarding the patient. Mother reported that the patient used to communicate with her frequently, but is a private person and had stopped relaying information about her health and whereabouts since 9/2018. She was appreciative of the update and voiced agreement about keeping her comfortable. She was familiar with hospice as several members of the family have had a history of cancer. She was hopeful that the patient would be able to transfer closer to home for hospice if safe, given her limited ability to commute to [facility].     Data reviewed today: I reviewed all medications, new labs and imaging results over the last 24 hours. I personally reviewed no images or EKG's today    Physical Exam   Vital Signs: Temp: 100.7  F (38.2  C) Temp src: Axillary BP: 134/73   Heart Rate: 135 Resp: 20 SpO2: 93 % O2 Device: Nasal  cannula Oxygen Delivery: 2 LPM  Weight: 312 lbs 0 oz    General: Patient lying in bed. Appears chronically ill. Pale. Sitting turned on her right side in bed. Face appears slightly diaphoretic. Intermittently closes her eyes.  Fan blowing on her face.  HEENt: PERRL, clear sclera.  Abd: patient holding her abdomen and groaned when asked about pain.  Neuro: Alert.  Opens eyes to examiner's voice. Does not answer questions.     Data    None.

## 2018-12-29 NOTE — PLAN OF CARE
Comfort care. Pt treated for pain for non-verbals, Oxy x2. Pt often somnolent, but intermittently will nod or shake head. Skin flushed & red this AM, fan & 2 L O2 for comfort. Repositioned in bed x3.

## 2018-12-29 NOTE — PLAN OF CARE
Pt on comfort cares. Repositioned in bed Q2H. Mildred cares and oral cares Q2H. Pt unable to respond to questions. Oxycodone given X2 for non verbal indicators of pain. Purewick catheter in place. Plan to possibly discharge to hospice tomorrow.

## 2018-12-29 NOTE — PLAN OF CARE
Pt on comfort cares. Pt family visited today. Talked with MD and  about potentially transferring pt. Family states they will probably have pt stay here. Pt lethargic. Answers yes and no questions. Pt in pain during turning. Oxycodone PRN q2h to manage. Purewick in place. Pt oral cares and turning q2h.

## 2018-12-29 NOTE — PLAN OF CARE
Pt on comfort cares. Turn and repositioning Q2H. Oral and zaire cares Q2H. Gave Oxycodone x1 for nonverbal indicators of pain. Pt not following commands. Plan: Discharge to hospice tomorrow.

## 2018-12-29 NOTE — PROGRESS NOTES
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 Presented with sepsis secondary to obstructive pneumonia and was admitted with acute hypoxic respiratory failure. In addition, she had significant delirium, that was multifactorial but with likely the most significant contributor being hypercalcemia; her mentation and calcium previously improved with IV fluids, calcitonin, and a dose of zoledronic acid. On 12/18, she had abdominal pain and diarrhea with CT showing worsening burden of metastatic SCC. On 12/24 hypercalcemia began to worsen and as well as patient's mental status. Following care conference on 12/26 the patient was moved to comfort cares.     --Palliave care consulted; recommendations appreciated  --Comfort care orders initiated.   --Pain: High concentration oxycodone solution 5-10 mg every 2 hours as needed. Dexamethasone solution 8mg PO daily.   -- Appreciate SW assistance in eval of dispo options for hospice, given current condition patient would likely not tolerate transportation well to an outside facility.         Diet: Regular Diet Adult  Snacks/Supplements Adult: Other; PRN; Between Meals   Fluids: None  Lines: PIV  DVT Prophylaxis: None  Bravo Catheter: not present  Code Status: DNR/DNI      Disposition Plan   Expected discharge: Possible today pending family discussion regarding cost/risk benefits of patient movement , recommended to staying hospitalized verses hospice once family discusses hospice options and what it entails for the family, (advised that patient may not tolerate movement well due to pain and it is dfficult to assess her overall proximity to death as she has shown signs of entering  into the last phases of life. Pain will need to be under control, PO medications tolerated, and will continue to reassess daily.   .  Entered: Ariana Justin MD 12/29/2018, 6:42 AM     The patient's care was discussed with the Patient and Patient's Family.    Patient was staffed with Dr. Rincon.    Ariana Justin  Medicine/pediatrics PGY-4  Pager [phone]    Physician Attestation  I, Cyrus Rincon MD, saw this patient with the resident and agree with the resident s findings and plan of care as documented in the resident s note with my edits.     I personally reviewed vital signs, medications, labs and imaging.    Pt is not following commands, lethargic. Per family, nodded head yes to 'do you have pain?'. Per nursing, nodding head responses are not consistent. Nurse states pt is comfortable at rest, but has tremendous pain signs with any movement. Concern is that transportation to a facility 2 hours away will cause too much discomfort for her while life expectancy is hours to days with worsening mentation. Discussed this with family (mother and niece) and they understand. Plan to keep hospitalized for tonight and reassess tomorrow.     Cyrus Rincon MD  Date of Service (when I saw the patient): 12/29/18      ______________________________________________________________________    Interval History   Nursing notes reviewed.  Unable to answer questions. Per nursing appears comfortable. She has been receiving her PRN oxycodone regularly. Family later updated at the bedside by Dr. Rincon.     Data reviewed today: I reviewed all medications, new labs and imaging results over the last 24 hours. I personally reviewed no images or EKG's today    Physical Exam   Vital Signs: Temp: 100.7  F (38.2  C) Temp src: Axillary BP: 134/73   Heart Rate: 135 Resp: 24 SpO2: 93 % O2 Device: Nasal cannula Oxygen Delivery: 2 LPM  Weight: 312 lbs 0 oz    General: Patient lying in bed. Appears chronically ill. Pale. Wash cloth on forehead.  Intermittently closes her eyes.   HEENt:  Clear sclera.  Chest: Heart sounds distant. Tachycardia with regular rhythm.   Lungs: Breathing appears relatively non-labored, but breaths are shallow. Air entry heard bilaterally.   Neuro: Eyes open, but does not turn to examiner. Does not follow commands or nod yes/no to examiner.     Data    None.

## 2018-12-29 NOTE — PROGRESS NOTES
Social Work Services Progress Note    Hospital Day: 19  Date of Initial Social Work Evaluation:  n/a  Collaborated with:  Patients, mother, MD, RN    Data:  Pt is 62 yo female readmitted to [facility] on 12/11/18 for sepsis following a TCU stay at [facility] in [place]. Pt is on comfort cares following diagnosis of metastatic squamous carcinoma of unknown primary and decline of condition.    Intervention:  ENZO spoke with the pt's mother Dorota who was present at the hospital and enquiring about the pt's discharge plan. Although the family does have a desire to get the pt closer to home, they are aware that there are some restrictions to this options. Dorota shared that Radha would be more aware of the pt's financial status regarding SNF placement and transport costs. ENZO explained that transport is not always covered by insurance and that this could be a out of pocket cost to the family ([facility] rate quote $1002.64 base rate, $23.39 per mile for BLS transport) as well as a down payment at the SNF facility of $5000. Discussed that at this time the pt is not stable for discharge, the pt's mother expressed a desire to get the pt home so that she could be closer, SW explained that although we can work to get the pt home, the pt may not tolerate the transport home and die in the process. Dorota expresses understanding of this. Dortoa states that she is not aware of anyone being able to access the pt's funds, but she will speak with Radha to see if she is aware. At this time both Dorota and the medical team feel it is best for the pt to not be discharged today. Will reassess the pt's status in the AM.    Dorota expressed that this is all very shocking for her as they did not know the pt was sick, besides some knee pain. Dorota has many questions regarding how long the pt was aware she had cancer and if the pt did not do something about her cancer. Dorota has a ride to come back down tomorrow  12/30/18 if the pt is  still alive. SW provided empathic listening, validation of concerns, and support.    Assessment:  Pt is unresponsive    Plan:    Anticipated Disposition:  Expected pt will die while IP    Barriers to d/c plan:  Medical stability    Follow Up:  Will f/u 12/30 as needed.    LORENA Brady, [facility]  Pager [phone]

## 2018-12-30 NOTE — PROGRESS NOTES
[facility], [place]    Palliative Care Progress Note    Patient: Mayra Stokes  Date of Admission:  12/11/2018    Recommendations:  -DNR/DNI  -comfort measures only  -would continue oxycodone 10 mg q2h PRN for pain or dypsnea  -add 0.5-1 mg SL haldol q4h for agitation/restlessness/hallucinations      Assessment  Mayra Stokes is a 63 year old female with recent diagnosis of squamous cell carcinoma, likely pulmonary source, admitted with AMS 2/2 hypercalcemia, UTI, PNA, and now with hypercalcemia. More lethargic, increased PRN needs.      Seen at bedside, no family present.  Appears comfortable on frequent PRNs.  Does not appear to be responding to internal stimuli currently, but concern for mild agitation overnight.  If recurs, would try low-dose haldol for restlessness/agitation.        These recommendations have been discussed with primary team.    Lorie Dukes MD  Palliative Medicine  Pager [phone]     [facility] Inpatient Team Consult pager [phone] (M-F 8-4:30)  After-hours Answering Service [phone]         Interval History:       Notes reviewed, no acute events.  Discussed with RN - thought was hallucinating overnight, mildly agitated, reaching for things not there.  Nursing have been regularly asking if pain, can reliably answer yes/no, earlier this AM was not having.  Appears comfortable after meds given.  Pretreating before turns, has asked not to turn her.     Saw at bedside, eyes open, tracking.  Will weakly nod to yes/no questions, not reliably verbalizing.  Nodded yes having pain, then closed eyes.         Medications:   I have reviewed this patient's medication profile and medications during this hospitalization    dexamethasone 8 mg daily  Acetaminophen PRN  Ondansetron PRN - x0  Oxycodone 10mg PRN- x7  Lorazepam PRN x0          Review of Systems:   A comprehensive ROS has been negative other than stated in the HPI and below:   Unable to assess 2/2 mental status.         Physical Exam:     Vital Signs:           Resp: 20        Weight: 312 lbs 0 oz    Physical Exam:  Constitutional: drowsy, will open eyes to voice, nodding weakly to questions, no apparent distress  Lungs: No increased work of breathing  Abd:  Soft, nontender  Neurologic: Drowsy, eyes rolled back, tracking with eyes, moving limbs spontaneously    Data Reviewed:     ROUTINE ICU LABS (Last four results)  CMP  Recent Labs   Lab 12/26/18  1252 12/26/18  0657 12/25/18  1737 12/25/18  0657 12/24/18  2358 12/24/18  1927 12/24/18  0736   NA  --  130*  --  132*  --   --   --    POTASSIUM  --  4.5  --  5.1 4.9 4.6  --    CHLORIDE  --  95  --  96  --   --   --    CO2  --  28  --  30  --   --   --    ANIONGAP  --  6  --  6  --   --   --    GLC  --  104*  --  100*  --   --   --    BUN  --  10  --  10  --   --   --    CR  --  0.32*  --  0.30*  --   --   --    GFRESTIMATED  --  >90  --  >90  --   --   --    GFRESTBLACK  --  >90  --  >90  --   --   --    DEANDRE  --  13.0* 12.4* 12.1*  --   --   --    MAG  --   --   --   --   --   --  2.0   PHOS  --   --   --  2.6  --   --  2.5   PROTTOTAL 7.5  --   --   --   --   --   --    ALBUMIN 2.0*  --   --  2.0*  --   --   --    BILITOTAL 0.3  --   --   --   --   --   --    ALKPHOS 231*  --   --   --   --   --   --    AST 57*  --   --   --   --   --   --    ALT 26  --   --   --   --   --   --      Lorie Dukes MD  Palliative Medicine  Pager [phone]     [facility] Inpatient Team Consult pager [phone] (M-F 8-4:30)  After-hours Answering Service [phone]    Total time spent was 15 minutes,  >50% of time was spent counseling and/or coordination of care regarding goals of care and symptom management.

## 2018-12-30 NOTE — PLAN OF CARE
Patient on comfort cares. Patient premedicated before turns. Moaning c turning and repositioning. Patient responds c yes or no answers. Shallow breathing. Cool. Oral cares done. Patients mother Jennifer called and requested patient receive Last Right. Spiritual Services notified and saw patient. Patient appears to be comfortable when not actively turning her. Continue c POC.

## 2018-12-30 NOTE — PROGRESS NOTES
[facility], [place]    Progress Note - Damaris 4 Service        Date of Admission:  12/11/2018    Plans for today  - Continue comfort measures.    Assessment & Plan   Mayra Stokes is a 63 year old female admitted on 12/11/2018 with a PMH significant for HTN, HLD, DMII and cor pulmonale with recent diagnosis of metastatic squamous cell carcinoma of unknown primary. Presented with sepsis secondary to obstructive pneumonia and was admitted with acute hypoxic respiratory failure. In addition, she had significant delirium, that was multifactorial but with likely the most significant contributor being hypercalcemia; her mentation and calcium previously improved with IV fluids, calcitonin, and a dose of zoledronic acid. On 12/18, she had abdominal pain and diarrhea with CT showing worsening burden of metastatic SCC. On 12/24 hypercalcemia began to worsen and as well as patient's mental status. Following care conference on 12/26 the patient was moved to comfort cares.     --Palliave care consulted; recommendations appreciated. Palliative added haldol for agitation overnight.   --Comfort care orders initiated.   --Pain: High concentration oxycodone solution 5-10 mg every 2 hours as needed. Dexamethasone solution 8mg PO daily.   -- Appreciate SW assistance in eval of dispo options for hospice, given current condition patient would likely not tolerate transportation well to an outside facility.         Diet: Regular Diet Adult  Snacks/Supplements Adult: Other; PRN; Between Meals   Fluids: None  Lines: PIV  DVT Prophylaxis: None  Bravo Catheter: not present  Code Status: DNR/DNI      Disposition Plan   Expected discharge: TBD. Pending pt stability, pain control (has tremendous pain with any kind of movement, comfortable when not moved), family discussion regarding cost of placement and transportation.   .  Entered: Cyrus Rincon MD 12/30/2018, 7:17 AM     Cyrus Rincon MD  Date of Service (when I saw the  patient): 12/30/18      ______________________________________________________________________    Interval History   Appears comfortable. Tracks to voice. Nodded yes when asked if her pain is controlled well.     Data reviewed today: I reviewed all medications, new labs and imaging results over the last 24 hours. I personally reviewed no images or EKG's today    Physical Exam   Vital Signs:           Resp: 20        Weight: 312 lbs 0 oz    General: Patient lying in bed. Appears comfortable while at rest. Not agitated.        Data    None.

## 2018-12-30 NOTE — PROGRESS NOTES
SPIRITUAL HEALTH SERVICES  SPIRITUAL ASSESSMENT Progress Note  [facility] ([place]C   ON-CALL VISIT    REFERRAL SOURCE: Epic consult.     Met with nurse who informed me that the pt is actively dying and asked if I could do an end of life ritual. I offered blessing for the journey service for Mayra by her bedside.    PLAN: I will notify unit  of the visit. Spiritual health remains available if future need arise.    Lizzy Ramirez  Chaplain Resident  Pager  [phone]

## 2018-12-30 NOTE — PLAN OF CARE
Patient on comfort cares, pain control with 10mg of oxycodone, received twice during this shift. Moaning when turning and repositioning. Patient appears calm and responding with yes or no questions. Extremities pale but warm. Shallow breathing, oral care done q2hrs  and zaire care done last at 1:30pm. External catheter was changed at 1:30pm during zaire care.

## 2018-12-30 NOTE — PLAN OF CARE
"Comfort care. Pt reporting pain \"stomachache\" & \"I feel a bit sick\", Oxy x2. Pt quite a bit more alert & responsive. Speech garbled, but pt reports wanting to \"go home\". Appears to be hallucinating, looking around the room & batting the air intermittently, but otherwise appears calm. Extremities pale, but warm, some mottling LLE. Pt refuses repositioning.  "

## 2018-12-31 NOTE — PROGRESS NOTES
Pt resting and awake at present time. Pt moaning earlier, and when asked if she was hurting stated yes. Pt received oxycodone x1.  Pt restlessness cont, med with ativan SL x1. Voiding small amts-pure wick intact. Refused repositioning. Lungs coarse, occ loose cough NP. Atropine x1. Pt nephew and wife at bs until 0230. Pt resting quietly at present time. Extremities warm. Mouth cares done. Comfort measures continue.    Addendum: Pt became unresponsive at 0500. Resp becoming more labored. Atropine repeated due to increased secretions. Pt arouses to name but unclear response. Cont to maintain comfort.

## 2018-12-31 NOTE — PROGRESS NOTES
Spoke with patient's niece, Radha, regarding patient belongings and  home of choice.  Per Radha, the family has not decided where the  will take place and will notify us when they decide. Radha will come to 7C tomorrow to  the patient's belongings.

## 2018-12-31 NOTE — PLAN OF CARE
Patient continue c Comfort cares. Oral cares done q 2 hrs. Atropine SL given for secretions x 1. Oxycodone sol given x 2 for pain. Patient quiet most of evening. Arouses when hold her hand and call her name. Patient responds c yes or no to questions. Patient repositioned x 1. Moaning c repositioning. External catheter in place. Nurse talked to several family members on the phone. Some family members now visiting. Continue c POC.

## 2018-12-31 NOTE — PROVIDER NOTIFICATION
Assumed care of patient at 0700:  Pt. Was lethargic and non-responsive all morning.  Labored, gurgly breathing. Oral cares done.  Purewick in place, draining dark urine, output decreased over the evening per previous RN. Atropine given for secretions. Oxycodone given for pain.  Pt's breathing slowly stopped and provider was notified, Pt. Was pronounced dead at 10:05 a.m.

## 2018-12-31 NOTE — DEATH PRONOUNCEMENT
MD DEATH PRONOUNCEMENT    Called to pronounce Mayra Stokes dead.    Physical Exam: Unresponsive to noxious stimuli, Spontaneous respirations absent, Breath sounds absent, Carotid pulse absent, Heart sounds absent, Pupillary light reflex absent and Corneal blink reflex absent    Patient was pronounced dead at 10:05am, 2018.    Active Problems:    Metastatic squamous cell carcinoma (H)    Post-obstructive pneumonia due to foreign body aspiration     Infectious disease present?: NO    Communicable disease present? (examples: HIV, chicken pox, TB, Ebola, CJD) :  NO    Multi-drug resistant organism present? (example: MRSA): NO    Please consider an autopsy if any of the following exist:  NO Unexpected or unexplained death during or following any dental, medical, or surgical diagnostic treatment procedures.   NO Death of mother at or up to seven days after delivery.     NO All  and pediatric deaths.     NO Death where the cause is sufficiently obscure to delay completion of the death certificate.   NO Deaths in which autopsy would confirm a suspected illness/condition that would affect surviving family members or recipients of transplanted organs.     The following deaths must be reported to the 's Office:  NO A death that may be due entirely or in part to any factors other than natural disease (recent surgery, recent trauma, suspected abuse/neglect).   NO A death that may be an accident, suicide, or homicide.     NO Any sudden, unexpected death in which there is no prior history of significant heart disease or any other condition associated with sudden death.   NO A death under suspicious, unusual, or unexpected circumstances.    NO Any death which is apparently due to natural causes but in which the  does not have a personal physician familiar with the patient s medical history, social, or environmental situation or the circumstances of the terminal event.   NO Any death apparently due  to Sudden Infant Death Syndrome.     NO Deaths that occur during, in association with, or as consequences of a diagnostic, therapeutic, or anesthetic procedure.   NO Any death in which a fracture of a major bone has occurred within the past (6) six months.   NO A death of persons note seen by their physician within 120 days of demise.     NO Any death in which the  was an inmate of a public institution or was in the custody of Law Enforcement personnel.   NO  All unexpected deaths of children   NO Solid organ donors   NO Unidentified bodies   NO Deaths of persons whose bodies are to be cremated or otherwise disposed of so that the bodies will later be unavailable for examination;   NO Deaths unattended by a physician outside of a licensed healthcare facility or licensed residential hospice program   NO Deaths occurring within 24 hours of arrival to a health care facility if death is unexpected.    NO Deaths associated with the decedent s employment.   NO Deaths attributed to acts of terrorism.   NO Any death in which there is uncertainty as to whether it is a medical examiner s care should be discussed with the medical investigator.        Body disposition: Autopsy was discussed with family member:  Mother by phone.  Permission for autopsy was declined.    Ariana Justin  Medicine/pediatrics PGY-4  Pager [phone]

## 2019-01-01 LAB
BACTERIA SPEC CULT: NO GROWTH
BACTERIA SPEC CULT: NO GROWTH
Lab: NORMAL
Lab: NORMAL
SPECIMEN SOURCE: NORMAL
SPECIMEN SOURCE: NORMAL

## 2019-01-01 NOTE — DISCHARGE SUMMARY
[facility], [place]  Discharge Summary - Medicine & Pediatrics       Date of Admission:  12/11/2018  Date of Discharge:  12/31/2018 12:55 PM  Discharging Provider: Dr. Akira Rincon  Discharge Service: Damaris Ragland    Discharge Diagnoses    Toxic metabolic encephalopathy secondary to hypercalcemia  Hypercalcemia secondary to malignancy   Metastatic squamous carcinoma, unknown primary   Endometrial thickening, suspicious for malignancy  Acute hypoxic respiratory failure secondary to CAP  Acute on chronic hypercapneic respiratory failure   HFpEF  DMII   Hypophosphatemia  Obesity hypoventilation syndrome  Obstructive sleep apnea    Follow-ups Needed After Discharge   None.    Unresulted Labs Ordered in the Past 30 Days of this Admission     Date and Time Order Name Status Description    12/26/2018 1144 Blood culture Preliminary     12/26/2018 1144 Blood culture Preliminary       These results will be followed up by Damaris Ragland service.     Hospital Course   Mayra Stokes is a 63 year old female with a PMH significant for HTN, HLD, DMII and cor pulmonale with recent diagnosis of metastatic squamous cell carcinoma of unknown primary admitted on 12/11/2018 with sepsis secondary to obstructive pneumonia and acute hypoxic respiratory failure. She initially improved following treatment for sepsis and initiation of IV antibiotics. In addition, she had significant delirium, that was multifactorial but with likely the most significant contributor being hypercalcemia; her mentation and calcium initially improved with IV fluids, calcitonin, and a dose of zoledronic acid. On 12/18, however, she had abdominal pain and diarrhea with CT showing worsening burden of metastatic SCC. On 12/24 her hypercalcemia began to worsen and as well as her mental status. Hematology/oncology was consulted. Given her poor functional status she was not a good candidate for chemotherapy. Hospice care was introduced. Following care  conference on  the patient was moved to comfort cares. On 2018 the patient .     Consultations This Hospital Stay   ADVANCE DIRECTIVE IP CONSULT  PHARMACY TO DOSE VANCO  HEMATOLOGY & ONCOLOGY IP CONSULT  MEDICATION HISTORY IP PHARMACY CONSULT  ENDOCRINE NON-DIABETES ADULT IP CONSULT  ORTHOPAEDIC SURGERY ADULT/PEDS IP CONSULT  VASCULAR ACCESS CARE ADULT IP CONSULT  PHYSICAL THERAPY ADULT IP CONSULT  OCCUPATIONAL THERAPY ADULT IP CONSULT  VASCULAR ACCESS CARE ADULT IP CONSULT  VASCULAR ACCESS CARE ADULT IP CONSULT  PALLIATIVE CARE ADULT IP CONSULT  WOUND OSTOMY CONTINENCE NURSE  IP CONSULT  VASCULAR ACCESS CARE ADULT IP CONSULT  PHYSICAL THERAPY ADULT IP CONSULT  OCCUPATIONAL THERAPY ADULT IP CONSULT  OCCUPATIONAL THERAPY ADULT IP CONSULT  SPIRITUAL HEALTH SERVICES IP CONSULT    Code Status   Prior       The patient was discussed with Dr. Sergey Chung MD  [address]  Pager: [phone]    Physician Attestation   I, Cyrus Rincon, saw and evaluated this patient prior to discharge.  I discussed the patient with the resident and agree with plan of care as documented in the resident note.      I personally reviewed vital signs, medications, labs and imaging.    I personally spent 15 minutes on discharge activities.    Cyrus Rincon  Date of Service (when I saw the patient): 18    ______________________________________________________________________    Physical Exam   Please see death note dated 2018.  Unresponsive to noxious stimuli,   Spontaneous respirations absent,   Breath sounds absent,   Carotid pulse absent,   Heart sounds absent,   Pupillary light reflex absent and Corneal blink reflex absent        Primary Care Physician   Tahmina Sanchez    Discharge Disposition   .   Condition at discharge:     Significant Results and Procedures   CT abdomen and Pelvis (2018)  1. Substantially increased burden of hepatic  metastatic disease.  2. Numerous new osseous lytic metastatic lesions are identified as  described above.  3. Mild stranding around the pancreatic tail. Consider correlation  with serum lipase. Peripancreatic inflammation has overall slightly  improved from prior study.  4. Unchanged heterogenous appearance of the uterus.    Discharge Medications  None

## 2019-01-16 NOTE — PROGRESS NOTES
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 Presenting with sepsis secondary to obstructive pneumonia suggested on CT and admitted for management of sepsis and acute hypoxic respiratory failure. In addition, she had significant delirium, that was multifactorial but with likely the most significant contributor being hypercalcemia; mentation and calcium improving with IV fluids, calcitonin and a dose of zoledronic acid. On 12/18 had new abdominal pain and worsening diarrhea.     # Diffuse abdominal pain  New and diffuse on 12/18. Concerning for possible c. Diff infection verses medication related diarrhea. Also question whether pain may be related to underlying cancer. Less likely an obstructive process given her on-going diarrhea.   --C.diff tested 12/18 and negative  --Restarted Imodium following negative c. Diff 12/18  --Will consider stopping antibiotics.  --Abdominal CT 12/18-->notable for increased burden of hepatic metastatic disease. Mild stranding also noted around the pancreatic tail.     #Toxic metabolic encephalopathy secondary to hypercalcemia  #Hypercalcemia secondary to malignancy - improving   Ionized calcium initially elevated. iCal 7.6. PTH appropriately suppresed (9/2018) in the setting of elevated PTH-rp (10/2018). Phos appropriately low. Low vitamin D. Last dose of denosumab on 10/29/18. S/p 2L NS at OSH. Additional 2L NS on 12/12. S/p Zolendronate 4mg IV x1 on 12/12. Ionized calcium stable 12/18  >Endocrine consult, apprec recs  >Lasix  20mg IV BID today  >Trend iCal q12h; albumin q24h   >If calcium starts rising again, can consider Denosumab 120mg per Endo  brigitte blas recommendations    #Acute hypoxic respiratory failure secondary to CAP   #Acute on chronic hypercapneic respiratory failure - requiring intermittent BiPAP  Oxygen desaturation documented to 82% on RA at OSH on 12/11. Required 4L NC initially. CT 12/11 concerning for post-obstructive PNA. CXR concerning for pulmonary edema (likely non-cardiogenic based on bedside US with IVC with good respiratory variation). VBG on 12/13,  pH 7.28/66 associated with mental status changes, thus BIPAP was initiated while sleeping. Suspect hypoxemia may now persist in part due to recent fluids administered for hypercalcemia. Other considerations include PNA not covered by current antibiotic regimen (WBC rising) vs. May in part be due to a small right-sided pleural effusion with ? loculation noted on CT imaging.    -Continuous pulse ox; keep sats > 90%  -Continue BiPAP while sleeping and nasal cannula when awake. Weaning NC during the day as tolerated.   -Treating PNA as below, will continue to evaluate antibiotic regimen.   -Lasix as above     #Sepsis 2/2 Obstructive pneumonia vs. Aspiration pneumonia  Qualified for Sepsis based on T 103F, 's, WBC 16.4, RR 22.  CTA chest imaging from OSH c/w post-obstructive pneumonia. Lactic acid 1.4. Volume resuscitated. Urine Cx with 10-50K Enterococcus faecalis. MRSA nares negative. BC NGTD. She is high risk for aspiration with altered mentation on presentation; thus aspiration PNA is on the differential for source of infection.   - IV Vancomycin, Cefepime, Levaquin (12/12-12/14); IV Unasyn (12/14-present, plan to complete 7 day course)     #Sinus Tachycardia-- resolved  Likely secondary to sepsis, intravascular depletion (as evidenced by bedside US on 12/12). CTA chest at OSH negative for PE but poor contrast study. EKG in sinus tachycardia rates 130-140. 12/13 Bedside US without signs of DVT.   -12/13 ECHO with no new anatomic abnormalities  -resumed home Metoprolol now that BP stabilized       #Metastatic squamous carcinoma of unknown primary (pulmonary likely)  #Endometrial thickening, suspicious for malignancy  S/p biopsy of rib revealed metastatic squamous cell carcinoma of unknown primary but likely pulmonary source. Last seen in Oncology clinic on 11/14/18. PET scan 12/4/18 revealed L parotid mass, RUL mass with post-obstructive consolidation (high suspicion of pulmonary primary), endometrial thickening, mets involving R 4th rib, right T9 pedicle, bilateral iliac bones, bilateral femurs in addition to multiple hepatic lesions. Gynecology was consulted last admission and they felt that it is unlikely that the squamous cell carcinoma originated in the cervix, the patient has been up-to-date on her cervical cancer screening and had a pelvic exam with HPV testing as recently as 2 years ago.  They were unable to perform pelvic exam or endometrial biopsy at that time due to right leg pain and cannot bend her knee as a result of the femur fracture; they wanted to follow-up with Mayra within 4-6 weeks of discharge, but she would prefer to follow-up with a provider closer to her home.   -Heme/Onc previously consulted, apprec recs--> will reach out again to their team given new findings on abdominal CT 12/18. Previously had been deferring therapy options to outpatient Oncologist in [facility] system, but will question treatment options and whether it would be appropriate to shift her care to [facility].       #Pathologic fracture s/p ORIF  Underwent open reduction/internal fixation, tumor curettage and excision, and biopsy of the bone on 10/16.  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 Last visit outpatient 11/13 : recs NWB with plan for 4 week f/u with repeat XR  -- Ortho consult, apprec recs  --Ok to use андрей lift but lower strap needs to be in the popliteal fossa behind the knee. Do not use with strap at the mid or upper thigh  --50% weightbearing      #Diastolic heart  failure  #Cor pulmonale   EF 60% but noted diastolic heart failure (4/27/18).   - PTA metoprolol 100mg PO BID    - Hold PTA enalapril 5mg qday in the setting of possible infection   - Hold PTA furosemide 40mg PO BID (diuresing as above for hypercalcemia)  - Repeat ECHO with stable systolic function     #DMII   Last A1c 6.1. Has not had elevated blood glucose levels this admission. Will stop blood sugar checks.     #Hypophosphatemia  Anticipate hypophosphatemia and hypercalcemia may occur with PTH-rp driving electrolyte imbalance.   - resume PTA K phos BID from QID prior to admission   - Replace per electrolyte protocol     #Obesity hypoventilation syndrome  #Obstructive sleep apnea  -BiPAP as above     Diet: Regular Diet Adult    Fluids: NS as above  Lines: PIV  DVT Prophylaxis: Enoxaparin (Lovenox) SQ  Bravo Catheter: not present  Code Status: Full Code      Disposition Plan   Expected discharge: 4 - 7 days, recommended to TCU verses hospital transfer due to insurance coverage once adequate pain management/ tolerating PO medications, safe disposition plan/ TCU bed available and completed discussion with heme/onc teams to establish a treatment and follow-up plan.    .  Entered: Ariana Justin MD 12/18/2018, 7:59 AM     The patient's care was discussed with the Bedside Nurse and Patient.    Patient was staffed with Dr. Durand.     Ariana Justin  Medicine/pediatrics PGY-4  Pager [phone]    ______________________________________________________________________    Interval History   No acute events overnight. Remained afebrile. This morning however began to have frequent loose stools. She was also noting new abdominal pain diffusely throughout the abdomen. Appeared uncomfortable. Does not feel short of breath. Remains on 1L NC.     Data reviewed today: I reviewed all medications, new labs and imaging results over the last 24 hours. I personally reviewed the abdominal CT image(s) showing new metastatic  changes in the liver and bones.     Physical Exam   Vital Signs: Temp: 97.1  F (36.2  C) Temp src: Oral BP: 150/84 Pulse: 90 Heart Rate: 99 Resp: 20 SpO2: 95 % O2 Device: Nasal cannula Oxygen Delivery: 1 LPM  Weight: 346 lbs 1.96 oz    General: Patient lying in bed, appears mildly distressed with hands on her abdomen.  HEENt: PERRL, clear sclera. Wearing nasal canula.   Resp: Diminished breath sounds overall. No focal findings. Breathing comfortably.   CV: RRR, no m/r/g.   Abd: Soft, non-distended, but tender to palpation throughout with some guarding.  Ext: ~1+ pitting edema of LEs    Data   Recent Labs   Lab 12/18/18  0944 12/18/18  0412 12/17/18  1350 12/17/18  0414 12/16/18  0437  12/14/18  0428  12/13/18  0425   WBC 16.3* 14.3*  --  12.4* 11.5*   < > 16.3*  --  15.8*   HGB 9.1* 8.9*  --  8.5* 8.8*   < > 9.0*  --  9.1*   MCV 95 97  --  97 98   < > 96  --  97    246  --  248 240   < > 312  --  319   NA  --  135  --  138 141   < > 137  --  136   POTASSIUM  --  4.1 3.4 3.3* 3.5   < > 3.2*   < > 3.0*   CHLORIDE  --  102  --  103 105   < > 101  --  102   CO2  --  28  --  29 27   < > 28  --  26   BUN  --  5*  --  6* 8   < > 7  --  7   CR  --  0.40*  --  0.38* 0.38*   < > 0.36*  --  0.36*   ANIONGAP  --  6  --  6 9   < > 8  --  8   DEANDRE  --  9.5  --  9.3 10.2*   < > 11.7*  --  12.6*   GLC  --  91  --  91 90   < > 123*  --  113*   ALBUMIN  --  2.1*  --  1.9* 1.9*  --  2.2*  --  2.1*   PROTTOTAL  --   --   --   --   --   --  6.8  --  6.7*   BILITOTAL  --   --   --   --   --   --  0.4  --  0.5   ALKPHOS  --   --   --   --   --   --  178*  --  191*   ALT  --   --   --   --   --   --  16  --  15   AST  --   --   --   --   --   --  36  --  37    < > = values in this interval not displayed.

## 2019-04-10 NOTE — PROGRESS NOTES
"Endocrine Consult Follow Up   Patient: Mayra Stokes   MRN: [id number]  Date of Service: 10/31/2018    Subjective:  No acute event overnight. She sat up on recliner today. Feels better. Appetite continues to be poor. L leg pain is improving.     Physical Examination:  Blood pressure 149/81, pulse 88, temperature 98.1  F (36.7  C), temperature source Oral, resp. rate 16, height 1.651 m (5' 5\"), weight 148.9 kg (328 lb 4.2 oz), SpO2 98 %.  GEN: Awake, alert, no distress.  HEENT: EOMI.  NECK: No cervical or clavicular LAD.   CV: RRR with no murmur.   RESP: CTA bilaterally.   ABD: Soft, non-tender, and non-distended, with normal BS.   EXT: Right leg wrapped. Pitting edema 1+ both feet.   SKIN: Normal skin temperature and turgor with no lesions.   NEURO: Normoactive reflexes. No tremor.     Labs:   10/26/2018  PTH <7  Ca 12.5  25 oh vit D <11 (L)  1,25 oh vit D 27.2 (wnl)  PTHrP pending  Vitamin A level: 0.33 (wnl)  Free k/l light chains: elevated Free Kappa light chain 13.9 (0.14-2.42), elevated free Lambda light chain 1.31 (0.02-0.67), Free K/L 10.61 (2.04-10.37)      10/31/2018  Correct Ca 12.49      10/26/2018  Right femur tumor:  INTRAOPERATIVE DIAGNOSIS   PRELIMINARY INTRAOPERATIVE FROZEN SECTION DIAGNOSIS:   Right femur tumor:   - Metastatic carcinoma, poorly differentiated.       10/29/2018  FNA left parotid gland  CYTOLOGIC INTERPRETATION:    Neck, left parotid, ultrasound guided fine needle aspiration:   - Benign neoplasm.   - Benign mixed tumor (pleomorphic adenoma).   Specimen Adequacy: Satisfactory for evaluation.       Assessment:   Mayra Stokes is a 63 year old female with h/o diastolic heart failure, pulmonary HTN, morbid obesity, hypercalcemia who was referred from OSH for R femoral fracture management, now known to be pathologic associated with metastatic carcinoma, undifferentiated. Endocrine was consulted for hypercalcemia management.       #humoral hypercalcemia of malignancy   -s/p calcitonin as " OP  -s/p pamidronate x2 as OP  -s/p aggressive fluids after admission  -s/p calcitonin subcutaneous as IP  -s/p lasix as IP  -Calcium level was improved after the above treatment, but rebounded. Pt denies symptoms associated with hypercalcemia at this point.   -s/p ORIF and bx of femur fracture - c/w metastatic carcinoma, undifferentiated on prelim, OSH path returned as squamous cell carcinoma.   -pt continues to be hypercalcemic, but overall stable.   - Denosumab 120 mg x1 on 10/29/2018  - 25 OH vitamin D level is very low <11, this could cause hypocalcemia following Denosumab. Will have to replace.     Overall likely PTHrP related hypercalcemia (elevated Ca, low Pi, normal 1,25 OH vitamin D), unclear origin of metastatic SCC.     Recommendations  - Start Cholecalciferol 5000 international unit daily  - IV hydration  - Lasix only given if volume overloaded. Can cause volume contraction and worsening hypercalcemia.   - Follow up: PTHrP     Patient was seen and discussed with .    Ahmet Bonilla MD  Endocrine fellow  [phone]      Attestation    I have seen and examined this patient with the Endocrinology Fellow on 10/31/2018.  I agree with the assessment and plan of care as documented in the note.      Key findings: Humeral hypercalcemia of malignancy(PTHrp pending; some osteolytic lesions) now with interval improvement of calcium after denosumab injection on 10/29/2018.  Corrected calcium 12.5 today.  1,25 OH D 27 with undetectable 25 OH levels. Expect the calcium to continue to improve with the denosumab injection she got. In the meantime, please start vitamin D 5000 international unit(s) daily(this will not cause hypercalcemia). Will continue to follow along.     Mandie Hermosillo MD  Staff Physician   Endocrinology, [facility]   Pager [phone]           10-Apr-2019

## 2020-02-14 NOTE — PROGRESS NOTES
Patient refuses to wear BiPAP. She states she doesn't want to wear it and isn't compliant with it at home either. Pt was wearing 2 L NC at the time.    [phone]

## 2022-04-12 NOTE — PROGRESS NOTES
12/25/18 1024   Quick Adds   Type of Visit Initial PT Evaluation       Present no   Language english   Living Environment   Lives With alone   Living Arrangements mobile home   Home Accessibility stairs to enter home   Living Environment Comment Information obtained from prior eval, as pt confused and unable to provide accurate information at this time. Has 4 steps to enter home, then all is on one level. Pt reports she has a ramp to access home, but unclear if this is accurate as pt is poor historian.    Self-Care   Usual Activity Tolerance fair   Current Activity Tolerance poor   Equipment Currently Used at Home wheelchair, manual;shower chair;walker, rolling   Activity/Exercise/Self-Care Comment PLOF is Mod I at baseline with use of 4ww d/t L knee pain, prior to initial hospitalization in Oct 2018 d/t pathologic femur fx leading to dx of SCC.    Functional Level Prior   Ambulation 0-->independent   Transferring 0-->independent   Toileting 0-->independent   Fall history within last six months yes   Number of times patient has fallen within last six months 1   Which of the above functional risks had a recent onset or change? ambulation;transferring   Prior Functional Level Comment Pt admitted from TCU following ORIF for R femur pathologic fx in Oct 2018. Pt is currently 50% weight bearing on R LE.    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 Presenting from TCU with sepsis with secondary to obstructive pneumonia suggestive on CT admitted for management of sepsis  secondary to pulmonary source and acute hypoxic respiratory failure. PMH includes fall with resulting R femur fracture s/p ORIF, biopsy, and tumor excision 10/16/18 and arthritis L knee   Precautions/Limitations fall precautions   Weight-Bearing Status - RLE partial weight-bearing (% in comments)  (50% R LE, lift strap behind knee)   General Observations Pt supine in bed upon arrival. Currently on 3L of O2 via NC. Bipap at night. Lethargic, drowsy, confused. Unable to state date. Poor memory.    General Info Comments Activity Orders: Up with assist. No lift strap under R thigh - needs to be in popliteal fossa behind knee.    Cognitive Status Examination   Orientation person   Level of Consciousness lethargic/somnolent   Follows Commands and Answers Questions 75% of the time   Personal Safety and Judgment impaired   Memory impaired   Cognitive Comment Unable to state date, even after being told date earlier in session. Lethargic at times. Confused.    Pain Assessment   Patient Currently in Pain Yes, see Vital Sign flowsheet  (R femur pain and abdominal pain)   Posture    Posture Forward head position;Protracted shoulders   Range of Motion (ROM)   ROM Comment B LE WFL. Limited d/t pain   Strength   Strength Comments Significantly deconditioned. MaxA of 2 for rolling in bed. Unable to fully extend knees to complete LAQ. Grossly 2/5 B LE   Bed Mobility   Bed Mobility Comments MaxA of 2 for rolling R and L   Transfer Skills   Transfer Comments Dependent lift transfer with use of ceiling lift and sling. Therapist holding R LE up to avoid additional pressure on R femur with lift sling.    Gait   Gait Comments NT - not safe or appropriate to initiate   Balance   Balance Comments Seated up in chair: fair. Leans to L side. Pillows placed for support.   Sensory Examination   Sensory Perception Comments Sensation intact to light touch on B LE with testing. Difficult to complete d/t poor command following.    General Therapy  "Interventions   Planned Therapy Interventions balance training;bed mobility training;gait training;neuromuscular re-education;ROM;strengthening;stretching;transfer training;home program guidelines;progressive activity/exercise   Clinical Impression   Criteria for Skilled Therapeutic Intervention yes, treatment indicated   PT Diagnosis Decreased functional mobility   Influenced by the following impairments Decreased strength, decreased activity tolerance, impaired balance, pain, impaired cognition   Functional limitations due to impairments Impaired ability to functionally navigate in home or community   Clinical Presentation Evolving/Changing   Clinical Presentation Rationale PMH, complex medical history, recent re admit   Clinical Decision Making (Complexity) Moderate complexity   Therapy Frequency` 5 times/week   Predicted Duration of Therapy Intervention (days/wks) 1/2/19   Anticipated Equipment Needs at Discharge (TBD pending goals of care and discharge disposition)   Anticipated Discharge Disposition Transitional Care Facility;Long Term Care Facility;Other (see comments)  (pending goals of care)   Risk & Benefits of therapy have been explained Yes   Patient, Family & other staff in agreement with plan of care Yes   [facility] AM-PAC  \"6 Clicks\" V.2 Basic Mobility Inpatient Short Form   1. Turning from your back to your side while in a flat bed without using bedrails? 2 - A Lot   2. Moving from lying on your back to sitting on the side of a flat bed without using bedrails? 2 - A Lot   3. Moving to and from a bed to a chair (including a wheelchair)? 1 - Total   4. Standing up from a chair using your arms (e.g., wheelchair, or bedside chair)? 1 - Total   5. To walk in hospital room? 1 - Total   6. Climbing 3-5 steps with a railing? 1 - Total   Basic Mobility Raw Score (Score out of 24.Lower scores equate to lower levels of function) 8   Total Evaluation Time   Total Evaluation Time (Minutes) 10     " .

## 2022-10-04 PROBLEM — C79.9 SECONDARY MALIGNANT NEOPLASM OF UNSPECIFIED SITE (CODE) (H): Status: ACTIVE | Noted: 2018-01-01

## (undated) DEVICE — DRAPE C-ARM W/STRAPS 42X72" 07-CA104

## (undated) DEVICE — SU ETHIBOND 0 CT-1 CR 8X18" CX21D

## (undated) DEVICE — SU SILK 2-0 TIE 12X30" A305H

## (undated) DEVICE — SU VICRYL 2-0 SH 27" UND J417H

## (undated) DEVICE — PREP DURAPREP 26ML APL 8630

## (undated) DEVICE — SU PDS II 3-0 SH 27" Z316H

## (undated) DEVICE — BLADE KNIFE SURG 15 371115

## (undated) DEVICE — DRSG MEPILEX BORDER SACRUM 6.3X7.9" 282055

## (undated) DEVICE — SPECIMEN CONTAINER 5OZ STERILE 2600SA

## (undated) DEVICE — DRSG AQUACEL AG 3.5X13.75" HYDROFIBER 412012

## (undated) DEVICE — DRAIN JACKSON PRATT RESERVOIR 400ML SU130-1000

## (undated) DEVICE — SPONGE LAP 18X18" X8435

## (undated) DEVICE — GLOVE PROTEXIS BLUE W/NEU-THERA 7.5  2D73EB75

## (undated) DEVICE — IMPLANTABLE DEVICE
Type: IMPLANTABLE DEVICE | Site: FEMUR | Status: NON-FUNCTIONAL
Removed: 2018-10-26

## (undated) DEVICE — Device

## (undated) DEVICE — DRSG TELFA 3X8" 1238

## (undated) DEVICE — SU VICRYL 0 CT-1 3X27" J430T

## (undated) DEVICE — SUCTION TIP YANKAUER STR K87

## (undated) DEVICE — IMP SCR SYN 5.0X55MM VA LOCK ST T25 STARDRIVE 02.231.255
Type: IMPLANTABLE DEVICE | Site: FEMUR | Status: NON-FUNCTIONAL
Removed: 2018-10-26

## (undated) DEVICE — SU PDS II 3-0 PS-1 18" Z683G

## (undated) DEVICE — SUCTION TIP YANKAUER W/O VENT K86

## (undated) DEVICE — GLOVE PROTEXIS W/NEU-THERA 8.0  2D73TE80

## (undated) DEVICE — BLADE KNIFE SURG 10 371110

## (undated) DEVICE — STRAP KNEE/BODY 31143004

## (undated) DEVICE — DRSG DRAIN 4X4" 7086

## (undated) DEVICE — DRSG TEGADERM 4X4 3/4" 1626W

## (undated) DEVICE — GLOVE PROTEXIS W/NEU-THERA 7.0  2D73TE70

## (undated) DEVICE — SU VICRYL 2-0 CT 36" J957H

## (undated) DEVICE — DRAPE STOCKINETTE 8" 8586

## (undated) DEVICE — DRSG AQUACEL AG 3.5X9.75" HYDROFIBER 412011

## (undated) DEVICE — DRSG STERI STRIP 1/2X4" R1547

## (undated) DEVICE — TOURNIQUET CUFF 42" REPRO MAROON 60-7070-107

## (undated) DEVICE — SUCTION MANIFOLD DORNOCH ULTRA CART UL-CL500

## (undated) DEVICE — BNDG ELASTIC 6" DBL LENGTH UNSTERILE 6611-16

## (undated) DEVICE — GLOVE PROTEXIS BLUE W/NEU-THERA 8.0  2D73EB80

## (undated) DEVICE — SOL NACL 0.9% IRRIG 1000ML BOTTLE 2F7124

## (undated) DEVICE — ESU GROUND PAD UNIVERSAL W/O CORD

## (undated) DEVICE — KIT CULTURE TRANSPORT SYS A.C.T. II DUAL ANEROBE R124022

## (undated) DEVICE — DRAPE STOCKINETTE IMPERVIOUS 12" 1587

## (undated) DEVICE — GUIDE WIRE 2.5X200MM 310.243

## (undated) DEVICE — DRAIN JACKSON PRATT ROUND W/TROCAR 15FR LF JP-HUR151

## (undated) DEVICE — PREP SKIN SCRUB TRAY 4461A

## (undated) DEVICE — DRILL BIT SYN 4.3X180MM  310.431

## (undated) DEVICE — PREP DURAPREP REMOVER 4OZ 8611

## (undated) DEVICE — PACK TOTAL HIP W/U DRAPE RIVERSIDE LATEX FREE

## (undated) DEVICE — SOL WATER IRRIG 1000ML BOTTLE 2F7114

## (undated) DEVICE — LINEN GOWN OVERSIZE 5408

## (undated) DEVICE — DRAPE IOBAN INCISE 23X17" 6650EZ

## (undated) DEVICE — PREP POVIDONE IODINE SCRUB 7.5% 120ML

## (undated) DEVICE — TUBE SUCTION STD KAMVAC 5300-00-500

## (undated) DEVICE — LINEN ORTHO PACK 5446

## (undated) RX ORDER — HYDROMORPHONE HYDROCHLORIDE 1 MG/ML
INJECTION, SOLUTION INTRAMUSCULAR; INTRAVENOUS; SUBCUTANEOUS
Status: DISPENSED
Start: 2018-01-01

## (undated) RX ORDER — DEXAMETHASONE SODIUM PHOSPHATE 4 MG/ML
INJECTION, SOLUTION INTRA-ARTICULAR; INTRALESIONAL; INTRAMUSCULAR; INTRAVENOUS; SOFT TISSUE
Status: DISPENSED
Start: 2018-01-01

## (undated) RX ORDER — PROPOFOL 10 MG/ML
INJECTION, EMULSION INTRAVENOUS
Status: DISPENSED
Start: 2018-01-01

## (undated) RX ORDER — FENTANYL CITRATE 50 UG/ML
INJECTION, SOLUTION INTRAMUSCULAR; INTRAVENOUS
Status: DISPENSED
Start: 2018-01-01

## (undated) RX ORDER — LIDOCAINE HYDROCHLORIDE 20 MG/ML
INJECTION, SOLUTION EPIDURAL; INFILTRATION; INTRACAUDAL; PERINEURAL
Status: DISPENSED
Start: 2018-01-01

## (undated) RX ORDER — KETAMINE HCL IN 0.9 % NACL 50 MG/5 ML
SYRINGE (ML) INTRAVENOUS
Status: DISPENSED
Start: 2018-01-01

## (undated) RX ORDER — ONDANSETRON 2 MG/ML
INJECTION INTRAMUSCULAR; INTRAVENOUS
Status: DISPENSED
Start: 2018-01-01

## (undated) RX ORDER — LIDOCAINE HYDROCHLORIDE 10 MG/ML
INJECTION, SOLUTION EPIDURAL; INFILTRATION; INTRACAUDAL; PERINEURAL
Status: DISPENSED
Start: 2018-01-01